# Patient Record
Sex: FEMALE | Race: WHITE | NOT HISPANIC OR LATINO | Employment: OTHER | URBAN - METROPOLITAN AREA
[De-identification: names, ages, dates, MRNs, and addresses within clinical notes are randomized per-mention and may not be internally consistent; named-entity substitution may affect disease eponyms.]

---

## 2018-01-09 PROBLEM — D50.9 IRON DEFICIENCY ANEMIA: Status: ACTIVE | Noted: 2018-01-09

## 2018-01-09 PROBLEM — N18.30 CKD (CHRONIC KIDNEY DISEASE) STAGE 3, GFR 30-59 ML/MIN: Status: ACTIVE | Noted: 2018-01-09

## 2018-01-09 PROBLEM — I10 ESSENTIAL HYPERTENSION: Status: ACTIVE | Noted: 2018-01-09

## 2018-03-16 PROBLEM — E87.20 ACIDOSIS: Status: ACTIVE | Noted: 2018-03-16

## 2018-03-16 PROBLEM — C50.919 BREAST CANCER: Status: ACTIVE | Noted: 2018-03-16

## 2018-03-16 PROBLEM — R80.9 MICROALBUMINURIA: Status: ACTIVE | Noted: 2018-03-16

## 2018-03-16 PROBLEM — R91.8 PULMONARY NODULES: Status: ACTIVE | Noted: 2018-03-16

## 2018-03-16 PROBLEM — E03.9 HYPOTHYROID: Status: ACTIVE | Noted: 2018-03-16

## 2018-03-16 PROBLEM — M85.80 OSTEOPENIA: Status: ACTIVE | Noted: 2018-03-16

## 2018-05-26 PROBLEM — N28.89 RIGHT RENAL MASS: Status: ACTIVE | Noted: 2018-05-26

## 2020-01-29 RX ORDER — LEVOTHYROXINE SODIUM 75 UG/1
75 CAPSULE ORAL DAILY
Qty: 90 CAPSULE | Refills: 0 | Status: SHIPPED | OUTPATIENT
Start: 2020-01-29 | End: 2020-01-30

## 2020-01-29 NOTE — TELEPHONE ENCOUNTER
----- Message from Chiqui Teran sent at 1/29/2020 10:43 AM CST -----  Contact: [place] [phone]  Type:  RX Refill Request    Who Called: Pt Elizabeth    Refill or New Rx: refill    RX Name and Strength: levothyroxine (TIROSINT) 75 mcg Cap    How is the patient currently taking it? (ex. 1XDay): Take 75 mcg by mouth once daily. - Oral    Is this a 30 day or 90 day RX: 90 day supply    Preferred Pharmacy with phone number: [facility]/pharmacy #[id number] - [place], [place] - [address]--[phone]      Local or Mail Order: Local    Ordering Provider: Dr. Masters    Would the patient rather a call back or a response via [facility]? Callback    Best Call Back Number: [phone]    Additional Information:     The pt is requesting a call back when the Rx has been called in

## 2020-01-30 RX ORDER — LEVOTHYROXINE SODIUM 75 UG/1
75 TABLET ORAL
Qty: 90 TABLET | Refills: 0 | Status: SHIPPED | OUTPATIENT
Start: 2020-01-30 | End: 2020-04-20

## 2020-01-30 RX ORDER — LEVOTHYROXINE SODIUM 75 UG/1
75 TABLET ORAL
COMMUNITY
End: 2020-01-30 | Stop reason: SDUPTHER

## 2020-01-30 NOTE — TELEPHONE ENCOUNTER
----- Message from Alex Jones sent at 1/30/2020  9:23 AM CST -----  Contact: [facility]   Pharmacy is calling to clarify an RX.  RX name:  levothyroxine (TIROSINT) 75 mcg Cap  What do they need to clarify:  Change to Tablet patient was taking tablet form Cap is more expensive can it be changed back to tablets please advise  Comments:

## 2020-02-05 NOTE — PROGRESS NOTES
[facility] Note    Chief Complaint      Chief Complaint   Patient presents with    Annual Exam       History of Present Illness      Elizabeth Dickson is a 79 y.o. WF with Breast cancer, Pulmonary nodules, Adjustment disorder, CKD 3,  BLE, HTN, Hypothyroidism, Osteopenia presents to re-establish care with him.  Pt was previously seen at [facility].  Last visit-2/14/2019.    Ovarian cyst-4.3 x 4.3 cm-Fu  by Dr. Chandler. Benign - was d/c from clinic.    Breast CA-invasive ductal carcinoma, right breast s/p lumpectomy.  Followed up by Dr. Solo.  Pt on Arimidex.  Patient followed up by H/O-Dr. Lopes.    Iron deficiency anemia-Pt on iron via prenatal vitamins q.day.    Pulmonary nodules-Fu  by Pulmonary, Dr. Sanchez, annually.    Osteopenia-patient on Prolia and vitamin D3 1000 units/day.    Vitamin-D deficiency-28-patient completed ergocalciferol times 12 weeks and is on vitamin D3 1000 units/day.    CKD III - Fu  by Dr. Goodman. Vit D level 48, at CKD goal. PTH 77, at CKD goal. Has  fu 3/25/2020    Iron Def anemia - Improved.  Pt on iron suppl 65 mg once daily.     Adjustment disorder with mixed anxiety and depression-Pt on melatonin OTC.    Hypothyroidism- Pt on levothyroxine 75 mcg/day.  TSH -     HTN - Pt on Lisinopril 10 mg/day and Toprol-XL 25 mg/day.  Continue low-sodium diet. +Microalbuminuria.     HLD - Pt on Lipitor 10 mg QHS.     Annular erythema - Pt on dapsone 50 mg/d planning to inc to 100 mg/d.  Fu by Dr. Mallory. Not helping per pt.     Low back pain - affects her ability to walk.  Pt on Gabapentin 200 mg QHS per Dr. Mallory.     BLE - Pt on compression stockings    Carotid bruits-no significant stenosis seen on previous carotid ultrasound.    Renal mass -8 mm right kidney.  Followed up by  Dr. Sanchez. Sched for U/S in June.     Pancreatic cyst- Fu by GI, Dr. King.  Patient had EUS-02/21/2019?    Osteopenia - Pt on Vit D3 2000 units/d and Calcium 1200 mg/d.  Pt on Prolia Q 6  mos.    HCM - Flu - 9/12/19;  Tdap - '06?;  PCV 13 - 6/2/17;  PVX 23 - 11/10;  Shingrix - ;  MGM - 11/19;  DEXA - 8/16/19-Osteopenia;  Hep C Screen - ;  C-scope - 1/10/19 - polyp, int hemorrhoids, diverticulosis, repeat 5 yrs;  Gyn/Onc - Dr. Chandler;  Renal - Dr. Goodman;  GI - Dr. King/Darron;  H/O - Dr. Lopes;  Rheum - Dr. Mallory;  Gen Sx - Dr. Solo; Pulm - Dr. Ayala;  ID - Dr. Rollins;  Podiatry - Dr. Ruiz; Ortho - [facility]; Derm - Dr. Angeles    Past Medical History:  Past Medical History:   Diagnosis Date    Anxiety     Bilateral leg edema     Breast cancer     Cellulitis of left leg     CKD (chronic kidney disease), stage III     Hypertension     Hypothyroidism     Iron deficiency anemia     Osteopenia     Ovarian cyst     Pancreatic cyst     Pulmonary nodules     Renal mass     Vitamin D deficiency        Past Surgical History:   has a past surgical history that includes Breast surgery; Removal of Ovaries; and debridement.    Family History:  family history includes COPD in her brother; Cholelithiasis in her brother; Diabetes in her father; Diabetes type I in her other; Heart disease in her brother and brother; Hypertension in her father and mother; Lung cancer in her sister.     Social History:  Social History     Tobacco Use    Smoking status: Never Smoker   Substance Use Topics    Alcohol use: No    Drug use: Never       Review of Systems   Constitutional: Negative for chills, diaphoresis and fever.   HENT: Negative for congestion, hearing loss and tinnitus.    Eyes: Negative for blurred vision and double vision.   Respiratory: Negative for cough and shortness of breath.    Cardiovascular: Negative for chest pain and palpitations.   Gastrointestinal: Negative for abdominal pain, blood in stool, constipation, diarrhea, heartburn, melena, nausea and vomiting.   Genitourinary: Negative for dysuria and frequency.   Musculoskeletal: Positive for neck pain. Negative for  joint pain and myalgias.   Skin: Positive for rash. Negative for itching.        Left posterior thigh - pt tx with Dapsone at present.  Not helping.  Rheum inc dose.  Pt plans to see Derm.   Neurological: Negative for dizziness and headaches.   Endo/Heme/Allergies: Does not bruise/bleed easily.   Psychiatric/Behavioral: Negative for depression. The patient is not nervous/anxious.         Medications:  Outpatient Encounter Medications as of 2/6/2020   Medication Sig Note Dispense Refill    anastrozole (ARIMIDEX) 1 mg Tab Take 1 mg by mouth once daily.       atorvastatin (LIPITOR) 10 MG tablet Take 1 tablet (10 mg total) by mouth once daily.  90 tablet 0    calcium carbonate 1250 MG capsule Take by mouth 2 (two) times daily with meals.       cholecalciferol, vitamin D3, 2,000 unit Tab Take 2,000 Units by mouth once daily.       dapsone 25 MG Tab Take 50 mg by mouth once daily.       gabapentin (NEURONTIN) 100 MG capsule 2 po QHS  180 capsule 0    levothyroxine (SYNTHROID) 75 MCG tablet Take 1 tablet (75 mcg total) by mouth before breakfast.  90 tablet 0    lisinopril 10 MG tablet Take 1 tablet (10 mg total) by mouth once daily.  90 tablet 1    melatonin 10 mg Tab Take 10 mg by mouth.       metoprolol succinate (TOPROL-XL) 25 MG 24 hr tablet Take 1 tablet (25 mg total) by mouth once daily.  90 tablet 1    [DISCONTINUED] atorvastatin (LIPITOR) 10 MG tablet 10 mg once daily.       [DISCONTINUED] gabapentin (NEURONTIN) 100 MG capsule 100 mg every evening.       [DISCONTINUED] lisinopril 10 MG tablet Take 10 mg by mouth once daily.       [DISCONTINUED] metoprolol succinate (TOPROL-XL) 25 MG 24 hr tablet Take 25 mg by mouth once daily.        [DISCONTINUED] multivit-min-iron-vitamin K (ADULTS' DAILY FORMULA) 18 mg iron-25 mcg Tab Take 25 Units by mouth.       [DISCONTINUED] omeprazole (PRILOSEC) 20 MG capsule 20 mg once daily. 2/6/2020: pt denies taking      denosumab (PROLIA) 60 mg/mL Syrg Inject 1 mL (60  mg total) into the skin every 6 (six) months.       [DISCONTINUED] amoxicillin (AMOXIL) 500 MG capsule 2 tabs in AM & 2 tabs in pm       [DISCONTINUED] clarithromycin (BIAXIN) 500 MG tablet 1 tan in am and 1 tab in pm       [DISCONTINUED] ketoconazole (NIZORAL) 2 % cream Apply topically once daily. for 14 days (Patient not taking: Reported on 2/6/2020)  1 Tube 0    [DISCONTINUED] metoprolol tartrate (LOPRESSOR) 25 MG tablet Take 25 mg by mouth once daily. 1/2 tablet by mouth twice daily        No facility-administered encounter medications on file as of 2/6/2020.        Current Outpatient Medications:     anastrozole (ARIMIDEX) 1 mg Tab, Take 1 mg by mouth once daily., Disp: , Rfl:     atorvastatin (LIPITOR) 10 MG tablet, Take 1 tablet (10 mg total) by mouth once daily., Disp: 90 tablet, Rfl: 0    calcium carbonate 1250 MG capsule, Take by mouth 2 (two) times daily with meals., Disp: , Rfl:     cholecalciferol, vitamin D3, 2,000 unit Tab, Take 2,000 Units by mouth once daily., Disp: , Rfl:     dapsone 25 MG Tab, Take 50 mg by mouth once daily., Disp: , Rfl:     gabapentin (NEURONTIN) 100 MG capsule, 2 po QHS, Disp: 180 capsule, Rfl: 0    levothyroxine (SYNTHROID) 75 MCG tablet, Take 1 tablet (75 mcg total) by mouth before breakfast., Disp: 90 tablet, Rfl: 0    lisinopril 10 MG tablet, Take 1 tablet (10 mg total) by mouth once daily., Disp: 90 tablet, Rfl: 1    melatonin 10 mg Tab, Take 10 mg by mouth., Disp: , Rfl:     metoprolol succinate (TOPROL-XL) 25 MG 24 hr tablet, Take 1 tablet (25 mg total) by mouth once daily., Disp: 90 tablet, Rfl: 1    denosumab (PROLIA) 60 mg/mL Syrg, Inject 1 mL (60 mg total) into the skin every 6 (six) months., Disp: , Rfl:     Allergies:  Review of patient's allergies indicates:  No Known Allergies    Health Maintenance:  Immunization History   Administered Date(s) Administered    Pneumococcal Conjugate - 13 Valent 06/02/2017    Pneumococcal Polysaccharide - 23 Valent  "11/01/2010      Health Maintenance   Topic Date Due    Lipid Panel  1940    TETANUS VACCINE  05/29/1959    DEXA SCAN  08/16/2022    Pneumococcal Vaccine (65+ High/Highest Risk)  Completed      Objective:      Vital Signs  Temp: 98 °F (36.7 °C)  Temp src: Oral  Pulse: 70  Resp: 18  SpO2: 95 %  BP: 132/70  BP Location: Left arm  Patient Position: Sitting  Pain Score: 0-No pain  Height and Weight  Height: 5' 2" (157.5 cm)  Weight: 67.9 kg (149 lb 11.1 oz)  BSA (Calculated - sq m): 1.72 sq meters  BMI (Calculated): 27.4  Weight in (lb) to have BMI = 25: 136.4]    Laboratory:  CBC:  Recent Labs   Lab 03/11/19  0000 08/13/19  1115 11/13/19  1014   WBC 5.0 5.4 4.6   RBC 4.03 4.31 3.97 L   Hemoglobin 11.3 L 12.6 11.7 L   Hematocrit 35.9 37.2 34.7 L   Platelets 183 174 171   Mean Corpuscular Volume 89.1 86.2 87.3   Mean Corpuscular Hemoglobin 28.0 29.1 29.5   Mean Corpuscular Hemoglobin Conc 31.5 L 33.8 33.8       CMP:  Recent Labs   Lab 03/11/19  0000  08/13/19  1115  11/13/19  1014   Glucose 91  --  87   < > 98   Calcium 8.4 L   < > 9.7   < > 9.1   Albumin 3.8  --   --   --   --    ALBUMIN  --   --  4.3  --  4.0   Total Protein 5.9 L  --  6.4  --  6.0 L   Sodium 137  --  141   < > 135   Potassium 4.9  --  5.5 H   < > 4.7   CO2 25  --  23   < > 24   Chloride 106  --  107   < > 100   BUN, Bld 22  --   --   --   --    BUN  --   --  44 H   < > 29 H   Creatinine 1.02 H  --  1.5 H   < > 1.2 H   eGFR if  61  --   --   --   --    eGFR if non  53 L  --   --   --   --    Alkaline Phosphatase 56  --  57  --  57   ALT 16  --  16  --  15   AST 19  --  23  --  22   Total Bilirubin 0.3  --  0.3  --  0.3    < > = values in this interval not displayed.       URINALYSIS:  Recent Labs   Lab 05/07/18  1331  03/11/19  0000  11/13/19  1014   Color, UA Yellow   < > YELLOW   < > YELLOW   Specific Gravity, UA 1.005   < > 1.004   < > 1.005   pH, UA 6.0   < > 5.5   < > < OR = 5.0   Protein, UA Negative   < > " NEGATIVE   < > NEGATIVE   Glucose, UA  --    < > NEGATIVE   < > NEGATIVE   Bilirubin (UA) Negative  --   --   --   --    Bacteria, UA None Seen   < > NONE SEEN  --   --     < > = values in this interval not displayed.      Recent Labs   Lab 05/07/18  1331 09/12/18  1036 03/11/19  0000  11/13/19  1014   Nitrite, UA Negative NEGATIVE NEGATIVE   < > NEGATIVE   Leukocytes, UA Negative 1+ A TRACE A   < > NEGATIVE   Hyaline Casts, UA None Seen NONE SEEN NONE SEEN  --   --     < > = values in this interval not displayed.      Other:   Lab Results   Component Value Date    FERRITIN 88.0 01/03/2018    IRON 64 01/03/2018    TIBC 290 01/03/2018     Lab Results   Component Value Date    HEPCAB Negative 02/07/2018       Physical Exam   Constitutional: She is oriented to person, place, and time. She appears well-developed and well-nourished. No distress.   HENT:   Head: Normocephalic and atraumatic.   Right Ear: External ear normal.   Left Ear: External ear normal.   Mouth/Throat: Oropharynx is clear and moist.   tariq hearing aids   Eyes: Pupils are equal, round, and reactive to light. Conjunctivae and EOM are normal.   Neck: Normal range of motion. Neck supple. No thyromegaly present.   Cardiovascular: Normal rate, regular rhythm, normal heart sounds and intact distal pulses.   No murmur heard.  Pulmonary/Chest: Effort normal and breath sounds normal. No respiratory distress.   Abdominal: Soft. Bowel sounds are normal. She exhibits no distension.   Musculoskeletal:   Tariq knee - Valgus deformity;  Atrophy to RT hand   Neurological: She is alert and oriented to person, place, and time.   Skin: Skin is warm and dry. She is not diaphoretic.   Erythematous rash with slight scale to Left forehead above eye and to Left post thigh and Left forearm   Psychiatric: She has a normal mood and affect.   Nursing note and vitals reviewed.          Assessment:       1. Osteopenia, unspecified location    2. Chronic right-sided low back pain  without sciatica    3. Hypertension, unspecified type    4. CKD (chronic kidney disease), stage III    5. Hypothyroidism, unspecified type    6. Renal mass    7. Pancreatic cyst    8. Hyperlipidemia, unspecified hyperlipidemia type        Elizabeth Dickson is a 79 y.o.female with:    1. Osteopenia, unspecified location  - denosumab (PROLIA) 60 mg/mL Syrg; Inject 1 mL (60 mg total) into the skin every 6 (six) months.  -Pt on Prolia, Calcium, and Vit d.     2. Chronic right-sided low back pain without sciatica  - gabapentin (NEURONTIN) 100 MG capsule; 2 po QHS  Dispense: 180 capsule; Refill: 0  -Cont current regimen. Pt sees a chiropractor.  She had been rx an unk pain med per Ortho which she did not tolerate.     3. Hypertension, unspecified type  - lisinopril 10 MG tablet; Take 1 tablet (10 mg total) by mouth once daily.  Dispense: 90 tablet; Refill: 1  - metoprolol succinate (TOPROL-XL) 25 MG 24 hr tablet; Take 1 tablet (25 mg total) by mouth once daily.  Dispense: 90 tablet; Refill: 1  -Controlled.  Cont current regimen.     4. CKD (chronic kidney disease), stage III  -Stable.  Fu by Dr. Goodman.      5. Hypothyroidism, unspecified type  - TSH; Future  - T4, free; Future  -Cont current repeat TFT's.     6. Renal mass  -Fu by Dr. Daniel HERRERA.     7. Pancreatic cyst  -Fu by GI.     8. Hyperlipidemia, unspecified hyperlipidemia type  - atorvastatin (LIPITOR) 10 MG tablet; Take 1 tablet (10 mg total) by mouth once daily.  Dispense: 90 tablet; Refill: 0  -Cont current regimen. Will see whjimmie last FLP was done.        Chronic conditions status updated as per HPI.  Other than changes above, cont current medications and maintain follow up with specialists.  Return to clinic in 4 months.    Porsha Masters MD  [facility]

## 2020-02-06 ENCOUNTER — OFFICE VISIT (OUTPATIENT)
Dept: PRIMARY CARE CLINIC | Facility: CLINIC | Age: 80
End: 2020-02-06
Payer: MEDICARE

## 2020-02-06 ENCOUNTER — LAB VISIT (OUTPATIENT)
Dept: LAB | Facility: HOSPITAL | Age: 80
End: 2020-02-06
Attending: INTERNAL MEDICINE
Payer: MEDICARE

## 2020-02-06 VITALS
HEART RATE: 70 BPM | RESPIRATION RATE: 18 BRPM | WEIGHT: 149.69 LBS | BODY MASS INDEX: 27.55 KG/M2 | HEIGHT: 62 IN | OXYGEN SATURATION: 95 % | TEMPERATURE: 98 F | SYSTOLIC BLOOD PRESSURE: 132 MMHG | DIASTOLIC BLOOD PRESSURE: 70 MMHG

## 2020-02-06 DIAGNOSIS — N28.89 RENAL MASS: ICD-10-CM

## 2020-02-06 DIAGNOSIS — I10 HYPERTENSION, UNSPECIFIED TYPE: ICD-10-CM

## 2020-02-06 DIAGNOSIS — N18.30 CKD (CHRONIC KIDNEY DISEASE), STAGE III: ICD-10-CM

## 2020-02-06 DIAGNOSIS — M85.80 OSTEOPENIA, UNSPECIFIED LOCATION: Primary | ICD-10-CM

## 2020-02-06 DIAGNOSIS — E78.5 HYPERLIPIDEMIA, UNSPECIFIED HYPERLIPIDEMIA TYPE: ICD-10-CM

## 2020-02-06 DIAGNOSIS — G89.29 CHRONIC RIGHT-SIDED LOW BACK PAIN WITHOUT SCIATICA: ICD-10-CM

## 2020-02-06 DIAGNOSIS — E03.9 HYPOTHYROIDISM, UNSPECIFIED TYPE: ICD-10-CM

## 2020-02-06 DIAGNOSIS — M54.50 CHRONIC RIGHT-SIDED LOW BACK PAIN WITHOUT SCIATICA: ICD-10-CM

## 2020-02-06 DIAGNOSIS — K86.2 PANCREATIC CYST: ICD-10-CM

## 2020-02-06 LAB
T4 FREE SERPL-MCNC: 1.2 NG/DL (ref 0.71–1.51)
TSH SERPL DL<=0.005 MIU/L-ACNC: 0.89 UIU/ML (ref 0.4–4)

## 2020-02-06 PROCEDURE — 99999 PR PBB SHADOW E&M-EST. PATIENT-LVL III: ICD-10-PCS | Mod: PBBFAC,,, | Performed by: INTERNAL MEDICINE

## 2020-02-06 PROCEDURE — 3078F DIAST BP <80 MM HG: CPT | Mod: CPTII,S$GLB,, | Performed by: INTERNAL MEDICINE

## 2020-02-06 PROCEDURE — 1159F PR MEDICATION LIST DOCUMENTED IN MEDICAL RECORD: ICD-10-PCS | Mod: S$GLB,,, | Performed by: INTERNAL MEDICINE

## 2020-02-06 PROCEDURE — 99999 PR PBB SHADOW E&M-EST. PATIENT-LVL III: CPT | Mod: PBBFAC,,, | Performed by: INTERNAL MEDICINE

## 2020-02-06 PROCEDURE — 84443 ASSAY THYROID STIM HORMONE: CPT

## 2020-02-06 PROCEDURE — 99214 OFFICE O/P EST MOD 30 MIN: CPT | Mod: S$GLB,,, | Performed by: INTERNAL MEDICINE

## 2020-02-06 PROCEDURE — 3078F PR MOST RECENT DIASTOLIC BLOOD PRESSURE < 80 MM HG: ICD-10-PCS | Mod: CPTII,S$GLB,, | Performed by: INTERNAL MEDICINE

## 2020-02-06 PROCEDURE — 36415 COLL VENOUS BLD VENIPUNCTURE: CPT | Mod: PN

## 2020-02-06 PROCEDURE — 1159F MED LIST DOCD IN RCRD: CPT | Mod: S$GLB,,, | Performed by: INTERNAL MEDICINE

## 2020-02-06 PROCEDURE — 1126F AMNT PAIN NOTED NONE PRSNT: CPT | Mod: S$GLB,,, | Performed by: INTERNAL MEDICINE

## 2020-02-06 PROCEDURE — 99214 PR OFFICE/OUTPT VISIT, EST, LEVL IV, 30-39 MIN: ICD-10-PCS | Mod: S$GLB,,, | Performed by: INTERNAL MEDICINE

## 2020-02-06 PROCEDURE — 1101F PT FALLS ASSESS-DOCD LE1/YR: CPT | Mod: CPTII,S$GLB,, | Performed by: INTERNAL MEDICINE

## 2020-02-06 PROCEDURE — 1101F PR PT FALLS ASSESS DOC 0-1 FALLS W/OUT INJ PAST YR: ICD-10-PCS | Mod: CPTII,S$GLB,, | Performed by: INTERNAL MEDICINE

## 2020-02-06 PROCEDURE — 3075F PR MOST RECENT SYSTOLIC BLOOD PRESS GE 130-139MM HG: ICD-10-PCS | Mod: CPTII,S$GLB,, | Performed by: INTERNAL MEDICINE

## 2020-02-06 PROCEDURE — 84439 ASSAY OF FREE THYROXINE: CPT

## 2020-02-06 PROCEDURE — 1126F PR PAIN SEVERITY QUANTIFIED, NO PAIN PRESENT: ICD-10-PCS | Mod: S$GLB,,, | Performed by: INTERNAL MEDICINE

## 2020-02-06 PROCEDURE — 3075F SYST BP GE 130 - 139MM HG: CPT | Mod: CPTII,S$GLB,, | Performed by: INTERNAL MEDICINE

## 2020-02-06 RX ORDER — ATORVASTATIN CALCIUM 10 MG/1
10 TABLET, FILM COATED ORAL DAILY
Qty: 90 TABLET | Refills: 0 | Status: SHIPPED | OUTPATIENT
Start: 2020-02-06 | End: 2020-05-31

## 2020-02-06 RX ORDER — METOPROLOL SUCCINATE 25 MG/1
25 TABLET, EXTENDED RELEASE ORAL DAILY
Qty: 90 TABLET | Refills: 1 | Status: SHIPPED | OUTPATIENT
Start: 2020-02-06 | End: 2020-08-19

## 2020-02-06 RX ORDER — DAPSONE 25 MG/1
50 TABLET ORAL DAILY
COMMUNITY
Start: 2019-11-25 | End: 2020-04-30

## 2020-02-06 RX ORDER — METOPROLOL SUCCINATE 25 MG/1
25 TABLET, EXTENDED RELEASE ORAL DAILY
COMMUNITY
Start: 2020-01-31 | End: 2020-02-06 | Stop reason: SDUPTHER

## 2020-02-06 RX ORDER — LISINOPRIL 10 MG/1
10 TABLET ORAL DAILY
Qty: 90 TABLET | Refills: 1 | Status: SHIPPED | OUTPATIENT
Start: 2020-02-06 | End: 2020-04-30

## 2020-02-06 RX ORDER — GABAPENTIN 100 MG/1
CAPSULE ORAL
Qty: 180 CAPSULE | Refills: 0
Start: 2020-02-06 | End: 2020-04-30 | Stop reason: SDUPTHER

## 2020-02-28 ENCOUNTER — TELEPHONE (OUTPATIENT)
Dept: PRIMARY CARE CLINIC | Facility: CLINIC | Age: 80
End: 2020-02-28

## 2020-02-28 NOTE — TELEPHONE ENCOUNTER
----- Message from Porsha Masters MD sent at 2/28/2020  4:05 PM CST -----  Please inform pt labs her TFT's were wnl.    Dr. JACOBS

## 2020-03-18 ENCOUNTER — TELEPHONE (OUTPATIENT)
Dept: PRIMARY CARE CLINIC | Facility: CLINIC | Age: 80
End: 2020-03-18

## 2020-03-18 DIAGNOSIS — I10 HYPERTENSION, UNSPECIFIED TYPE: Primary | ICD-10-CM

## 2020-03-18 DIAGNOSIS — N18.30 CKD (CHRONIC KIDNEY DISEASE), STAGE III: ICD-10-CM

## 2020-03-18 NOTE — TELEPHONE ENCOUNTER
----- Message from Elida Wellington sent at 3/18/2020 10:56 AM CDT -----  Contact: Self   Pt is requesting a call from nurse regarding message. Please call and advise.

## 2020-03-18 NOTE — TELEPHONE ENCOUNTER
Pt scheduled a same day appt with  this evening. B/c her morning bp was 188/87 and 182/82 and some dizziness. Pt checks her bp everyday and reports all week she has been around 124-126/52-56. She reports having vertigo in the past but is concerned about her bp. She has no other symptoms. She had a skin bx yesterday by derm and is not in any kind of pain.

## 2020-03-18 NOTE — TELEPHONE ENCOUNTER
Pt bp after taking second Lisinopril was 150/63 pulse 56. She will start taking 2 Lisinopril 10mg daily and keep a log. She is aware to hold second dose if bp<110/60. She will call me Friday evening with more bp readings. Pt would like her bmp done at [facility]. I will make her orders to her.

## 2020-03-18 NOTE — TELEPHONE ENCOUNTER
Will have her inc Lisinopril to 10 mg 2 tabs daily (she can take her 2nd dose now).  She should hold 2nd dose if BP <110/60. She should call with her BP log and let me know if Sx's worsen. Will need a BMP in 2 wks.  Call back in 45 min to reassess BP.    Dr. JACOBS

## 2020-03-20 ENCOUNTER — TELEPHONE (OUTPATIENT)
Dept: PRIMARY CARE CLINIC | Facility: CLINIC | Age: 80
End: 2020-03-20

## 2020-03-20 DIAGNOSIS — I10 HYPERTENSION, UNSPECIFIED TYPE: Primary | ICD-10-CM

## 2020-03-20 RX ORDER — AMLODIPINE BESYLATE 2.5 MG/1
2.5 TABLET ORAL DAILY
Qty: 30 TABLET | Refills: 0 | Status: SHIPPED | OUTPATIENT
Start: 2020-03-20 | End: 2020-04-17

## 2020-03-20 NOTE — TELEPHONE ENCOUNTER
----- Message from Paxton Dumont sent at 3/20/2020  1:02 PM CDT -----  Contact: Self [phone]  Patient will like to inform the Tawnya that her B/P is calming down standing up reading is 129/62-64, please advise.

## 2020-03-20 NOTE — TELEPHONE ENCOUNTER
----- Message from Chrissy Rendon sent at 3/20/2020 12:00 PM CDT -----  Contact: self [phone]  Blood pressure readings:  Today's 162/64 before medicine after medicine 178/77, she thinks it could be the machine, and or stress related, please call her back    thanks

## 2020-03-20 NOTE — TELEPHONE ENCOUNTER
Will add amlodipine 2.5 mg/d. Please inform pt. She should alert me for any issues and that a potential S.E . of this med could be swelling.     Dr. JACOBS

## 2020-03-20 NOTE — TELEPHONE ENCOUNTER
Bp readings:    Weds- 1pm 140/57 P 64,133/55 P 61 5pm, 127/57 P 70 8:15p, 132/56 P 55 10pm    Thurs: 146/57 P 60 7:25a, 140/66 P60 10am, 127/54 P 60 12p, 124/53 P 62 10pm    Today: 162/64 P59 7:30a, 178/77 P61 9:45a, standing up 157/73 P 61 12:15    Pt on lisinopril 10mg 2Qd and Metoprolol succinate 25mg QD. Please advise

## 2020-04-02 LAB
ANION GAP SERPL CALC-SCNC: 16 MEQ/L (ref 9–18)
BUN BLD-MCNC: 41 MG/DL (ref 7–21)
BUN/CREAT SERPL: 29 RATIO (ref 6–22)
CALC OSMOLALITY: 279 MOSM/KG (ref 275–295)
CALCIUM SERPL-MCNC: 9.3 MG/DL (ref 8.5–10.3)
CHLORIDE SERPL-SCNC: 101 MEQ/L (ref 98–107)
CO2 SERPL-SCNC: 23 MEQ/L (ref 21–31)
CREAT SERPL-MCNC: 1.4 MG/DL (ref 0.5–1)
GFR: 34.9 ML/MIN/1.73M2
GLUCOSE SERPL-MCNC: 85 MG/DL (ref 70–100)
POTASSIUM SERPL-SCNC: 5.2 MEQ/L (ref 3.5–5)
SODIUM BLD-SCNC: 135 MEQ/L (ref 135–145)

## 2020-04-03 NOTE — TELEPHONE ENCOUNTER
I reviewed labs with pt - Renal functions -35% - stable.  Potassium 5.2 - cont  Low potassium diet.  I rec she inc her hydration.  She is fu by Renal.     Dr. JACOBS

## 2020-04-17 DIAGNOSIS — I10 HYPERTENSION, UNSPECIFIED TYPE: ICD-10-CM

## 2020-04-17 RX ORDER — AMLODIPINE BESYLATE 2.5 MG/1
TABLET ORAL
Qty: 90 TABLET | Refills: 0 | Status: SHIPPED | OUTPATIENT
Start: 2020-04-17 | End: 2020-07-12

## 2020-04-20 RX ORDER — LEVOTHYROXINE SODIUM 75 UG/1
TABLET ORAL
Qty: 90 TABLET | Refills: 0 | Status: SHIPPED | OUTPATIENT
Start: 2020-04-20 | End: 2020-07-13

## 2020-04-29 ENCOUNTER — TELEPHONE (OUTPATIENT)
Dept: PRIMARY CARE CLINIC | Facility: CLINIC | Age: 80
End: 2020-04-29

## 2020-04-29 NOTE — PROGRESS NOTES
"Established Patient - Audio Only Telehealth Visit     The patient location is: "home"  The chief complaint leading to consultation is: "Fu ER fu"  Visit type: Virtual visit with audio only (telephone)     The reason for the audio only service rather than synchronous audio and video virtual visit was related to technical difficulties or patient preference/necessity.     Each patient to whom I provide medical services by telemedicine is:  (1) informed of the relationship between the physician and patient and the respective role of any other health care provider with respect to management of the patient; and (2) notified that they may decline to receive medical services by telemedicine and may withdraw from such care at any time. Patient verbally consented to receive this service via voice-only telephone call.       HPI: 80 yo WF with Breast cancer, Pulmonary nodules, Adjustment disorder, CKD III,  BLE, HTN, Hypothyroidism, Osteopenia presents to fu hospitalization for syncopal episode ?Seizure, Hyperkalemia, Dehydration, with UTI.  Last visit-2/6/20.    Syncope - Pt reports she had a CT Head and Cardiac davis that was neg. I await records from hospital d/c.    UTI - Pt is on Cefdinir BID.     Hyperkalemia - Lisinopril was d/c in hospital.      Ovarian cyst-4.3 x 4.3 cm-Fu  by Dr. Chandler. Benign - was d/c from clinic.     Breast CA-invasive ductal carcinoma, right breast s/p lumpectomy.  Followed up by Dr. Solo.  Pt on Arimidex.  Pt followed up by H/O-Dr. Grande     Iron deficiency anemia-Pt on iron via prenatal vitamins q day.     Pulmonary nodules-Fu by Pulmonary, Dr. Johnson, annually.     Osteopenia-patient on Prolia and vitamin D3 1000 units/day.     Vitamin-D [id number] completed ergocalciferol times 12 weeks and is on vitamin D3 1000 units/day.     CKD III - Fu  by Dr. Goodman. Vit D level 48, at CKD goal. PTH 77, at CKD goal. Has  fu 3/25/2020     Iron Def anemia - Improved.  Pt on iron suppl 65 mg " once daily.      Adjustment disorder with mixed anxiety and depression-Pt on melatonin OTC.     Hypothyroidism- Pt on levothyroxine 75 mcg/day.  Controlled.       HTN - Pt off Lisinopril 10 mg 2 tabs/day.  Pt is on amlodipine 2.5 mg/d, and Toprol-XL 25 mg QHS.  Continue low-sodium diet. +Microalbuminuria. Her BP is 125/64 today.  She denies any dizziness.       HLD - Pt on Lipitor 10 mg QHS.      Dermatitis - Pt had a repeat Bx. Pt off dapsone.  Pt on a topical Triamcinolone Acetonide cream 0.1% BID.  Fu by Derm, Dr. Sloan.      Low back pain - affects her ability to walk.  Pt on Gabapentin 100 mg QHS per Dr. Mallory.      BLE - Pt on compression stockings     Carotid bruits-no significant stenosis seen on previous carotid ultrasound.     Renal mass -8 mm right kidney.  Followed up by  Dr. Sanchez. Sched for imaging in June.      Pancreatic cyst- Fu by GI, Dr. King.  Pt had EUS-02/21/2019?      Osteopenia - Pt on Vit D3 2000 units/d and Calcium 1200 mg/d.  Pt on Prolia Q 6 mos.     HCM - Flu - 9/12/19;  Tdap - '06?;  PCV 13 - 6/2/17;  PVX 23 - 11/10;  Shingrix - ;  MGM - 11/19;  DEXA - 8/16/19-Osteopenia;  Hep C Screen - ;  C-scope - 1/10/19 - polyp, int hemorrhoids, diverticulosis, repeat 5 yrs;  Gyn/Onc - Dr. Chandler;  Renal - Dr. Goodman;  GI - Dr. King/Darron;  H/O - Dr. Lopes;  Rheum - Dr. Mallory;  Gen Sx - Dr. Solo; Pulm - Dr. Ayala;  ID - Dr. Rollins;  Podiatry - Dr. Ruiz; Ortho - [facility]; Derm - Dr. Angeles; Derm - Dr. Sloan       Assessment and plan:      1. Hypertension, unspecified type - Controlled. Cont current.  Added Lisinopril to allergy list.     2. Acute cystitis without hematuria - Pt on Cefdinir.       3. Hyperkalemia - Stopped Lisinopril.     4. CKD (chronic kidney disease), stage III - Will repeat BMP.  Avoid NSAIDS.     5. Renal mass - Pt sched for imaging in June/July.     6. Hyperlipidemia, unspecified hyperlipidemia type - Cont Atorvastatin.     7. Dermatitis -  Pt dx with Dermatitis per recent Bx.      8. Chronic right-sided low back pain without sciatica - Cont Gabapentin 100 mg QHS.         Porsha Masters M.D.  Section of Internal Medicine/Primary Care                          This service was not originating from a related E/M service provided within the previous 7 days nor will  to an E/M service or procedure within the next 24 hours or my soonest available appointment.  Prevailing standard of care was able to be met in this audio-only visit.

## 2020-04-29 NOTE — TELEPHONE ENCOUNTER
----- Message from Paxton Dumont sent at 4/29/2020 12:20 PM CDT -----  Contact: Self [phone]  Patient is returning a phone call.  Who left a message for the patient: Dr. Masters   Does patient know what this is regarding:  Medications review  Comments:

## 2020-04-30 ENCOUNTER — OFFICE VISIT (OUTPATIENT)
Dept: PRIMARY CARE CLINIC | Facility: CLINIC | Age: 80
End: 2020-04-30
Payer: MEDICARE

## 2020-04-30 DIAGNOSIS — E78.5 HYPERLIPIDEMIA, UNSPECIFIED HYPERLIPIDEMIA TYPE: ICD-10-CM

## 2020-04-30 DIAGNOSIS — L30.9 DERMATITIS: ICD-10-CM

## 2020-04-30 DIAGNOSIS — G89.29 CHRONIC RIGHT-SIDED LOW BACK PAIN WITHOUT SCIATICA: ICD-10-CM

## 2020-04-30 DIAGNOSIS — N28.89 RENAL MASS: ICD-10-CM

## 2020-04-30 DIAGNOSIS — N18.30 CKD (CHRONIC KIDNEY DISEASE), STAGE III: ICD-10-CM

## 2020-04-30 DIAGNOSIS — E87.5 HYPERKALEMIA: ICD-10-CM

## 2020-04-30 DIAGNOSIS — M54.50 CHRONIC RIGHT-SIDED LOW BACK PAIN WITHOUT SCIATICA: ICD-10-CM

## 2020-04-30 DIAGNOSIS — N30.00 ACUTE CYSTITIS WITHOUT HEMATURIA: ICD-10-CM

## 2020-04-30 DIAGNOSIS — I10 HYPERTENSION, UNSPECIFIED TYPE: Primary | ICD-10-CM

## 2020-04-30 PROCEDURE — 99441 PR PHYSICIAN TELEPHONE EVALUATION 5-10 MIN: ICD-10-PCS | Mod: 95,,, | Performed by: INTERNAL MEDICINE

## 2020-04-30 PROCEDURE — 99441 PR PHYSICIAN TELEPHONE EVALUATION 5-10 MIN: CPT | Mod: 95,,, | Performed by: INTERNAL MEDICINE

## 2020-04-30 RX ORDER — TRIAMCINOLONE ACETONIDE 1 MG/G
CREAM TOPICAL 2 TIMES DAILY
Qty: 80 G | Refills: 0
Start: 2020-04-30 | End: 2021-04-01

## 2020-04-30 RX ORDER — GABAPENTIN 100 MG/1
CAPSULE ORAL
Qty: 90 CAPSULE | Refills: 0
Start: 2020-04-30 | End: 2021-02-15 | Stop reason: SDUPTHER

## 2020-05-20 ENCOUNTER — LAB VISIT (OUTPATIENT)
Dept: LAB | Facility: HOSPITAL | Age: 80
End: 2020-05-20
Attending: INTERNAL MEDICINE
Payer: MEDICARE

## 2020-05-20 ENCOUNTER — TELEPHONE (OUTPATIENT)
Dept: PRIMARY CARE CLINIC | Facility: CLINIC | Age: 80
End: 2020-05-20

## 2020-05-20 DIAGNOSIS — I10 HYPERTENSION, UNSPECIFIED TYPE: ICD-10-CM

## 2020-05-20 DIAGNOSIS — E78.5 HYPERLIPIDEMIA, UNSPECIFIED HYPERLIPIDEMIA TYPE: ICD-10-CM

## 2020-05-20 DIAGNOSIS — N18.30 CKD (CHRONIC KIDNEY DISEASE), STAGE III: ICD-10-CM

## 2020-05-20 DIAGNOSIS — E87.5 HYPERKALEMIA: ICD-10-CM

## 2020-05-20 LAB
ANION GAP SERPL CALC-SCNC: 8 MMOL/L (ref 8–16)
BUN SERPL-MCNC: 35 MG/DL (ref 8–23)
CALCIUM SERPL-MCNC: 9.3 MG/DL (ref 8.7–10.5)
CHLORIDE SERPL-SCNC: 110 MMOL/L (ref 95–110)
CHOLEST SERPL-MCNC: 161 MG/DL (ref 120–199)
CHOLEST/HDLC SERPL: 2.4 {RATIO} (ref 2–5)
CO2 SERPL-SCNC: 24 MMOL/L (ref 23–29)
CREAT SERPL-MCNC: 1.3 MG/DL (ref 0.5–1.4)
EST. GFR  (AFRICAN AMERICAN): 45.4 ML/MIN/1.73 M^2
EST. GFR  (NON AFRICAN AMERICAN): 39.4 ML/MIN/1.73 M^2
GLUCOSE SERPL-MCNC: 93 MG/DL (ref 70–110)
HDLC SERPL-MCNC: 66 MG/DL (ref 40–75)
HDLC SERPL: 41 % (ref 20–50)
LDLC SERPL CALC-MCNC: 84.8 MG/DL (ref 63–159)
NONHDLC SERPL-MCNC: 95 MG/DL
POTASSIUM SERPL-SCNC: 4 MMOL/L (ref 3.5–5.1)
SODIUM SERPL-SCNC: 142 MMOL/L (ref 136–145)
TRIGL SERPL-MCNC: 51 MG/DL (ref 30–150)

## 2020-05-20 PROCEDURE — 80061 LIPID PANEL: CPT

## 2020-05-20 PROCEDURE — 80048 BASIC METABOLIC PNL TOTAL CA: CPT

## 2020-05-20 PROCEDURE — 36415 COLL VENOUS BLD VENIPUNCTURE: CPT | Mod: PO

## 2020-05-30 DIAGNOSIS — E78.5 HYPERLIPIDEMIA, UNSPECIFIED HYPERLIPIDEMIA TYPE: ICD-10-CM

## 2020-05-31 RX ORDER — ATORVASTATIN CALCIUM 10 MG/1
TABLET, FILM COATED ORAL
Qty: 90 TABLET | Refills: 3 | Status: SHIPPED | OUTPATIENT
Start: 2020-05-31 | End: 2021-01-14

## 2020-06-09 ENCOUNTER — TELEPHONE (OUTPATIENT)
Dept: PRIMARY CARE CLINIC | Facility: CLINIC | Age: 80
End: 2020-06-09

## 2020-06-09 RX ORDER — TRIAMCINOLONE ACETONIDE 1 MG/G
OINTMENT TOPICAL
COMMUNITY
Start: 2020-05-18 | End: 2021-01-14

## 2020-06-09 RX ORDER — LEVOTHYROXINE SODIUM 75 UG/1
TABLET ORAL
COMMUNITY
Start: 2020-04-20 | End: 2020-10-12

## 2020-06-09 RX ORDER — TRIAMCINOLONE ACETONIDE 1 MG/G
CREAM TOPICAL
COMMUNITY
Start: 2020-03-24 | End: 2021-01-14

## 2020-06-09 RX ORDER — KETOCONAZOLE 20 MG/G
1 CREAM TOPICAL 2 TIMES DAILY
COMMUNITY
Start: 2020-03-02 | End: 2021-01-14

## 2020-06-09 RX ORDER — CEFDINIR 300 MG/1
CAPSULE ORAL
COMMUNITY
Start: 2020-04-29 | End: 2020-06-11

## 2020-06-09 RX ORDER — ANASTROZOLE 1 MG/1
TABLET ORAL
COMMUNITY
Start: 2020-03-13 | End: 2020-06-11

## 2020-06-09 NOTE — TELEPHONE ENCOUNTER
----- Message from Elida Wellington sent at 6/9/2020 10:18 AM CDT -----  Contact: Self   Pt is requesting a call after 12:30pm regarding appt. Please advise

## 2020-06-10 NOTE — PROGRESS NOTES
[facility]    Chief Complaint      Chief Complaint   Patient presents with    Follow-up       History of Present Illness      Elizabeth Dickson is a 80 y.o. WF with Breast cancer, Pulmonary nodules, Adjustment disorder, CKD III,  BLE, HTN, Hypothyroidism, Osteopenia presents to fu hospitalization for syncopal episode ?Seizure, Hyperkalemia, Dehydration, with UTI.  Last visit- 4/30/20..      Syncope - Pt reports she had a CT Head and Cardiac davis that was neg.      Hyperkalemia - Lisinopril was d/c in hospital. 5.2.  Pt on Low potassium diet.      Ovarian cyst-4.3 x 4.3 cm-Fu  by Dr. Chandler. Benign - was d/c from clinic. Sched for fu with OB/GYN, Dr. Benitez next wk     Breast CA-invasive ductal carcinoma, right breast s/p lumpectomy.  Fu by Dr. Solo.  Pt on Arimidex.  Pt fu by H/O - Dr. Grande     Iron deficiency anemia-Pt on iron via prenatal vitamins q day.     Pulmonary nodules-Prev fu by Pulmonary, Dr. Elizabeth, annually. [facility] has appt with new Pulm.      Osteopenia-Pt on Prolia and vitamin D3 1000 units/day.     Vitamin-D deficiency-Resolved 48 up from prev 28-Pt completed ergocalciferol times 12 weeks and is on vitamin D3 1000 units/day.     CKD III - Fu  by Dr. Goodman. BUN/Cr - 28/1.2; GFR - 53. Vit D level 48, at CKD goal. , at CKD goal. Has fu Sept. Her Kidney function was stable and still C/w CKD III.  Cont to rec adeq hydration     Iron Def anemia - Improved.  Pt on iron suppl 65 mg once daily.      Adjustment disorder with mixed anxiety and depression-Pt on melatonin OTC.     Hypothyroidism- Pt on levothyroxine 75 mcg/day.  Controlled.       HTN - Pt off Lisinopril 10 mg 2 tabs/day.  Pt is on amlodipine 2.5 mg/d, and Toprol-XL 25 mg QHS.  Continue low-sodium diet. +Microalbuminuria. Her BP is 125/64 today.  She denies any dizziness.       HLD - Pt on Lipitor 10 mg QHS. Controlled.  TG 51 HDL 66 LDL 84 - cont current dose of Lipitor.     Dermatitis - Pt had a  repeat Bx. Pt off dapsone.  Pt on a topical Triamcinolone Acetonide cream 0.1% BID.  Fu by Derm, Dr. Sloan.      Low back pain - Affects her ability to walk.  Pt on Gabapentin 100 mg QHS per Dr. Mallory. No longer fu by Dr. Mallory.      BLE - Pt on compression socks.     Carotid bruits-no significant stenosis seen on previous carotid ultrasound.     Renal mass -8 mm right kidney.  Followed up by  Dr. Sanchez. Sched for imaging and fu next wk.       Pancreatic cyst- Fu by GI, Dr. King.  Pt had EUS-02/21/2019      Osteopenia - Pt on Vit D3 2000 units/d and Calcium 1200 mg/d.  Pt on Prolia Q 6 mos.     HCM - Flu - 9/12/19;  Tdap - '06?;  PCV 13 - 6/2/17;  PVX 23 - 11/10;  Shingrix - none;  Zostavax - utd;  MGM - 11/19;  DEXA - 8/16/19 - Osteopenia;  C-scope - 1/10/19 - polyp, int hemorrhoids, diverticulosis, repeat 5 yrs;  Gyn/Onc - Dr. Chandler;  Renal - Dr. Goodman;  GI - Dr. King/Darron;  H/O - Dr. Grande;  Prev Rheum - Dr. Mallory;  Gen Sx - Dr. Solo; Pulm - Dr. Ayala;  ID - Dr. Rollins; Podiatry - Dr. Ruiz; Ortho - [facility]; Derm - Dr. Angeles; Derm - Dr. Sloan    Past Medical History:  Past Medical History:   Diagnosis Date    Anxiety     Bilateral leg edema     Breast cancer     Cellulitis of left leg     CKD (chronic kidney disease), stage III     Hypertension     Hypothyroidism     Iron deficiency anemia     Osteopenia     Ovarian cyst     Teratoma and Benign Serous Cystadenoma    Pancreatic cyst     Pulmonary nodules     Renal mass     Vitamin D deficiency        Past Surgical History:   has a past surgical history that includes Breast surgery; Removal of Ovaries; and debridement.    Family History:  family history includes COPD in her brother; Cholelithiasis in her brother; Diabetes in her father; Diabetes type I in her other; Heart disease in her brother and brother; Hypertension in her father and mother; Lung cancer in her sister.     Social History:  Social  History     Tobacco Use    Smoking status: Never Smoker   Substance Use Topics    Alcohol use: No    Drug use: Never       Review of Systems   Constitutional: Negative for chills, diaphoresis and fever.   HENT: Positive for hearing loss. Negative for congestion and sore throat.    Eyes: Negative for blurred vision and double vision.        Has fu with Dr. Finkelstein in aug.    Respiratory: Negative for cough.    Cardiovascular: Negative for chest pain and palpitations.   Gastrointestinal: Negative for abdominal pain, blood in stool, constipation, diarrhea, heartburn, nausea and vomiting.   Genitourinary: Negative for dysuria and frequency.   Musculoskeletal: Positive for back pain. Negative for myalgias.        Low back pain   Skin: Positive for itching and rash.        Improving on current.    Neurological: Negative for dizziness, tingling, weakness and headaches.   Endo/Heme/Allergies: Does not bruise/bleed easily.   Psychiatric/Behavioral: Negative for depression. The patient is not nervous/anxious.         Medications:  Outpatient Encounter Medications as of 6/11/2020   Medication Sig Dispense Refill    amLODIPine (NORVASC) 2.5 MG tablet TAKE 1 TABLET BY MOUTH EVERY DAY 90 tablet 0    anastrozole (ARIMIDEX) 1 mg Tab Take 1 mg by mouth once daily.      atorvastatin (LIPITOR) 10 MG tablet TAKE 1 TABLET BY MOUTH EVERY DAY 90 tablet 3    calcium carbonate 1250 MG capsule Take by mouth 2 (two) times daily with meals.      cholecalciferol, vitamin D3, 2,000 unit Tab Take 2,000 Units by mouth once daily.      denosumab (PROLIA) 60 mg/mL Syrg Inject 1 mL (60 mg total) into the skin every 6 (six) months.      gabapentin (NEURONTIN) 100 MG capsule 1 po QHS 90 capsule 0    ketoconazole (NIZORAL) 2 % cream 1 application 2 (two) times daily. Apply to affected area      levothyroxine (SYNTHROID) 75 MCG tablet TAKE 1 CAPSULE BY MOUTH ONCE DAILY. 90 tablet 0    melatonin 10 mg Tab Take 10 mg by mouth.       metoprolol succinate (TOPROL-XL) 25 MG 24 hr tablet Take 1 tablet (25 mg total) by mouth once daily. 90 tablet 1    triamcinolone acetonide 0.1% (KENALOG) 0.1 % cream Apply topically 2 (two) times daily. 80 g 0    triamcinolone acetonide 0.1% (KENALOG) 0.1 % cream       triamcinolone acetonide 0.1% (KENALOG) 0.1 % ointment       SYNTHROID 75 mcg tablet       [DISCONTINUED] anastrozole (ARIMIDEX) 1 mg Tab       [DISCONTINUED] cefdinir (OMNICEF) 300 MG capsule TAKE 1 CAPSULE BY MOUTH EVERY 12 HOURS FOR 3 DAYS       No facility-administered encounter medications on file as of 6/11/2020.        Current Outpatient Medications:     amLODIPine (NORVASC) 2.5 MG tablet, TAKE 1 TABLET BY MOUTH EVERY DAY, Disp: 90 tablet, Rfl: 0    anastrozole (ARIMIDEX) 1 mg Tab, Take 1 mg by mouth once daily., Disp: , Rfl:     atorvastatin (LIPITOR) 10 MG tablet, TAKE 1 TABLET BY MOUTH EVERY DAY, Disp: 90 tablet, Rfl: 3    calcium carbonate 1250 MG capsule, Take by mouth 2 (two) times daily with meals., Disp: , Rfl:     cholecalciferol, vitamin D3, 2,000 unit Tab, Take 2,000 Units by mouth once daily., Disp: , Rfl:     denosumab (PROLIA) 60 mg/mL Syrg, Inject 1 mL (60 mg total) into the skin every 6 (six) months., Disp: , Rfl:     gabapentin (NEURONTIN) 100 MG capsule, 1 po QHS, Disp: 90 capsule, Rfl: 0    ketoconazole (NIZORAL) 2 % cream, 1 application 2 (two) times daily. Apply to affected area, Disp: , Rfl:     levothyroxine (SYNTHROID) 75 MCG tablet, TAKE 1 CAPSULE BY MOUTH ONCE DAILY., Disp: 90 tablet, Rfl: 0    melatonin 10 mg Tab, Take 10 mg by mouth., Disp: , Rfl:     metoprolol succinate (TOPROL-XL) 25 MG 24 hr tablet, Take 1 tablet (25 mg total) by mouth once daily., Disp: 90 tablet, Rfl: 1    triamcinolone acetonide 0.1% (KENALOG) 0.1 % cream, Apply topically 2 (two) times daily., Disp: 80 g, Rfl: 0    triamcinolone acetonide 0.1% (KENALOG) 0.1 % cream, , Disp: , Rfl:     triamcinolone acetonide 0.1% (KENALOG)  "0.1 % ointment, , Disp: , Rfl:     SYNTHROID 75 mcg tablet, , Disp: , Rfl:     Allergies:  Review of patient's allergies indicates:   Allergen Reactions    Lisinopril      hyperkalemia       Health Maintenance:  Immunization History   Administered Date(s) Administered    Pneumococcal Conjugate - 13 Valent 06/02/2017    Pneumococcal Polysaccharide - 23 Valent 11/01/2010      Health Maintenance   Topic Date Due    TETANUS VACCINE  05/29/1958    DEXA SCAN  08/16/2022    Lipid Panel  05/20/2025    Pneumococcal Vaccine (65+ High/Highest Risk)  Completed      Objective:      Vital Signs  Pulse: 62  Resp: 19  SpO2: 99 %  BP: (!) 154/80  BP Location: Left arm  Patient Position: Sitting  Pain Score: 0-No pain  Height and Weight  Height: 5' 2" (157.5 cm)  Weight: 68.4 kg (150 lb 12.7 oz)  BSA (Calculated - sq m): 1.73 sq meters  BMI (Calculated): 27.6  Weight in (lb) to have BMI = 25: 136.4]    Laboratory:  CBC:  Recent Labs   Lab 11/13/19  1014 03/17/20  1111 05/01/20  0914   WBC 4.6 5.0 5.7   RBC 3.97 L 3.33 L 4.16 L   Hemoglobin 11.7 L 10.6 L 12.2   Hematocrit 34.7 L 30.9 L 36.9 L   Platelets 171 142 L 173   Mean Corpuscular Volume 87.3 92.9 88.7   Mean Corpuscular Hemoglobin 29.5 32.0 29.3   Mean Corpuscular Hemoglobin Conc 33.8 34.4 33.1       CMP:  Recent Labs   Lab 11/13/19  1014 03/17/20  1111  05/01/20  0914 05/20/20  0734   Glucose 98 89   < > 88 93   Calcium 9.1 9.4   < > 8.8 9.3   ALBUMIN 4.0 4.3  --  3.8  --    Total Protein 6.0 L 6.4  --  6.1 L  --    Sodium 135 139   < > 139 142   Potassium 4.7 4.5   < > 5.2 H 4.0   CO2 24 25   < > 22 24   Chloride 100 101   < > 106 110   BUN, Bld  --   --   --   --  35 H   BUN 29 H 35 H   < > 28 H  --    Creatinine 1.2 H 1.4 H   < > 1.2 H 1.3   eGFR if   --   --   --   --  45.4 A   eGFR if non   --   --   --   --  39.4 A   Alkaline Phosphatase 57 50  --  52  --    ALT 15 19  --  20  --    AST 22 20  --  24  --    Total Bilirubin 0.3 0.2  " --  0.2  --     < > = values in this interval not displayed.       URINALYSIS:  Recent Labs   Lab 05/07/18  1331  05/01/20  0924   Color, UA Yellow   < > YELLOW   Specific Gravity, UA 1.005   < > 1.009   pH, UA 6.0   < > < OR = 5.0   Protein, UA Negative   < > NEGATIVE   Glucose, UA  --    < > NEGATIVE   Bilirubin (UA) Negative  --   --    Bacteria, UA None Seen   < > NONE SEEN    < > = values in this interval not displayed.      Recent Labs   Lab 03/11/19  0000  03/17/20  1111 05/01/20  0924   Nitrite, UA NEGATIVE   < >  --  NEGATIVE   Leukocytes, UA TRACE A   < >  --  NEGATIVE   Hyaline Casts, UA NONE SEEN  --  NONE SEEN NONE SEEN    < > = values in this interval not displayed.        LIPIDS:  Recent Labs   Lab 02/06/20  1036 05/20/20  0734   TSH 0.889  --    HDL  --  66   Cholesterol  --  161   Triglycerides  --  51   LDL Cholesterol  --  84.8   Hdl/Cholesterol Ratio  --  41.0   Non-HDL Cholesterol  --  95   Total Cholesterol/HDL Ratio  --  2.4       TSH:  Recent Labs   Lab 02/06/20  1036   TSH 0.889     Other:   Lab Results   Component Value Date    FERRITIN 177.0 (H) 03/17/2020    IRON 58 03/17/2020    TIBC 280 03/17/2020     Lab Results   Component Value Date    HEPCAB Negative 02/07/2018       Physical Exam   Constitutional: She is oriented to person, place, and time. She appears well-developed and well-nourished. No distress.   HENT:   Head: Normocephalic and atraumatic.   Right Ear: External ear normal.   Left Ear: External ear normal.   Mouth/Throat: Oropharynx is clear and moist.   Wearing silvina hearing aids   Eyes: Pupils are equal, round, and reactive to light. Conjunctivae and EOM are normal.   Neck: Normal range of motion. Neck supple. No thyromegaly present.   Cardiovascular: Normal rate, regular rhythm, normal heart sounds and intact distal pulses.   No murmur heard.  Pulmonary/Chest: Effort normal and breath sounds normal. No respiratory distress.   Abdominal: Soft. Bowel sounds are normal. She  exhibits no distension.   Musculoskeletal:   Genu valgus   Neurological: She is alert and oriented to person, place, and time.   Skin: She is not diaphoretic.   Erythematous patch to Rt forearm   Psychiatric: She has a normal mood and affect.   Nursing note and vitals reviewed.          Assessment:       1. Hypertension, unspecified type    2. CKD (chronic kidney disease), stage III    3. Hyperlipidemia, unspecified hyperlipidemia type    4. Hypothyroidism, unspecified type    5. Osteopenia, unspecified location    6. Renal mass    7. Pancreatic cyst        Elizabeth Dickson is a 80 y.o.female with:    1. Hypertension, unspecified type  - Elevated today.  Stable per home numbers.  Cont current regimen.    2. CKD (chronic kidney disease), stage III  - Stable.  Cont current regimen. Fu by Dr. Goodman.     3. Hyperlipidemia, unspecified hyperlipidemia type  - Stable.  Cont current regimen.    4. Hypothyroidism, unspecified type  - Stable.  Cont current regimen.    5. Osteopenia, unspecified location  - Stable.  Cont current regimen.    6. Renal mass  - Pt has upcoming imaging and fu with Dr. Sanchez.    7. Pancreatic cyst   -Fu by GI.  Told to fu prn with Dr. Rob    8. Hyperkalemia   - Will repeat Potassium.    Chronic conditions status updated as per HPI.  Other than changes above, cont current medications and maintain follow up with specialists.  Return to clinic in 4 months or sooner if needed.    Porsha Masters MD  [facility]

## 2020-06-11 ENCOUNTER — OFFICE VISIT (OUTPATIENT)
Dept: PRIMARY CARE CLINIC | Facility: CLINIC | Age: 80
End: 2020-06-11
Payer: MEDICARE

## 2020-06-11 ENCOUNTER — TELEPHONE (OUTPATIENT)
Dept: PRIMARY CARE CLINIC | Facility: CLINIC | Age: 80
End: 2020-06-11

## 2020-06-11 VITALS
OXYGEN SATURATION: 99 % | RESPIRATION RATE: 19 BRPM | HEIGHT: 62 IN | DIASTOLIC BLOOD PRESSURE: 80 MMHG | HEART RATE: 62 BPM | WEIGHT: 150.81 LBS | SYSTOLIC BLOOD PRESSURE: 154 MMHG | BODY MASS INDEX: 27.75 KG/M2

## 2020-06-11 DIAGNOSIS — E87.5 HYPERKALEMIA: Primary | ICD-10-CM

## 2020-06-11 DIAGNOSIS — I10 HYPERTENSION, UNSPECIFIED TYPE: Primary | ICD-10-CM

## 2020-06-11 DIAGNOSIS — M85.80 OSTEOPENIA, UNSPECIFIED LOCATION: ICD-10-CM

## 2020-06-11 DIAGNOSIS — N28.89 RENAL MASS: ICD-10-CM

## 2020-06-11 DIAGNOSIS — E03.9 HYPOTHYROIDISM, UNSPECIFIED TYPE: ICD-10-CM

## 2020-06-11 DIAGNOSIS — E78.5 HYPERLIPIDEMIA, UNSPECIFIED HYPERLIPIDEMIA TYPE: ICD-10-CM

## 2020-06-11 DIAGNOSIS — E87.5 HYPERKALEMIA: ICD-10-CM

## 2020-06-11 DIAGNOSIS — N18.30 CKD (CHRONIC KIDNEY DISEASE), STAGE III: ICD-10-CM

## 2020-06-11 DIAGNOSIS — K86.2 PANCREATIC CYST: ICD-10-CM

## 2020-06-11 PROCEDURE — 3077F SYST BP >= 140 MM HG: CPT | Mod: CPTII,S$GLB,, | Performed by: INTERNAL MEDICINE

## 2020-06-11 PROCEDURE — 99214 PR OFFICE/OUTPT VISIT, EST, LEVL IV, 30-39 MIN: ICD-10-PCS | Mod: S$GLB,,, | Performed by: INTERNAL MEDICINE

## 2020-06-11 PROCEDURE — 1159F MED LIST DOCD IN RCRD: CPT | Mod: S$GLB,,, | Performed by: INTERNAL MEDICINE

## 2020-06-11 PROCEDURE — 1101F PR PT FALLS ASSESS DOC 0-1 FALLS W/OUT INJ PAST YR: ICD-10-PCS | Mod: CPTII,S$GLB,, | Performed by: INTERNAL MEDICINE

## 2020-06-11 PROCEDURE — 3079F DIAST BP 80-89 MM HG: CPT | Mod: CPTII,S$GLB,, | Performed by: INTERNAL MEDICINE

## 2020-06-11 PROCEDURE — 99499 UNLISTED E&M SERVICE: CPT | Mod: S$GLB,,, | Performed by: INTERNAL MEDICINE

## 2020-06-11 PROCEDURE — 3077F PR MOST RECENT SYSTOLIC BLOOD PRESSURE >= 140 MM HG: ICD-10-PCS | Mod: CPTII,S$GLB,, | Performed by: INTERNAL MEDICINE

## 2020-06-11 PROCEDURE — 1101F PT FALLS ASSESS-DOCD LE1/YR: CPT | Mod: CPTII,S$GLB,, | Performed by: INTERNAL MEDICINE

## 2020-06-11 PROCEDURE — 99214 OFFICE O/P EST MOD 30 MIN: CPT | Mod: S$GLB,,, | Performed by: INTERNAL MEDICINE

## 2020-06-11 PROCEDURE — 99999 PR PBB SHADOW E&M-EST. PATIENT-LVL IV: ICD-10-PCS | Mod: PBBFAC,,, | Performed by: INTERNAL MEDICINE

## 2020-06-11 PROCEDURE — 3079F PR MOST RECENT DIASTOLIC BLOOD PRESSURE 80-89 MM HG: ICD-10-PCS | Mod: CPTII,S$GLB,, | Performed by: INTERNAL MEDICINE

## 2020-06-11 PROCEDURE — 1159F PR MEDICATION LIST DOCUMENTED IN MEDICAL RECORD: ICD-10-PCS | Mod: S$GLB,,, | Performed by: INTERNAL MEDICINE

## 2020-06-11 PROCEDURE — 1126F PR PAIN SEVERITY QUANTIFIED, NO PAIN PRESENT: ICD-10-PCS | Mod: S$GLB,,, | Performed by: INTERNAL MEDICINE

## 2020-06-11 PROCEDURE — 99499 RISK ADDL DX/OHS AUDIT: ICD-10-PCS | Mod: S$GLB,,, | Performed by: INTERNAL MEDICINE

## 2020-06-11 PROCEDURE — 1126F AMNT PAIN NOTED NONE PRSNT: CPT | Mod: S$GLB,,, | Performed by: INTERNAL MEDICINE

## 2020-06-11 PROCEDURE — 99999 PR PBB SHADOW E&M-EST. PATIENT-LVL IV: CPT | Mod: PBBFAC,,, | Performed by: INTERNAL MEDICINE

## 2020-06-11 NOTE — TELEPHONE ENCOUNTER
Pt did have a BMP after her last visit with . BMP was done on 5/20/2020 and she seen  on 5/11/2020. Do you still want another one?

## 2020-06-15 LAB
ANION GAP SERPL CALC-SCNC: 17 MEQ/L (ref 9–18)
BUN BLD-MCNC: 23 MG/DL (ref 7–21)
BUN/CREAT SERPL: 19 RATIO (ref 6–22)
CALC OSMOLALITY: 281 MOSM/KG (ref 275–295)
CALCIUM SERPL-MCNC: 9.4 MG/DL (ref 8.5–10.3)
CHLORIDE SERPL-SCNC: 103 MEQ/L (ref 98–107)
CO2 SERPL-SCNC: 24 MEQ/L (ref 21–31)
CREAT SERPL-MCNC: 1.2 MG/DL (ref 0.5–1)
GFR: 43.8 ML/MIN/1.73M2
GLUCOSE SERPL-MCNC: 95 MG/DL (ref 70–100)
POTASSIUM SERPL-SCNC: 4.6 MEQ/L (ref 3.5–5)
SODIUM BLD-SCNC: 139 MEQ/L (ref 135–145)

## 2020-06-16 ENCOUNTER — TELEPHONE (OUTPATIENT)
Dept: PRIMARY CARE CLINIC | Facility: CLINIC | Age: 80
End: 2020-06-16

## 2020-06-16 NOTE — TELEPHONE ENCOUNTER
----- Message from Porsha Masters MD sent at 6/15/2020  8:37 PM CDT -----  Please inform pt labs were good.  Her kidney function was stable. Her Potassium was 4.6 - wnl. Cont current regimen.  No further recs at this time.    Dr. JACOBS

## 2020-06-16 NOTE — TELEPHONE ENCOUNTER
Please inform pt labs were good.  Her kidney function was stable. Her Potassium was 4.6 - wnl. Cont current regimen.  No further recs at this time.    Dr. JACOBS

## 2020-08-18 ENCOUNTER — TELEPHONE (OUTPATIENT)
Dept: PRIMARY CARE CLINIC | Facility: CLINIC | Age: 80
End: 2020-08-18

## 2020-08-18 NOTE — TELEPHONE ENCOUNTER
----- Message from Aurelia Brooks sent at 8/18/2020  9:04 AM CDT -----  Contact: Patient [phone]  Requesting Orders    Orders being requested: shingles and pneumonia    Please call to schedule once orders have been placed.    Thank You

## 2020-08-18 NOTE — TELEPHONE ENCOUNTER
I sw pt. She will go to her pharmacy to get her Shingrix vaccine. I informed her that she could come to our pharmacy but she did not want to drive that far

## 2020-10-12 ENCOUNTER — OFFICE VISIT (OUTPATIENT)
Dept: PRIMARY CARE CLINIC | Facility: CLINIC | Age: 80
End: 2020-10-12
Payer: MEDICARE

## 2020-10-12 VITALS
HEIGHT: 62 IN | RESPIRATION RATE: 18 BRPM | SYSTOLIC BLOOD PRESSURE: 132 MMHG | OXYGEN SATURATION: 97 % | HEART RATE: 82 BPM | DIASTOLIC BLOOD PRESSURE: 65 MMHG | WEIGHT: 152.13 LBS | BODY MASS INDEX: 27.99 KG/M2 | TEMPERATURE: 98 F

## 2020-10-12 DIAGNOSIS — R60.0 BILATERAL LEG EDEMA: ICD-10-CM

## 2020-10-12 DIAGNOSIS — K86.2 PANCREATIC CYST: ICD-10-CM

## 2020-10-12 DIAGNOSIS — I10 HYPERTENSION, UNSPECIFIED TYPE: Primary | ICD-10-CM

## 2020-10-12 DIAGNOSIS — N28.89 RENAL MASS: ICD-10-CM

## 2020-10-12 DIAGNOSIS — E03.9 HYPOTHYROIDISM, UNSPECIFIED TYPE: ICD-10-CM

## 2020-10-12 DIAGNOSIS — N18.31 STAGE 3A CHRONIC KIDNEY DISEASE: ICD-10-CM

## 2020-10-12 DIAGNOSIS — M85.80 OSTEOPENIA, UNSPECIFIED LOCATION: ICD-10-CM

## 2020-10-12 DIAGNOSIS — E78.5 HYPERLIPIDEMIA, UNSPECIFIED HYPERLIPIDEMIA TYPE: ICD-10-CM

## 2020-10-12 DIAGNOSIS — R91.8 PULMONARY NODULES: ICD-10-CM

## 2020-10-12 PROCEDURE — 1126F AMNT PAIN NOTED NONE PRSNT: CPT | Mod: S$GLB,,, | Performed by: INTERNAL MEDICINE

## 2020-10-12 PROCEDURE — 99999 PR PBB SHADOW E&M-EST. PATIENT-LVL V: ICD-10-PCS | Mod: PBBFAC,,, | Performed by: INTERNAL MEDICINE

## 2020-10-12 PROCEDURE — 1101F PR PT FALLS ASSESS DOC 0-1 FALLS W/OUT INJ PAST YR: ICD-10-PCS | Mod: CPTII,S$GLB,, | Performed by: INTERNAL MEDICINE

## 2020-10-12 PROCEDURE — 3078F DIAST BP <80 MM HG: CPT | Mod: CPTII,S$GLB,, | Performed by: INTERNAL MEDICINE

## 2020-10-12 PROCEDURE — 3078F PR MOST RECENT DIASTOLIC BLOOD PRESSURE < 80 MM HG: ICD-10-PCS | Mod: CPTII,S$GLB,, | Performed by: INTERNAL MEDICINE

## 2020-10-12 PROCEDURE — 1126F PR PAIN SEVERITY QUANTIFIED, NO PAIN PRESENT: ICD-10-PCS | Mod: S$GLB,,, | Performed by: INTERNAL MEDICINE

## 2020-10-12 PROCEDURE — 99214 PR OFFICE/OUTPT VISIT, EST, LEVL IV, 30-39 MIN: ICD-10-PCS | Mod: S$GLB,,, | Performed by: INTERNAL MEDICINE

## 2020-10-12 PROCEDURE — 3075F PR MOST RECENT SYSTOLIC BLOOD PRESS GE 130-139MM HG: ICD-10-PCS | Mod: CPTII,S$GLB,, | Performed by: INTERNAL MEDICINE

## 2020-10-12 PROCEDURE — 99999 PR PBB SHADOW E&M-EST. PATIENT-LVL V: CPT | Mod: PBBFAC,,, | Performed by: INTERNAL MEDICINE

## 2020-10-12 PROCEDURE — 3075F SYST BP GE 130 - 139MM HG: CPT | Mod: CPTII,S$GLB,, | Performed by: INTERNAL MEDICINE

## 2020-10-12 PROCEDURE — 99499 UNLISTED E&M SERVICE: CPT | Mod: S$GLB,,, | Performed by: INTERNAL MEDICINE

## 2020-10-12 PROCEDURE — 99499 RISK ADDL DX/OHS AUDIT: ICD-10-PCS | Mod: S$GLB,,, | Performed by: INTERNAL MEDICINE

## 2020-10-12 PROCEDURE — 1101F PT FALLS ASSESS-DOCD LE1/YR: CPT | Mod: CPTII,S$GLB,, | Performed by: INTERNAL MEDICINE

## 2020-10-12 PROCEDURE — 99214 OFFICE O/P EST MOD 30 MIN: CPT | Mod: S$GLB,,, | Performed by: INTERNAL MEDICINE

## 2020-10-12 PROCEDURE — 1159F MED LIST DOCD IN RCRD: CPT | Mod: S$GLB,,, | Performed by: INTERNAL MEDICINE

## 2020-10-12 PROCEDURE — 1159F PR MEDICATION LIST DOCUMENTED IN MEDICAL RECORD: ICD-10-PCS | Mod: S$GLB,,, | Performed by: INTERNAL MEDICINE

## 2020-10-12 RX ORDER — ITRACONAZOLE 100 MG/1
100 CAPSULE ORAL 2 TIMES DAILY
COMMUNITY
Start: 2020-09-17 | End: 2021-01-14

## 2020-10-12 RX ORDER — PREDNISONE 5 MG/1
5 TABLET ORAL DAILY
COMMUNITY
Start: 2020-10-09 | End: 2021-01-14

## 2020-10-12 NOTE — PROGRESS NOTES
[facility] Note    Chief Complaint      Chief Complaint   Patient presents with    Follow-up       History of Present Illness      Elizabeth Dcikson is a 80 y.o. WF with Breast cancer, Pulmonary nodules, Adjustment disorder, CKD III,  BLE, HTN, Hypothyroidism, Osteopenia presents to fu hospitalization for syncopal episode ?Seizure, Hyperkalemia, Dehydration, with UTI.  Last visit- 6/11/20.      Syncope - Pt reports she had a CT Head and Cardiac davis that was neg.      Hyperkalemia - Lisinopril was d/c in hospital. 5.2.  Pt on Low potassium diet. Her Potassium was 4.1- wnl.     Ovarian cyst-4.3 x 4.3 cm-Fu  by Dr. Chandler. Benign - was d/c from clinic. Sched for fu with OB/GYN, Dr. Benitez in 2 yrs.     Breast CA-invasive ductal carcinoma, right breast s/p lumpectomy.  Fu by Dr. Solo in Apr.  Pt on Arimidex.  Pt fu by H/O - Dr. Grande     Iron deficiency anemia-Pt on iron via prenatal vitamins q day.     Pulmonary nodules-Prev fu by Pulmonary, Dr. Salinas, annually. She has appt with new Pulm.      Osteopenia-Pt on Prolia and vitamin D3 1000 units/day.      Vitamin-D deficiency-Resolved 48 up from prev 28-Pt completed ergocalciferol times 12 weeks and is on vitamin D3 1000 units/day.     CKD III - Fu  by Dr. Goodman. Has fu in Mar. BUN/Cr - 23/1.3. PTH - 58 -8/31/20 , at CKD goal. Has fu Mar. Her Kidney function was stable and still C/w CKD III.  Cont to rec adeq hydration     Iron Def anemia - Improved.  Pt on prenatal vitamin once daily.      Adjustment disorder with mixed anxiety and depression-Pt on melatonin OTC.     Hypothyroidism- Pt on levothyroxine 75 mcg/day.  Controlled in Feb.       HTN - Pt off Lisinopril 10 mg 2 tabs/day.  Pt is on amlodipine 2.5 mg/d, and Toprol-XL 25 mg QHS.  Continue low-sodium diet. +Microalbuminuria. Her BP is 125/64 today.  She denies any dizziness.       HLD - Pt on Lipitor 10 mg QHS. Controlled.  TG 51 HDL 66 LDL 84 - cont current dose of  Lipitor.    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      Dermatitis - Pt had a repeat Bx. Pt off dapsone.  Pt on a topical Triamcinolone Acetonide cream 0.1% BID.  Fu by Derm, Dr. Sloan. She saw ID, Dr. Rollins. She wis on Prednisone 5 mg/d.  She has fu in 2 wks.      Low back pain - Affects her ability to walk.  Pt on Gabapentin 100 mg QHS per Dr. Mallory. No longer fu by Dr. Mallory.  She goes to the gym daily.     BLE - Pt on compression socks.     Carotid bruits-no significant stenosis seen on previous carotid ultrasound.     Renal mass -8 mm right kidney.  Followed up by  Dr. Sanchez.  Has fu in June.      Pancreatic cyst- Fu by GI, Dr. King.  Pt had EUS-02/21/2019      Osteopenia - Pt on Vit D3 2000 units/d and Calcium 1200 mg/d.  Pt on Prolia Q 6 mos.     HCM - Flu - 8/18/20;  Tdap - '06?;  PCV 13 - 6/2/17;  PVX 23 - 11/1/10;  Shingrix - none;  Zostavax - utd; Shingrix - 8/18/20; MGM - 11/10/20 -scheduled;  DEXA - 8/16/19 - Osteopenia; 11/10/20 -scheduled;  Hep C - 2/7/18; C-scope - 1/10/19 - polyp, int hemorrhoids, diverticulosis, repeat 5 yrs;  Gyn/Onc - Dr. Chandler;  Renal - Dr. Goodman;  GI - Dr. King/Darron;  H/O - Dr. Grande;  Prev Rheum - Dr. Mallory;  Gen Sx - Dr. Solo; Pulm - Dr. Ayala;  ID - Dr. Rollins; Podiatry - Dr. Ruiz; Ortho - [facility]; Derm - Dr. Angeles; Derm - Dr. Sloan    Radiology Reports        Exam Date Time Procedure Performing Provider Status   6/18/20 11:18 AM US Kidney Jesus Mariam PENA;  Modified   Notes:    (US Kidney) Reason For Exam: N28.89    REPORT  = = = = = = = = = = = = = = = = = = = = = = = = = = = = = = = = = = = = = = = =  Patient Number: [id number] --- Patient Name: LUCY DE LA VEGA ( )  Ordering Physician: Silvana Sifuentes MD  = = = = = = = = = = = = = = = = = = = = = = = = = = = = = = = = = = = = = = = =    Renal ultrasound    Clinical history: N28.89 DIAG: N28.89 - Other specified disorders of kidney and ureter Reason for Exam: N28.89  Reason for Visit: Other specified disorders of kidney and ureter      Comparison: 6/19/2019, 1/5/2018 and previous MRI examinations.    Procedure: Grayscale ultrasound images through the kidneys were obtained in transverse and longitudinal projection.    Findings:   The kidneys are normal in size and echogenicity. The right kidney measures 7.9 and the left 8.7 cm in longitudinal dimension. There is no evidence of hydronephrosis or stone. There is a faint echogenic rounded solid area noted in the inferior right kidney measuring 0.9 x 0.9 x 0.7 cm. This area is seen on some of the previous examinations and not well seen on some other examinations. This is measures roughly 0.9 x 0.9 x 0.7 cm (0.9 x 0.9 x 0.7 cm on examination dated 6/19/2019 and 0.8 x 0.7 x 0.6 cm on examination dated 1/5/2018). Left lower simple peripelvic cyst measures 1.0 x 0.7 x 1.5 cm (previously 0.9 x 0.9 x 1.7 cm). Mid to lower left peripelvic cyst measures 0.9 x 0.7 x 1.1 cm, not definitely seen on previous exam.    The bladder is partially contracted. Bilateral ureteral jets are seen.    Impression:    Stable appearance of subcentimeter solid echogenic area noted in the inferior right kidney. Six-month follow-up recommended for surveillance.    Left renal cysts.    Final     Finalized: Reynaldo MUNGUIA, Silvestre MCCAIN 06/18/2020 15:31          Past Medical History:  Past Medical History:   Diagnosis Date    Anxiety     Bilateral leg edema     Breast cancer     Cellulitis of left leg     CKD (chronic kidney disease), stage III     Hypertension     Hypothyroidism     Iron deficiency anemia     Osteopenia     Ovarian cyst     Teratoma and Benign Serous Cystadenoma    Pancreatic cyst     Pulmonary nodules     Renal mass     Vitamin D deficiency        Past Surgical History:   has a past surgical history that includes Breast surgery; Removal of Ovaries; and debridement.    Family History:  family history includes COPD in her brother;  Cholelithiasis in her brother; Diabetes in her father; Diabetes type I in her other; Heart disease in her brother and brother; Hypertension in her father and mother; Lung cancer in her sister.     Social History:  Social History     Tobacco Use    Smoking status: Never Smoker   Substance Use Topics    Alcohol use: No    Drug use: Never       Review of Systems   Constitutional: Negative for chills, diaphoresis and fever.   HENT: Positive for hearing loss. Negative for tinnitus.    Eyes: Negative for blurred vision and double vision.        +glaucoma.  Pt on Ophtho drops. Has fu in 2 wks.    Respiratory: Negative for cough and shortness of breath.    Cardiovascular: Positive for leg swelling. Negative for chest pain and palpitations.   Gastrointestinal: Negative for abdominal pain, blood in stool, constipation, diarrhea, heartburn, melena, nausea and vomiting.   Genitourinary: Negative for dysuria and frequency.   Musculoskeletal: Negative for joint pain and myalgias.   Skin: Positive for itching and rash.   Neurological: Positive for headaches. Negative for dizziness.        With steroids.    Endo/Heme/Allergies: Does not bruise/bleed easily.   Psychiatric/Behavioral: Negative for depression. The patient is not nervous/anxious.         Medications:  Outpatient Encounter Medications as of 10/12/2020   Medication Sig Dispense Refill    amLODIPine (NORVASC) 2.5 MG tablet TAKE 1 TABLET BY MOUTH EVERY DAY 90 tablet 1    anastrozole (ARIMIDEX) 1 mg Tab Take 1 mg by mouth once daily.      atorvastatin (LIPITOR) 10 MG tablet TAKE 1 TABLET BY MOUTH EVERY DAY 90 tablet 3    calcium carbonate 1250 MG capsule Take by mouth 2 (two) times daily with meals.      cholecalciferol, vitamin D3, 2,000 unit Tab Take 2,000 Units by mouth once daily.      clobetasoL (TEMOVATE) 0.05 % cream APPLY CREAM TOPICALLY TWICE DAILY FOR 14 DAYS      denosumab (PROLIA) 60 mg/mL Syrg Inject 1 mL (60 mg total) into the skin every 6 (six)  months.      gabapentin (NEURONTIN) 100 MG capsule 1 po QHS 90 capsule 0    itraconazole (SPORANOX) 100 mg Cap Take 100 mg by mouth 2 (two) times daily.      ketoconazole (NIZORAL) 2 % cream 1 application 2 (two) times daily. Apply to affected area      levothyroxine (SYNTHROID) 75 MCG tablet TAKE 1 TABLET BY MOUTH EVERY DAY 90 tablet 2    melatonin 10 mg Tab Take 10 mg by mouth.      metoprolol succinate (TOPROL-XL) 25 MG 24 hr tablet TAKE 1 TABLET BY MOUTH EVERY DAY 90 tablet 1    predniSONE (DELTASONE) 5 MG tablet Take 5 mg by mouth once daily.       triamcinolone acetonide 0.1% (KENALOG) 0.1 % cream Apply topically 2 (two) times daily. 80 g 0    triamcinolone acetonide 0.1% (KENALOG) 0.1 % cream       triamcinolone acetonide 0.1% (KENALOG) 0.1 % ointment       [DISCONTINUED] doxycycline (VIBRAMYCIN) 100 MG Cap Take 100 mg by mouth 2 (two) times a day.      [DISCONTINUED] SYNTHROID 75 mcg tablet        No facility-administered encounter medications on file as of 10/12/2020.        Current Outpatient Medications:     amLODIPine (NORVASC) 2.5 MG tablet, TAKE 1 TABLET BY MOUTH EVERY DAY, Disp: 90 tablet, Rfl: 1    anastrozole (ARIMIDEX) 1 mg Tab, Take 1 mg by mouth once daily., Disp: , Rfl:     atorvastatin (LIPITOR) 10 MG tablet, TAKE 1 TABLET BY MOUTH EVERY DAY, Disp: 90 tablet, Rfl: 3    calcium carbonate 1250 MG capsule, Take by mouth 2 (two) times daily with meals., Disp: , Rfl:     cholecalciferol, vitamin D3, 2,000 unit Tab, Take 2,000 Units by mouth once daily., Disp: , Rfl:     clobetasoL (TEMOVATE) 0.05 % cream, APPLY CREAM TOPICALLY TWICE DAILY FOR 14 DAYS, Disp: , Rfl:     denosumab (PROLIA) 60 mg/mL Syrg, Inject 1 mL (60 mg total) into the skin every 6 (six) months., Disp: , Rfl:     gabapentin (NEURONTIN) 100 MG capsule, 1 po QHS, Disp: 90 capsule, Rfl: 0    itraconazole (SPORANOX) 100 mg Cap, Take 100 mg by mouth 2 (two) times daily., Disp: , Rfl:     ketoconazole (NIZORAL) 2 %  "cream, 1 application 2 (two) times daily. Apply to affected area, Disp: , Rfl:     levothyroxine (SYNTHROID) 75 MCG tablet, TAKE 1 TABLET BY MOUTH EVERY DAY, Disp: 90 tablet, Rfl: 2    melatonin 10 mg Tab, Take 10 mg by mouth., Disp: , Rfl:     metoprolol succinate (TOPROL-XL) 25 MG 24 hr tablet, TAKE 1 TABLET BY MOUTH EVERY DAY, Disp: 90 tablet, Rfl: 1    predniSONE (DELTASONE) 5 MG tablet, Take 5 mg by mouth once daily. , Disp: , Rfl:     triamcinolone acetonide 0.1% (KENALOG) 0.1 % cream, Apply topically 2 (two) times daily., Disp: 80 g, Rfl: 0    triamcinolone acetonide 0.1% (KENALOG) 0.1 % cream, , Disp: , Rfl:     triamcinolone acetonide 0.1% (KENALOG) 0.1 % ointment, , Disp: , Rfl:     Allergies:  Review of patient's allergies indicates:   Allergen Reactions    Lisinopril      hyperkalemia       Health Maintenance:  Immunization History   Administered Date(s) Administered    Influenza 10/18/2005, 10/11/2013, 09/18/2015, 09/17/2016, 10/01/2018, 10/25/2018, 09/12/2019    Influenza - High Dose - PF (65 years and older) 08/18/2020    Pneumococcal Conjugate - 13 Valent 06/23/2016, 06/02/2017    Pneumococcal Polysaccharide - 23 Valent 11/01/2010    Zoster Recombinant 08/18/2020      Health Maintenance   Topic Date Due    TETANUS VACCINE  05/29/1958    DEXA SCAN  08/16/2022    Lipid Panel  05/20/2025    Hepatitis C Screening  Completed    Pneumococcal Vaccine (65+ High/Highest Risk)  Completed      Objective:      Vital Signs  Temp: 97.8 °F (36.6 °C)  Temp src: Oral  Pulse: 82  Resp: 18  SpO2: 97 %  BP: 132/65  BP Location: Right arm  Patient Position: Sitting  Pain Score: 0-No pain  Height and Weight  Height: 5' 2" (157.5 cm)  Weight: 69 kg (152 lb 1.9 oz)  BSA (Calculated - sq m): 1.74 sq meters  BMI (Calculated): 27.8  Weight in (lb) to have BMI = 25: 136.4]    Laboratory:  CBC:  Recent Labs   Lab 03/17/20  1111 05/01/20  0914 08/31/20  1150   WBC 5.0 5.7 5.7   RBC 3.33 L 4.16 L 4.56 "   Hemoglobin 10.6 L 12.2 13.6   Hematocrit 30.9 L 36.9 L 40.1   Platelets 142 L 173 190   Mean Corpuscular Volume 92.9 88.7 87.9   Mean Corpuscular Hemoglobin 32.0 29.3 29.9   Mean Corpuscular Hemoglobin Conc 34.4 33.1 34.0       CMP:  Recent Labs   Lab 03/17/20  1111  05/01/20  0914 05/20/20  0734  08/31/20  1150   Glucose 89   < > 88 93   < > 90   Calcium 9.4   < > 8.8 9.3   < > 9.7   ALBUMIN 4.3  --  3.8  --   --  4.2   Total Protein 6.4  --  6.1 L  --   --  5.9 L   Sodium 139   < > 139 142   < > 141   Potassium 4.5   < > 5.2 H 4.0   < > 4.1   CO2 25   < > 22 24   < > 24   Chloride 101   < > 106 110   < > 106   BUN, Bld  --   --   --  35 H  --   --    BUN 35 H   < > 28 H  --    < > 22 H   Creatinine 1.4 H   < > 1.2 H 1.3   < > 1.3 H   eGFR if   --   --   --  45.4 A  --   --    eGFR if non   --   --   --  39.4 A  --   --    Alkaline Phosphatase 50  --  52  --   --  63   ALT 19  --  20  --   --  23   AST 20  --  24  --   --  25   Total Bilirubin 0.2  --  0.2  --   --  0.3    < > = values in this interval not displayed.       URINALYSIS:  Recent Labs   Lab 05/07/18  1331  08/31/20  1153   Color, UA Yellow   < > YELLOW   Specific Gravity, UA 1.005   < > 1.005   pH, UA 6.0   < > < OR = 5.0   Protein, UA Negative   < > NEGATIVE   Glucose, UA  --    < > NEGATIVE   Bilirubin (UA) Negative  --   --    Bacteria, UA None Seen   < > NONE SEEN    < > = values in this interval not displayed.      Recent Labs   Lab 03/17/20  1111 05/01/20  0924 08/31/20  1153   Nitrite, UA  --  NEGATIVE NEGATIVE   Leukocytes, UA  --  NEGATIVE NEGATIVE   Hyaline Casts, UA NONE SEEN NONE SEEN NONE SEEN        LIPIDS:  Recent Labs   Lab 02/06/20  1036 05/20/20  0734   TSH 0.889  --    HDL  --  66   Cholesterol  --  161   Triglycerides  --  51   LDL Cholesterol  --  84.8   Hdl/Cholesterol Ratio  --  41.0   Non-HDL Cholesterol  --  95   Total Cholesterol/HDL Ratio  --  2.4       TSH:  Recent Labs   Lab 02/06/20  1036    TSH 0.889     Other:   Lab Results   Component Value Date    FERRITIN 177.0 (H) 03/17/2020    IRON 58 03/17/2020    TIBC 280 03/17/2020     Lab Results   Component Value Date    HEPCAB Negative 02/07/2018       Physical Exam  Vitals signs reviewed.   Constitutional:       General: She is not in acute distress.     Appearance: Normal appearance. She is not ill-appearing, toxic-appearing or diaphoretic.   HENT:      Head: Normocephalic and atraumatic.      Right Ear: Tympanic membrane normal.      Left Ear: Tympanic membrane normal.   Eyes:      Extraocular Movements: Extraocular movements intact.      Pupils: Pupils are equal, round, and reactive to light.   Neck:      Musculoskeletal: Normal range of motion and neck supple.   Cardiovascular:      Rate and Rhythm: Normal rate and regular rhythm.      Pulses: Normal pulses.      Heart sounds: No murmur.      Comments: 1+ pedal edema  Pulmonary:      Breath sounds: Normal breath sounds.   Abdominal:      General: Bowel sounds are normal. There is no distension.      Palpations: Abdomen is soft.      Tenderness: There is no abdominal tenderness. There is no guarding or rebound.   Musculoskeletal: Normal range of motion.      Comments: Genu valgus; Flat feet   Skin:     General: Skin is warm.      Comments: Erythematous serpiginous patches to BUE and to Left lateral thight with scaling to edge    Neurological:      General: No focal deficit present.      Mental Status: She is alert and oriented to person, place, and time.   Psychiatric:         Mood and Affect: Mood normal.         Behavior: Behavior normal.             Assessment:       1. Hypertension, unspecified type    2. Hypothyroidism, unspecified type    3. Hyperlipidemia, unspecified hyperlipidemia type    4. Stage 3a chronic kidney disease    5. Renal mass    6. Osteopenia, unspecified location    7. Pancreatic cyst    8. Pulmonary nodules        Elizabeth Dickson is a 80 y.o.female with:    1.  Hypertension, unspecified type   - Controlled.  Cont current.     2. Hypothyroidism, unspecified type   - Controlled.  Cont current.     3. Hyperlipidemia, unspecified hyperlipidemia type   - Controlled.  Cont current.     4. Stage 3a chronic kidney disease  - Stable.  Cont current regimen.    5. Renal mass  - Stable per repeat imaging.     6. Osteopenia, unspecified location  - Stable.  Cont current regimen.    7. Pancreatic cyst  - Stable.  Cont current regimen.    8. Pulmonary nodules   -Stable. Has upcoming fu with new Pulm.     9. Bilateral leg edema  - Cont compression socks, low sodium diet, and to elevate BLE prn.     Chronic conditions status updated as per HPI.  Other than changes above, cont current medications and maintain follow up with specialists.  Return to clinic in 6 months or sooner if needed.    Porsha Masters MD  [facility]

## 2020-12-03 ENCOUNTER — TELEPHONE (OUTPATIENT)
Dept: PRIMARY CARE CLINIC | Facility: CLINIC | Age: 80
End: 2020-12-03

## 2020-12-03 DIAGNOSIS — L30.9 DERMATITIS: Primary | ICD-10-CM

## 2020-12-03 NOTE — TELEPHONE ENCOUNTER
----- Message from Maty Rodarte sent at 12/3/2020 11:05 AM CST -----  Contact: [phone] @ Patient  Good morning,  Would like to get medical advice.  Symptoms (please be specific):  Rash on arm , face and hip  How long has patient had these symptoms:  1 yr  Pharmacy name and phone # (copy from chart):    Comments:  need to know what she can do or a Dr she can see

## 2020-12-03 NOTE — TELEPHONE ENCOUNTER
Per last note: Dermatitis - Pt had a repeat Bx. Pt off dapsone.  Pt on a topical Triamcinolone Acetonide cream 0.1% BID.  Fu by Derm, Dr. Sloan. She saw ID, Dr. Rollins. She wis on Prednisone 5 mg/d.  She has fu in 2 wks  Pt states  told her recently she needed to see a dermatologist. She states that  was unable to treat this. She would like a rec. Pt was wondering if you would rec  Dr.Julie Lockett at [facility] or anyone else. Pt is very anxious and concerned b/c she has been dealing with this for over 1 year and her skin is still peeling and still has the rashes(now on face as well)

## 2020-12-08 ENCOUNTER — TELEPHONE (OUTPATIENT)
Dept: DERMATOLOGY | Facility: CLINIC | Age: 80
End: 2020-12-08

## 2020-12-08 NOTE — TELEPHONE ENCOUNTER
----- Message from Eriberto Swift sent at 12/8/2020  2:31 PM CST -----  Contact: Pt. [phone]  Pt is calling to see if  Has all of her records from [facility] and need to know if she needs to bring any more information upon appt.     Pt. [phone]

## 2020-12-15 ENCOUNTER — PATIENT OUTREACH (OUTPATIENT)
Dept: ADMINISTRATIVE | Facility: OTHER | Age: 80
End: 2020-12-15

## 2020-12-16 ENCOUNTER — OFFICE VISIT (OUTPATIENT)
Dept: DERMATOLOGY | Facility: CLINIC | Age: 80
End: 2020-12-16
Payer: MEDICARE

## 2020-12-16 VITALS — BODY MASS INDEX: 27.8 KG/M2 | WEIGHT: 152 LBS

## 2020-12-16 DIAGNOSIS — L30.9 DERMATITIS: ICD-10-CM

## 2020-12-16 PROCEDURE — 88312 SPECIAL STAINS GROUP 1: CPT | Mod: 26,,, | Performed by: PATHOLOGY

## 2020-12-16 PROCEDURE — 1101F PR PT FALLS ASSESS DOC 0-1 FALLS W/OUT INJ PAST YR: ICD-10-PCS | Mod: CPTII,S$GLB,, | Performed by: DERMATOLOGY

## 2020-12-16 PROCEDURE — 1159F MED LIST DOCD IN RCRD: CPT | Mod: S$GLB,,, | Performed by: DERMATOLOGY

## 2020-12-16 PROCEDURE — 99203 PR OFFICE/OUTPT VISIT, NEW, LEVL III, 30-44 MIN: ICD-10-PCS | Mod: 25,S$GLB,, | Performed by: DERMATOLOGY

## 2020-12-16 PROCEDURE — 99999 PR PBB SHADOW E&M-EST. PATIENT-LVL IV: ICD-10-PCS | Mod: PBBFAC,,, | Performed by: DERMATOLOGY

## 2020-12-16 PROCEDURE — 1126F AMNT PAIN NOTED NONE PRSNT: CPT | Mod: S$GLB,,, | Performed by: DERMATOLOGY

## 2020-12-16 PROCEDURE — 99203 OFFICE O/P NEW LOW 30 MIN: CPT | Mod: 25,S$GLB,, | Performed by: DERMATOLOGY

## 2020-12-16 PROCEDURE — 88305 TISSUE EXAM BY PATHOLOGIST: CPT | Performed by: PATHOLOGY

## 2020-12-16 PROCEDURE — 88305 TISSUE EXAM BY PATHOLOGIST: ICD-10-PCS | Mod: 26,,, | Performed by: PATHOLOGY

## 2020-12-16 PROCEDURE — 88312 SPECIAL STAINS GROUP 1: CPT | Performed by: PATHOLOGY

## 2020-12-16 PROCEDURE — 1159F PR MEDICATION LIST DOCUMENTED IN MEDICAL RECORD: ICD-10-PCS | Mod: S$GLB,,, | Performed by: DERMATOLOGY

## 2020-12-16 PROCEDURE — 11104 PUNCH BX SKIN SINGLE LESION: CPT | Mod: S$GLB,,, | Performed by: DERMATOLOGY

## 2020-12-16 PROCEDURE — 1126F PR PAIN SEVERITY QUANTIFIED, NO PAIN PRESENT: ICD-10-PCS | Mod: S$GLB,,, | Performed by: DERMATOLOGY

## 2020-12-16 PROCEDURE — 11104 PR PUNCH BIOPSY, SKIN, SINGLE LESION: ICD-10-PCS | Mod: S$GLB,,, | Performed by: DERMATOLOGY

## 2020-12-16 PROCEDURE — 3288F FALL RISK ASSESSMENT DOCD: CPT | Mod: CPTII,S$GLB,, | Performed by: DERMATOLOGY

## 2020-12-16 PROCEDURE — 99999 PR PBB SHADOW E&M-EST. PATIENT-LVL IV: CPT | Mod: PBBFAC,,, | Performed by: DERMATOLOGY

## 2020-12-16 PROCEDURE — 88305 TISSUE EXAM BY PATHOLOGIST: CPT | Mod: 26,,, | Performed by: PATHOLOGY

## 2020-12-16 PROCEDURE — 1101F PT FALLS ASSESS-DOCD LE1/YR: CPT | Mod: CPTII,S$GLB,, | Performed by: DERMATOLOGY

## 2020-12-16 PROCEDURE — 88312 PR  SPECIAL STAINS,GROUP I: ICD-10-PCS | Mod: 26,,, | Performed by: PATHOLOGY

## 2020-12-16 PROCEDURE — 3288F PR FALLS RISK ASSESSMENT DOCUMENTED: ICD-10-PCS | Mod: CPTII,S$GLB,, | Performed by: DERMATOLOGY

## 2020-12-16 NOTE — LETTER
December 17, 2020      Porsha Masters MD  [address] [facility] [address]           [facility] Derm [address]th [address]  [address]  [place] [address]  Phone: [phone]  Fax: [phone]          Patient: Elizabeth Dickson   MR Number: [id number]   YOB: 1940   Date of Visit: 12/16/2020       Dear Dr. Porsha Masters:    Thank you for referring Elizabeth Dickson to me for evaluation. Attached you will find relevant portions of my assessment and plan of care.    If you have questions, please do not hesitate to call me. I look forward to following Elizabeth Dickson along with you.    Sincerely,    Bernarda [facility] MD    Enclosure  CC:  No Recipients    If you would like to receive this communication electronically, please contact [email or web address] or [phone] to request more information on [email or web address] Link access.    For providers and/or their staff who would like to refer a patient to [facility], please contact us through our one-stop-shop provider referral line, [facility] [place], at [phone].    If you feel you have received this communication in error or would no longer like to receive these types of communications, please e-mail [email or web address]

## 2020-12-16 NOTE — PROGRESS NOTES
"  Subjective:       Patient ID:  Elizabeth Dickson is a 80 y.o. female who presents for   Chief Complaint   Patient presents with    Rash     all over the body, itching, several years.      History of a rash which started on her left leg about 2 years ago and has spread to trunk, arms and face.  Has seen multiple physicians for this.  Has some hand written notes with her, history includes a dermatologist who did a punch bx on her arm and showed "fungus", given doxycycline no better.  She is currently on prednisone which has not helped and is using tac cream.    Nephrology note 9/8:  She is being followed by multiple providers regarding BUE and BLE rash. She reports it was initially thought the rash was a drug reaction and the Arimidex was stopped for a period of time with no improvement. She is now back on the Arimidex. Reports the rash is now thought to be fungal. She states she had recent GYN appointment for vaginal infection. 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 She has follow-up with ID next week. She denies cp, sob, dysuria, hematuria, difficulty urinating, worsening edema. She has been wearing compression stockings for BLE edema. Reports her blood pressure at home has been controlled with SBP ranging 126-127 recently.      (Background: taking dapsone since June 2016 when she had a rash and steroids would not clear it, trying to wean. She recalls having a bone marrow biopsy on 3/2/17, it was negative. She has been on the anastrozole for two years.  Works out at [employer])"    She also took itraconizole according the the historical records here but not clear when this was, she and her daughter are not sure about it.  Apparently did not help since she relates she has never been clear.     Patient Active Problem List:     Iron deficiency anemia     Microalbuminuria     Breast cancer     Pulmonary " nodules     Osteopenia     Hypothyroid     Acidosis     Right renal mass     Hypertension     CKD (chronic kidney disease), stage III     Hypothyroidism     Pancreatic cyst     Chronic right-sided low back pain without sciatica     Hyperlipidemia     Acute cystitis without hematuria     Hyperkalemia     Dermatitis          Review of Systems   Constitutional: Negative for fever, chills, weight loss, weight gain, fatigue, night sweats and malaise.   Skin: Positive for itching and rash. Negative for daily sunscreen use, activity-related sunscreen use and wears hat.   Hematologic/Lymphatic: Bruises/bleeds easily.        Objective:    Physical Exam   Constitutional: She appears well-developed and well-nourished. No distress.   Neurological: She is alert and oriented to person, place, and time. She is not disoriented.   Psychiatric: She has a normal mood and affect.   Skin:   Areas Examined (abnormalities noted in diagram):   Scalp / Hair Palpated and Inspected  Head / Face Inspection Performed  Neck Inspection Performed  Chest / Axilla Inspection Performed  Abdomen Inspection Performed  Genitals / Buttocks / Groin Inspection Performed  Back Inspection Performed  RUE Inspected  LUE Inspection Performed  RLE Inspected  LLE Inspection Performed  Nails and Digits Inspection Performed                   Diagram Legend     Erythematous scaling macule/papule c/w actinic keratosis       Vascular papule c/w angioma      Pigmented verrucoid papule/plaque c/w seborrheic keratosis      Yellow umbilicated papule c/w sebaceous hyperplasia      Irregularly shaped tan macule c/w lentigo     1-2 mm smooth white papules consistent with Milia      Movable subcutaneous cyst with punctum c/w epidermal inclusion cyst      Subcutaneous movable cyst c/w pilar cyst      Firm pink to brown papule c/w dermatofibroma      Pedunculated fleshy papule(s) c/w skin tag(s)      Evenly pigmented macule c/w junctional nevus     Mildly variegated pigmented,  slightly irregular-bordered macule c/w mildly atypical nevus      Flesh colored to evenly pigmented papule c/w intradermal nevus       Pink pearly papule/plaque c/w basal cell carcinoma      Erythematous hyperkeratotic cursted plaque c/w SCC      Surgical scar with no sign of skin cancer recurrence      Open and closed comedones      Inflammatory papules and pustules      Verrucoid papule consistent consistent with wart     Erythematous eczematous patches and plaques     Dystrophic onycholytic nail with subungual debris c/w onychomycosis     Umbilicated papule    Erythematous-base heme-crusted tan verrucoid plaque consistent with inflamed seborrheic keratosis     Erythematous Silvery Scaling Plaque c/w Psoriasis     See annotation                      Assessment / Plan:      Pathology Orders:     Normal Orders This Visit    Specimen to Pathology, Dermatology     Comments:    Please stain for fungus, perhaps partially treated  See photos in record  Number of Specimens:->1  ------------------------->-------------------------  Spec 1 Procedure:->Biopsy  Spec 1 Clinical Impression:->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  Spec 1 Source:->left shoulder    Questions:    Procedure Type: Dermatology and skin neoplasms    Number of Specimens: 1    ------------------------: -------------------------    Spec 1 Procedure: Biopsy    Spec 1 Clinical Impression: erythema 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  Directions for post bx care given.                   Follow up if symptoms worsen or fail to improve.

## 2020-12-22 LAB
FINAL PATHOLOGIC DIAGNOSIS: NORMAL
GROSS: NORMAL
MICROSCOPIC EXAM: NORMAL

## 2020-12-24 ENCOUNTER — TELEPHONE (OUTPATIENT)
Dept: DERMATOLOGY | Facility: CLINIC | Age: 80
End: 2020-12-24

## 2020-12-24 NOTE — TELEPHONE ENCOUNTER
----- Message from Bernarda Lynn MD sent at 12/24/2020  1:17 PM CST -----  Regarding: RE: Ref to results fr om last Wednesday  Contact: Daughter /Abbey [phone]  Yes, she can stay on the prednisone  ----- Message -----  From: Marilin Alfaro MA  Sent: 12/24/2020   1:02 PM CST  To: Bernarda Lynn MD  Subject: FW: Ref to results fr om last Wednesday          Dr. Lynn daughter need's to know if mother should stop Prednisone   ----- Message -----  From: Bernarda Lynn MD  Sent: 12/24/2020  12:53 PM CST  To: Marilin Alfaro MA  Subject: FW: Ref to results fr om last Wednesday          Please schedule [facility] next [facility] for skin scraping.  ----- Message -----  From: Marilin Alfaro MA  Sent: 12/23/2020  12:11 PM CST  To: Bernarda Lynn MD  Subject: FW: Ref to results fr om last Wednesday          Dr. Lynn Mrs. Dickson is asking for results not sure if you seen them yet?     Skin, left shoulder, biopsy:  -SPONGIOTIC DERMATITIS, see commen  ----- Message -----  From: Marla Valdivia  Sent: 12/23/2020  12:07 PM CST  To: Shane Pool  Subject: Ref to results fr om last Wednesday              Pt is anxious to get her results before [place].  Call with update.

## 2020-12-24 NOTE — TELEPHONE ENCOUNTER
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  Bx:  Skin, left shoulder, biopsy:  -SPONGIOTIC DERMATITIS, see comment  COMMENT: The clinical images were reviewed in epic and differential diagnosis noted. 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 Although PAS stain is negative for fungal organisms,  this could be due to partial treatment as clinically suggested. Therefore, correlation with culture is  recommended.  EUGENIO BURNS  Biopsy sections show focal spongiotic vesicles and exocytosis of lymphocytes and neutrophils. There is a  superficial perivascular and interstitial lymphohistiocytic infiltrate with rare scattered neutrophils and mild red  KAMLA NEWBERRY M.D.

## 2020-12-28 ENCOUNTER — TELEPHONE (OUTPATIENT)
Dept: PRIMARY CARE CLINIC | Facility: CLINIC | Age: 80
End: 2020-12-28

## 2020-12-28 ENCOUNTER — OFFICE VISIT (OUTPATIENT)
Dept: DERMATOLOGY | Facility: CLINIC | Age: 80
End: 2020-12-28
Payer: MEDICARE

## 2020-12-28 DIAGNOSIS — L53.1 ERYTHEMA ANNULARE CENTRIFUGUM: Primary | ICD-10-CM

## 2020-12-28 DIAGNOSIS — S50.811A ABRASION OF RIGHT FOREARM, INITIAL ENCOUNTER: ICD-10-CM

## 2020-12-28 PROCEDURE — 99213 PR OFFICE/OUTPT VISIT, EST, LEVL III, 20-29 MIN: ICD-10-PCS | Mod: S$GLB,,, | Performed by: DERMATOLOGY

## 2020-12-28 PROCEDURE — 1159F PR MEDICATION LIST DOCUMENTED IN MEDICAL RECORD: ICD-10-PCS | Mod: S$GLB,,, | Performed by: DERMATOLOGY

## 2020-12-28 PROCEDURE — 99213 OFFICE O/P EST LOW 20 MIN: CPT | Mod: S$GLB,,, | Performed by: DERMATOLOGY

## 2020-12-28 PROCEDURE — 99999 PR PBB SHADOW E&M-EST. PATIENT-LVL III: ICD-10-PCS | Mod: PBBFAC,,,

## 2020-12-28 PROCEDURE — 1159F MED LIST DOCD IN RCRD: CPT | Mod: S$GLB,,, | Performed by: DERMATOLOGY

## 2020-12-28 PROCEDURE — 99999 PR PBB SHADOW E&M-EST. PATIENT-LVL III: CPT | Mod: PBBFAC,,,

## 2020-12-28 PROCEDURE — 87102 FUNGUS ISOLATION CULTURE: CPT

## 2020-12-28 NOTE — TELEPHONE ENCOUNTER
Please inform pt/daughter - Unfortunately, there's really nothing new for me to order in regards to the rash or an autoimmune workup at this time.   She's seen Rheum, ID, and Derm for this.  I await Dr. Lynn's thoughts based on her recent evaluation.  Sometimes the fungal tests and biopsies need to be repeated if the rash persists/worsens so while I know this is frustrating it's often necessary to get to the source of the issue.  Let her know, she had a neg. LEONEL in 2018 with Dr. Goodman and that this is a screening test for certain autoimmune disorders.     Dr. JACOBS

## 2020-12-28 NOTE — TELEPHONE ENCOUNTER
----- Message from Ramila Escobar sent at 12/28/2020  3:55 PM CST -----  Contact: [phone] Abbey (daughter)  Patient is requesting a call in regards to dermatology. Will further discuss upon call. Please call and advise.

## 2020-12-28 NOTE — TELEPHONE ENCOUNTER
Pt has been going back and to derm. Pt/daughter states she is going back and forth for scrapping. They stated originally the culture showed no fungus. She has been to a few diff derm recently and states she is continuously getting scraped and does not understand why they are constantly testing for fungus when she was already told it was not. She was told that maybe she has an autoimmune disease. Pt/daughter would like to see if by chance you can order some labs vs waiting a few weeks for derm. Pt is very frustrated and nervous. She is aware that you are out of office this week and was wondering if you could contact her at some point to further discuss

## 2020-12-28 NOTE — PROGRESS NOTES
Subjective:       Patient ID:  Elizabeth Dickson is a 80 y.o. female who presents for   Chief Complaint   Patient presents with    Rash     HPI  Patient presents for follow-up of a rash x 13 months on her trunk and extremities. She is followed by Dr. 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  Dr. Lynn requested she come in to this clinic for fungal culture scraping to further work up.    Patient has not been applying any topicals to the rash as she reports that they do not help. She has self-tapered off prednisone as she does not believe this was helping after 3 months. She endorses pain, especially in her right arm, and worse at night.     She cut her right forearm this morning and requests a bandage change. She has crusting on her dorsal nose, has recently taken antibiotics for this. Currently not treating this area and it is still very crusted.    Skin, left shoulder, biopsy:   -SPONGIOTIC DERMATITIS, see comment   COMMENT:  The clinical images were reviewed in epic and differential   diagnosis noted.  Given the superficial perivascular lymphohistiocytic   infiltrate, spongiosis, and mild red blood cell extravasation, erythema   annulare centrifugum is a likely diagnosis.  Although PAS stain is negative   for fungal organisms, this could be due to partial treatment as clinically   suggested.  Therefore, correlation with culture is recommended.    Review of Systems   Constitutional: Negative for fever and chills.   Skin: Positive for itching, rash and dry skin.        Objective:    Physical Exam   Constitutional: She appears well-developed and well-nourished.   Neurological: She is alert and oriented to person, place, and time.   Psychiatric: Her mood appears anxious.   Skin:   Areas Examined (abnormalities noted in diagram):   Scalp / Hair Palpated and Inspected  Head / Face Inspection Performed  Neck Inspection Performed  Chest /  Axilla Inspection Performed  Abdomen Inspection Performed  Back Inspection Performed  RUE Inspected  LUE Inspection Performed  RLE Inspected  LLE Inspection Performed  Nails and Digits Inspection Performed                   Diagram Legend     Erythematous scaling macule/papule c/w actinic keratosis       Vascular papule c/w angioma      Pigmented verrucoid papule/plaque c/w seborrheic keratosis      Yellow umbilicated papule c/w sebaceous hyperplasia      Irregularly shaped tan macule c/w lentigo     1-2 mm smooth white papules consistent with Milia      Movable subcutaneous cyst with punctum c/w epidermal inclusion cyst      Subcutaneous movable cyst c/w pilar cyst      Firm pink to brown papule c/w dermatofibroma      Pedunculated fleshy papule(s) c/w skin tag(s)      Evenly pigmented macule c/w junctional nevus     Mildly variegated pigmented, slightly irregular-bordered macule c/w mildly atypical nevus      Flesh colored to evenly pigmented papule c/w intradermal nevus       Pink pearly papule/plaque c/w basal cell carcinoma      Erythematous hyperkeratotic cursted plaque c/w SCC      Surgical scar with no sign of skin cancer recurrence      Open and closed comedones      Inflammatory papules and pustules      Verrucoid papule consistent consistent with wart     Erythematous eczematous patches and plaques     Dystrophic onycholytic nail with subungual debris c/w onychomycosis     Umbilicated papule    Erythematous-base heme-crusted tan verrucoid plaque consistent with inflamed seborrheic keratosis     Erythematous Silvery Scaling Plaque c/w Psoriasis     See annotation      Assessment / Plan:      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    The patient is being seen by Dr. Lynn for this in [place], and fungal scraping culture of skin cells was performed today per Dr. Lynn's request.      Discussed that etiology of this rash can be difficult to sort out and can range from no cause (idiopathic)  to autoimmune to drug reactions to infections to underlying malignancy.  Often times these patients have extensive workups to check for an associated conditions.  Various systemic therapies have been described (po antifungals, po steroids, po antibiotics) to treat it, and it appears the patient has tried these without improvement to date.    We offered for the patient to start coming here to see Dr. Sommers for this condition and discussion of further workup and treatment options, which would involve multiple follow up visits.  She does not want to come back to main campus if it can be avoided.      She is frustrated by the lack of answers given today and was hoping to have a definitive solution at this appointment.  She also reports she is in pain and we encouraged her to discuss this with her PCP.  It is unusual for EAC to cause pain.    -     Fungus culture obtained today from left shoulder and right distal lower leg. Areas scraped with #15 blade onto agar plate. Patient tolerated well.     - Recommend patient continue TAC 0.1% ointment BID to the affected areas    - Recommend patient discuss pain management with her PCP as EAC does not classically cause pain     Abrasion, right forearm  - Area was cleaned and dressed with Vaseline and dressed with sterile gauze and ACE bandage. Patient instructed to clean gently once daily with hydrogen peroxide, keep Vaseline and bandage over area for at least 2 weeks.     Serous crusting, dorsal nose  - advise hydrogen peroxide soaked gauze to AA daily to BID, serum crust will gently fall off     Patient to follow-up with Dr. Lynn in 2-3 weeks.

## 2020-12-29 NOTE — TELEPHONE ENCOUNTER
Pt daughter informed me today that her mother is exteremly  anxious. She states she did not mention anything yesterday b/c her mother was in the car with her. She just wanted you to be aware so possibly you can dw pt at her nx visit

## 2021-01-04 ENCOUNTER — PATIENT MESSAGE (OUTPATIENT)
Dept: DERMATOLOGY | Facility: CLINIC | Age: 81
End: 2021-01-04

## 2021-01-04 NOTE — TELEPHONE ENCOUNTER
I rec she also d/w her DERm since I am unsure what her diagnosis is for the rash but typically the contraindication to the vaccine is a severe (anaphylactic allergy)/h/o anaphylaxis which as far as I am aware she does not have so I would recommend the vaccine.     Dr. JACOBS

## 2021-01-04 NOTE — TELEPHONE ENCOUNTER
Pt scheduled for audio visit next week. She would like to know if you rec you schedule an appt for covid vaccine with all her skin issues/bx

## 2021-01-06 DIAGNOSIS — L53.1 ERYTHEMA ANNULARE CENTRIFUGUM: Primary | ICD-10-CM

## 2021-01-06 RX ORDER — DOXYCYCLINE HYCLATE 100 MG
TABLET ORAL
Qty: 30 TABLET | Refills: 0 | Status: SHIPPED | OUTPATIENT
Start: 2021-01-06 | End: 2021-02-01

## 2021-01-14 ENCOUNTER — OFFICE VISIT (OUTPATIENT)
Dept: PRIMARY CARE CLINIC | Facility: CLINIC | Age: 81
End: 2021-01-14
Payer: MEDICARE

## 2021-01-14 DIAGNOSIS — F32.A DEPRESSION, UNSPECIFIED DEPRESSION TYPE: ICD-10-CM

## 2021-01-14 DIAGNOSIS — E78.5 HYPERLIPIDEMIA, UNSPECIFIED HYPERLIPIDEMIA TYPE: ICD-10-CM

## 2021-01-14 DIAGNOSIS — N18.31 STAGE 3A CHRONIC KIDNEY DISEASE: ICD-10-CM

## 2021-01-14 DIAGNOSIS — F41.9 ANXIETY: Primary | ICD-10-CM

## 2021-01-14 PROCEDURE — 99443 PR PHYSICIAN TELEPHONE EVALUATION 21-30 MIN: ICD-10-PCS | Mod: 95,,, | Performed by: INTERNAL MEDICINE

## 2021-01-14 PROCEDURE — 99443 PR PHYSICIAN TELEPHONE EVALUATION 21-30 MIN: CPT | Mod: 95,,, | Performed by: INTERNAL MEDICINE

## 2021-01-14 RX ORDER — ESCITALOPRAM OXALATE 5 MG/1
5 TABLET ORAL DAILY
Qty: 30 TABLET | Refills: 1 | Status: SHIPPED | OUTPATIENT
Start: 2021-01-14 | End: 2021-02-25 | Stop reason: SDUPTHER

## 2021-01-14 RX ORDER — ESCITALOPRAM OXALATE 5 MG/1
5 TABLET ORAL DAILY
Qty: 30 TABLET | Refills: 1 | Status: SHIPPED | OUTPATIENT
Start: 2021-01-14 | End: 2021-01-14

## 2021-01-14 RX ORDER — ATORVASTATIN CALCIUM 10 MG/1
10 TABLET, FILM COATED ORAL DAILY
Qty: 90 TABLET | Refills: 3
Start: 2021-01-14 | End: 2021-06-01

## 2021-01-26 LAB — FUNGUS SPEC CULT: NORMAL

## 2021-02-15 DIAGNOSIS — M54.50 CHRONIC RIGHT-SIDED LOW BACK PAIN WITHOUT SCIATICA: ICD-10-CM

## 2021-02-15 DIAGNOSIS — G89.29 CHRONIC RIGHT-SIDED LOW BACK PAIN WITHOUT SCIATICA: ICD-10-CM

## 2021-02-15 RX ORDER — GABAPENTIN 100 MG/1
CAPSULE ORAL
Qty: 90 CAPSULE | Refills: 1
Start: 2021-02-15 | End: 2021-02-24 | Stop reason: SDUPTHER

## 2021-02-24 DIAGNOSIS — G89.29 CHRONIC RIGHT-SIDED LOW BACK PAIN WITHOUT SCIATICA: ICD-10-CM

## 2021-02-24 DIAGNOSIS — M54.50 CHRONIC RIGHT-SIDED LOW BACK PAIN WITHOUT SCIATICA: ICD-10-CM

## 2021-02-24 RX ORDER — GABAPENTIN 100 MG/1
CAPSULE ORAL
Qty: 90 CAPSULE | Refills: 1 | Status: SHIPPED | OUTPATIENT
Start: 2021-02-24 | End: 2021-05-28

## 2021-02-25 ENCOUNTER — OFFICE VISIT (OUTPATIENT)
Dept: PRIMARY CARE CLINIC | Facility: CLINIC | Age: 81
End: 2021-02-25
Payer: MEDICARE

## 2021-02-25 ENCOUNTER — TELEPHONE (OUTPATIENT)
Dept: PRIMARY CARE CLINIC | Facility: CLINIC | Age: 81
End: 2021-02-25

## 2021-02-25 VITALS — SYSTOLIC BLOOD PRESSURE: 130 MMHG | HEART RATE: 87 BPM | DIASTOLIC BLOOD PRESSURE: 62 MMHG

## 2021-02-25 DIAGNOSIS — I10 HYPERTENSION, UNSPECIFIED TYPE: ICD-10-CM

## 2021-02-25 DIAGNOSIS — F32.A DEPRESSION, UNSPECIFIED DEPRESSION TYPE: ICD-10-CM

## 2021-02-25 DIAGNOSIS — N18.31 STAGE 3A CHRONIC KIDNEY DISEASE: ICD-10-CM

## 2021-02-25 DIAGNOSIS — E03.9 HYPOTHYROIDISM, UNSPECIFIED TYPE: ICD-10-CM

## 2021-02-25 DIAGNOSIS — F41.9 ANXIETY: Primary | ICD-10-CM

## 2021-02-25 PROCEDURE — 99442 PR PHYSICIAN TELEPHONE EVALUATION 11-20 MIN: ICD-10-PCS | Mod: 95,,, | Performed by: INTERNAL MEDICINE

## 2021-02-25 PROCEDURE — 99442 PR PHYSICIAN TELEPHONE EVALUATION 11-20 MIN: CPT | Mod: 95,,, | Performed by: INTERNAL MEDICINE

## 2021-02-25 RX ORDER — METOPROLOL SUCCINATE 25 MG/1
25 TABLET, EXTENDED RELEASE ORAL DAILY
Qty: 90 TABLET | Refills: 1 | Status: SHIPPED | OUTPATIENT
Start: 2021-02-25 | End: 2021-07-01 | Stop reason: SDUPTHER

## 2021-02-25 RX ORDER — ESCITALOPRAM OXALATE 5 MG/1
5 TABLET ORAL DAILY
Qty: 90 TABLET | Refills: 1 | Status: SHIPPED | OUTPATIENT
Start: 2021-02-25 | End: 2021-07-01 | Stop reason: SDUPTHER

## 2021-02-26 LAB
FREE T4: 1.4 NANOGRAM/DL (ref 0.9–1.7)
TSH SERPL DL<=0.005 MIU/L-ACNC: 1.26 UIL/ML (ref 0.35–4)

## 2021-03-01 ENCOUNTER — TELEPHONE (OUTPATIENT)
Dept: PRIMARY CARE CLINIC | Facility: CLINIC | Age: 81
End: 2021-03-01

## 2021-03-09 ENCOUNTER — TELEPHONE (OUTPATIENT)
Dept: DERMATOLOGY | Facility: CLINIC | Age: 81
End: 2021-03-09

## 2021-03-10 ENCOUNTER — PATIENT MESSAGE (OUTPATIENT)
Dept: DERMATOLOGY | Facility: CLINIC | Age: 81
End: 2021-03-10

## 2021-03-30 ENCOUNTER — PATIENT OUTREACH (OUTPATIENT)
Dept: ADMINISTRATIVE | Facility: OTHER | Age: 81
End: 2021-03-30

## 2021-04-01 ENCOUNTER — OFFICE VISIT (OUTPATIENT)
Dept: ALLERGY | Facility: CLINIC | Age: 81
End: 2021-04-01
Payer: MEDICARE

## 2021-04-01 ENCOUNTER — LAB VISIT (OUTPATIENT)
Dept: LAB | Facility: HOSPITAL | Age: 81
End: 2021-04-01
Attending: ALLERGY & IMMUNOLOGY
Payer: MEDICARE

## 2021-04-01 VITALS — BODY MASS INDEX: 27.83 KG/M2 | WEIGHT: 151.25 LBS | HEIGHT: 62 IN

## 2021-04-01 DIAGNOSIS — L30.9 DERMATITIS: Primary | ICD-10-CM

## 2021-04-01 DIAGNOSIS — L30.9 DERMATITIS: ICD-10-CM

## 2021-04-01 DIAGNOSIS — L08.9 SKIN INFECTION: ICD-10-CM

## 2021-04-01 DIAGNOSIS — L29.9 PRURITUS: ICD-10-CM

## 2021-04-01 DIAGNOSIS — L53.1 ERYTHEMA ANNULARE CENTRIFUGUM: ICD-10-CM

## 2021-04-01 LAB
BASOPHILS # BLD AUTO: 0.02 K/UL (ref 0–0.2)
BASOPHILS NFR BLD: 0.4 % (ref 0–1.9)
DIFFERENTIAL METHOD: ABNORMAL
EOSINOPHIL # BLD AUTO: 0.1 K/UL (ref 0–0.5)
EOSINOPHIL NFR BLD: 1.8 % (ref 0–8)
ERYTHROCYTE [DISTWIDTH] IN BLOOD BY AUTOMATED COUNT: 13.2 % (ref 11.5–14.5)
HCT VFR BLD AUTO: 43 % (ref 37–48.5)
HGB BLD-MCNC: 13.7 G/DL (ref 12–16)
IMM GRANULOCYTES # BLD AUTO: 0.02 K/UL (ref 0–0.04)
IMM GRANULOCYTES NFR BLD AUTO: 0.4 % (ref 0–0.5)
LYMPHOCYTES # BLD AUTO: 1.2 K/UL (ref 1–4.8)
LYMPHOCYTES NFR BLD: 22.4 % (ref 18–48)
MCH RBC QN AUTO: 28.4 PG (ref 27–31)
MCHC RBC AUTO-ENTMCNC: 31.9 G/DL (ref 32–36)
MCV RBC AUTO: 89 FL (ref 82–98)
MONOCYTES # BLD AUTO: 0.5 K/UL (ref 0.3–1)
MONOCYTES NFR BLD: 10.5 % (ref 4–15)
NEUTROPHILS # BLD AUTO: 3.3 K/UL (ref 1.8–7.7)
NEUTROPHILS NFR BLD: 64.5 % (ref 38–73)
NRBC BLD-RTO: 0 /100 WBC
PLATELET # BLD AUTO: 189 K/UL (ref 150–450)
PMV BLD AUTO: 10.8 FL (ref 9.2–12.9)
RBC # BLD AUTO: 4.83 M/UL (ref 4–5.4)
WBC # BLD AUTO: 5.14 K/UL (ref 3.9–12.7)

## 2021-04-01 PROCEDURE — 99499 UNLISTED E&M SERVICE: CPT | Mod: S$GLB,,, | Performed by: ALLERGY & IMMUNOLOGY

## 2021-04-01 PROCEDURE — 99204 PR OFFICE/OUTPT VISIT, NEW, LEVL IV, 45-59 MIN: ICD-10-PCS | Mod: S$GLB,,, | Performed by: ALLERGY & IMMUNOLOGY

## 2021-04-01 PROCEDURE — 86003 ALLG SPEC IGE CRUDE XTRC EA: CPT | Performed by: ALLERGY & IMMUNOLOGY

## 2021-04-01 PROCEDURE — 1126F PR PAIN SEVERITY QUANTIFIED, NO PAIN PRESENT: ICD-10-PCS | Mod: S$GLB,,, | Performed by: ALLERGY & IMMUNOLOGY

## 2021-04-01 PROCEDURE — 1159F MED LIST DOCD IN RCRD: CPT | Mod: S$GLB,,, | Performed by: ALLERGY & IMMUNOLOGY

## 2021-04-01 PROCEDURE — 86003 ALLG SPEC IGE CRUDE XTRC EA: CPT | Mod: 59 | Performed by: ALLERGY & IMMUNOLOGY

## 2021-04-01 PROCEDURE — 86359 T CELLS TOTAL COUNT: CPT | Performed by: ALLERGY & IMMUNOLOGY

## 2021-04-01 PROCEDURE — 82785 ASSAY OF IGE: CPT | Performed by: ALLERGY & IMMUNOLOGY

## 2021-04-01 PROCEDURE — 86317 IMMUNOASSAY INFECTIOUS AGENT: CPT | Performed by: ALLERGY & IMMUNOLOGY

## 2021-04-01 PROCEDURE — 99204 OFFICE O/P NEW MOD 45 MIN: CPT | Mod: S$GLB,,, | Performed by: ALLERGY & IMMUNOLOGY

## 2021-04-01 PROCEDURE — 82784 ASSAY IGA/IGD/IGG/IGM EACH: CPT | Mod: 59 | Performed by: ALLERGY & IMMUNOLOGY

## 2021-04-01 PROCEDURE — 99499 RISK ADDL DX/OHS AUDIT: ICD-10-PCS | Mod: S$GLB,,, | Performed by: ALLERGY & IMMUNOLOGY

## 2021-04-01 PROCEDURE — 82787 IGG 1 2 3 OR 4 EACH: CPT | Mod: 59 | Performed by: ALLERGY & IMMUNOLOGY

## 2021-04-01 PROCEDURE — 86355 B CELLS TOTAL COUNT: CPT | Performed by: ALLERGY & IMMUNOLOGY

## 2021-04-01 PROCEDURE — 1159F PR MEDICATION LIST DOCUMENTED IN MEDICAL RECORD: ICD-10-PCS | Mod: S$GLB,,, | Performed by: ALLERGY & IMMUNOLOGY

## 2021-04-01 PROCEDURE — 82784 ASSAY IGA/IGD/IGG/IGM EACH: CPT | Performed by: ALLERGY & IMMUNOLOGY

## 2021-04-01 PROCEDURE — 86774 TETANUS ANTIBODY: CPT | Performed by: ALLERGY & IMMUNOLOGY

## 2021-04-01 PROCEDURE — 85025 COMPLETE CBC W/AUTO DIFF WBC: CPT | Performed by: ALLERGY & IMMUNOLOGY

## 2021-04-01 PROCEDURE — 99999 PR PBB SHADOW E&M-EST. PATIENT-LVL III: CPT | Mod: PBBFAC,,, | Performed by: ALLERGY & IMMUNOLOGY

## 2021-04-01 PROCEDURE — 86360 T CELL ABSOLUTE COUNT/RATIO: CPT | Performed by: ALLERGY & IMMUNOLOGY

## 2021-04-01 PROCEDURE — 99999 PR PBB SHADOW E&M-EST. PATIENT-LVL III: ICD-10-PCS | Mod: PBBFAC,,, | Performed by: ALLERGY & IMMUNOLOGY

## 2021-04-01 PROCEDURE — 86357 NK CELLS TOTAL COUNT: CPT | Performed by: ALLERGY & IMMUNOLOGY

## 2021-04-01 PROCEDURE — 36415 COLL VENOUS BLD VENIPUNCTURE: CPT | Mod: PO | Performed by: ALLERGY & IMMUNOLOGY

## 2021-04-01 PROCEDURE — 1126F AMNT PAIN NOTED NONE PRSNT: CPT | Mod: S$GLB,,, | Performed by: ALLERGY & IMMUNOLOGY

## 2021-04-01 RX ORDER — VIT C/E/ZN/COPPR/LUTEIN/ZEAXAN 250MG-90MG
1000 CAPSULE ORAL DAILY
COMMUNITY

## 2021-04-01 RX ORDER — FERROUS SULFATE 325(65) MG
325 TABLET ORAL DAILY
COMMUNITY

## 2021-04-02 LAB
CD3+CD4+ CELLS # BLD: 531 CELLS/UL (ref 300–1400)
CD3+CD4+ CELLS NFR BLD: 45.5 % (ref 28–57)
IGA SERPL-MCNC: 91 MG/DL (ref 40–350)
IGE SERPL-ACNC: <35 IU/ML (ref 0–100)
IGG SERPL-MCNC: 853 MG/DL (ref 650–1600)
IGM SERPL-MCNC: 69 MG/DL (ref 50–300)
LYMPHOCYTES NFR CSF MANUAL: 18 % (ref 10–39)
LYMPHOCYTES NFR CSF MANUAL: 2.52 % (ref 0.9–3.6)
LYMPHOCYTES NFR CSF MANUAL: 210 CELLS/UL (ref 200–900)
LYMPHOCYTES NFR CSF MANUAL: 30.4 % (ref 7–31)
LYMPHOCYTES NFR CSF MANUAL: 369 CELLS/UL (ref 90–600)
LYMPHOCYTES NFR CSF MANUAL: 6.9 % (ref 6–19)
LYMPHOCYTES NFR CSF MANUAL: 61.2 % (ref 55–83)
LYMPHOCYTES NFR CSF MANUAL: 714 CELLS/UL (ref 700–2100)
LYMPHOCYTES NFR CSF MANUAL: 83 CELLS/UL (ref 100–500)

## 2021-04-05 LAB
IGG1 SER-MCNC: 540 MG/DL (ref 382–929)
IGG2 SER-MCNC: 194 MG/DL (ref 242–700)
IGG3 SER-MCNC: 66 MG/DL (ref 22–176)
IGG4 SER-MCNC: 16 MG/DL (ref 4–86)

## 2021-04-06 LAB
A ALTERNATA IGE QN: <0.1 KU/L
A FUMIGATUS IGE QN: <0.1 KU/L
BERMUDA GRASS IGE QN: <0.1 KU/L
CAT DANDER IGE QN: <0.1 KU/L
CEDAR IGE QN: <0.1 KU/L
COW MILK IGE QN: <0.1 KU/L
D FARINAE IGE QN: <0.1 KU/L
D PTERONYSS IGE QN: <0.1 KU/L
DEPRECATED A ALTERNATA IGE RAST QL: NORMAL
DEPRECATED A FUMIGATUS IGE RAST QL: NORMAL
DEPRECATED BERMUDA GRASS IGE RAST QL: NORMAL
DEPRECATED CAT DANDER IGE RAST QL: NORMAL
DEPRECATED CEDAR IGE RAST QL: NORMAL
DEPRECATED COW MILK IGE RAST QL: NORMAL
DEPRECATED D FARINAE IGE RAST QL: NORMAL
DEPRECATED D PTERONYSS IGE RAST QL: NORMAL
DEPRECATED DOG DANDER IGE RAST QL: NORMAL
DEPRECATED EGG WHITE IGE RAST QL: NORMAL
DEPRECATED ELDER IGE RAST QL: NORMAL
DEPRECATED ENGL PLANTAIN IGE RAST QL: NORMAL
DEPRECATED PEANUT IGE RAST QL: NORMAL
DEPRECATED PECAN/HICK TREE IGE RAST QL: NORMAL
DEPRECATED PECAN/HICK TREE IGE RAST QL: NORMAL
DEPRECATED ROACH IGE RAST QL: NORMAL
DEPRECATED SHRIMP IGE RAST QL: NORMAL
DEPRECATED SOYBEAN IGE RAST QL: NORMAL
DEPRECATED TIMOTHY IGE RAST QL: NORMAL
DEPRECATED TUNA IGE RAST QL: NORMAL
DEPRECATED WEST RAGWEED IGE RAST QL: NORMAL
DEPRECATED WHEAT IGE RAST QL: NORMAL
DEPRECATED WHITE OAK IGE RAST QL: NORMAL
DOG DANDER IGE QN: <0.1 KU/L
EGG WHITE IGE QN: <0.1 KU/L
ELDER IGE QN: <0.1 KU/L
ENGL PLANTAIN IGE QN: <0.1 KU/L
PEANUT IGE QN: <0.1 KU/L
PECAN/HICK TREE IGE QN: <0.1 KU/L
PECAN/HICK TREE IGE QN: <0.1 KU/L
ROACH IGE QN: <0.1 KU/L
SHRIMP IGE QN: <0.1 KU/L
SOYBEAN IGE QN: <0.1 KU/L
TIMOTHY IGE QN: <0.1 KU/L
TUNA IGE QN: <0.1 KU/L
WEST RAGWEED IGE QN: <0.1 KU/L
WHEAT IGE QN: <0.1 KU/L
WHITE OAK IGE QN: <0.1 KU/L

## 2021-04-07 LAB
C DIPHTHERIAE AB SER IA-ACNC: 0.13 IU/ML
C TETANI TOXOID AB SER-ACNC: 4.57 IU/ML
DEPRECATED S PNEUM23 IGG SER-MCNC: 3.1 UG/ML
DEPRECATED S PNEUM4 IGG SER-MCNC: <0.3 UG/ML
HAEM INFLU B IGG SER IA-MCNC: 2.66 MCG/ML
S PN DA SERO 19F IGG SER-MCNC: 10.7 UG/ML
S PNEUM DA 1 IGG SER-MCNC: 6.3 UG/ML
S PNEUM DA 14 IGG SER-MCNC: 2.2
S PNEUM DA 18C IGG SER-MCNC: 10.2
S PNEUM DA 19A IGG SER-MCNC: 16.9 UG/ML
S PNEUM DA 3 IGG SER-MCNC: <0.3 UG/ML
S PNEUM DA 5 IGG SER-MCNC: <0.3 UG/ML
S PNEUM DA 6A IGG SER-MCNC: 0.9 UG/ML
S PNEUM DA 6B IGG SER-MCNC: 0.7 UG/ML
S PNEUM DA 7F IGG SER-MCNC: 0.5 UG/ML
S PNEUM DA 9V IGG SER-MCNC: 0.4 UG/ML

## 2021-04-12 ENCOUNTER — PATIENT MESSAGE (OUTPATIENT)
Dept: ALLERGY | Facility: CLINIC | Age: 81
End: 2021-04-12

## 2021-07-01 ENCOUNTER — OFFICE VISIT (OUTPATIENT)
Dept: PRIMARY CARE CLINIC | Facility: CLINIC | Age: 81
End: 2021-07-01
Payer: MEDICARE

## 2021-07-01 VITALS
HEART RATE: 67 BPM | SYSTOLIC BLOOD PRESSURE: 132 MMHG | OXYGEN SATURATION: 97 % | HEIGHT: 62 IN | WEIGHT: 145.5 LBS | TEMPERATURE: 98 F | BODY MASS INDEX: 26.78 KG/M2 | DIASTOLIC BLOOD PRESSURE: 74 MMHG | RESPIRATION RATE: 17 BRPM

## 2021-07-01 DIAGNOSIS — M85.80 OSTEOPENIA, UNSPECIFIED LOCATION: ICD-10-CM

## 2021-07-01 DIAGNOSIS — E03.9 HYPOTHYROIDISM, UNSPECIFIED TYPE: ICD-10-CM

## 2021-07-01 DIAGNOSIS — L53.1 ERYTHEMA ANNULARE CENTRIFUGUM: ICD-10-CM

## 2021-07-01 DIAGNOSIS — F32.A DEPRESSION, UNSPECIFIED DEPRESSION TYPE: ICD-10-CM

## 2021-07-01 DIAGNOSIS — N18.31 STAGE 3A CHRONIC KIDNEY DISEASE: ICD-10-CM

## 2021-07-01 DIAGNOSIS — F41.9 ANXIETY: ICD-10-CM

## 2021-07-01 DIAGNOSIS — I10 HYPERTENSION, UNSPECIFIED TYPE: ICD-10-CM

## 2021-07-01 DIAGNOSIS — E78.5 HYPERLIPIDEMIA, UNSPECIFIED HYPERLIPIDEMIA TYPE: Primary | ICD-10-CM

## 2021-07-01 DIAGNOSIS — R60.0 BILATERAL LEG EDEMA: ICD-10-CM

## 2021-07-01 DIAGNOSIS — N28.89 RENAL MASS: ICD-10-CM

## 2021-07-01 PROCEDURE — 3288F PR FALLS RISK ASSESSMENT DOCUMENTED: ICD-10-PCS | Mod: CPTII,S$GLB,, | Performed by: INTERNAL MEDICINE

## 2021-07-01 PROCEDURE — 1101F PR PT FALLS ASSESS DOC 0-1 FALLS W/OUT INJ PAST YR: ICD-10-PCS | Mod: CPTII,S$GLB,, | Performed by: INTERNAL MEDICINE

## 2021-07-01 PROCEDURE — 99214 OFFICE O/P EST MOD 30 MIN: CPT | Mod: S$GLB,,, | Performed by: INTERNAL MEDICINE

## 2021-07-01 PROCEDURE — 3288F FALL RISK ASSESSMENT DOCD: CPT | Mod: CPTII,S$GLB,, | Performed by: INTERNAL MEDICINE

## 2021-07-01 PROCEDURE — 1126F AMNT PAIN NOTED NONE PRSNT: CPT | Mod: S$GLB,,, | Performed by: INTERNAL MEDICINE

## 2021-07-01 PROCEDURE — 99999 PR PBB SHADOW E&M-EST. PATIENT-LVL IV: ICD-10-PCS | Mod: PBBFAC,,, | Performed by: INTERNAL MEDICINE

## 2021-07-01 PROCEDURE — 1126F PR PAIN SEVERITY QUANTIFIED, NO PAIN PRESENT: ICD-10-PCS | Mod: S$GLB,,, | Performed by: INTERNAL MEDICINE

## 2021-07-01 PROCEDURE — 1159F PR MEDICATION LIST DOCUMENTED IN MEDICAL RECORD: ICD-10-PCS | Mod: S$GLB,,, | Performed by: INTERNAL MEDICINE

## 2021-07-01 PROCEDURE — 99214 PR OFFICE/OUTPT VISIT, EST, LEVL IV, 30-39 MIN: ICD-10-PCS | Mod: S$GLB,,, | Performed by: INTERNAL MEDICINE

## 2021-07-01 PROCEDURE — 1159F MED LIST DOCD IN RCRD: CPT | Mod: S$GLB,,, | Performed by: INTERNAL MEDICINE

## 2021-07-01 PROCEDURE — 1101F PT FALLS ASSESS-DOCD LE1/YR: CPT | Mod: CPTII,S$GLB,, | Performed by: INTERNAL MEDICINE

## 2021-07-01 PROCEDURE — 99999 PR PBB SHADOW E&M-EST. PATIENT-LVL IV: CPT | Mod: PBBFAC,,, | Performed by: INTERNAL MEDICINE

## 2021-07-01 PROCEDURE — 99499 RISK ADDL DX/OHS AUDIT: ICD-10-PCS | Mod: S$GLB,,, | Performed by: INTERNAL MEDICINE

## 2021-07-01 PROCEDURE — 99499 UNLISTED E&M SERVICE: CPT | Mod: S$GLB,,, | Performed by: INTERNAL MEDICINE

## 2021-07-01 RX ORDER — ESCITALOPRAM OXALATE 5 MG/1
5 TABLET ORAL DAILY
Qty: 90 TABLET | Refills: 1 | Status: SHIPPED | OUTPATIENT
Start: 2021-07-01 | End: 2021-08-05

## 2021-07-01 RX ORDER — AMLODIPINE BESYLATE 2.5 MG/1
2.5 TABLET ORAL DAILY
Qty: 90 TABLET | Refills: 1 | Status: SHIPPED | OUTPATIENT
Start: 2021-07-01 | End: 2022-02-13

## 2021-07-01 RX ORDER — METOPROLOL SUCCINATE 25 MG/1
25 TABLET, EXTENDED RELEASE ORAL DAILY
Qty: 90 TABLET | Refills: 1 | Status: SHIPPED | OUTPATIENT
Start: 2021-07-01 | End: 2021-08-05

## 2021-07-02 LAB
CHOL/HDLC RATIO: 2
CHOLEST SERPL-MSCNC: 148 MG/DL (ref 100–200)
HDLC SERPL-MCNC: 69 MG/DL (ref 40–75)
LDLC SERPL CALC-MCNC: 73 MG/DL (ref 0–125)
NONHDLC SERPL-MCNC: 79 MG/DL (ref 60–125)
TRIGL SERPL-MCNC: 39 MG/DL (ref 30–150)

## 2021-07-12 ENCOUNTER — PATIENT OUTREACH (OUTPATIENT)
Dept: ADMINISTRATIVE | Facility: HOSPITAL | Age: 81
End: 2021-07-12

## 2021-08-18 ENCOUNTER — TELEPHONE (OUTPATIENT)
Dept: PRIMARY CARE CLINIC | Facility: CLINIC | Age: 81
End: 2021-08-18

## 2021-09-17 LAB — BMD RECOMMENDATION EXT: NORMAL

## 2021-12-14 ENCOUNTER — PATIENT OUTREACH (OUTPATIENT)
Dept: PRIMARY CARE CLINIC | Facility: CLINIC | Age: 81
End: 2021-12-14
Payer: MEDICARE

## 2022-01-28 ENCOUNTER — TELEPHONE (OUTPATIENT)
Dept: PRIMARY CARE CLINIC | Facility: CLINIC | Age: 82
End: 2022-01-28
Payer: MEDICARE

## 2022-01-28 DIAGNOSIS — M25.562 ACUTE PAIN OF LEFT KNEE: Primary | ICD-10-CM

## 2022-01-28 NOTE — TELEPHONE ENCOUNTER
LOV 7/1/21  Pt twisted her knee getting up from the sofa 3 days going. She is having pain to her left knee/hamstring. She is using a walker that  A family friend gave her. She does have an appt to see you on Tues but would like to know if you think she needs to be eval by someone before. She states she will not take any without talking to you first. She is taking otc Tylenol with very minimal help

## 2022-01-28 NOTE — TELEPHONE ENCOUNTER
I put in a referral to Sports medicine to evaluate further.  She may need xrays if worsens. It is feeling better. Rec ice prn 20 min intervals, elevate the knee. She is ambulating without walker now. She has been taking tylenol and it is now helping. Can wrap with ACE bandage or use a knee brace.    Dr. JACOBS

## 2022-01-28 NOTE — TELEPHONE ENCOUNTER
----- Message from Martha Adams sent at 1/28/2022  9:21 AM CST -----  Contact: [phone]  Pt states she will be here for appt she states she will be at her appt on Tuesday but she has a question for Jyothi please advise and give a return call

## 2022-01-30 NOTE — PROGRESS NOTES
"[facility] Note    Chief Complaint      Chief Complaint   Patient presents with    Follow-up       History of Present Illness      Elizabeth Dickson is a 81 y.o. . WF with Breast cancer, Pulmonary nodules, Adjustment disorder, CKD III,  BLE, HTN, Hypothyroidism, Osteopenia presents to fu chronic issues.  Last visit- 7/1/21.      Left Knee pain - Twisted it getting up from sofa 1/25/22.  She has been icing it and using an ACE bandage. I referred her to Sports medicine but she prefers to go to [facility]. Sometimes it is better and sometimes she feels her leg is going to give out.     Syncope - No further episodes. Pt had a CT Head and Cardiac davis that was neg.      Hyperkalemia -Resolved since Lisinopril was d/c in hospital. 5.2.  Pt on Low potassium diet.  Wnl in Sept.      Ovarian cyst-4.3 x 4.3 cm-Fu  by Dr. Chandler. Benign - was d/c from clinic.       Breast CA-invasive ductal carcinoma, right breast s/p lumpectomy.  Fu by Dr. Solo.  Pt on Arimidex. She had a titanium marker placed over non-cancerous spot and is planning for upcoming imaging.  Pt fu by H/O - Dr. Grande.     Iron deficiency anemia - Pt on iron via prenatal vitamins q day.     Pulmonary nodules - Prev fu by Pulmonary, Dr. Salinas, annually. She had a PET scan and was reassured. D/c from his clinic.      Vitamin-D deficiency-Resolved 48 up from prev 28-Pt completed ergocalciferol times 12 weeks and is on vitamin D3 1000 units/day.     CKD III - Fu  by Dr. Goodman. Has fu in in Apr.  BUN/Cr - 23/1.1 GFR - 46 -9/29/21. Stable.   Cont to rec adeq hydration     Adjustment disorder with mixed anxiety and depression-Pt on melatonin OTC.  Pt doing good on Lexapro 5 mg and it has helped and she has been blocking out things that bother her.  "It's good".      TMJ -  "It's better". Can't take NSAIDS. She takes a tylenol prn - helps.  She can also try ice/massaging.      Hypothyroidism- Pt controlled on levothyroxine 75 " "mcg/day.  Repeat TFT's.     HTN - Pt off Lisinopril 10 mg 2 tabs/day.  Pt is on amlodipine 2.5 mg/d, and Toprol-XL 25 mg QHS.  Continue low-sodium diet. +Microalbuminuria. Her BP is 130/62 HR 87 today.  She denies any dizziness.       HLD - Pt on Lipitor 10 mg QHS. Controlled.  Cont current dose of Lipitor. 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      Erythema Annulare Centrifugum - Pt had a repeat Bx. Pt off dapsone, Doxycycline.    Fu by Derm, Dr. Sloan. She saw ID, Dr. Rollins. She was on Prednisone 5 mg/d. Now fu by Dr. Lynn.  She has self-tapered off prednisone as she does not believe this was helping after 3 months. Dr. Lynn Rx Doxycycline 100 mg/d which she started 1/10/20. She was told to try a diet avoiding eggplant, potatoes, peppers, and blue cheese. Allergy tests were neg. Immune tests wnl. She saw A/I Dr. Bar and was told to fu with Derm . Pt off Abx.  Fu by new [facility], Dr. Hinds. She uses Aquaphor.     Low back pain - "It's ok". Affects her ability to walk.  Pt off Gabapentin 100 mg QHS per Dr. Rollins. No longer fu by Dr. Mallory.  She goes to the gym most days.  Stopped when twisted knee last wk.      BLE - Pt uses compression socks.     Carotid bruits-no significant stenosis seen on previous carotid ultrasound.     Renal mass -8 mm right kidney.  Followed up by  Dr. Sanchez - had U/S and was discharged from his clinic.     Pancreatic cyst- Fu by GI, Dr. King.  Pt had EUS-02/21/2019      Osteopenia-Pt on Calcium 1200 mg/d, and vitamin D3 1000 units/day.  She did not get her Prolia in Mar.  ID told her to hold it.  Note - she is on arimidex.    Pt off Prolia.  Recent DEXA with Dr. Almanza was stable per pt.     H/o COVID -19 - 8/2021     HCM - Flu - 12/24/21;  Tdap - '06?;  PCV 13 - 6/2/17;  PVX 23 - 11/1/10;   Zostavax - utd; Shingrix - 8/18/20 nad #2 - 11/19/20; COVID - 19 - Vaccine (Pfizer) #1 - 1/26/21;  #2 - 2/15/21; 3 3 11/17/21;  MGM - '21 -per pt;  DEXA - 8/16/19 - " Osteopenia; 11/10/20 -await report;  Hep C - 2/7/18; C-scope - 1/10/19 - polyp, int hemorrhoids, diverticulosis, repeat 5 yrs;  Gyn/Onc - Dr. Chandler;  Renal - Dr. Goodman;  GI - Dr. King/Darron;  H/O - Dr. Grande;  Prev Rheum - Dr. Mallory;  Gen Sx - Dr. Solo; Pulm - Dr. Salinas;  ID - Dr. Rollins; Podiatry - Dr. Ruiz; Ortho - [facility]; Derm - Dr. Angeles; Derm - Dr. Sloan     Patient Care Team:  Porsha Masters MD as PCP - General (Internal Medicine)  Benoit Salinas MD as Consulting Physician (Family Medicine)  Robbie Mallory Jr., MD as Consulting Physician (Rheumatology)  Robbie Mallory Jr., MD (Rheumatology)  Russell King MD as Consulting Physician (Gastroenterology)  Lynsey Goodman MD as Consulting Physician (Nephrology)  Felecia Solo MD as Consulting Physician (General Surgery)  Duncan Singleton DPM as Consulting Physician (Podiatry)  Tarun Rollins Jr., MD as  (Infectious Diseases)  Naomy Estevez MA as Care Coordinator     Health Maintenance:  Immunization History   Administered Date(s) Administered    COVID-19, MRNA, LN-S, PF (Pfizer) (Purple Cap) 01/26/2021, 02/15/2021    Influenza 10/18/2005, 10/11/2013, 09/18/2015, 09/17/2016, 10/01/2018, 10/25/2018, 09/12/2019    Influenza - High Dose - PF (65 years and older) 10/25/2018, 09/12/2019, 08/18/2020    Influenza Split 10/18/2005, 10/11/2013    Pneumococcal Conjugate - 13 Valent 10/11/2013, 06/23/2016, 06/02/2017, 06/28/2021    Pneumococcal Polysaccharide - 23 Valent 11/01/2010    Zoster Recombinant 08/18/2020, 11/19/2020      Health Maintenance   Topic Date Due    TETANUS VACCINE  Never done    DEXA SCAN  09/17/2024    Lipid Panel  07/02/2026        Past Medical History:  Past Medical History:   Diagnosis Date    Anxiety     Bilateral leg edema     Breast cancer     Cellulitis of left leg     CKD (chronic kidney disease), stage III     Hypertension      "Hypothyroidism     Iron deficiency anemia     Osteopenia     Ovarian cyst     Teratoma and Benign Serous Cystadenoma    Pancreatic cyst     Pulmonary nodules     Renal mass     Vitamin D deficiency        Past Surgical History:   has a past surgical history that includes Breast surgery; Removal of Ovaries; and debridement.    Family History:  family history includes COPD in her brother; Cholelithiasis in her brother; Diabetes in her father; Diabetes type I in her other; Heart disease in her brother and brother; Hypertension in her father and mother; Lung cancer in her sister.     Social History:  Social History     Tobacco Use    Smoking status: Never Smoker    Smokeless tobacco: Never Used   Substance Use Topics    Alcohol use: No    Drug use: Never       Review of Systems   Constitutional: Negative for chills, diaphoresis and fever.   HENT: Positive for hearing loss. Negative for nasal congestion and sore throat.    Respiratory: Negative for cough and shortness of breath.    Cardiovascular: Negative for chest pain, palpitations and leg swelling.   Gastrointestinal: Positive for abdominal pain. Negative for constipation, diarrhea, nausea and vomiting.        Abd pain when I get stressed out.   Endocrine: Negative for polydipsia.   Genitourinary: Negative for dysuria and frequency.   Musculoskeletal: Negative for arthralgias and myalgias.   Neurological: Negative for dizziness and headaches.   Psychiatric/Behavioral: Negative for dysphoric mood. The patient is not nervous/anxious.         "Doing well". Daughter and son and in law living with her and she is happy about this.        Medications:    Current Outpatient Medications:     amLODIPine (NORVASC) 2.5 MG tablet, Take 1 tablet (2.5 mg total) by mouth once daily., Disp: 90 tablet, Rfl: 1    atorvastatin (LIPITOR) 10 MG tablet, TAKE 1 TABLET BY MOUTH EVERY DAY, Disp: 90 tablet, Rfl: 3    calcium carbonate 1250 MG capsule, Take by mouth 2 (two) times " "daily with meals., Disp: , Rfl:     cholecalciferol, vitamin D3, (VITAMIN D3) 25 mcg (1,000 unit) capsule, , Disp: , Rfl:     denosumab (PROLIA) 60 mg/mL Syrg, Inject 1 mL (60 mg total) into the skin every 6 (six) months., Disp: , Rfl:     EScitalopram oxalate (LEXAPRO) 5 MG Tab, TAKE 1 TABLET BY MOUTH EVERY DAY, Disp: 90 tablet, Rfl: 1    ferrous sulfate (FEOSOL) 325 mg (65 mg iron) Tab tablet, , Disp: , Rfl:     latanoprost 0.005 % ophthalmic solution, Place into both eyes., Disp: , Rfl:     levothyroxine (SYNTHROID) 75 MCG tablet, TAKE 1 TABLET BY MOUTH EVERY DAY, Disp: 90 tablet, Rfl: 3    melatonin 10 mg Tab, Take 10 mg by mouth., Disp: , Rfl:     metoprolol succinate (TOPROL-XL) 25 MG 24 hr tablet, TAKE 1 TABLET BY MOUTH EVERY DAY, Disp: 90 tablet, Rfl: 1     Allergies:  Review of patient's allergies indicates:   Allergen Reactions    Lisinopril      hyperkalemia       Physical Exam      Vital Signs  Temp: 97.9 °F (36.6 °C)  Pulse: 65  Resp: 18  SpO2: 99 %  BP: 128/80  BP Location: Left arm  Patient Position: Sitting  Pain Score: 0-No pain  Height and Weight  Height: 5' 2" (157.5 cm)  Weight: 68.9 kg (151 lb 14.4 oz)  BSA (Calculated - sq m): 1.74 sq meters  BMI (Calculated): 27.8  Weight in (lb) to have BMI = 25: 136.4      Patient Position: Sitting      Physical Exam  Vitals reviewed.   Constitutional:       General: She is not in acute distress.     Appearance: Normal appearance. She is not ill-appearing, toxic-appearing or diaphoretic.   HENT:      Head: Normocephalic and atraumatic.      Right Ear: Tympanic membrane normal.      Left Ear: Tympanic membrane normal.   Eyes:      Extraocular Movements: Extraocular movements intact.      Conjunctiva/sclera: Conjunctivae normal.      Pupils: Pupils are equal, round, and reactive to light.   Neck:      Vascular: No carotid bruit.   Cardiovascular:      Rate and Rhythm: Normal rate and regular rhythm.      Pulses: Normal pulses.      Heart sounds: " Normal heart sounds.   Pulmonary:      Effort: Pulmonary effort is normal.      Breath sounds: Normal breath sounds.   Abdominal:      General: Bowel sounds are normal. There is no distension.      Palpations: Abdomen is soft.      Tenderness: There is no abdominal tenderness. There is no guarding or rebound.      Hernia: No hernia is present.      Comments: Umbilicus slight sanguinous drainage.  Cleaned with cotton swab.  Do not see any erythema or open sore but can not see the entire umbilicus.  No hernia noted.  No prominence on Valsalva.  NTTP   Musculoskeletal:      Comments: Valgus deformity of knees; No erythema. No warmth. No effusion.  Pt able to flex and extend knee without c/o pain. NTTP to medial or lateral joint line.  No laxity.    Skin:     General: Skin is warm and dry.      Comments: Erythematous scaly plaques to LE   Neurological:      General: No focal deficit present.      Mental Status: She is alert and oriented to person, place, and time.   Psychiatric:         Mood and Affect: Mood normal.         Behavior: Behavior normal.          Laboratory:  CBC:  Recent Labs   Lab 02/23/21  1048 02/23/21  1048 04/01/21  1138 04/01/21  1138 09/29/21  1035   WBC 5.4   < > 5.14   < > 4.8   RBC 4.91   < > 4.83   < > 4.43   Hemoglobin 14.1   < > 13.7   < > 13.1   Hematocrit 42.9   < > 43.0   < > 38.7   Platelets 183   < > 189   < > 168   MCV 87.3   < > 89   < > 87.4   MCH 28.8   < > 28.4   < > 29.5   MCHC 33.0  --  31.9 L  --  33.8    < > = values in this interval not displayed.       CMP:  Recent Labs   Lab 08/31/20  1150 08/31/20  1150 02/23/21  1048 02/23/21  1048 09/29/21  1035   Glucose 90   < > 95   < > 90   Calcium 9.7   < > 9.8   < > 9.5   ALBUMIN 4.2   < > 4.5   < > 4.1   Total Protein 5.9 L   < > 6.4   < > 6.1 L   Sodium 141   < > 139   < > 141   Potassium 4.1   < > 4.0   < > 4.3   CO2 24   < > 25   < > 26   Chloride 106   < > 102   < > 105   BUN 22 H   < > 22 H   < > 23 H   Creatinine 1.3 H   < >  1.0   < > 1.1 H   Alkaline Phosphatase 63   < > 72   < > 98   ALT 23   < > 50   < > 21   AST 25   < > 39   < > 28   Total Bilirubin 0.3  --  0.4  --  0.4    < > = values in this interval not displayed.       URINALYSIS:  Recent Labs   Lab 08/31/20  1153 08/31/20  1153 02/23/21  1050 02/23/21  1050 09/29/21  1037   Color, UA YELLOW   < > YELLOW   < > YELLOW   Specific Gravity, UA 1.005   < > 1.006   < > 1.007   pH, UA < OR = 5.0   < > < OR = 5.0   < > < OR = 5.0   Protein, UA NEGATIVE   < > NEGATIVE   < > NEGATIVE   Glucose, UA NEGATIVE   < > NEGATIVE   < > NEGATIVE   Bacteria, UA NONE SEEN   < > NONE SEEN   < > NONE SEEN   Nitrite, UA NEGATIVE   < > NEGATIVE   < > NEGATIVE   Leukocytes, UA NEGATIVE   < > NEGATIVE   < > NEGATIVE   Hyaline Casts, UA NONE SEEN  --  NONE SEEN  --  NONE SEEN    < > = values in this interval not displayed.        LIPIDS:  Recent Labs   Lab 02/06/20  1036 05/20/20  0734 02/26/21  1042 07/02/21  0851   TSH 0.889  --  1.26  --    HDL  --  66  --  69   Cholesterol  --  161  --  148   Triglycerides  --  51  --  39   LDL Calculated  --   --   --  73   LDL Cholesterol  --  84.8  --   --    HDL/Cholesterol Ratio  --  41.0  --   --    Non-HDL Cholesterol  --  95  --  79   Total Cholesterol/HDL Ratio  --  2.4  --   --        TSH:  Recent Labs   Lab 02/06/20  1036 02/26/21  1042   TSH 0.889 1.26       Other:   Recent Labs   Lab 03/17/20  1111   Ferritin 177.0 H   Iron 58   TIBC 280           Assessment/Plan     Elizabeth Dickson is a 81 y.o.female with:    Hypertension, unspecified type  - Controlled.  Cont current.     Hyperlipidemia, unspecified hyperlipidemia type.ngc  - Controlled.  Cont current.     Anxiety  - Controlled.  Cont current.     Depression, unspecified depression type  - Controlled.  Cont current.     Stage 3a chronic kidney disease  - Stable.  Cont current regimen.     Hypothyroidism, unspecified type  -     TSH; Future; Expected date: 02/01/2022  -     T4, Free; Future;  Expected date: 02/01/2022  - Controlled.  Cont current.     Bilateral leg edema  - Stable.  Cont current regimen.    Osteopenia, unspecified location  - Will get copy of DEXA from Dr. Grande. Pt no longer on Pharmacotherapy.     Acute pain of left knee  - Pt plans to fu with [facility].    Umbilical bleeding  - Unable to visualize source.  Cleaned with soap and water.  Pt will apply neosporin BID and continue to monitor. If persists she will alert me.     Chronic conditions status updated as per HPI.  Other than changes above, cont current medications and maintain follow up with specialists.  Follow up in about 6 months (around 8/1/2022) for fu chronic issues or sooner if needed.      Porsha Masters MD  [facility]

## 2022-02-01 ENCOUNTER — OFFICE VISIT (OUTPATIENT)
Dept: PRIMARY CARE CLINIC | Facility: CLINIC | Age: 82
End: 2022-02-01
Payer: MEDICARE

## 2022-02-01 VITALS
SYSTOLIC BLOOD PRESSURE: 128 MMHG | TEMPERATURE: 98 F | OXYGEN SATURATION: 99 % | RESPIRATION RATE: 18 BRPM | WEIGHT: 151.88 LBS | HEIGHT: 62 IN | DIASTOLIC BLOOD PRESSURE: 80 MMHG | BODY MASS INDEX: 27.95 KG/M2 | HEART RATE: 65 BPM

## 2022-02-01 DIAGNOSIS — E03.9 HYPOTHYROIDISM, UNSPECIFIED TYPE: ICD-10-CM

## 2022-02-01 DIAGNOSIS — N18.31 STAGE 3A CHRONIC KIDNEY DISEASE: ICD-10-CM

## 2022-02-01 DIAGNOSIS — R60.0 BILATERAL LEG EDEMA: ICD-10-CM

## 2022-02-01 DIAGNOSIS — E78.5 HYPERLIPIDEMIA, UNSPECIFIED HYPERLIPIDEMIA TYPE: ICD-10-CM

## 2022-02-01 DIAGNOSIS — F32.A DEPRESSION, UNSPECIFIED DEPRESSION TYPE: ICD-10-CM

## 2022-02-01 DIAGNOSIS — I10 HYPERTENSION, UNSPECIFIED TYPE: Primary | ICD-10-CM

## 2022-02-01 DIAGNOSIS — F41.9 ANXIETY: ICD-10-CM

## 2022-02-01 DIAGNOSIS — M25.562 ACUTE PAIN OF LEFT KNEE: ICD-10-CM

## 2022-02-01 DIAGNOSIS — R19.8 UMBILICAL BLEEDING: ICD-10-CM

## 2022-02-01 DIAGNOSIS — M85.80 OSTEOPENIA, UNSPECIFIED LOCATION: ICD-10-CM

## 2022-02-01 PROCEDURE — 1101F PR PT FALLS ASSESS DOC 0-1 FALLS W/OUT INJ PAST YR: ICD-10-PCS | Mod: CPTII,S$GLB,, | Performed by: INTERNAL MEDICINE

## 2022-02-01 PROCEDURE — 1126F PR PAIN SEVERITY QUANTIFIED, NO PAIN PRESENT: ICD-10-PCS | Mod: CPTII,S$GLB,, | Performed by: INTERNAL MEDICINE

## 2022-02-01 PROCEDURE — 99214 OFFICE O/P EST MOD 30 MIN: CPT | Mod: S$GLB,,, | Performed by: INTERNAL MEDICINE

## 2022-02-01 PROCEDURE — 3079F PR MOST RECENT DIASTOLIC BLOOD PRESSURE 80-89 MM HG: ICD-10-PCS | Mod: CPTII,S$GLB,, | Performed by: INTERNAL MEDICINE

## 2022-02-01 PROCEDURE — 1159F MED LIST DOCD IN RCRD: CPT | Mod: CPTII,S$GLB,, | Performed by: INTERNAL MEDICINE

## 2022-02-01 PROCEDURE — 3074F SYST BP LT 130 MM HG: CPT | Mod: CPTII,S$GLB,, | Performed by: INTERNAL MEDICINE

## 2022-02-01 PROCEDURE — 99214 PR OFFICE/OUTPT VISIT, EST, LEVL IV, 30-39 MIN: ICD-10-PCS | Mod: S$GLB,,, | Performed by: INTERNAL MEDICINE

## 2022-02-01 PROCEDURE — 99999 PR PBB SHADOW E&M-EST. PATIENT-LVL IV: ICD-10-PCS | Mod: PBBFAC,,, | Performed by: INTERNAL MEDICINE

## 2022-02-01 PROCEDURE — 3288F FALL RISK ASSESSMENT DOCD: CPT | Mod: CPTII,S$GLB,, | Performed by: INTERNAL MEDICINE

## 2022-02-01 PROCEDURE — 3074F PR MOST RECENT SYSTOLIC BLOOD PRESSURE < 130 MM HG: ICD-10-PCS | Mod: CPTII,S$GLB,, | Performed by: INTERNAL MEDICINE

## 2022-02-01 PROCEDURE — 1101F PT FALLS ASSESS-DOCD LE1/YR: CPT | Mod: CPTII,S$GLB,, | Performed by: INTERNAL MEDICINE

## 2022-02-01 PROCEDURE — 99999 PR PBB SHADOW E&M-EST. PATIENT-LVL IV: CPT | Mod: PBBFAC,,, | Performed by: INTERNAL MEDICINE

## 2022-02-01 PROCEDURE — 1159F PR MEDICATION LIST DOCUMENTED IN MEDICAL RECORD: ICD-10-PCS | Mod: CPTII,S$GLB,, | Performed by: INTERNAL MEDICINE

## 2022-02-01 PROCEDURE — 3079F DIAST BP 80-89 MM HG: CPT | Mod: CPTII,S$GLB,, | Performed by: INTERNAL MEDICINE

## 2022-02-01 PROCEDURE — 3288F PR FALLS RISK ASSESSMENT DOCUMENTED: ICD-10-PCS | Mod: CPTII,S$GLB,, | Performed by: INTERNAL MEDICINE

## 2022-02-01 PROCEDURE — 1126F AMNT PAIN NOTED NONE PRSNT: CPT | Mod: CPTII,S$GLB,, | Performed by: INTERNAL MEDICINE

## 2022-02-01 RX ORDER — LATANOPROST 50 UG/ML
SOLUTION/ DROPS OPHTHALMIC
COMMUNITY
Start: 2022-01-18

## 2022-02-03 ENCOUNTER — TELEPHONE (OUTPATIENT)
Dept: PRIMARY CARE CLINIC | Facility: CLINIC | Age: 82
End: 2022-02-03
Payer: MEDICARE

## 2022-02-03 LAB
T4 FREE SERPL-MCNC: 1.3 NG/DL (ref 0.8–1.8)
TSH SERPL-ACNC: 1.08 MIU/L (ref 0.4–4.5)

## 2022-02-03 NOTE — TELEPHONE ENCOUNTER
Ok. Sounds good.  Glad she didn't tear anything.  Exam was pretty benign so that's great news.    Dr. JACOBS

## 2022-02-03 NOTE — TELEPHONE ENCOUNTER
----- Message from Betty Adams sent at 2/3/2022 10:47 AM CST -----  Contact: self [phone]  Patient went to ortho 2/2/2022 and he said it is arthritis in the leg. Patient just wanted to inform doctor.

## 2022-02-08 ENCOUNTER — PATIENT OUTREACH (OUTPATIENT)
Dept: ADMINISTRATIVE | Facility: HOSPITAL | Age: 82
End: 2022-02-08
Payer: MEDICARE

## 2022-02-09 ENCOUNTER — TELEPHONE (OUTPATIENT)
Dept: PRIMARY CARE CLINIC | Facility: CLINIC | Age: 82
End: 2022-02-09
Payer: MEDICARE

## 2022-02-09 NOTE — TELEPHONE ENCOUNTER
I would like for her to fu with Gen Sx she sees Dr. Solo. I rec she fu with her. Remind me and I can message Dr. Solo in AM.    Dr. JACOBS

## 2022-02-09 NOTE — TELEPHONE ENCOUNTER
LOV 2/1/22    From last note: Comments: Umbilicus slight sanguinous drainage.  Cleaned with cotton swab.  Do not see any erythema or open sore but can not see the entire umbilicus.  No hernia noted.  No prominence on Valsalva.  NTTP   You told her to use Neosporin Bid . Pt states this is not helping

## 2022-02-09 NOTE — TELEPHONE ENCOUNTER
----- Message from Jolly Noel sent at 2/9/2022 10:07 AM CST -----  Contact: Elizabeth   Patient would like to get medical advice.  Symptoms (please be specific): naval blood discharge    How long have you had these symptoms: since last week   Would you like a call back, or a response through your [facility] portal?: call back   Pharmacy name and phone # (copy from chart): [facility]  [address]   Comments:Elizabeth was in last week to see Dr Masters & was told to use neosporin but she said it is not working

## 2022-02-14 ENCOUNTER — PES CALL (OUTPATIENT)
Dept: ADMINISTRATIVE | Facility: CLINIC | Age: 82
End: 2022-02-14
Payer: MEDICARE

## 2022-02-22 ENCOUNTER — PATIENT MESSAGE (OUTPATIENT)
Dept: PRIMARY CARE CLINIC | Facility: CLINIC | Age: 82
End: 2022-02-22
Payer: MEDICARE

## 2022-02-23 NOTE — TELEPHONE ENCOUNTER
We can fill out the paperwork but if they are asking about an annual well visit for Medicare we don't actually do those.  Those are performed by a NP. Let's get the form and see.  Thanks for the update.  I ahd spoken with Dr. Solo.    Dr. JACOBS

## 2022-03-08 ENCOUNTER — PATIENT OUTREACH (OUTPATIENT)
Dept: ADMINISTRATIVE | Facility: HOSPITAL | Age: 82
End: 2022-03-08
Payer: MEDICARE

## 2022-03-11 ENCOUNTER — PES CALL (OUTPATIENT)
Dept: ADMINISTRATIVE | Facility: CLINIC | Age: 82
End: 2022-03-11
Payer: MEDICARE

## 2022-03-28 DIAGNOSIS — E78.5 HYPERLIPIDEMIA, UNSPECIFIED HYPERLIPIDEMIA TYPE: ICD-10-CM

## 2022-03-28 NOTE — TELEPHONE ENCOUNTER
No new care gaps identified.  Powered by [email or web address]. Reference number: [id number].   3/28/2022 1:53:23 PM CDT

## 2022-03-29 RX ORDER — ATORVASTATIN CALCIUM 10 MG/1
TABLET, FILM COATED ORAL
Qty: 90 TABLET | Refills: 1 | Status: SHIPPED | OUTPATIENT
Start: 2022-03-29 | End: 2022-11-01 | Stop reason: SDUPTHER

## 2022-03-29 NOTE — TELEPHONE ENCOUNTER
Refill Authorization Note   Elizabeth Dickson  is requesting a refill authorization.  Brief Assessment and Rationale for Refill:  Approve     Medication Therapy Plan:       Medication Reconciliation Completed: No   Comments:   --->Care Gap information included below if applicable.       Requested Prescriptions   Pending Prescriptions Disp Refills    atorvastatin (LIPITOR) 10 MG tablet [Pharmacy Med Name: ATORVASTATIN 10 MG TABLET] 90 tablet 1     Sig: TAKE 1 TABLET BY MOUTH EVERY DAY       Cardiovascular:  Antilipid - Statins Passed - 3/28/2022  1:52 PM        Passed - Patient is at least 18 years old        Passed - Valid encounter within last 15 months     Recent Visits  Date Type Provider Dept   02/01/22 Office Visit Porsha Masters MD [facility]   07/01/21 Office Visit Porsha Masters MD [facility]   02/25/21 Office Visit Porsha Masters MD [facility]   01/14/21 Office Visit Porsha Masters MD [facility]   10/12/20 Office Visit Porsha Masters MD [facility]   06/11/20 Office Visit Porsha Masters MD [facility]   04/30/20 Office Visit Porsha Masters MD [facility]   Showing recent visits within past 720 days and meeting all other requirements  Future Appointments  No visits were found meeting these conditions.  Showing future appointments within next 150 days and meeting all other requirements      Future Appointments              In 1 week Lynsey Goodman MD [facility], [facility]    In 3 months [facility]                Passed - Matches previous order       Previous Authorizing Provider: Porsha Masters MD (atorvastatin (LIPITOR) 10 MG tablet)  Previous [id number] [place], [place] - [address]            Passed - No ED/Hospital visits since last PCP visit     Last PCP Visit: 2/1/2022 Last Admission:  Last ED  Visit:           Passed - ALT is 131 or below and within 360 days     ALT   Date Value Ref Range Status   09/29/2021 21 7 - 56 unit/L Final   02/23/2021 50 7 - 56 unit/L Final   08/31/2020 23 7 - 56 unit/L Final   05/07/2018 16 7 - 56 U/L Final   03/14/2018 16 6 - 29 U/L Final              Passed - AST is 119 or below and within 360 days     AST   Date Value Ref Range Status   09/29/2021 28 7 - 40 unit/L Final   02/23/2021 39 7 - 40 unit/L Final   08/31/2020 25 7 - 40 unit/L Final   05/07/2018 22 7 - 40 U/L Final   03/14/2018 20 10 - 35 U/L Final   02/07/2018 19 10 - 35 U/L Final              Passed - Total Cholesterol within 360 days     Lab Results   Component Value Date    CHOL 148 07/02/2021    CHOL 161 05/20/2020              Passed - LDL within 360 days     LDL Calculated   Date Value Ref Range Status   07/02/2021 73 0 - 125 mg/dL Final            Passed - HDL within 360 days     HDL   Date Value Ref Range Status   07/02/2021 69 40 - 75 mg/dL Final            Passed - Triglycerides within 360 days     Lab Results   Component Value Date    TRIG 39 07/02/2021    TRIG 51 05/20/2020                  Appointments  past 12m or future 3m with PCP    Date Provider   Last Visit   2/1/2022 Porsha Masters MD   Next Visit   7/26/2022 Porsha Masters MD   ED visits in past 90 days: 0     Note composed:2:59 PM 03/29/2022

## 2022-05-05 ENCOUNTER — PES CALL (OUTPATIENT)
Dept: ADMINISTRATIVE | Facility: CLINIC | Age: 82
End: 2022-05-05
Payer: MEDICARE

## 2022-05-06 ENCOUNTER — TELEPHONE (OUTPATIENT)
Dept: PRIMARY CARE CLINIC | Facility: CLINIC | Age: 82
End: 2022-05-06
Payer: MEDICARE

## 2022-05-06 NOTE — TELEPHONE ENCOUNTER
----- Message from Leslye Boykin sent at 5/6/2022  3:51 PM CDT -----  Contact: [phone]  Pt called to get an appointment for clearance (pre-op) for an eye surgery she is scheduled to have 6/13/2022. Pt was offered an appointment on 5/26/22 but cannot do that day due to another appointment. Please Advise

## 2022-05-29 NOTE — PROGRESS NOTES
"[facility] Note    Chief Complaint      Chief Complaint   Patient presents with    Pre-op Exam       History of Present Illness      Elizabeth Dickson is a 82 y.o. WF with LBBB, Breast cancer, Pulmonary nodules, Adjustment disorder, CKD III,  BLE, HTN, Hypothyroidism, Osteopenia presents to fu chronic issues.  Last visit-2/1/22    Preop - Pt planning for Ptosis repair/Ectropion repair 6/13/22 with Dr. Mcpherson. She is able to mop and vaccuum at home without SOB. No CP.  She goes to the gym x 30 min and does seated elliptical.      Left Knee pain - Twisted it getting up from sofa 1/25/22.  She has been icing it and using an ACE bandage. Fu by Dr Crawford. Doing better since inj.      Syncope - No further episodes. Pt had a CT Head and Cardiac davis that was neg.      Hyperkalemia -Resolved since Lisinopril was d/c in hospital. 4.3 in Sept.  Pt on Low potassium diet.  Wnl in Sept.      Ovarian cyst-4.3 x 4.3 cm-Fu  by Dr. Chandler. Benign - was d/c from clinic.       Breast CA-invasive ductal carcinoma, right breast s/p lumpectomy.  Fu by Dr. Solo.  Pt on Arimidex. She had a titanium marker placed over non-cancerous spot and is planning for upcoming imaging.  Pt fu by H/O - Dr. Grande.     Iron deficiency anemia - Pt on iron via prenatal vitamins q day. Repeat CBC.      Pulmonary nodules - Prev fu by Pulmonary, Dr. Salinas, annually. She had a PET scan and was reassured. D/c from his clinic.      Vitamin-D deficiency-Resolved 48 up from prev 28-Pt completed ergocalciferol times 12 weeks and is on vitamin D3 1000 units/day.     CKD III - Fu  by Dr. Goodman.    BUN/Cr - 23/1.1 GFR - 46 -9/29/21. Stable.   Cont to rec adeq hydration     Adjustment disorder with mixed anxiety and depression-Pt on melatonin OTC.  Pt doing good on Lexapro 5 mg and it has helped and she has been blocking out things that bother her.  "I'm fine".      TMJ -  "It's better". Can't take NSAIDS. She takes a tylenol prn - helps. " " She can also try ice/massaging.      Hypothyroidism- Pt controlled on levothyroxine 75 mcg/day.  Repeat TFT's in Feb.      HTN - Pt off Lisinopril 10 mg 2 tabs/day due to hyperkalemia.  Pt is on amlodipine 2.5 mg/d, and Toprol-XL 25 mg QHS.  Continue low-sodium diet. +Microalbuminuria. Her BP is 132/69HR 68 at home today.  She denies any dizziness.       HLD - Pt on Lipitor 10 mg QHS. Controlled.  Cont current dose of Lipitor. Repeat FLP.     Lichen simplex chronicus - Fu by OB.      Erythema Annulare Centrifugum - Pt had a repeat Bx. Pt off dapsone, Doxycycline.    Fu by Derm, Dr. Sloan. She saw ID, Dr. Rollins. She was on Prednisone 5 mg/d. Now fu by Dr. Lynn.  She has self-tapered off prednisone as she does not believe this was helping after 3 months. Dr. Lynn Rx Doxycycline 100 mg/d which she started 1/10/20. She was told to try a diet avoiding eggplant, potatoes, peppers, and blue cheese. Allergy tests were neg. Immune tests wnl. She saw A/I Dr. Bar and was told to fu with Derm . Pt off Abx.  Fu by Dr. Guzman Brandt. She uses Aquaphor.    Infected tooth - She just completed PCN for infection.compelted it and is sched for tooth extraction tomorrow.      Low back pain - "It's ok". Affects her ability to walk.  Pt off Gabapentin 100 mg QHS per Dr. Rollins. No longer fu by Dr. Mallory.  She goes to the gym most days.  Stopped when twisted knee last wk.      BLE - Pt uses compression socks.     Carotid bruits-no significant stenosis seen on previous carotid ultrasound.     Renal mass -8 mm right kidney.  Fu by  Dr. Sanchez - had U/S and was discharged from his clinic.     Pancreatic cyst- Fu by GI, Dr. King.  Pt had EUS-02/21/2019. [facility] has not fu in a while.  Will check with his office as I suspect she is overdue.      Osteopenia-Pt on Calcium 1200 mg/d, and vitamin D3 1000 units/day.  She did not get her Prolia in Mar.  ID told her to hold it.  Note - she is on arimidex.    Pt off Prolia.  Recent DEXA with " Dr. Grande was stable.      H/o COVID -19 - 8/2021     HCM - Flu - 12/24/21;  Tdap - '06?;  PCV 13 - 6/2/17;  PVX 23 - 11/1/10;   Zostavax - utd; Shingrix - 8/18/20;  #2 - 11/19/20; COVID - 19 - Vaccine (Pfizer) #1 - 1/26/21;  #2 - 2/15/21; # 3 11/17/21;  MGM - '21 -per pt- planning to sched;  DEXA -Osteopenia; 11/10/20;  Hep C - 2/7/18; C-scope - 1/10/19 - polyp, int hemorrhoids, diverticulosis, repeat 5 yrs;  Gyn/Onc - Dr. Chandler;  Renal - Dr. Goodman;  GI - Dr. King/Darron;  H/O - Dr. Grande;  Prev Rheum - Dr. Mallory;  Gen Sx - Dr. Solo; Pulm - Dr. Salinas;  ID - Dr. Rollins; Podiatry - Dr. Ruiz; Ortho - [facility]; Derm - Dr. Angeles; Derm - Dr. Sloan       Patient Care Team:  Porsha Masters MD as PCP - General (Internal Medicine)  Benoit Salinas MD as Consulting Physician (Family Medicine)  Robbie Mallory Jr., MD as Consulting Physician (Rheumatology)  Robbie Mallory Jr., MD (Rheumatology)  Russell King MD as Consulting Physician (Gastroenterology)  Lynsey Goodman MD as Consulting Physician (Nephrology)  Felecia Solo MD as Consulting Physician (General Surgery)  Duncan Singleton DPM as Consulting Physician (Podiatry)  Tarun Rollins Jr., MD as  (Infectious Diseases)  Naomy Estevez MA as Care Coordinator     Health Maintenance:  Immunization History   Administered Date(s) Administered    COVID-19, MRNA, LN-S, PF (Pfizer) (Purple Cap) 01/26/2021, 02/15/2021, 11/17/2021    Influenza 10/18/2005, 10/11/2013, 09/18/2015, 09/17/2016, 10/01/2018, 10/25/2018, 09/12/2019    Influenza - High Dose - PF (65 years and older) 09/18/2015, 09/17/2016, 10/25/2018, 09/12/2019, 08/18/2020    Influenza - Quadrivalent - PF *Preferred* (6 months and older) 10/11/2013, 12/24/2021    Influenza Split 10/18/2005, 10/11/2013    Pneumococcal Conjugate - 13 Valent 10/11/2013, 06/23/2016, 06/02/2017, 06/28/2021    Pneumococcal Polysaccharide - 23  Valent 11/01/2010    Zoster Recombinant 08/18/2020, 11/19/2020      Health Maintenance   Topic Date Due    TETANUS VACCINE  Never done    DEXA Scan  09/17/2024    Lipid Panel  05/31/2027        Past Medical History:  Past Medical History:   Diagnosis Date    Anxiety     Bilateral leg edema     Breast cancer     Cellulitis of left leg     CKD (chronic kidney disease), stage III     Hypertension     Hypothyroidism     Iron deficiency anemia     Osteopenia     Ovarian cyst     Teratoma and Benign Serous Cystadenoma    Pancreatic cyst     Pulmonary nodules     Renal mass     Vitamin D deficiency        Past Surgical History:   has a past surgical history that includes Breast surgery; Removal of Ovaries; and debridement.    Family History:  family history includes COPD in her brother; Cholelithiasis in her brother; Diabetes in her father; Diabetes type I in her other; Heart disease in her brother and brother; Hypertension in her father and mother; Lung cancer in her sister.     Social History:  Social History     Tobacco Use    Smoking status: Never Smoker    Smokeless tobacco: Never Used   Substance Use Topics    Alcohol use: No    Drug use: Never       Review of Systems   Constitutional: Negative for chills, diaphoresis and fever.   HENT: Negative for nasal congestion and sore throat.    Eyes: Negative for visual disturbance.   Respiratory: Negative for cough and shortness of breath.    Cardiovascular: Negative for chest pain and palpitations.   Gastrointestinal: Negative for abdominal pain, constipation, diarrhea, nausea and vomiting.   Endocrine: Negative for cold intolerance and heat intolerance.   Genitourinary: Negative for dysuria and frequency.   Musculoskeletal: Negative for arthralgias and myalgias.   Neurological: Negative for dizziness and headaches.   Psychiatric/Behavioral: Negative for dysphoric mood. The patient is not nervous/anxious.         Sleeping well with taking melatonin at  "8 PM        Medications:    Current Outpatient Medications:     atorvastatin (LIPITOR) 10 MG tablet, TAKE 1 TABLET BY MOUTH EVERY DAY, Disp: 90 tablet, Rfl: 1    calcium carbonate 1250 MG capsule, Take by mouth 2 (two) times daily with meals., Disp: , Rfl:     cholecalciferol, vitamin D3, (VITAMIN D3) 25 mcg (1,000 unit) capsule, , Disp: , Rfl:     ferrous sulfate (FEOSOL) 325 mg (65 mg iron) Tab tablet, , Disp: , Rfl:     latanoprost 0.005 % ophthalmic solution, Place into both eyes., Disp: , Rfl:     levothyroxine (SYNTHROID) 75 MCG tablet, TAKE 1 TABLET BY MOUTH EVERY DAY, Disp: 90 tablet, Rfl: 1    melatonin 10 mg Tab, Take 10 mg by mouth., Disp: , Rfl:     amLODIPine (NORVASC) 2.5 MG tablet, Take 1 tablet (2.5 mg total) by mouth once daily., Disp: 90 tablet, Rfl: 1    EScitalopram oxalate (LEXAPRO) 5 MG Tab, Take 1 tablet (5 mg total) by mouth once daily., Disp: 90 tablet, Rfl: 1    metoprolol succinate (TOPROL-XL) 25 MG 24 hr tablet, Take 1 tablet (25 mg total) by mouth once daily., Disp: 90 tablet, Rfl: 1     Allergies:  Review of patient's allergies indicates:   Allergen Reactions    Lisinopril      hyperkalemia       Physical Exam      Vital Signs  Temp: 97.8 °F (36.6 °C)  Pulse: 68  Resp: 18  SpO2: 99 %  BP: (!) 140/84  BP Location: Left arm  Patient Position: Sitting  Pain Score: 0-No pain  Height and Weight  Height: 5' 2" (157.5 cm)  Weight: 66 kg (145 lb 8.1 oz)  BSA (Calculated - sq m): 1.7 sq meters  BMI (Calculated): 26.6  Weight in (lb) to have BMI = 25: 136.4      Patient Position: Sitting      Physical Exam  Vitals reviewed.   Constitutional:       General: She is not in acute distress.     Appearance: Normal appearance. She is not ill-appearing, toxic-appearing or diaphoretic.   HENT:      Head: Normocephalic and atraumatic.      Right Ear: Tympanic membrane normal.      Left Ear: Tympanic membrane normal.      Ears:      Comments: silvina hearing aids  Eyes:      Extraocular Movements: " Extraocular movements intact.      Conjunctiva/sclera: Conjunctivae normal.      Pupils: Pupils are equal, round, and reactive to light.      Comments: silvina ptosis   Neck:      Vascular: Carotid bruit present.      Comments: Left carotid bruit  Cardiovascular:      Rate and Rhythm: Normal rate and regular rhythm.      Pulses: Normal pulses.      Heart sounds: Normal heart sounds. No murmur heard.  Pulmonary:      Effort: No respiratory distress.      Breath sounds: Normal breath sounds. No wheezing.   Abdominal:      General: Bowel sounds are normal. There is no distension.      Palpations: Abdomen is soft.      Tenderness: There is no abdominal tenderness. There is no guarding or rebound.   Musculoskeletal:         General: Normal range of motion.      Comments: Valgus deformity of knees.    Skin:     General: Skin is warm and dry.   Neurological:      General: No focal deficit present.      Mental Status: She is alert and oriented to person, place, and time.   Psychiatric:         Mood and Affect: Mood normal.         Behavior: Behavior normal.          Laboratory:  CBC:  Recent Labs   Lab 04/01/21  1138 09/29/21  1035 05/31/22  1330   WBC 5.14 4.8 5.1   RBC 4.83 4.43 4.79   Hemoglobin 13.7 13.1 14.2   Hematocrit 43.0 38.7 42.4   Platelets 189 168 164   MCV 89 87.4 88.5   MCH 28.4 29.5 29.6   MCHC 31.9 L 33.8 33.4       CMP:  Recent Labs   Lab 02/23/21  1048 09/29/21  1035 05/31/22  1330   Glucose 95 90 94   Calcium 9.8 9.5 9.9   ALBUMIN 4.5 4.1 4.6   Total Protein 6.4 6.1 L 6.3   Sodium 139 141 142   Potassium 4.0 4.3 4.3   CO2 25 26 26   Chloride 102 105 103   BUN 22 H 23 H 23.0 H   Creatinine 1.0 1.1 H 1.06 H   Alkaline Phosphatase 72 98 104   ALT 50 21 24   AST 39 28 28   Total Bilirubin 0.4 0.4 0.6           URINALYSIS:  Recent Labs   Lab 02/23/21  1050 09/29/21  1037 03/29/22  1037   Color, UA YELLOW YELLOW YELLOW   Specific Gravity, UA 1.006 1.007 1.015   pH, UA < OR = 5.0 < OR = 5.0 < OR = 5.0   Protein, UA  NEGATIVE NEGATIVE NEGATIVE   Glucose, UA NEGATIVE NEGATIVE NEGATIVE   Bacteria, UA NONE SEEN NONE SEEN NONE SEEN   Nitrite, UA NEGATIVE NEGATIVE NEGATIVE   Leukocytes, UA NEGATIVE NEGATIVE 1+ A   Hyaline Casts, UA NONE SEEN NONE SEEN NONE SEEN        LIPIDS:  Recent Labs   Lab 02/06/20  1036 05/20/20  0734 02/26/21  1042 07/02/21  0851 02/02/22  1028 05/31/22  1330   TSH 0.889  --  1.26  --  1.08  --    HDL  --  66  --  69  --  75   Cholesterol  --  161  --  148  --  180   Triglycerides  --  51  --  39  --  81   LDL Calculated  --   --   --  73  --  92   LDL Cholesterol  --  84.8  --   --   --   --    HDL/Cholesterol Ratio  --  41.0  --   --   --   --    Non-HDL Cholesterol  --  95  --  79  --   --    Total Cholesterol/HDL Ratio  --  2.4  --   --   --   --        TSH:  Recent Labs   Lab 02/06/20  1036 02/26/21  1042 02/02/22  1028   TSH 0.889 1.26 1.08       Other:   Recent Labs   Lab 03/17/20  1111   Ferritin 177.0 H   Iron 58   TIBC 280           Assessment/Plan     Elizabeth Dickson is a 82 y.o.female with:    Preop examination  -     IN OFFICE EKG 12-LEAD (to Muse)  -     Comprehensive Metabolic Panel; Future; Expected date: 05/31/2022  -     CBC Auto Differential; Future; Expected date: 05/31/2022  - EKG - LBBB - not new seen on prev EKG from 2016. Will check labs and clear if normal and pt is aware she must alert MD if tooth infection does not clear as she would have to delay surgery in this event.  I received message from dentist after extraction that there is not current concern for infection.     Hypertension, unspecified type  -     metoprolol succinate (TOPROL-XL) 25 MG 24 hr tablet; Take 1 tablet (25 mg total) by mouth once daily.  Dispense: 90 tablet; Refill: 1  -     amLODIPine (NORVASC) 2.5 MG tablet; Take 1 tablet (2.5 mg total) by mouth once daily.  Dispense: 90 tablet; Refill: 1  - Home BP controlled.  Cont current regimen and cont to monitor at home and call with any concerns.      Hypothyroidism, unspecified type  - Controlled.  Cont current regimen.    Hyperlipidemia, unspecified hyperlipidemia type  -     Lipid Panel; Future; Expected date: 05/31/2022  - Controlled.  Cont current.  Repeat FLP.     Anxiety  -     EScitalopram oxalate (LEXAPRO) 5 MG Tab; Take 1 tablet (5 mg total) by mouth once daily.  Dispense: 90 tablet; Refill: 1  - Stable.  Cont current regimen.    Depression, unspecified depression type  -     EScitalopram oxalate (LEXAPRO) 5 MG Tab; Take 1 tablet (5 mg total) by mouth once daily.  Dispense: 90 tablet; Refill: 1  - Stable.  Cont current regimen.    Bilateral leg edema  - Stable.  Cont current regimen.    Osteopenia, unspecified location  - Stable.  Cont current regimen.    Pancreatic cyst  - Rec fu with GI office as I suspect she is due for fu.    Stage 3a chronic kidney disease  - Stable.  Cont current regimen.         Chronic conditions status updated as per HPI.  Other than changes above, cont current medications and maintain follow up with specialists.  Follow up in about 6 months (around 11/30/2022) for fu chronic issues or sooner if needed.      Porsha Masters MD  [facility]

## 2022-05-31 ENCOUNTER — OFFICE VISIT (OUTPATIENT)
Dept: PRIMARY CARE CLINIC | Facility: CLINIC | Age: 82
End: 2022-05-31
Payer: MEDICARE

## 2022-05-31 VITALS
DIASTOLIC BLOOD PRESSURE: 84 MMHG | WEIGHT: 145.5 LBS | RESPIRATION RATE: 18 BRPM | HEART RATE: 68 BPM | OXYGEN SATURATION: 99 % | BODY MASS INDEX: 26.78 KG/M2 | TEMPERATURE: 98 F | HEIGHT: 62 IN | SYSTOLIC BLOOD PRESSURE: 140 MMHG

## 2022-05-31 DIAGNOSIS — R60.0 BILATERAL LEG EDEMA: ICD-10-CM

## 2022-05-31 DIAGNOSIS — E78.5 HYPERLIPIDEMIA, UNSPECIFIED HYPERLIPIDEMIA TYPE: ICD-10-CM

## 2022-05-31 DIAGNOSIS — E03.9 HYPOTHYROIDISM, UNSPECIFIED TYPE: ICD-10-CM

## 2022-05-31 DIAGNOSIS — F41.9 ANXIETY: ICD-10-CM

## 2022-05-31 DIAGNOSIS — F32.A DEPRESSION, UNSPECIFIED DEPRESSION TYPE: ICD-10-CM

## 2022-05-31 DIAGNOSIS — K86.2 PANCREATIC CYST: ICD-10-CM

## 2022-05-31 DIAGNOSIS — Z01.818 PREOP EXAMINATION: Primary | ICD-10-CM

## 2022-05-31 DIAGNOSIS — N18.31 STAGE 3A CHRONIC KIDNEY DISEASE: ICD-10-CM

## 2022-05-31 DIAGNOSIS — M85.80 OSTEOPENIA, UNSPECIFIED LOCATION: ICD-10-CM

## 2022-05-31 DIAGNOSIS — I10 HYPERTENSION, UNSPECIFIED TYPE: ICD-10-CM

## 2022-05-31 LAB
ALBUMIN: 4.6 G/DL (ref 3.5–5)
ALP SERPL-CCNC: 104 U/L (ref 38–126)
ALT SERPL W P-5'-P-CCNC: 24 U/L (ref 7–56)
ANION GAP SERPL CALC-SCNC: 17.3 MMOL/L (ref 9–18)
AST SERPL-CCNC: 28 U/L (ref 7–40)
BASOPHILS ABSOLUTE COUNT: 0 THOUSAND/UL (ref 0–0.2)
BASOPHILS NFR BLD: 0.4 % (ref 0–2)
BILIRUB SERPL-MCNC: 0.6 MG/DL (ref 0–1.2)
BUN BLD-MCNC: 23 MG/DL (ref 7–21)
BUN/CREAT SERPL: 22 (ref 6–22)
CALC OSMOLALITY: 287 MOSM/KG (ref 275–295)
CALCIUM SERPL-MCNC: 9.9 MG/DL (ref 8.5–10.3)
CHLORIDE SERPL-SCNC: 103 MMOL/L (ref 98–107)
CHOL/HDLC RATIO: 2.4
CHOLEST SERPL-MSCNC: 180 MG/DL (ref 100–200)
CO2 SERPL-SCNC: 26 MMOL/L (ref 21–31)
CREAT SERPL-MCNC: 1.06 MG/DL (ref 0.5–1)
EGFR: 57 ML/MIN
EOSINOPHIL NFR BLD: 1 % (ref 0–4)
EOSINOPHILS ABSOLUTE COUNT: 0.1 THOUSAND/UL (ref 0–0.7)
ERYTHROCYTE [DISTWIDTH] IN BLOOD BY AUTOMATED COUNT: 14 % (ref 12–15.3)
GFR: 49 ML/MIN
GLUCOSE SERPL-MCNC: 94 MG/DL (ref 70–100)
HCT VFR BLD AUTO: 42.4 % (ref 37–47)
HDLC SERPL-MCNC: 75 MG/DL (ref 40–75)
HGB BLD-MCNC: 14.2 GM/DL (ref 12–16)
LDLC SERPL CALC-MCNC: 92 MG/DL (ref 0–125)
LYMPHOCYTES %: 24.8 % (ref 15–45)
LYMPHOCYTES ABSOLUTE COUNT: 1.3 THOUSAND/UL (ref 1–4.2)
MCH RBC QN AUTO: 29.6 PG (ref 27–33)
MCHC RBC AUTO-ENTMCNC: 33.4 G/DL (ref 32–36)
MCV RBC AUTO: 88.5 FL (ref 81–99)
MONOCYTES %: 7.7 % (ref 3–13)
MONOCYTES ABSOLUTE COUNT: 0.4 THOUSAND/UL (ref 0.1–0.8)
NEUTROPHILS ABSOLUTE COUNT: 3.4 THOUSAND/UL (ref 2.1–7.6)
NEUTROPHILS RELATIVE PERCENT: 66.1 % (ref 32–80)
PLATELET # BLD AUTO: 164 THOUSAND/UL (ref 150–350)
PMV BLD AUTO: 8.5 FL (ref 7–10.2)
POTASSIUM SERPL-SCNC: 4.3 MMOL/L (ref 3.5–5)
RBC # BLD AUTO: 4.79 MILLION/UL (ref 4.2–5.4)
SODIUM BLD-SCNC: 142 MMOL/L (ref 135–145)
TOTAL PROTEIN: 6.3 G/DL (ref 6.3–8.2)
TRIGL SERPL-MCNC: 81 MG/DL (ref 30–150)
WBC # BLD AUTO: 5.1 THOUSAND/UL (ref 4.5–11)

## 2022-05-31 PROCEDURE — 93005 EKG 12-LEAD: ICD-10-PCS | Mod: S$GLB,,, | Performed by: INTERNAL MEDICINE

## 2022-05-31 PROCEDURE — 1160F PR REVIEW ALL MEDS BY PRESCRIBER/CLIN PHARMACIST DOCUMENTED: ICD-10-PCS | Mod: CPTII,S$GLB,, | Performed by: INTERNAL MEDICINE

## 2022-05-31 PROCEDURE — 99214 PR OFFICE/OUTPT VISIT, EST, LEVL IV, 30-39 MIN: ICD-10-PCS | Mod: S$GLB,,, | Performed by: INTERNAL MEDICINE

## 2022-05-31 PROCEDURE — 99999 PR PBB SHADOW E&M-EST. PATIENT-LVL IV: ICD-10-PCS | Mod: PBBFAC,,, | Performed by: INTERNAL MEDICINE

## 2022-05-31 PROCEDURE — 1101F PT FALLS ASSESS-DOCD LE1/YR: CPT | Mod: CPTII,S$GLB,, | Performed by: INTERNAL MEDICINE

## 2022-05-31 PROCEDURE — 1160F RVW MEDS BY RX/DR IN RCRD: CPT | Mod: CPTII,S$GLB,, | Performed by: INTERNAL MEDICINE

## 2022-05-31 PROCEDURE — 3079F DIAST BP 80-89 MM HG: CPT | Mod: CPTII,S$GLB,, | Performed by: INTERNAL MEDICINE

## 2022-05-31 PROCEDURE — 3077F SYST BP >= 140 MM HG: CPT | Mod: CPTII,S$GLB,, | Performed by: INTERNAL MEDICINE

## 2022-05-31 PROCEDURE — 1159F MED LIST DOCD IN RCRD: CPT | Mod: CPTII,S$GLB,, | Performed by: INTERNAL MEDICINE

## 2022-05-31 PROCEDURE — 99999 PR PBB SHADOW E&M-EST. PATIENT-LVL IV: CPT | Mod: PBBFAC,,, | Performed by: INTERNAL MEDICINE

## 2022-05-31 PROCEDURE — 1126F AMNT PAIN NOTED NONE PRSNT: CPT | Mod: CPTII,S$GLB,, | Performed by: INTERNAL MEDICINE

## 2022-05-31 PROCEDURE — 99499 UNLISTED E&M SERVICE: CPT | Mod: S$GLB,,, | Performed by: INTERNAL MEDICINE

## 2022-05-31 PROCEDURE — 1101F PR PT FALLS ASSESS DOC 0-1 FALLS W/OUT INJ PAST YR: ICD-10-PCS | Mod: CPTII,S$GLB,, | Performed by: INTERNAL MEDICINE

## 2022-05-31 PROCEDURE — 3079F PR MOST RECENT DIASTOLIC BLOOD PRESSURE 80-89 MM HG: ICD-10-PCS | Mod: CPTII,S$GLB,, | Performed by: INTERNAL MEDICINE

## 2022-05-31 PROCEDURE — 1126F PR PAIN SEVERITY QUANTIFIED, NO PAIN PRESENT: ICD-10-PCS | Mod: CPTII,S$GLB,, | Performed by: INTERNAL MEDICINE

## 2022-05-31 PROCEDURE — 1159F PR MEDICATION LIST DOCUMENTED IN MEDICAL RECORD: ICD-10-PCS | Mod: CPTII,S$GLB,, | Performed by: INTERNAL MEDICINE

## 2022-05-31 PROCEDURE — 3288F PR FALLS RISK ASSESSMENT DOCUMENTED: ICD-10-PCS | Mod: CPTII,S$GLB,, | Performed by: INTERNAL MEDICINE

## 2022-05-31 PROCEDURE — 93005 ELECTROCARDIOGRAM TRACING: CPT | Mod: S$GLB,,, | Performed by: INTERNAL MEDICINE

## 2022-05-31 PROCEDURE — 99214 OFFICE O/P EST MOD 30 MIN: CPT | Mod: S$GLB,,, | Performed by: INTERNAL MEDICINE

## 2022-05-31 PROCEDURE — 99499 RISK ADDL DX/OHS AUDIT: ICD-10-PCS | Mod: S$GLB,,, | Performed by: INTERNAL MEDICINE

## 2022-05-31 PROCEDURE — 3077F PR MOST RECENT SYSTOLIC BLOOD PRESSURE >= 140 MM HG: ICD-10-PCS | Mod: CPTII,S$GLB,, | Performed by: INTERNAL MEDICINE

## 2022-05-31 PROCEDURE — 3288F FALL RISK ASSESSMENT DOCD: CPT | Mod: CPTII,S$GLB,, | Performed by: INTERNAL MEDICINE

## 2022-05-31 PROCEDURE — 93010 ELECTROCARDIOGRAM REPORT: CPT | Mod: S$GLB,,, | Performed by: INTERNAL MEDICINE

## 2022-05-31 PROCEDURE — 93010 EKG 12-LEAD: ICD-10-PCS | Mod: S$GLB,,, | Performed by: INTERNAL MEDICINE

## 2022-05-31 RX ORDER — METOPROLOL SUCCINATE 25 MG/1
25 TABLET, EXTENDED RELEASE ORAL DAILY
Qty: 90 TABLET | Refills: 1 | Status: SHIPPED | OUTPATIENT
Start: 2022-05-31 | End: 2022-11-01 | Stop reason: SDUPTHER

## 2022-05-31 RX ORDER — ESCITALOPRAM OXALATE 5 MG/1
5 TABLET ORAL DAILY
Qty: 90 TABLET | Refills: 1 | Status: SHIPPED | OUTPATIENT
Start: 2022-05-31 | End: 2022-11-01 | Stop reason: SDUPTHER

## 2022-05-31 RX ORDER — AMLODIPINE BESYLATE 2.5 MG/1
2.5 TABLET ORAL DAILY
Qty: 90 TABLET | Refills: 1 | Status: SHIPPED | OUTPATIENT
Start: 2022-05-31 | End: 2022-11-01 | Stop reason: SDUPTHER

## 2022-06-03 ENCOUNTER — TELEPHONE (OUTPATIENT)
Dept: PRIMARY CARE CLINIC | Facility: CLINIC | Age: 82
End: 2022-06-03
Payer: MEDICARE

## 2022-06-03 NOTE — TELEPHONE ENCOUNTER
----- Message from Porsha Masters MD sent at 6/3/2022  4:01 PM CDT -----  Called pt.  No answer.  Left message to return call.  Please inform pt and I also sent her a my chart message-  I reviewed your labs.  Your Cholesterol looked good.  Your kidney function was stable and liver functions looked good.  You are not anemic. You are clear for your surgery. No further recommendations at this time.    Porsha

## 2022-06-03 NOTE — PROGRESS NOTES
I sent pt a my chart message -  I reviewed your EKG which showed a Left bundle branch block.  This is chronic and was seen on your previous EKG from 2016. You are clear for your surgery. Your dentist also sent me a letter telling me things went well with your tooth extraction.    Porsha

## 2022-06-03 NOTE — PROGRESS NOTES
Called pt.  No answer.  Left message to return call.  Please inform pt and I also sent her a my chart message-  I reviewed your labs.  Your Cholesterol looked good.  Your kidney function was stable and liver functions looked good.  You are not anemic. You are clear for your surgery. No further recommendations at this time.    Porsha

## 2022-06-03 NOTE — TELEPHONE ENCOUNTER
----- Message from Porsha Masters MD sent at 6/3/2022  4:02 PM CDT -----  I sent pt a my chart message -  I reviewed your EKG which showed a Left bundle branch block.  This is chronic and was seen on your previous EKG from 2016. You are clear for your surgery. Your dentist also sent me a letter telling me things went well with your tooth extraction.    Porsha

## 2022-06-09 ENCOUNTER — PATIENT MESSAGE (OUTPATIENT)
Dept: PRIMARY CARE CLINIC | Facility: CLINIC | Age: 82
End: 2022-06-09
Payer: MEDICARE

## 2022-06-09 ENCOUNTER — TELEPHONE (OUTPATIENT)
Dept: PRIMARY CARE CLINIC | Facility: CLINIC | Age: 82
End: 2022-06-09
Payer: MEDICARE

## 2022-06-09 NOTE — TELEPHONE ENCOUNTER
----- Message from Brie ABRAM Milan sent at 6/9/2022  3:25 PM CDT -----  Contact: Ms. Felicianoen @ [facility]# Direct # [phone], fax# [phone]  MsFredy Lyles at [facility] is calling in regards to her saying that she would like for you to fax the patients medical clearance information that was done on 05/31/2022 and the patients EKG information to her, because the patient is going to [facility] on this Monday coming please.        Group Therapy Note    Date: 11/2/2020    Group Start Time: 1000  Group End Time: 0488  Group Topic: Psychoeducation    [facility]    Patient refused to attend Psychoeducation Group at 1000 after encouragement from staff. 1:1 talk time offered.     Signature:  La Ramos

## 2022-06-09 NOTE — TELEPHONE ENCOUNTER
Can you finish form that is on your desk so I can fax please? I think there is just one spot you need to fill something out

## 2022-06-10 VITALS — DIASTOLIC BLOOD PRESSURE: 56 MMHG | SYSTOLIC BLOOD PRESSURE: 110 MMHG

## 2022-06-10 NOTE — TELEPHONE ENCOUNTER
Oh, so sorry to hear that . I didn't realize that had happened and that explains the mild elevation in her BP. If you wouldn't mind shooting me a reply with her BP from today we can put that in her chart. We sent off her clearance for surgery    Porsha

## 2022-06-16 ENCOUNTER — TELEPHONE (OUTPATIENT)
Dept: PRIMARY CARE CLINIC | Facility: CLINIC | Age: 82
End: 2022-06-16
Payer: MEDICARE

## 2022-06-16 NOTE — TELEPHONE ENCOUNTER
----- Message from Tawnya Perea MA sent at 5/31/2022 11:12 AM CDT -----  Check with Dr. King and see if pt overdue for fu with her Pancreatic Cyst - I suspect she is

## 2022-06-16 NOTE — TELEPHONE ENCOUNTER
I ashish Anne at 's office. She states there is no recommendation for follow up noted in patient chart. It not not appear she needs to f/u unless she is having issues

## 2022-07-06 ENCOUNTER — PES CALL (OUTPATIENT)
Dept: ADMINISTRATIVE | Facility: CLINIC | Age: 82
End: 2022-07-06
Payer: MEDICARE

## 2022-07-07 ENCOUNTER — PES CALL (OUTPATIENT)
Dept: ADMINISTRATIVE | Facility: CLINIC | Age: 82
End: 2022-07-07
Payer: MEDICARE

## 2022-09-21 DIAGNOSIS — E03.9 HYPOTHYROIDISM, UNSPECIFIED TYPE: ICD-10-CM

## 2022-09-21 RX ORDER — LEVOTHYROXINE SODIUM 75 UG/1
TABLET ORAL
Qty: 90 TABLET | Refills: 1 | Status: SHIPPED | OUTPATIENT
Start: 2022-09-21 | End: 2023-02-19

## 2022-09-21 NOTE — TELEPHONE ENCOUNTER
No new care gaps identified.  [facility] Embedded Care Gaps. Reference number: [id number]. 9/21/2022   12:06:50 AM CDT

## 2022-09-21 NOTE — TELEPHONE ENCOUNTER
Refill Authorization Note   Elizabeth Morrokaro Yessi  is requesting a refill authorization.  Brief Assessment and Rationale for Refill:  Approve     Medication Therapy Plan:       Medication Reconciliation Completed: No   Comments:     No Care Gaps recommended.     Note composed:7:19 AM 09/21/2022

## 2022-10-13 ENCOUNTER — TELEPHONE (OUTPATIENT)
Dept: PRIMARY CARE CLINIC | Facility: CLINIC | Age: 82
End: 2022-10-13
Payer: MEDICARE

## 2022-10-13 NOTE — TELEPHONE ENCOUNTER
Her Derm, Dr. Hinds, called to inform me he performed a Bx on pt which showed atypical lymphoid infiltrate +CD 30 which could be assoc with possible mycosis Fungoides, T cell lymphoma, lymphomatoid papulosa.  He wanted to alert me so I could potentially do any labs or imaging necessary to further investigate. He is sending pt to [facility]'s T cell lymphoma clinic and plans to have her see Dr. Martinez. She has an appt at end of Nov with me.  Not sure if we have anywhere we can put her sooner like beg of Nov. As I will be out of towwn but if we have anything let's try.     Dr. JACOBS

## 2022-10-18 ENCOUNTER — TELEPHONE (OUTPATIENT)
Dept: PRIMARY CARE CLINIC | Facility: CLINIC | Age: 82
End: 2022-10-18
Payer: MEDICARE

## 2022-10-18 NOTE — TELEPHONE ENCOUNTER
----- Message from Leslye Boykin sent at 10/18/2022  8:49 AM CDT -----  Contact: [phone]  Pt returning a phone call about coming in November 1st. Please Advise

## 2022-10-31 NOTE — PROGRESS NOTES
[facility] Note    Chief Complaint      Chief Complaint   Patient presents with    Annual Exam       History of Present Illness      Elizabeth Dickson is a 82 y.o.   WF with LBBB, Breast cancer, Pulmonary nodules, Adjustment disorder, CKD III,  BLE, HTN, Hypothyroidism, Osteopenia presents to fu chronic issues. Pt s/p  Ptosis repair/Ectropion repair 6/13/22 with Dr. Mcpherson.  Last visit- 5/31/22    LBBB -  chronic and was seen on previous EKG from 2016.      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   Prev Fu by Derm, Dr. Sloan, and Dr. Lynn . She saw ID, Dr. Rollins. She was on Prednisone 5 mg/d. Prednisone not helping after 3 months.   She was told to try a diet avoiding eggplant, potatoes, peppers, and blue cheese. Allergy tests were neg. Immune tests wnl. She saw A/I Dr. Bar and was told to fu with Derm . Pt off Abx.   Fu by a new Derm, Dr. Hinds, who called to inform me he performed a Bx on pt which showed atypical lymphoid infiltrate +CD 30 which could be assoc with possible mycosis Fungoides, T cell lymphoma, lymphomatoid papulosa.  He wanted to alert me so I could potentially do any labs or imaging necessary to further investigate. He is sending pt to [facility] T cell lymphoma clinic at [facility] (She has appt on 12/20/22 with Dr. Jazzmine James at [facility]).       RT elbow Cellulitis - She also has an appt with wound care at [facility] 11/3/22. Dermatologist put her on bactrim BID x 14 d.      Left Knee pain - Twisted it getting up from sofa 1/25/22.  She has been icing it and using an ACE bandage. Fu by Dr Crawford. Doing ok since inj.      Ovarian cyst-4.3 x 4.3 cm-Fu  by Dr. Chandler. Benign - was d/c from clinic.       Breast CA-invasive ductal carcinoma, right breast s/p lumpectomy.  Fu by Dr. Solo.  Pt on Arimidex. She had a titanium marker placed over non-cancerous spot and is planning for upcoming imaging.  Prev fu by H/O - Dr. Grande.     Iron  "deficiency anemia - Resolved. Pt on iron via prenatal vitamins q day. Repeat CBC.      Pulmonary nodules - Prev fu by Pulmonary, Dr. Salinas, annually. She had a PET scan and was reassured. D/c from his clinic.      Vitamin-D deficiency-Resolved 48 up from prev 28-Pt completed ergocalciferol times 12 weeks and is on vitamin D3 1000 units/day.     CKD III - Fu  by Dr. Goodman.  BUN/Cr - 23/1.06 GFR - 57-5/31/22. Stable.   Cont to rec adeq hydration     Adjustment disorder with mixed anxiety and depression-Pt on melatonin OTC.  Pt doing good on Lexapro 5 mg and it has helped and she has been blocking out things that bother her.  "I'm fine".      TMJ -  "It's better". Can't take NSAIDS. She takes a tylenol prn - helps.  She can also try ice/massaging.      Hypothyroidism- Pt controlled on levothyroxine 75 mcg/day.  Repeat TFT's in Feb.      HTN - Pt off Lisinopril 10 mg 2 tabs/day due to hyperkalemia.  Pt is on amlodipine 2.5 mg/d, and Toprol-XL 25 mg QHS.  Continue low-sodium diet. +Microalbuminuria. Her BP is 132/69HR 68 at home today.  She denies any dizziness.       HLD - Pt on Lipitor 10 mg QHS. Controlled.  Cont current dose of Lipitor. Repeat FLP in May.     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      Low back pain - "It's ok". Affects her ability to walk.  Pt off Gabapentin 100 mg QHS per Dr. Rollins. No longer fu by Dr. Mallory.  She goes to the gym most days.  Stopped when twisted knee last wk.      BLE - Pt uses compression socks.     Carotid bruits-no significant stenosis seen on previous carotid ultrasound.     Renal mass -8 mm right kidney.  Fu by  Dr. Sanchez - had U/S and was discharged from his clinic.  Renal U/S- stable Left renal cyst - 9/13/22     Pancreatic cyst- Fu by GI, Dr. King.  Pt had EUS-02/21/2019. [facility] was told to fu prn.     Osteopenia-Pt on Calcium 1200 mg/d, and vitamin D3 1000 units/day.  She did not get her Prolia in Mar.  ID told her to hold it.  Note - she is on arimidex.    Pt off " Prolia.  Recent DEXA with Dr. Grande was stable.     H/o Syncope - No further episodes. Pt had a CT Head and Cardiac davis that was neg.      H/o COVID -19 - 8/2021     HCM - Flu - 9/17/22;  Tdap - 2/2/22;  PCV 13 - 6/2/17;  PVX 23 - 11/1/10;   Zostavax - utd; Shingrix - 8/18/20;  #2 - 11/19/20; COVID - 19 - Vaccine (Pfizer) #1 - 1/26/21;  #2 - 2/15/21; # 3 11/17/21; 3 4 ;  MGM - 6/24/22 - repeat 1 yr;  DEXA - 9/17/21Osteopenia;  Hep C - 2/7/18; C-scope - 1/10/19 - polyp, int hemorrhoids, diverticulosis, repeat 5 yrs;  Gyn/Onc - Dr. Chandler;  Renal - Dr. Goodman;  GI - Dr. King/Darron;  H/O - Dr. Grande;  Prev Rheum - Dr. Mallory;  Gen Sx - Dr. Solo; Pulm - Dr. Salinas;  ID - Dr. Rollins; Podiatry - Dr. Ruiz/Samuel; Ortho - [facility]; Derm - Dr. Angeles; Derm - Dr. Hinds         Patient Care Team:  Porsha Masters MD as PCP - General (Internal Medicine)  Benoit Salinas MD as Consulting Physician (Family Medicine)  Robbie Mallory Jr., MD as Consulting Physician (Rheumatology)  Robbie Mallory Jr., MD (Rheumatology)  Russell King MD as Consulting Physician (Gastroenterology)  Lynsey Goodman MD as Consulting Physician (Nephrology)  Felecia Solo MD as Consulting Physician (General Surgery)  Duncan Singleton DPM as Consulting Physician (Podiatry)  Tarun Rollins Jr., MD as  (Infectious Diseases)  Naomy Estevez MA as Care Coordinator     Health Maintenance:  Immunization History   Administered Date(s) Administered    COVID-19, MRNA, LN-S, PF (Pfizer) (Purple Cap) 01/26/2021, 02/15/2021, 11/17/2021    Influenza 10/18/2005, 10/11/2013, 09/18/2015, 09/17/2016, 10/01/2018, 10/25/2018, 09/12/2019    Influenza - High Dose - PF (65 years and older) 09/18/2015, 09/17/2016, 10/25/2018, 09/12/2019, 08/18/2020    Influenza - Quadrivalent - High Dose - PF (65 years and older) 09/17/2022    Influenza - Quadrivalent - PF *Preferred* (6 months and older)  10/11/2013, 12/24/2021    Influenza Split 10/18/2005, 10/11/2013    Pneumococcal Conjugate - 13 Valent 10/11/2013, 06/23/2016, 06/02/2017, 06/28/2021    Pneumococcal Polysaccharide - 23 Valent 11/01/2010    Tdap 02/02/2022    Zoster Recombinant 08/18/2020, 11/19/2020      Health Maintenance   Topic Date Due    DEXA Scan  09/17/2024    Lipid Panel  05/31/2027    TETANUS VACCINE  02/02/2032        Past Medical History:  Past Medical History:   Diagnosis Date    Anxiety     Bilateral leg edema     Breast cancer     Cellulitis of left leg     CKD (chronic kidney disease), stage III     Hypertension     Hypothyroidism     Iron deficiency anemia     Osteopenia     Ovarian cyst     Teratoma and Benign Serous Cystadenoma    Pancreatic cyst     Pulmonary nodules     Renal mass     Vitamin D deficiency        Past Surgical History:   has a past surgical history that includes Breast surgery; Removal of Ovaries; and debridement.    Family History:  family history includes COPD in her brother; Cholelithiasis in her brother; Diabetes in her father; Diabetes type I in an other family member; Heart disease in her brother and brother; Hypertension in her father and mother; Lung cancer in her sister.     Social History:  Social History     Tobacco Use    Smoking status: Never    Smokeless tobacco: Never   Substance Use Topics    Alcohol use: No    Drug use: Never       Review of Systems   Constitutional:  Negative for chills, diaphoresis, fever and unexpected weight change.   HENT:  Positive for hearing loss. Negative for nasal congestion and sore throat.         Has fu with audiology in Jan.    Respiratory:  Negative for cough and chest tightness.    Cardiovascular:  Negative for chest pain and palpitations.   Gastrointestinal:  Negative for abdominal pain, constipation, diarrhea, nausea and vomiting.   Genitourinary:  Positive for nocturia. Negative for difficulty urinating and frequency.        Rare   Neurological:  Negative for  "dizziness and headaches.   Psychiatric/Behavioral:  Negative for dysphoric mood. The patient is not nervous/anxious.       Medications:    Current Outpatient Medications:     calcium carbonate 1250 MG capsule, Take by mouth once daily., Disp: , Rfl:     cholecalciferol, vitamin D3, (VITAMIN D3) 25 mcg (1,000 unit) capsule, , Disp: , Rfl:     ferrous sulfate (FEOSOL) 325 mg (65 mg iron) Tab tablet, , Disp: , Rfl:     latanoprost 0.005 % ophthalmic solution, Place into both eyes., Disp: , Rfl:     levothyroxine (SYNTHROID) 75 MCG tablet, TAKE 1 TABLET BY MOUTH EVERY DAY, Disp: 90 tablet, Rfl: 1    melatonin 10 mg Tab, Take 10 mg by mouth., Disp: , Rfl:     sulfamethoxazole-trimethoprim 800-160mg (BACTRIM DS) 800-160 mg Tab, SMARTSI Tablet(s) By Mouth Every 12 Hours, Disp: , Rfl:     amLODIPine (NORVASC) 2.5 MG tablet, Take 1 tablet (2.5 mg total) by mouth once daily., Disp: 90 tablet, Rfl: 1    atorvastatin (LIPITOR) 10 MG tablet, Take 1 tablet (10 mg total) by mouth once daily., Disp: 90 tablet, Rfl: 1    EScitalopram oxalate (LEXAPRO) 5 MG Tab, Take 1 tablet (5 mg total) by mouth once daily., Disp: 90 tablet, Rfl: 1    metoprolol succinate (TOPROL-XL) 25 MG 24 hr tablet, Take 1 tablet (25 mg total) by mouth once daily., Disp: 90 tablet, Rfl: 1     Allergies:  Review of patient's allergies indicates:   Allergen Reactions    Lisinopril      hyperkalemia       Physical Exam      Vital Signs  Temp: 97.6 °F (36.4 °C)  Pulse: 72  Resp: 12  SpO2: 97 %  BP: 132/70  BP Location: Left arm  Patient Position: Sitting  Pain Score: 0-No pain  Height and Weight  Height: 5' 2" (157.5 cm)  Weight: 69.2 kg (152 lb 8.9 oz)  BSA (Calculated - sq m): 1.74 sq meters  BMI (Calculated): 27.9  Weight in (lb) to have BMI = 25: 136.4      Patient Position: Sitting      Physical Exam  Vitals reviewed.   Constitutional:       General: She is not in acute distress.     Appearance: Normal appearance. She is not ill-appearing, toxic-appearing " or diaphoretic.   HENT:      Head: Normocephalic and atraumatic.      Right Ear: Tympanic membrane normal.      Left Ear: Tympanic membrane normal.   Eyes:      Extraocular Movements: Extraocular movements intact.      Pupils: Pupils are equal, round, and reactive to light.   Neck:      Vascular: No carotid bruit.   Cardiovascular:      Rate and Rhythm: Normal rate and regular rhythm.      Pulses: Normal pulses.      Heart sounds: Normal heart sounds. No murmur heard.  Pulmonary:      Effort: No respiratory distress.      Breath sounds: Normal breath sounds. No wheezing.   Abdominal:      General: Bowel sounds are normal. There is no distension.      Palpations: Abdomen is soft.      Tenderness: There is no abdominal tenderness. There is no guarding.   Musculoskeletal:         General: Normal range of motion.   Skin:     General: Skin is warm and dry.      Comments: Erythematous rash to prox arms and Left flank/hip; Circular erythematous raised lesion to elbow (see pic)   Neurological:      General: No focal deficit present.      Mental Status: She is alert and oriented to person, place, and time.   Psychiatric:         Mood and Affect: Mood normal.         Behavior: Behavior normal.        Laboratory:  CBC:  Recent Labs   Lab 04/01/21  1138 09/29/21  1035 05/31/22  1330   WBC 5.14 4.8 5.1   RBC 4.83 4.43 4.79   Hemoglobin 13.7 13.1 14.2   Hematocrit 43.0 38.7 42.4   Platelets 189 168 164   MCV 89 87.4 88.5   MCH 28.4 29.5 29.6   MCHC 31.9 L 33.8 33.4       CMP:  Recent Labs   Lab 02/23/21  1048 09/29/21  1035 05/31/22  1330   Glucose 95 90 94   Calcium 9.8 9.5 9.9   ALBUMIN 4.5 4.1 4.6   Total Protein 6.4 6.1 L 6.3   Sodium 139 141 142   Potassium 4.0 4.3 4.3   CO2 25 26 26   Chloride 102 105 103   BUN 22 H 23 H 23.0 H   Creatinine 1.0 1.1 H 1.06 H   Alkaline Phosphatase 72 98 104   ALT 50 21 24   AST 39 28 28   Total Bilirubin 0.4 0.4 0.6           URINALYSIS:  Recent Labs   Lab 09/29/21  1037 03/29/22  1037  09/23/22  1046   Color, UA YELLOW YELLOW YELLOW   Specific Gravity, UA 1.007 1.015 1.022   pH, UA < OR = 5.0 < OR = 5.0 < OR = 5.0   Protein, UA NEGATIVE NEGATIVE NEGATIVE   Glucose, UA NEGATIVE NEGATIVE NEGATIVE   Bacteria, UA NONE SEEN NONE SEEN NONE SEEN   Nitrite, UA NEGATIVE NEGATIVE NEGATIVE   Leukocytes, UA NEGATIVE 1+ A 1+ A   Hyaline Casts, UA NONE SEEN NONE SEEN NONE SEEN        LIPIDS:  Recent Labs   Lab 02/06/20  1036 05/20/20  0734 02/26/21  1042 07/02/21  0851 02/02/22  1028 05/31/22  1330   TSH 0.889  --  1.26  --  1.08  --    HDL  --  66  --  69  --  75   Cholesterol  --  161  --  148  --  180   Triglycerides  --  51  --  39  --  81   LDL Calculated  --   --   --  73  --  92   LDL Cholesterol  --  84.8  --   --   --   --    HDL/Cholesterol Ratio  --  41.0  --   --   --   --    Non-HDL Cholesterol  --  95  --  79  --   --    Total Cholesterol/HDL Ratio  --  2.4  --   --   --   --        TSH:  Recent Labs   Lab 02/06/20  1036 02/26/21  1042 02/02/22  1028   TSH 0.889 1.26 1.08       Other:   Recent Labs   Lab 03/17/20  1111   Ferritin 177.0 H   Iron 58   TIBC 280           Assessment/Plan     Elizabeth Dickson is a 82 y.o.female with:    Cellulitis of right elbow  - Pt on Bactrim and sched to see Wound care at [facility] in 2 days.             Mycosis fungoides, unspecified body region  - Fu by Derm, Dr. Hinds and planning to be seen in Cutaneous T Cell Lymphoma  clinic at [facility] by Dr. Valenzuela in Dec.     Hypertension, unspecified type  -     amLODIPine (NORVASC) 2.5 MG tablet; Take 1 tablet (2.5 mg total) by mouth once daily.  Dispense: 90 tablet; Refill: 1  -     metoprolol succinate (TOPROL-XL) 25 MG 24 hr tablet; Take 1 tablet (25 mg total) by mouth once daily.  Dispense: 90 tablet; Refill: 1  - Controlled.  Cont current.     Hyperlipidemia, unspecified hyperlipidemia type  -     atorvastatin (LIPITOR) 10 MG tablet; Take 1 tablet (10 mg total) by mouth once daily.  Dispense: 90 tablet;  Refill: 1  -     Lipid Panel; Future; Expected date: 05/01/2023  - Controlled.  Cont current.     Anxiety  -     EScitalopram oxalate (LEXAPRO) 5 MG Tab; Take 1 tablet (5 mg total) by mouth once daily.  Dispense: 90 tablet; Refill: 1  - Controlled.  Cont current.     Depression, unspecified depression type  -     EScitalopram oxalate (LEXAPRO) 5 MG Tab; Take 1 tablet (5 mg total) by mouth once daily.  Dispense: 90 tablet; Refill: 1  - Controlled.  Cont current.     Osteopenia, unspecified location  - Stable.  Cont current regimen.    Stage 3a chronic kidney disease  - Stable.  Cont current regimen.    Hypothyroidism, unspecified type  - Controlled.  Cont current.     Other specified spondylopathies, lumbar region  - Stable.  Cont current regimen.    Bilateral leg edema  - Stable.  Cont current regimen.    Malignant neoplasm of female breast, unspecified estrogen receptor status, unspecified laterality, unspecified site of breast   - Stable.  Cont current regimen. Fu by Dr. Solo.       Chronic conditions status updated as per HPI.  Other than changes above, cont current medications and maintain follow up with specialists.   Follow up in about 3 months (around 2/1/2023) for fu chronic issues or sooner if needed.      Porsha Masters MD  [facility]

## 2022-11-01 ENCOUNTER — OFFICE VISIT (OUTPATIENT)
Dept: PRIMARY CARE CLINIC | Facility: CLINIC | Age: 82
End: 2022-11-01
Payer: MEDICARE

## 2022-11-01 VITALS
HEIGHT: 62 IN | OXYGEN SATURATION: 97 % | TEMPERATURE: 98 F | DIASTOLIC BLOOD PRESSURE: 70 MMHG | BODY MASS INDEX: 28.07 KG/M2 | RESPIRATION RATE: 12 BRPM | HEART RATE: 72 BPM | SYSTOLIC BLOOD PRESSURE: 132 MMHG | WEIGHT: 152.56 LBS

## 2022-11-01 DIAGNOSIS — F41.9 ANXIETY: ICD-10-CM

## 2022-11-01 DIAGNOSIS — N18.31 STAGE 3A CHRONIC KIDNEY DISEASE: ICD-10-CM

## 2022-11-01 DIAGNOSIS — M48.8X6 OTHER SPECIFIED SPONDYLOPATHIES, LUMBAR REGION: ICD-10-CM

## 2022-11-01 DIAGNOSIS — E78.5 HYPERLIPIDEMIA, UNSPECIFIED HYPERLIPIDEMIA TYPE: ICD-10-CM

## 2022-11-01 DIAGNOSIS — M85.80 OSTEOPENIA, UNSPECIFIED LOCATION: ICD-10-CM

## 2022-11-01 DIAGNOSIS — F32.A DEPRESSION, UNSPECIFIED DEPRESSION TYPE: ICD-10-CM

## 2022-11-01 DIAGNOSIS — C84.00 MYCOSIS FUNGOIDES, UNSPECIFIED BODY REGION: ICD-10-CM

## 2022-11-01 DIAGNOSIS — I10 HYPERTENSION, UNSPECIFIED TYPE: ICD-10-CM

## 2022-11-01 DIAGNOSIS — R60.0 BILATERAL LEG EDEMA: ICD-10-CM

## 2022-11-01 DIAGNOSIS — E03.9 HYPOTHYROIDISM, UNSPECIFIED TYPE: ICD-10-CM

## 2022-11-01 DIAGNOSIS — C50.919 MALIGNANT NEOPLASM OF FEMALE BREAST, UNSPECIFIED ESTROGEN RECEPTOR STATUS, UNSPECIFIED LATERALITY, UNSPECIFIED SITE OF BREAST: ICD-10-CM

## 2022-11-01 DIAGNOSIS — L03.113 CELLULITIS OF RIGHT ELBOW: Primary | ICD-10-CM

## 2022-11-01 PROCEDURE — 3288F PR FALLS RISK ASSESSMENT DOCUMENTED: ICD-10-PCS | Mod: CPTII,S$GLB,, | Performed by: INTERNAL MEDICINE

## 2022-11-01 PROCEDURE — 99999 PR PBB SHADOW E&M-EST. PATIENT-LVL V: ICD-10-PCS | Mod: PBBFAC,,, | Performed by: INTERNAL MEDICINE

## 2022-11-01 PROCEDURE — 1159F MED LIST DOCD IN RCRD: CPT | Mod: CPTII,S$GLB,, | Performed by: INTERNAL MEDICINE

## 2022-11-01 PROCEDURE — 3075F SYST BP GE 130 - 139MM HG: CPT | Mod: CPTII,S$GLB,, | Performed by: INTERNAL MEDICINE

## 2022-11-01 PROCEDURE — 3288F FALL RISK ASSESSMENT DOCD: CPT | Mod: CPTII,S$GLB,, | Performed by: INTERNAL MEDICINE

## 2022-11-01 PROCEDURE — 1159F PR MEDICATION LIST DOCUMENTED IN MEDICAL RECORD: ICD-10-PCS | Mod: CPTII,S$GLB,, | Performed by: INTERNAL MEDICINE

## 2022-11-01 PROCEDURE — 1126F AMNT PAIN NOTED NONE PRSNT: CPT | Mod: CPTII,S$GLB,, | Performed by: INTERNAL MEDICINE

## 2022-11-01 PROCEDURE — 3075F PR MOST RECENT SYSTOLIC BLOOD PRESS GE 130-139MM HG: ICD-10-PCS | Mod: CPTII,S$GLB,, | Performed by: INTERNAL MEDICINE

## 2022-11-01 PROCEDURE — 1101F PR PT FALLS ASSESS DOC 0-1 FALLS W/OUT INJ PAST YR: ICD-10-PCS | Mod: CPTII,S$GLB,, | Performed by: INTERNAL MEDICINE

## 2022-11-01 PROCEDURE — 99214 OFFICE O/P EST MOD 30 MIN: CPT | Mod: S$GLB,,, | Performed by: INTERNAL MEDICINE

## 2022-11-01 PROCEDURE — 3078F DIAST BP <80 MM HG: CPT | Mod: CPTII,S$GLB,, | Performed by: INTERNAL MEDICINE

## 2022-11-01 PROCEDURE — 3078F PR MOST RECENT DIASTOLIC BLOOD PRESSURE < 80 MM HG: ICD-10-PCS | Mod: CPTII,S$GLB,, | Performed by: INTERNAL MEDICINE

## 2022-11-01 PROCEDURE — 99499 UNLISTED E&M SERVICE: CPT | Mod: S$GLB,,, | Performed by: INTERNAL MEDICINE

## 2022-11-01 PROCEDURE — 99214 PR OFFICE/OUTPT VISIT, EST, LEVL IV, 30-39 MIN: ICD-10-PCS | Mod: S$GLB,,, | Performed by: INTERNAL MEDICINE

## 2022-11-01 PROCEDURE — 1101F PT FALLS ASSESS-DOCD LE1/YR: CPT | Mod: CPTII,S$GLB,, | Performed by: INTERNAL MEDICINE

## 2022-11-01 PROCEDURE — 1126F PR PAIN SEVERITY QUANTIFIED, NO PAIN PRESENT: ICD-10-PCS | Mod: CPTII,S$GLB,, | Performed by: INTERNAL MEDICINE

## 2022-11-01 PROCEDURE — 99499 RISK ADDL DX/OHS AUDIT: ICD-10-PCS | Mod: S$GLB,,, | Performed by: INTERNAL MEDICINE

## 2022-11-01 PROCEDURE — 99999 PR PBB SHADOW E&M-EST. PATIENT-LVL V: CPT | Mod: PBBFAC,,, | Performed by: INTERNAL MEDICINE

## 2022-11-01 RX ORDER — ATORVASTATIN CALCIUM 10 MG/1
10 TABLET, FILM COATED ORAL DAILY
Qty: 90 TABLET | Refills: 1 | Status: SHIPPED | OUTPATIENT
Start: 2022-11-01 | End: 2023-02-19

## 2022-11-01 RX ORDER — METOPROLOL SUCCINATE 25 MG/1
25 TABLET, EXTENDED RELEASE ORAL DAILY
Qty: 90 TABLET | Refills: 1 | Status: SHIPPED | OUTPATIENT
Start: 2022-11-01 | End: 2023-02-19

## 2022-11-01 RX ORDER — AMLODIPINE BESYLATE 2.5 MG/1
2.5 TABLET ORAL DAILY
Qty: 90 TABLET | Refills: 1 | Status: SHIPPED | OUTPATIENT
Start: 2022-11-01 | End: 2023-06-19 | Stop reason: SDUPTHER

## 2022-11-01 RX ORDER — SULFAMETHOXAZOLE AND TRIMETHOPRIM 800; 160 MG/1; MG/1
TABLET ORAL
COMMUNITY
Start: 2022-10-26 | End: 2023-06-19

## 2022-11-01 RX ORDER — ESCITALOPRAM OXALATE 5 MG/1
5 TABLET ORAL DAILY
Qty: 90 TABLET | Refills: 1 | Status: SHIPPED | OUTPATIENT
Start: 2022-11-01 | End: 2023-03-23

## 2022-11-03 ENCOUNTER — TELEPHONE (OUTPATIENT)
Dept: PRIMARY CARE CLINIC | Facility: CLINIC | Age: 82
End: 2022-11-03
Payer: MEDICARE

## 2022-11-03 NOTE — TELEPHONE ENCOUNTER
----- Message from Brian Sutherland sent at 11/3/2022 11:47 AM CDT -----  Contact: pt [phone]  Patient states it is no emergency but she has the report from wound care.  Please call and advise.    Thank you and have a great day.

## 2022-11-03 NOTE — TELEPHONE ENCOUNTER
Pt is doing wound care. Having more test with dermatology and will have them send us all the results once she hears from them next week. I provided our fax number

## 2022-11-10 ENCOUNTER — TELEPHONE (OUTPATIENT)
Dept: PRIMARY CARE CLINIC | Facility: CLINIC | Age: 82
End: 2022-11-10
Payer: MEDICARE

## 2022-11-10 NOTE — TELEPHONE ENCOUNTER
Pt wanted to let Dr. Masters know that she has been following with wound care regarding her cancer. Patient wanted to let Dr. Masters know they found out the cancer is only above skin, and bones clear of cancer. Wound care is treating with steroid cream first, if that does not work they will treat with steroid pills, and the last option with wound care would be lancing. Message per patient.

## 2022-11-10 NOTE — TELEPHONE ENCOUNTER
----- Message from Patrizia Meredith sent at 11/10/2022  2:00 PM CST -----  Contact: [phone]  Patient is returning a phone call.  Who left a message for the patient: Dr Masters's office  Does patient know what this is regarding:  yes  Would you like a call back, or a response through your [facility] portal?:   phone  Comments: per patient - any time at PCP convenient patient have the report to share. Thank you

## 2022-11-11 DIAGNOSIS — I10 HYPERTENSION, UNSPECIFIED TYPE: ICD-10-CM

## 2022-11-11 RX ORDER — AMLODIPINE BESYLATE 2.5 MG/1
TABLET ORAL
Qty: 90 TABLET | Refills: 1 | OUTPATIENT
Start: 2022-11-11

## 2022-11-11 NOTE — TELEPHONE ENCOUNTER
No new care gaps identified.  [facility] Embedded Care Gaps. Reference number: [id number]. 11/11/2022   11:02:52 AM CST

## 2022-11-11 NOTE — TELEPHONE ENCOUNTER
Quick DC. Request already responded to by other means (e.g. phone or fax)   Refill Authorization Note   Elizabeth Dickson  is requesting a refill authorization.  Brief Assessment and Rationale for Refill:  Quick Discontinue  Medication Therapy Plan:  Signed 1 week ago    Medication Reconciliation Completed:  No      Comments:     Note composed:3:50 PM 11/11/2022

## 2022-12-01 DIAGNOSIS — I10 HYPERTENSION, UNSPECIFIED TYPE: ICD-10-CM

## 2022-12-01 RX ORDER — METOPROLOL SUCCINATE 25 MG/1
TABLET, EXTENDED RELEASE ORAL
Qty: 90 TABLET | Refills: 1 | OUTPATIENT
Start: 2022-12-01

## 2022-12-01 RX ORDER — AMLODIPINE BESYLATE 2.5 MG/1
TABLET ORAL
Qty: 90 TABLET | Refills: 1 | OUTPATIENT
Start: 2022-12-01

## 2022-12-01 NOTE — TELEPHONE ENCOUNTER
No new care gaps identified.  [facility] Embedded Care Gaps. Reference number: [id number]. 12/01/2022   10:53:35 AM CST

## 2022-12-02 NOTE — TELEPHONE ENCOUNTER
Kalie DC. Request already responded to by other means (e.g. phone or fax)   Refill Authorization Note   Elizabeth Dickson  is requesting a refill authorization.  Brief Assessment and Rationale for Refill:  Quick Discontinue  Medication Therapy Plan:  Verified pharmacy received both scripts 11/1/22    Medication Reconciliation Completed:  No      Comments:     Note composed:8:28 PM 12/01/2022

## 2023-01-05 ENCOUNTER — PES CALL (OUTPATIENT)
Dept: ADMINISTRATIVE | Facility: OTHER | Age: 83
End: 2023-01-05
Payer: MEDICARE

## 2023-01-26 LAB
CHOL/HDLC RATIO: 3.38
CHOLEST SERPL-MSCNC: 169 MG/DL (ref 100–200)
HDLC SERPL-MCNC: 50 MG/DL (ref 40–75)
LDLC SERPL CALC-MCNC: 102 MG/DL (ref 0–125)
TRIGL SERPL-MCNC: 107 MG/DL (ref 30–150)

## 2023-01-26 NOTE — PROGRESS NOTES
This is Dr. Simons covering for Dr. Masters while she is out of the office.     Your Cholesterol is NORMAL. Continue to focus on low fat, high fiber foods and aerobic exericse (huffing & puffing) for at least 20 minutes most days of the week.

## 2023-02-01 ENCOUNTER — OFFICE VISIT (OUTPATIENT)
Dept: PRIMARY CARE CLINIC | Facility: CLINIC | Age: 83
End: 2023-02-01
Payer: MEDICARE

## 2023-02-01 VITALS
BODY MASS INDEX: 28.2 KG/M2 | RESPIRATION RATE: 18 BRPM | HEIGHT: 62 IN | WEIGHT: 153.25 LBS | DIASTOLIC BLOOD PRESSURE: 84 MMHG | TEMPERATURE: 98 F | OXYGEN SATURATION: 98 % | SYSTOLIC BLOOD PRESSURE: 128 MMHG

## 2023-02-01 DIAGNOSIS — E78.5 HYPERLIPIDEMIA, UNSPECIFIED HYPERLIPIDEMIA TYPE: ICD-10-CM

## 2023-02-01 DIAGNOSIS — M85.80 OSTEOPENIA, UNSPECIFIED LOCATION: ICD-10-CM

## 2023-02-01 DIAGNOSIS — C84.00 MYCOSIS FUNGOIDES, UNSPECIFIED BODY REGION: Primary | ICD-10-CM

## 2023-02-01 DIAGNOSIS — E03.9 HYPOTHYROIDISM, UNSPECIFIED TYPE: ICD-10-CM

## 2023-02-01 DIAGNOSIS — I70.0 ATHEROSCLEROSIS OF AORTA: ICD-10-CM

## 2023-02-01 DIAGNOSIS — N18.31 STAGE 3A CHRONIC KIDNEY DISEASE: ICD-10-CM

## 2023-02-01 DIAGNOSIS — I10 HYPERTENSION, UNSPECIFIED TYPE: ICD-10-CM

## 2023-02-01 DIAGNOSIS — M48.8X6 OTHER SPECIFIED SPONDYLOPATHIES, LUMBAR REGION: ICD-10-CM

## 2023-02-01 DIAGNOSIS — C50.919 MALIGNANT NEOPLASM OF FEMALE BREAST, UNSPECIFIED ESTROGEN RECEPTOR STATUS, UNSPECIFIED LATERALITY, UNSPECIFIED SITE OF BREAST: ICD-10-CM

## 2023-02-01 DIAGNOSIS — R60.0 BILATERAL LEG EDEMA: ICD-10-CM

## 2023-02-01 PROCEDURE — 1159F PR MEDICATION LIST DOCUMENTED IN MEDICAL RECORD: ICD-10-PCS | Mod: CPTII,S$GLB,, | Performed by: INTERNAL MEDICINE

## 2023-02-01 PROCEDURE — 3079F PR MOST RECENT DIASTOLIC BLOOD PRESSURE 80-89 MM HG: ICD-10-PCS | Mod: CPTII,S$GLB,, | Performed by: INTERNAL MEDICINE

## 2023-02-01 PROCEDURE — 3288F PR FALLS RISK ASSESSMENT DOCUMENTED: ICD-10-PCS | Mod: CPTII,S$GLB,, | Performed by: INTERNAL MEDICINE

## 2023-02-01 PROCEDURE — 99214 OFFICE O/P EST MOD 30 MIN: CPT | Mod: S$GLB,,, | Performed by: INTERNAL MEDICINE

## 2023-02-01 PROCEDURE — 1126F PR PAIN SEVERITY QUANTIFIED, NO PAIN PRESENT: ICD-10-PCS | Mod: CPTII,S$GLB,, | Performed by: INTERNAL MEDICINE

## 2023-02-01 PROCEDURE — 99214 PR OFFICE/OUTPT VISIT, EST, LEVL IV, 30-39 MIN: ICD-10-PCS | Mod: S$GLB,,, | Performed by: INTERNAL MEDICINE

## 2023-02-01 PROCEDURE — 3074F SYST BP LT 130 MM HG: CPT | Mod: CPTII,S$GLB,, | Performed by: INTERNAL MEDICINE

## 2023-02-01 PROCEDURE — 1159F MED LIST DOCD IN RCRD: CPT | Mod: CPTII,S$GLB,, | Performed by: INTERNAL MEDICINE

## 2023-02-01 PROCEDURE — 99999 PR PBB SHADOW E&M-EST. PATIENT-LVL IV: CPT | Mod: PBBFAC,,, | Performed by: INTERNAL MEDICINE

## 2023-02-01 PROCEDURE — 1101F PR PT FALLS ASSESS DOC 0-1 FALLS W/OUT INJ PAST YR: ICD-10-PCS | Mod: CPTII,S$GLB,, | Performed by: INTERNAL MEDICINE

## 2023-02-01 PROCEDURE — 3288F FALL RISK ASSESSMENT DOCD: CPT | Mod: CPTII,S$GLB,, | Performed by: INTERNAL MEDICINE

## 2023-02-01 PROCEDURE — 3074F PR MOST RECENT SYSTOLIC BLOOD PRESSURE < 130 MM HG: ICD-10-PCS | Mod: CPTII,S$GLB,, | Performed by: INTERNAL MEDICINE

## 2023-02-01 PROCEDURE — 1160F RVW MEDS BY RX/DR IN RCRD: CPT | Mod: CPTII,S$GLB,, | Performed by: INTERNAL MEDICINE

## 2023-02-01 PROCEDURE — 1101F PT FALLS ASSESS-DOCD LE1/YR: CPT | Mod: CPTII,S$GLB,, | Performed by: INTERNAL MEDICINE

## 2023-02-01 PROCEDURE — 1160F PR REVIEW ALL MEDS BY PRESCRIBER/CLIN PHARMACIST DOCUMENTED: ICD-10-PCS | Mod: CPTII,S$GLB,, | Performed by: INTERNAL MEDICINE

## 2023-02-01 PROCEDURE — 1126F AMNT PAIN NOTED NONE PRSNT: CPT | Mod: CPTII,S$GLB,, | Performed by: INTERNAL MEDICINE

## 2023-02-01 PROCEDURE — 3079F DIAST BP 80-89 MM HG: CPT | Mod: CPTII,S$GLB,, | Performed by: INTERNAL MEDICINE

## 2023-02-01 PROCEDURE — 99999 PR PBB SHADOW E&M-EST. PATIENT-LVL IV: ICD-10-PCS | Mod: PBBFAC,,, | Performed by: INTERNAL MEDICINE

## 2023-02-01 RX ORDER — IBUPROFEN 100 MG/5ML
1000 SUSPENSION, ORAL (FINAL DOSE FORM) ORAL DAILY
COMMUNITY

## 2023-02-01 RX ORDER — BEXAROTENE 75 MG/1
4 CAPSULE ORAL DAILY
COMMUNITY

## 2023-02-01 NOTE — PROGRESS NOTES
[facility] Note    Chief Complaint      Chief Complaint   Patient presents with    Follow-up       History of Present Illness      Elizabeth Dickson is a 82 y.o.  WF with LBBB, Breast cancer, Pulmonary nodules, Adjustment disorder, CKD III,  BLE, HTN, Hypothyroidism, Osteopenia presents to fu chronic issues. Pt s/p  Ptosis repair/Ectropion repair 6/13/22 with Dr. Mcpherson.  Last visit- 11/1/22     LBBB -  chronic and was seen on previous EKG from 2016.       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   Prev Fu by Derm, Dr. Sloan, and Dr. Lynn . She saw ID, Dr. Rollins. She was on Prednisone 5 mg/d. Prednisone not helping after 3 months.   She was told to try a diet avoiding eggplant, potatoes, peppers, and blue cheese. Allergy tests were neg. Immune tests wnl. She saw A/I Dr. Bar and was told to fu with Derm . Pt off Abx.   Fu by a new Derm, Dr. Hinds, who called to inform me he performed a Bx on pt which showed atypical lymphoid infiltrate +CD 30 which could be assoc with possible mycosis Fungoides, T cell lymphoma, lymphomatoid papulosa. He referred her to [facility] T cell lymphoma clinic at [facility] ( Dr. Jazzmine James at [facility]).   She was recently started on Bexarotene 150 mg/day and continued on betamethasone.   Using gentamycin and betamethasone to the area and follows with wound care for wound to her RT elbow which is improving.    1/2023 CT chest- SUBCENTIMETER PULMONARY NODULES WITH SOME NODULES WHICH APPEAR GROSSLY SIMILAR, SOME NODULES WHICH APPEAR DECREASED AND SOME RETICULONODULAR CHANGES OF THE LEFT LOWER LOBE WHICH WERE NOT SEEN ON PREVIOUS EXAMINATION FOR WHICH CONTINUED FOLLOW-UP IS RECOMMENDED.  1/2023 CT AP- SMALL HYPODENSITY NOTED IN THE LIVER WHICH MAY REFLECT SMALL CYST OR HEMANGIOMA. NO EVIDENCE OF LYMPHADENOPATHY. ATHEROSCLEROTIC VASCULAR DISEASE. 11/3/2022 Skin, right elbow, biopsy:  Final result: Cutaneous T-cell lymphoma.  "bexarotene, again discussed side effects and anticipated central thyroid suppression and hypertriglycerdemia; patient already on lipitor and synthroid. She is sched for a repeat CBC, CMP lipids, free T4 after starting bexarotene in one month ([facility]). Has fu in Apr with Dr. Fleming.      Left Knee pain - Twisted it getting up from sofa 1/25/22.  She has been icing it and using an ACE bandage. Fu by Dr Crawford. Doing ok since inj. Doing ok.      Ovarian cyst-4.3 x 4.3 cm-Fu  by Dr. Chandler. Benign - was d/c from clinic.       Breast CA-invasive ductal carcinoma, right breast s/p lumpectomy.  Prev fu by Dr. Solo. Now being seen by a new Sx at [facility].  Pt on Arimidex. She had a titanium marker placed over non-cancerous spot and is planning for upcoming imaging.  Prev fu by H/O - Dr. Grande.     Iron deficiency anemia - H/H - 14.8/44 - 12/20/22 -  Resolved. Pt on iron via prenatal vitamins q day.       Pulmonary nodules - Prev fu by Pulmonary, Dr. Salinas, annually. She had a PET scan and was reassured. D/c from his clinic. Pt now getting serial CT scans to monitor her T cell Lymphoma.      Vitamin-D deficiency-Resolved 48 up from prev 28-Pt completed ergocalciferol times 12 weeks and is on vitamin D3 1000 units/day.     CKD III - Prev fu  by Dr. Goodman.  BUN/Cr - 23/1.06 GFR - 57-5/31/22. Stable.   Cont to rec adeq hydration. Planning to fu with [facility] - has appt to est in end of Feb. .      Adjustment disorder with mixed anxiety and depression-Pt on melatonin OTC.  Pt doing good on Lexapro 5 mg and it has helped and she has been blocking out things that bother her.  "I'm fine".      TMJ -  "It's better". Can't take NSAIDS. She takes a tylenol prn - helps.  She can also try ice/massaging.      Hypothyroidism- Pt controlled on levothyroxine 75 mcg/day.  TSH - 1.26 - 12/20/22      HTN - Pt off Lisinopril 10 mg 2 tabs/day due to hyperkalemia.  Pt is on amlodipine 2.5 mg/d, and " "Toprol-XL 25 mg QHS.  Continue low-sodium diet. +Microalbuminuria.  She denies any dizziness.  Monitor edema.      HLD - Pt on Lipitor 10 mg QHS. Controlled.  Cont current dose of Lipitor.  HDL 75 LDL 71  - 12/20/22     Lichen simplex chronicus - Fu by OB.      Low back pain - "It's ok". Affects her ability to walk.  Pt off Gabapentin 100 mg QHS per Dr. Rollins. No longer fu by Dr. Mallory.  She goes to the gym most days.  Stopped when twisted knee last wk. CT Chest/abd/pelvis -1/13/23 - Right 1 anterolisthesis of L4 on L5. Multilevel spondylosis of the spine.      BLE - Pt uses compression socks.     Carotid bruits-no significant stenosis seen on previous carotid ultrasound.     Renal mass -8 mm right kidney.  Fu by  Dr. Sanchez - had U/S and was discharged from his clinic.  Renal U/S- stable Left renal cyst - 9/13/22.  Not noted on recent CT 1/13/23 at [facility].      Pancreatic cyst- Fu by GI, Dr. King.  Pt had EUS-02/21/2019. She was told to fu prn. Not noted on recent CT 1/13/23 at [facility].      Osteopenia-Pt on Calcium 1200 mg/d, and vitamin D3 1000 units/day.  She did not get her Prolia in Mar.  ID told her to hold it.  Note - she is on arimidex.    Pt off Prolia.  Recent DEXA with Dr. Grande was stable.      H/o Syncope - No further episodes. Pt had a CT Head and Cardiac davis that was neg.      H/o COVID -19 - 8/2021     HCM - Flu - 9/17/22;  Tdap - 2/2/22;  PCV 13 - 6/2/17;  PVX 23 - 11/1/10;   Zostavax - utd; Shingrix - 8/18/20;  #2 - 11/19/20; COVID - 19 - Vaccine (Pfizer) #1 - 1/26/21;  #2 - 2/15/21; # 3 11/17/21; 3 4 ;  MGM - 6/24/22 - repeat 1 yr;  DEXA - 9/17/21Osteopenia;  Hep C - 2/7/18; C-scope - 1/10/19 - polyp, int hemorrhoids, diverticulosis, repeat 5 yrs;  Gyn/Onc - Dr. Chandler;  Renal - Dr. Goodman;  GI - Dr. King/Darron;  H/O - Dr. Grande;  Prev Rheum - Dr. Mallory;  Gen Sx - Dr. Solo; Pulm - Dr. Ayala;  ID - Dr. Rollins; Podiatry - Dr. Ruiz/Samuel; [facility] " [facility]o; Derm - Dr. Angeles; Derm - Dr. Hinds     Patient Care Team:  Porsha Masters MD as PCP - General (Internal Medicine)  Benoit Salinas MD as Consulting Physician (Family Medicine)  Robbie Mallory Jr., MD as Consulting Physician (Rheumatology)  Robbie Mallory Jr., MD (Rheumatology)  Russell King MD as Consulting Physician (Gastroenterology)  Lynsey Goodman MD as Consulting Physician (Nephrology)  Felecia Solo MD as Consulting Physician (General Surgery)  Duncan Singleton DPM as Consulting Physician (Podiatry)  Tarun Rollins Jr., MD as  (Infectious Diseases)  Naomy Estevez MA as Care Coordinator     Silvestre Jacobs MD - 01/13/2023   Formatting of this note might be different from the original.   CT scan of the chest without contrast.     Clinical history:Hematologic malignancy, staging   Cutaneous T-Cell Lymphoma Staging C84.A0   Cutaneous T-cell lymphoma, unspecified body region (CMS/HCC)     Comparison:12/2/2019.     Technique: CT scan of the chest was performed without intravenous contrast as requested using 5mm slice thickness per standard protocol.  The lack of intravenous contrast decreases the sensitivity for evaluation of lymphadenopathy and parenchymal lesions. An individualized dose optimization technique, automated exposure control, was utilized for the performed procedure.     Findings:     CARDIOVASCULAR STRUCTURES: The heart is normal in size. Mitral annulus and coronary artery calcifications. Some tortuosity and atherosclerotic calcification of the aorta. The aorta demonstrates similar caliber when compared to the previous exam.     HILAR/MEDIASTINUM/AXILLARY:  Subcentimeter lymph nodes are identified in the mediastinum and axillary regions. No definite mediastinal or axillary lymphadenopathy can be demonstrated by CT scan size criteria. The hilar regions are suboptimally evaluated due to the lack of intravenous contrast but  demonstrate no gross focal findings.     LUNGS: Stable 4 mm nodule noted in the lingula on image 32. Mild bronchial thickening and reticulonodular changes of the right middle lobe appears somewhat decreased when compared to the previous examination. Mild increased reticulonodular changes of the left lower lobe on image 33/57 may reflect inflammatory changes. Continued follow-up is recommended. Previously noted 2 mm nodule in the left lower lobe is not definitely seen on today's exam. Mild biapical parenchymal scarring, grossly similar. Minor linear scarring or atelectasis is noted in the inferior right middle lobe and lingula, grossly similar.     PLEURA/PERICARDIUM: No pleural nor pericardial effusions are identified.     SOFT TISSUES: Small hiatal hernia appears somewhat increased when compared to the previous exam.     UPPER ABDOMEN: Abdominal findings are reported separately.     BONY STRUCTURES: Grossly similar. Multilevel spondylosis of the spine.     IMPRESSION:     SUBCENTIMETER PULMONARY NODULES WITH SOME NODULES WHICH APPEAR GROSSLY SIMILAR, SOME NODULES WHICH APPEAR DECREASED AND SOME RETICULONODULAR CHANGES OF THE LEFT LOWER LOBE WHICH WERE NOT SEEN ON PREVIOUS EXAMINATION FOR WHICH CONTINUED FOLLOW-UP IS RECOMMENDED.     ADDITIONAL FINDINGS AS DISCUSSED ABOVE.     Electronically Signed By: Silvestre Jacobs MD 1/13/2023 3:09 PM CST  Exam End: 01/13/23 14:16    Specimen Collected: 01/13/23 14:44 Last Resulted: 01/13/23 15:09   Received From: [facility]  Result Received: 02/01/23 10:32        Silvestre Jacobs MD - 01/13/2023   Formatting of this note might be different from the original.   Clinical data:  Hematologic malignancy, staging   Cutaneous T-Cell Lymphoma Staging C84.A0   Cutaneous T-cell lymphoma, unspecified body region (CMS/HCC)     Comparison: PET/CT scan examination dated 11/24/2020.     CT scan of the abdomen and pelvis without contrast     Procedure:  No IV contrast material was  administered as requested. Oral contrast was given.  Multiple noncontrasted axial CT images through the abdomen and pelvis were obtained at 5 mm slice thickness. In addition, coronal and sagittal reformatted images were obtained. The lack of intravenous contrast decreases sensitivity for evaluation and detection of lymphadenopathy and parenchymal lesions. An individualized dose optimization technique, automated exposure control, was utilized for the performed procedure.       Findings:      Chest findings are reported separately.     KIDNEYS: Both kidneys are normal in size. No hydronephrosis nor hydroureter is identified. Definite renal or ureteral calculi noted.     LIVER:A 9 mm hypodensity noted in the central superior aspect of the liver in the left hepatic lobe adjacent to its junction with the right hepatic lobe on image 10. This cannot be further characterized due to the small size of the findings could reflect a small cyst or hemangioma. This is grossly similar when compared to the previous PET CT scan examination.     SPLEEN: No gross focal findings.     GALLBLADDER: No visible gallstones. No pericholecystic fluid.     PANCREAS: No definite gross focal findings can be demonstrated.     ADRENALS GLANDS: Unremarkable.     RETROPERITONEUM: The aorta tapers normally. No definite periaortic lymphadenopathy is identified. Atherosclerotic calcification of the aorta.     GASTROINTESTINAL: The stomach is partially contracted. The bowel is nondilated. There is moderate scattered stool material and incomplete distention of the colon which limits evaluation for neoplastic process. Contracted rectosigmoid colon. No definite inflammatory changes can be demonstrated in the colon. The appendix demonstrates no focal inflammatory changes.     PELVIS: The bladder is contracted. Hysterectomy changes are identified. No definite inguinal lymphadenopathy is identified.     FREE AIR/FLUID: None observed.     BONY STRUCTURES: Right  1 anterolisthesis of L4 on L5. Multilevel spondylosis of the spine.     IMPRESSION:     SMALL HYPODENSITY NOTED IN THE LIVER WHICH MAY REFLECT SMALL CYST OR HEMANGIOMA.     NO EVIDENCE OF LYMPHADENOPATHY.     ATHEROSCLEROTIC VASCULAR DISEASE.     ADDITIONAL FINDINGS AS DISCUSSED ABOVE.     Electronically Signed By: Silvestre Jacobs MD 1/13/2023 3:21 PM CST  Exam End: 01/13/23 14:16    Specimen Collected: 01/13/23 15:12 Last Resulted: 01/13/23 15:21   Received From: [facility]  Result Received: 02/01/23 10:32     Health Maintenance:  Immunization History   Administered Date(s) Administered    COVID-19, MRNA, LN-S, PF (Pfizer) (Purple Cap) 01/26/2021, 02/15/2021, 11/17/2021    Influenza 10/18/2005, 10/11/2013, 09/18/2015, 09/17/2016, 10/01/2018, 10/25/2018, 09/12/2019    Influenza - High Dose - PF (65 years and older) 09/18/2015, 09/17/2016, 10/25/2018, 09/12/2019, 08/18/2020    Influenza - Quadrivalent - High Dose - PF (65 years and older) 09/17/2022    Influenza - Quadrivalent - PF *Preferred* (6 months and older) 10/11/2013, 12/24/2021    Influenza Split 10/18/2005, 10/11/2013    Pneumococcal Conjugate - 13 Valent 10/11/2013, 06/23/2016, 06/02/2017, 06/28/2021    Pneumococcal Polysaccharide - 23 Valent 11/01/2010    Tdap 02/02/2022    Zoster Recombinant 08/18/2020, 11/19/2020      Health Maintenance   Topic Date Due    DEXA Scan  09/17/2024    Lipid Panel  01/26/2028    TETANUS VACCINE  02/02/2032        Past Medical History:  Past Medical History:   Diagnosis Date    Anxiety     Bilateral leg edema     Breast cancer     Cellulitis of left leg     CKD (chronic kidney disease), stage III     Hypertension     Hypothyroidism     Iron deficiency anemia     Osteopenia     Ovarian cyst     Teratoma and Benign Serous Cystadenoma    Pancreatic cyst     Pulmonary nodules     Renal mass     Vitamin D deficiency        Past Surgical History:   has a past surgical history that includes Breast surgery; Removal of Ovaries; and  "debridement.    Family History:  family history includes COPD in her brother; Cholelithiasis in her brother; Diabetes in her father; Diabetes type I in an other family member; Heart disease in her brother and brother; Hypertension in her father and mother; Lung cancer in her sister.     Social History:  Social History     Tobacco Use    Smoking status: Never    Smokeless tobacco: Never   Substance Use Topics    Alcohol use: No    Drug use: Never       Review of Systems   Constitutional:  Negative for chills, diaphoresis and fever.   HENT:  Positive for hearing loss.         Planning to get new hearing aids   Eyes:  Negative for visual disturbance.   Respiratory:  Negative for chest tightness and shortness of breath.    Cardiovascular:  Negative for chest pain and leg swelling.   Gastrointestinal:  Positive for abdominal pain and fecal incontinence. Negative for blood in stool, constipation, diarrhea, nausea and vomiting.        Abd pain in Am sometimes since starting new medication - Bexarotene. She has 3 stools that ar formed and no soft or watery.  This makes her nervous because she has rare fecal incontinence.    Genitourinary:  Negative for bladder incontinence, dysuria and frequency.   Musculoskeletal:  Negative for arthralgias and myalgias.   Neurological:  Negative for dizziness, vertigo and headaches.   Psychiatric/Behavioral:  Negative for dysphoric mood. The patient is not nervous/anxious.         "I try to keep busy"      Medications:    Current Outpatient Medications:     amLODIPine (NORVASC) 2.5 MG tablet, Take 1 tablet (2.5 mg total) by mouth once daily., Disp: 90 tablet, Rfl: 1    ascorbic acid, vitamin C, (VITAMIN C) 1000 MG tablet, Take 1,000 mg by mouth once daily., Disp: , Rfl:     atorvastatin (LIPITOR) 10 MG tablet, Take 1 tablet (10 mg total) by mouth once daily., Disp: 90 tablet, Rfl: 1    bexarotene 75 mg Cap, Take 1 capsule by mouth 2 (two) times a day., Disp: , Rfl:     cholecalciferol, " "vitamin D3, (VITAMIN D3) 25 mcg (1,000 unit) capsule, , Disp: , Rfl:     EScitalopram oxalate (LEXAPRO) 5 MG Tab, Take 1 tablet (5 mg total) by mouth once daily., Disp: 90 tablet, Rfl: 1    ferrous sulfate (FEOSOL) 325 mg (65 mg iron) Tab tablet, , Disp: , Rfl:     latanoprost 0.005 % ophthalmic solution, Place into both eyes., Disp: , Rfl:     levothyroxine (SYNTHROID) 75 MCG tablet, TAKE 1 TABLET BY MOUTH EVERY DAY, Disp: 90 tablet, Rfl: 1    melatonin 10 mg Tab, Take 10 mg by mouth., Disp: , Rfl:     metoprolol succinate (TOPROL-XL) 25 MG 24 hr tablet, Take 1 tablet (25 mg total) by mouth once daily., Disp: 90 tablet, Rfl: 1    calcium carbonate 1250 MG capsule, Take by mouth once daily., Disp: , Rfl:     sulfamethoxazole-trimethoprim 800-160mg (BACTRIM DS) 800-160 mg Tab, SMARTSI Tablet(s) By Mouth Every 12 Hours, Disp: , Rfl:      Allergies:  Review of patient's allergies indicates:   Allergen Reactions    Lisinopril      hyperkalemia       Physical Exam      Vital Signs  Temp: 97.7 °F (36.5 °C)  Temp src: Oral  Resp: 18  SpO2: 98 %  BP: 128/84  BP Location: Left arm  Patient Position: Sitting  Pain Score: 0-No pain  Height and Weight  Height: 5' 2" (157.5 cm)  Weight: 69.5 kg (153 lb 3.5 oz)  BSA (Calculated - sq m): 1.74 sq meters  BMI (Calculated): 28  Weight in (lb) to have BMI = 25: 136.4      Patient Position: Sitting      Physical Exam  Vitals reviewed.   Constitutional:       General: She is not in acute distress.     Appearance: Normal appearance. She is not ill-appearing, toxic-appearing or diaphoretic.   HENT:      Head: Normocephalic and atraumatic.      Right Ear: Tympanic membrane normal.      Left Ear: Tympanic membrane normal.      Mouth/Throat:      Mouth: Mucous membranes are moist.      Pharynx: No posterior oropharyngeal erythema.   Eyes:      Extraocular Movements: Extraocular movements intact.      Conjunctiva/sclera: Conjunctivae normal.      Pupils: Pupils are equal, round, and " reactive to light.   Cardiovascular:      Rate and Rhythm: Normal rate and regular rhythm.      Comments: Trace BLE edema; Compression socks in place  Pulmonary:      Effort: Pulmonary effort is normal. No respiratory distress.      Breath sounds: Normal breath sounds.   Abdominal:      General: Bowel sounds are normal. There is no distension.      Palpations: Abdomen is soft.      Tenderness: There is no abdominal tenderness. There is no guarding or rebound.   Musculoskeletal:      Comments: Valgus deformity of knees.   Skin:     Comments: Dry skin to hands   Neurological:      General: No focal deficit present.      Mental Status: She is alert and oriented to person, place, and time.   Psychiatric:         Mood and Affect: Mood normal.         Behavior: Behavior normal.        Laboratory:  CBC:  Recent Labs   Lab 05/31/22  1330 09/22/22  1053 12/07/22  1036   WBC 5.1 6.7 14.6 H   RBC 4.79 4.74 4.58   Hemoglobin 14.2 13.9 13.4   Hematocrit 42.4 41.8 40.4   Platelets 164 180 210   MCV 88.5 88.0 88.2   MCH 29.6 29.3 29.3   MCHC 33.4 33.3 33.2       CMP:  Recent Labs   Lab 09/29/21  1035 05/31/22  1330 09/22/22  1053 12/07/22  1036   Glucose 90 94 93 88   Calcium 9.5 9.9 9.6 9.7   ALBUMIN 4.1 4.6 4.3  --    Total Protein 6.1 L 6.3 6.3  --    Sodium 141 142 139 138   Potassium 4.3 4.3 5.0 4.1   CO2 26 26 25 23   Chloride 105 103 104 104   BUN 23 H 23.0 H 31.0 H 30.0 H   Creatinine 1.1 H 1.06 H 1.37 H 1.19 H   Alkaline Phosphatase 98 104 87  --    ALT 21 24 20  --    AST 28 28 22  --    Total Bilirubin 0.4 0.6 0.3  --        URINALYSIS:  Recent Labs   Lab 09/29/21  1037 03/29/22  1037 09/23/22  1046   Color, UA YELLOW YELLOW YELLOW   Specific Gravity, UA 1.007 1.015 1.022   pH, UA < OR = 5.0 < OR = 5.0 < OR = 5.0   Protein, UA NEGATIVE NEGATIVE NEGATIVE   Glucose, UA NEGATIVE NEGATIVE NEGATIVE   Bacteria, UA NONE SEEN NONE SEEN NONE SEEN   Nitrite, UA NEGATIVE NEGATIVE NEGATIVE   Leukocytes, UA NEGATIVE 1+ A 1+ A    Hyaline Casts, UA NONE SEEN NONE SEEN NONE SEEN        LIPIDS:  Recent Labs   Lab 02/06/20  1036 05/20/20  0734 05/20/20  0734 02/26/21  1042 07/02/21  0851 02/02/22  1028 05/31/22  1330 12/20/22  1543 01/26/23  0957   TSH 0.889  --   --  1.26  --  1.08  --   --   --    HDL  --  66   < >  --  69  --  75 75 H 50   Cholesterol  --  161   < >  --  148  --  180 170 169   Triglycerides  --  51   < >  --  39  --  81 122 107   LDL Calculated  --   --    < >  --  73  --  92 71 102   LDL Cholesterol  --  84.8  --   --   --   --   --   --   --    HDL/Cholesterol Ratio  --  41.0  --   --   --   --   --   --   --    Hdl/Cholesterol Ratio  --   --   --   --   --   --   --  2.27  --    Non-HDL Cholesterol  --  95  --   --  79  --   --  95  --    Total Cholesterol/HDL Ratio  --  2.4  --   --   --   --   --   --   --     < > = values in this interval not displayed.       TSH:  Recent Labs   Lab 02/06/20  1036 02/26/21  1042 02/02/22  1028   TSH 0.889 1.26 1.08       Other:   Recent Labs   Lab 03/17/20  1111   Ferritin 177.0 H   Iron 58   TIBC 280           Assessment/Plan     Elizabeth Dickson is a 82 y.o.female with:    Mycosis fungoides, unspecified body region  - Fu at [facility]. Cont current regimen. She has order fr upcoming labs.     Hypertension, unspecified type  - Controlled.  Cont current.     Hyperlipidemia, unspecified hyperlipidemia type  - Controlled.  Cont current.     Stage 3a chronic kidney disease  .- Stable.  Cont current regimen. Planning to est with a new Nephrologist, Dr. Kinsey.     Hypothyroidism, unspecified type  - Controlled.  Cont current. Cont to monitor closely while on Bexarotene. Sched for upcoming labs with Derm.     Bilateral leg edema  - Stable.  Cont current regimen. Cont to monitor.     Malignant neoplasm of female breast, unspecified estrogen receptor status, unspecified laterality, unspecified site of breast  - Stable.  Cont current regimen.    Osteopenia, unspecified location  -  Stable.  Cont current regimen.    Atherosclerosis of aorta  - Stable.  Cont current regimen.    Other specified spondylopathies, lumbar region  - Stable.  Cont current regimen.         Chronic conditions status updated as per HPI.  Other than changes above, cont current medications and maintain follow up with specialists.  Follow up in about 3 months (around 5/1/2023) for fu chronic issues or sooner if needed.      Porsha Masters MD  [facility]

## 2023-03-02 ENCOUNTER — TELEPHONE (OUTPATIENT)
Dept: PRIMARY CARE CLINIC | Facility: CLINIC | Age: 83
End: 2023-03-02
Payer: MEDICARE

## 2023-03-02 NOTE — TELEPHONE ENCOUNTER
----- Message from Verna Palomino sent at 3/2/2023  2:53 PM CST -----  Contact: [phone]  Patient says she had to get a divorce from [facility] [place] and she would like Tawnya to give her a call back to discuss her test result and what she should do with her medications.

## 2023-03-03 ENCOUNTER — PATIENT MESSAGE (OUTPATIENT)
Dept: PRIMARY CARE CLINIC | Facility: CLINIC | Age: 83
End: 2023-03-03
Payer: MEDICARE

## 2023-03-03 DIAGNOSIS — E03.9 HYPOTHYROIDISM, UNSPECIFIED TYPE: Primary | ICD-10-CM

## 2023-03-03 NOTE — TELEPHONE ENCOUNTER
Ok, thanks. I reviewed the labs.  TSH - 0.12 - low  Free T4 - 0.87.  BUN/Cr - 28/1.15 GFR 48; ; WBC - 3.8.  Will need to decrease her Levothyroxine from 75 mcg daily to 75 mcg daily M-F and 1/2 tab (37.5 mcg) on Sat and Sun and repeat Thyroid functions in 6 wks.     Dr. JACOBS

## 2023-03-03 NOTE — TELEPHONE ENCOUNTER
"I sw pt. She will no longer be seeing [facility] skin cancer specialist. She will now be going back [facility]. States she has labs for thyroid and cholesterol. States she is on Bexaroten 75mg bid and derm has been checking her levels closely. She filed a complaint with the supervisor. She will get a copy of the labs sent to us incase you need to make any adjustments to her statin or thyroid meds. Pt states her levels changed but not sure what the readings are. Note" patient was all over the place during the call but it appears pt just wants to let you know that she is not going to see [facility] anymore(states you can't get them on the phone and never returns any calls) and will get a copy of the labs to you to review.   "

## 2023-03-21 ENCOUNTER — TELEPHONE (OUTPATIENT)
Dept: PRIMARY CARE CLINIC | Facility: CLINIC | Age: 83
End: 2023-03-21
Payer: MEDICARE

## 2023-03-21 DIAGNOSIS — R91.8 PULMONARY NODULES: Primary | ICD-10-CM

## 2023-03-21 NOTE — TELEPHONE ENCOUNTER
Dr. Judie James and Dr. Sutherland were kind enough to call and update me and inform me of her recent CT scan which showed: SUBCENTIMETER PULMONARY NODULES WITH SOME NODULES WHICH APPEAR GROSSLY SIMILAR, SOME NODULES WHICH APPEAR DECREASED AND SOME RETICULONODULAR CHANGES OF THE LEFT LOWER LOBE WHICH WERE NOT SEEN ON PREVIOUS EXAMINATION FOR WHICH CONTINUED FOLLOW-UP IS RECOMMENDED.   In regards to the Mild increased reticulonodular changes of the left lower lobe that will need follow up I rec repeating a CT chest in near future or we could have her fu again with Dr. Salinas who had prev. been following her lung nodules.  I called pt to discuss her preference. No answer.  Left message to return call.  Please get her on phone for me      Dr. JACOBS

## 2023-03-22 NOTE — TELEPHONE ENCOUNTER
I d/w pt.  She upset with the [facility] system and does not plan to fu there again.  She will fu with Dr. Houston. In regards to her lung findings she will fu again with Dr. Salinas.  Will place a referral again. She is also planning to fu with H/O, Dr. Harjinder JACOBS

## 2023-03-22 NOTE — TELEPHONE ENCOUNTER
----- Message from Brie Milan sent at 3/22/2023  9:27 AM CDT -----  Contact: Pt Home [phone]  Patient is returning a phone call.  Who left a message for the patient:   Does patient know what this is regarding:    Would you like a call back, or a response through your [facility] portal?:   Call  Comments:  Patient said that she received a call on yesterday in regards to test results for a CT scan that she had.

## 2023-04-11 ENCOUNTER — TELEPHONE (OUTPATIENT)
Dept: PRIMARY CARE CLINIC | Facility: CLINIC | Age: 83
End: 2023-04-11
Payer: MEDICARE

## 2023-04-11 NOTE — TELEPHONE ENCOUNTER
Chief Complaint:  Naomi Latif is here today for   Chief Complaint   Patient presents with   • Physical        Medications: medications verified, no change  Refills needed today?  NO  denies Latex allergy or sensitivity  Tobacco history: verified  Health Maintenance Due   Topic Date Due   • Influenza Vaccine (1) 09/01/2020     Patient is due for topics as listed above but will discuss with PCP   Health Maintenance Summary     Influenza Vaccine (1)  Overdue since 9/1/2020    Depression Screening (Yearly)  Next due on 11/17/2021    Breast Cancer Screening (Every 2 Years)  Next due on 1/17/2022    Cervical Cancer Screening HPV CO-Testing (Every 5 Years)  Next due on 10/25/2022    Colonoscopy Risk (Every 5 Years)  Next due on 2/3/2025    DTaP/Tdap/Td Vaccine (3 - Td)  Next due on 10/31/2028    Hepatitis C Screening   Completed    Shingles Vaccine   Completed    Meningococcal Vaccine   Aged Out    HPV Vaccine   Aged Out    Pneumococcal Vaccine 0-64   Aged Out          Patient would like communication of their results via:      Dez   I sw pt and informed her that we are not positive who is still in the [facility]. She will find out who is there and will let us know to see if  rec one of these.

## 2023-04-11 NOTE — TELEPHONE ENCOUNTER
----- Message from Ramila Palomino sent at 4/11/2023  9:17 AM CDT -----  Contact: Self [phone]  Would like to receive medical advice.    Would they like a call back or a response via [facility]:  call back    Additional information:  Calling to speak with the nurse regarding pt provider  from [facility] oncologist is no longer practicing and would like another recommendation for a new oncologist provider. Pt also states  from [facility] did an error on blood work.

## 2023-04-11 NOTE — TELEPHONE ENCOUNTER
----- Message from Rossy Diaz sent at 4/11/2023  4:04 PM CDT -----  Contact: MATTHEW Johnson@[phone]--  Patient is returning a phone call.    Who left a message for the patient: --Tawnya--    Does patient know what this is regarding:  --not sure--    Would you like a call back, or a response through your [facility] portal?:  --Call back--     Comments: Please call to advise.

## 2023-04-12 ENCOUNTER — TELEPHONE (OUTPATIENT)
Dept: PRIMARY CARE CLINIC | Facility: CLINIC | Age: 83
End: 2023-04-12
Payer: MEDICARE

## 2023-04-12 NOTE — TELEPHONE ENCOUNTER
----- Message from Shanita Aaron sent at 4/12/2023  3:33 PM CDT -----  Contact: self [phone]  Pt stated Dr. Isaac going on maternity leave have an appt with  and it is 4/25/2023.    Please call and advise

## 2023-04-14 LAB
FREE T4: 1 NG/DL (ref 0.9–1.7)
TSH SERPL DL<=0.005 MIU/L-ACNC: 0.42 UIU/ML (ref 0.35–4)

## 2023-04-21 DIAGNOSIS — E03.9 HYPOTHYROIDISM, UNSPECIFIED TYPE: ICD-10-CM

## 2023-04-21 DIAGNOSIS — E03.9 HYPOTHYROIDISM, UNSPECIFIED TYPE: Primary | ICD-10-CM

## 2023-04-21 RX ORDER — LEVOTHYROXINE SODIUM 75 UG/1
75 TABLET ORAL DAILY
Qty: 90 TABLET | Refills: 1
Start: 2023-04-21 | End: 2023-06-11

## 2023-04-21 NOTE — PROGRESS NOTES
I sent pt a my chart message -  I reviewed your labs.  Your TSH is low normal.  I would like to see it a little higher. She is currently on  levothyroxine 75 mcg/day. I would like her to take 1 tab daily M-Sat and hold the medication on sundays. We can repeat her TSH in 6 wks.   No further recommendations at this time.    Dr. JACOBS

## 2023-04-21 NOTE — TELEPHONE ENCOUNTER
No new care gaps identified.  [facility] Embedded Care Gaps. Reference number: [id number]. 4/21/2023   3:44:04 PM CDT

## 2023-04-21 NOTE — TELEPHONE ENCOUNTER
----- Message from Porsha Masters MD sent at 4/21/2023  3:40 PM CDT -----  I sent pt a my chart message -  I reviewed your labs.  Your TSH is low normal.  I would like to see it a little higher. She is currently on  levothyroxine 75 mcg/day. I would like her to take 1 tab daily M-Sat and hold the medication on sundays. We can repeat her TSH in 6 wks.   No further recommendations at this time.    Dr. JACOBS

## 2023-04-28 ENCOUNTER — PES CALL (OUTPATIENT)
Dept: ADMINISTRATIVE | Facility: CLINIC | Age: 83
End: 2023-04-28
Payer: MEDICARE

## 2023-05-01 ENCOUNTER — TELEPHONE (OUTPATIENT)
Dept: PRIMARY CARE CLINIC | Facility: CLINIC | Age: 83
End: 2023-05-01
Payer: MEDICARE

## 2023-05-01 NOTE — TELEPHONE ENCOUNTER
Pt seen the new hem/onc at . She was told that the provider does dermatology when scheduling the appointment but at the visit she was told that she does not do any dermatology. Pt wanted to let you know that will be seeing [facility] on 5/15/23. Also seeing pulm on Wed()

## 2023-05-01 NOTE — TELEPHONE ENCOUNTER
----- Message from Charanjit Salazar sent at 5/1/2023 11:20 AM CDT -----  Contact: [phone] @patient  Good Morning patient would like a call back this afternoon cause she won't be home about her blood work.

## 2023-06-09 LAB — TSH SERPL DL<=0.005 MIU/L-ACNC: 0.19 UIU/ML (ref 0.35–4)

## 2023-06-11 DIAGNOSIS — E03.9 HYPOTHYROIDISM, UNSPECIFIED TYPE: Primary | ICD-10-CM

## 2023-06-11 RX ORDER — LEVOTHYROXINE SODIUM 50 UG/1
50 TABLET ORAL
Qty: 90 TABLET | Refills: 0 | Status: SHIPPED | OUTPATIENT
Start: 2023-06-11 | End: 2024-01-31

## 2023-06-12 NOTE — PROGRESS NOTES
Please let pt know I reviewed her labs. Her TSH is low at 0.19. We need to decrease her Levothyroxine From 75 mcg Every Mon-Sat and none on Sunday to Levothyroxine 50 mcg daily and we can repeat her TSH again in 6 wks.  Dr. JACOBS

## 2023-06-13 ENCOUNTER — TELEPHONE (OUTPATIENT)
Dept: PRIMARY CARE CLINIC | Facility: CLINIC | Age: 83
End: 2023-06-13
Payer: MEDICARE

## 2023-06-13 NOTE — TELEPHONE ENCOUNTER
I sw pt. She seen (Dermatology) on 5/15/23 and was informed at that time to take 75mcg everyday. She has been doing this since instructed. States she ordered a bunch of labs and told her everything was good. She will have them send us a copy. She has an appt with you on Monday

## 2023-06-13 NOTE — TELEPHONE ENCOUNTER
----- Message from Porsha Masters MD sent at 6/11/2023  7:12 PM CDT -----  Please let pt know I reviewed her labs. Her TSH is low at 0.19. We need to decrease her Levothyroxine From 75 mcg Every Mon-Sat and none on Sunday to Levothyroxine 50 mcg daily and we can repeat her TSH again in 6 wks.  Dr. JACOBS

## 2023-06-18 NOTE — PROGRESS NOTES
[facility] Note    Chief Complaint      Chief Complaint   Patient presents with    Follow-up       History of Present Illness      Elizabeth Dickson is a 83 y.o. WF with LBBB, Breast cancer, Pulmonary nodules, Adjustment disorder, CKD III,  BLE, HTN, Hypothyroidism, Osteopenia presents to fu chronic issues. Pt s/p  Ptosis repair/Ectropion repair 6/13/22 with Dr. Mcpherson.  Last visit- 2/1/23     LBBB -  chronic and was seen on previous EKG from 2016.       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   Prev Fu by Derm, Dr. Sloan, and Dr. Lynn. She saw ID, Dr. Rollins. She was on Prednisone 5 mg/d. Prednisone no help after 3 months.   She was told to try a diet avoiding eggplant, potatoes, peppers, and blue cheese. Allergy tests were neg. Immune tests wnl. She saw A/I Dr. Bar and was told to fu with Derm . Pt off Abx.   Fu with Derm, Dr. Hinds, who called to inform me he performed a Bx on pt which showed atypical lymphoid infiltrate +CD 30 which could be assoc with possible mycosis Fungoides, T cell lymphoma, lymphomatoid papulosa. He referred her to [facility]'s T cell lymphoma clinic at [facility] ( Dr. Jazzmine James at [facility]).  She is no longer being seen by [facility] or Dr. Williamson and is only fu with [facility].   She was recently started on Bexarotene 150 mg/day and continued on betamethasone.  Using gentamycin and betamethasone to the area and follows with wound care for wound to her RT elbow which is improving.  1/2023 CT chest- SUBCENTIMETER PULMONARY NODULES WITH SOME NODULES WHICH APPEAR GROSSLY SIMILAR, SOME NODULES WHICH APPEAR DECREASED AND SOME RETICULONODULAR CHANGES OF THE LEFT LOWER LOBE WHICH WERE NOT SEEN ON PREVIOUS EXAMINATION FOR WHICH CONTINUED FOLLOW-UP IS RECOMMENDED.  1/2023 CT AP- SMALL HYPODENSITY NOTED IN THE LIVER WHICH MAY REFLECT SMALL CYST OR HEMANGIOMA. NO EVIDENCE OF LYMPHADENOPATHY. ATHEROSCLEROTIC VASCULAR DISEASE. She fu with  "Pulm, Dr. Salinas, and has fu repeat imaging in Aug. 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 bexarotene, again discussed side effects and anticipated central thyroid suppression and hypertriglycerdemia; patient already on lipitor and synthroid.  She is being monitored with freq labs per Derm, Dr. Pinto.      Left Knee pain - Twisted it getting up from sofa 1/25/22.  She has been icing it and using an ACE bandage. Fu by Dr Crawford. Doing ok since inj.        H/o Benign Ovarian cyst-4.3 x 4.3 cm-Fu  by Dr. Chandler. Benign - was d/c from clinic.       Breast CA-invasive ductal carcinoma, right breast s/p lumpectomy.  Prev fu by Dr. Solo and Dr. Grande. Now being seen by a new Sx at [facility].  Pt on Arimidex. She had a titanium marker placed over non-cancerous spot and is planning for upcoming imaging.  Now fu by H/O - Dr. Charlene Dorado. Sched for MGM 6/27/23      Iron deficiency anemia - H/H - 14.8/44 - 12/20/22 -  Resolved. Pt on iron q day.       Pulmonary nodules - Fu by Pulmonary, Dr. Salinas, annually. Has fu in Aug.  She had a PET scan and was reassured. Pt now getting serial CT scans to monitor her T cell Lymphoma.       Vitamin-D deficiency-Resolved 48 up from prev 28-Pt completed ergocalciferol times 12 weeks and is on vitamin D3 1000 units/day.     CKD III - Prev fu  by Dr. Goodman and now fu by Dr. Nieto.  BUN/Cr -27/1.1 -6/15/23. Stable.   Cont to rec adeq hydration.  .      Adjustment disorder with mixed anxiety and depression-Pt on melatonin OTC.  Pt doing good on Lexapro 5 mg and it has helped and she has been blocking out things that bother her.  "I'm fine".      TMJ -  "It's better". Can't take NSAIDS. She takes a tylenol prn - helps.  She can also try ice/massaging.      Hypothyroidism - TSH was 4.56 in Apr. Free T4 - wnl - 1.15 -6/15/23.   She is currently on Levothyroxine 75 mcg/day      HTN - Pt off Lisinopril 10 mg 2 tabs/day due to hyperkalemia.  Pt is on " "amlodipine 2.5 mg/d, and Toprol-XL 25 mg QHS.  Continue low-sodium diet. +Microalbuminuria.  She denies any dizziness.  Monitor edema.      HLD - Pt on Lipitor 10 mg QHS and fenofibrate. Controlled.  Cont current dose of Lipitor.  HDL 88 LDL 73 TG 68 - 6/15/23     Lichen simplex chronicus - Fu by OB.      Low back pain - "It's ok". She take breaks.  Pt off Gabapentin 100 mg QHS per Dr. Rollins. No longer fu by Dr. Mallory.  She goes to the gym most days.  Stopped when twisted knee last wk. CT Chest/abd/pelvis -1/13/23 - Right 1 anterolisthesis of L4 on L5. Multilevel spondylosis of the spine.      BLE - Pt uses compression socks.     Carotid bruits- No significant stenosis seen on previous carotid ultrasound.     Renal mass -8 mm right kidney.  Fu by  Dr. Sanchez - had U/S and was discharged from his clinic.  Renal U/S- stable Left renal cyst - 9/13/22. Not noted on recent CT 1/13/23 at [facility].  Prev fu by Dr. Goodman. Fu by new Nephrologist, Dr. Nieto.      Pancreatic cyst- Fu by GI, Dr. King.  Pt had EUS-02/21/2019. She was told to fu prn. Not noted on recent CT 1/13/23 at [facility].      Osteopenia-Pt on Calcium 1200 mg/d, and vitamin D3 1000 units/day.  She has been off Prolia x 2 yrs.   ID, Dr. Rollins,  told her to hold it.  Note - she is on arimidex.  Recent DEXA with Dr. Grande was stable.      H/o Syncope - No further episodes. Pt had a CT Head and Cardiac davis that was neg.     HCM - Flu - 9/17/22;  Tdap - 2/2/22;  PCV 13 - 6/2/17;  PVX 23 - 11/1/10;   Zostavax - utd; Shingrix - 8/18/20;  #2 - 11/19/20; COVID - 19 - Vaccine (Pfizer) #1 - 1/26/21;  #2 - 2/15/21; # 3 11/17/21; 3 4 ;  MGM - 6/24/22 - repeat 1 yr;  DEXA - 9/17/21Osteopenia;  Hep C - 2/7/18; C-scope - 1/10/19 - polyp, int hemorrhoids, diverticulosis, repeat 5 yrs;  Gyn/Onc - Dr. Chandler;  Renal - Dr. Nieto;  GI - Dr. King/Darron;  H/O - Dr. Grande;  Prev Rheum - Dr. Mallory;  Gen Sx - Dr. Alabaster;  Pulm - Dr. Ayala;  ID - Dr." Ria; Podiatry - Dr. Ruiz/Samuel; Ortho - [facility]; Derm - Dr. Pinto; Prev. H/O - Dr. Grande and then Dr. Williamson      Patient Care Team:  Porsha Masters MD as PCP - General (Internal Medicine)  Benoit Salinas MD as Consulting Physician (Family Medicine)  Robbie Mallory Jr., MD as Consulting Physician (Rheumatology)  Robbie Mallory Jr., MD (Rheumatology)  Russell King MD as Consulting Physician (Gastroenterology)  Lynsey Goodman MD as Consulting Physician (Nephrology)  Felecia Solo MD as Consulting Physician (General Surgery)  Duncan Singleton DPM as Consulting Physician (Podiatry)  Tarun Rollins Jr., MD as  (Infectious Diseases)  Naomy Estevez MA as Care Coordinator     Health Maintenance:  Immunization History   Administered Date(s) Administered    COVID-19, MRNA, LN-S, PF (Pfizer) (Purple Cap) 01/26/2021, 02/15/2021, 11/17/2021    Influenza 10/18/2005, 10/11/2013, 09/18/2015, 09/17/2016, 10/01/2018, 10/25/2018, 09/12/2019    Influenza - High Dose - PF (65 years and older) 09/18/2015, 09/17/2016, 10/25/2018, 09/12/2019, 08/18/2020    Influenza - Quadrivalent - High Dose - PF (65 years and older) 09/17/2022    Influenza - Quadrivalent - PF *Preferred* (6 months and older) 10/11/2013, 12/24/2021    Influenza Split 10/18/2005, 10/11/2013    Pneumococcal Conjugate - 13 Valent 10/11/2013, 06/23/2016, 06/02/2017, 06/28/2021    Pneumococcal Polysaccharide - 23 Valent 11/01/2010    Tdap 02/02/2022    Zoster 08/18/2020, 11/19/2020    Zoster Recombinant 08/18/2020, 11/19/2020      Health Maintenance   Topic Date Due    DEXA Scan  09/17/2024    Lipid Panel  03/02/2028    TETANUS VACCINE  02/02/2032        Past Medical History:  Past Medical History:   Diagnosis Date    Anxiety     Bilateral leg edema     Breast cancer     Cellulitis of left leg     CKD (chronic kidney disease), stage III     Hypertension     Hypothyroidism     Iron deficiency  anemia     Osteopenia     Ovarian cyst     Teratoma and Benign Serous Cystadenoma    Pancreatic cyst     Pulmonary nodules     Renal mass     Vitamin D deficiency        Past Surgical History:   has a past surgical history that includes Breast surgery; Removal of Ovaries; and debridement.    Family History:  family history includes COPD in her brother; Cholelithiasis in her brother; Colon cancer in her brother and brother; Diabetes in her father; Diabetes type I in an other family member; Heart disease in her brother and brother; Hypertension in her father and mother; Lung cancer in her sister.     Social History:  Social History     Tobacco Use    Smoking status: Never    Smokeless tobacco: Never   Substance Use Topics    Alcohol use: No    Drug use: Never       Review of Systems   Constitutional:  Negative for chills and fever.   HENT:  Negative for nasal congestion and sore throat.    Respiratory:  Negative for cough and shortness of breath.    Cardiovascular:  Negative for chest pain and palpitations.   Gastrointestinal:  Negative for abdominal pain, constipation, diarrhea, nausea and vomiting.        No bloating but feels clothes feel tighter around abdomen. Wt unchanged. She will alert me if this worsens or she notes any bloating, new Sx's, or wt change.    Genitourinary:  Negative for bladder incontinence, dysuria, frequency and hematuria.   Musculoskeletal:  Negative for arthralgias and myalgias.   Neurological:  Negative for dizziness and headaches.   Psychiatric/Behavioral:  Negative for dysphoric mood. The patient is not nervous/anxious.       Medications:    Current Outpatient Medications:     ascorbic acid, vitamin C, (VITAMIN C) 1000 MG tablet, Take 1,000 mg by mouth once daily., Disp: , Rfl:     bexarotene 75 mg Cap, Take 1 capsule by mouth 2 (two) times a day., Disp: , Rfl:     cholecalciferol, vitamin D3, (VITAMIN D3) 25 mcg (1,000 unit) capsule, , Disp: , Rfl:     EScitalopram oxalate (LEXAPRO) 5  "MG Tab, TAKE 1 TABLET BY MOUTH EVERY DAY, Disp: 90 tablet, Rfl: 3    fenofibrate 160 MG Tab, Take 1 tablet by mouth Daily., Disp: , Rfl:     ferrous sulfate (FEOSOL) 325 mg (65 mg iron) Tab tablet, Take 325 mg by mouth., Disp: , Rfl:     latanoprost 0.005 % ophthalmic solution, Place into both eyes., Disp: , Rfl:     levothyroxine (SYNTHROID) 50 MCG tablet, Take 1 tablet (50 mcg total) by mouth before breakfast. (Patient taking differently: Take 75 mcg by mouth before breakfast.), Disp: 90 tablet, Rfl: 0    melatonin 10 mg Tab, Take 10 mg by mouth., Disp: , Rfl:     metoprolol succinate (TOPROL-XL) 25 MG 24 hr tablet, TAKE 1 TABLET BY MOUTH EVERY DAY, Disp: 90 tablet, Rfl: 2    amLODIPine (NORVASC) 2.5 MG tablet, Take 1 tablet (2.5 mg total) by mouth once daily., Disp: 90 tablet, Rfl: 1    atorvastatin (LIPITOR) 10 MG tablet, Take 1 tablet (10 mg total) by mouth once daily., Disp: 90 tablet, Rfl: 1    calcium carbonate 1250 MG capsule, Take by mouth once daily., Disp: , Rfl:      Allergies:  Review of patient's allergies indicates:   Allergen Reactions    Lisinopril      hyperkalemia       Physical Exam      Vital Signs  Temp: 97.9 °F (36.6 °C)  Temp Source: Oral  Pulse: 66  Resp: 18  SpO2: 99 %  BP: (P) 136/70  BP Location: (P) Left arm  Patient Position: (P) Sitting  Pain Score: 0-No pain  Height and Weight  Height: 5' 2" (157.5 cm)  Weight: 69.3 kg (152 lb 12.5 oz)  BSA (Calculated - sq m): 1.74 sq meters  BMI (Calculated): 27.9  Weight in (lb) to have BMI = 25: 136.4      Patient Position: (P) Sitting      Physical Exam  Vitals reviewed.   Constitutional:       General: She is not in acute distress.     Appearance: Normal appearance. She is not ill-appearing, toxic-appearing or diaphoretic.   HENT:      Head: Normocephalic and atraumatic.      Right Ear: Tympanic membrane normal.      Left Ear: Tympanic membrane normal.      Ears:      Comments: Tariq hearing aids  Eyes:      Extraocular Movements: Extraocular " movements intact.      Conjunctiva/sclera: Conjunctivae normal.      Pupils: Pupils are equal, round, and reactive to light.   Cardiovascular:      Rate and Rhythm: Normal rate and regular rhythm.      Pulses: Normal pulses.      Heart sounds: Normal heart sounds. No murmur heard.     Comments: Trace BLE edema; Compression socks in place  Pulmonary:      Effort: No respiratory distress.      Breath sounds: Normal breath sounds. No wheezing.   Abdominal:      General: Bowel sounds are normal. There is no distension.      Palpations: Abdomen is soft.      Tenderness: There is no abdominal tenderness. There is no guarding or rebound.   Musculoskeletal:      Comments: Valgus knee deformity   Skin:     Comments: Left wrist - Palpable visible 5 mm calcified lump. Erythematous patches to Left UE with scale.   Neurological:      General: No focal deficit present.      Mental Status: She is alert and oriented to person, place, and time.   Psychiatric:         Mood and Affect: Mood normal.         Behavior: Behavior normal.        Laboratory:  CBC:  Recent Labs   Lab 05/31/22  1330 09/22/22  1053 12/07/22  1036   WBC 5.1 6.7 14.6 H   RBC 4.79 4.74 4.58   Hemoglobin 14.2 13.9 13.4   Hematocrit 42.4 41.8 40.4   Platelets 164 180 210   MCV 88.5 88.0 88.2   MCH 29.6 29.3 29.3   MCHC 33.4 33.3 33.2       CMP:  Recent Labs   Lab 09/29/21  1035 05/31/22  1330 09/22/22  1053 12/07/22  1036   Glucose 90 94 93 88   Calcium 9.5 9.9 9.6 9.7   ALBUMIN 4.1 4.6 4.3  --    Total Protein 6.1 L 6.3 6.3  --    Sodium 141 142 139 138   Potassium 4.3 4.3 5.0 4.1   CO2 26 26 25 23   Chloride 105 103 104 104   BUN 23 H 23.0 H 31.0 H 30.0 H   Creatinine 1.1 H 1.06 H 1.37 H 1.19 H   Alkaline Phosphatase 98 104 87  --    ALT 21 24 20  --    AST 28 28 22  --    Total Bilirubin 0.4 0.6 0.3  --          URINALYSIS:  Recent Labs   Lab 09/29/21  1037 03/29/22  1037 09/23/22  1046   Color, UA YELLOW YELLOW YELLOW   Specific Gravity, UA 1.007 1.015 1.022    pH, UA < OR = 5.0 < OR = 5.0 < OR = 5.0   Protein, UA NEGATIVE NEGATIVE NEGATIVE   Glucose, UA NEGATIVE NEGATIVE NEGATIVE   Bacteria, UA NONE SEEN NONE SEEN NONE SEEN   Nitrite, UA NEGATIVE NEGATIVE NEGATIVE   Leukocytes, UA NEGATIVE 1+ A 1+ A   Hyaline Casts, UA NONE SEEN NONE SEEN NONE SEEN        LIPIDS:  Recent Labs   Lab 07/02/21  0851 02/02/22  1028 05/31/22  1330 12/20/22  1543 01/26/23  0957 04/14/23  1046 06/09/23  0905   TSH  --  1.08  --   --   --  0.42 0.19 L   HDL 69  --  75 75 H 50  --   --    Cholesterol 148  --  180 170 169  --   --    Triglycerides 39  --  81 122 107  --   --    LDL Calculated 73  --  92 71 102  --   --    Hdl/Cholesterol Ratio  --   --   --  2.27  --   --   --    Non-HDL Cholesterol 79  --   --  95  --   --   --        TSH:  Recent Labs   Lab 02/02/22  1028 04/14/23  1046 06/09/23  0905   TSH 1.08 0.42 0.19 L           Assessment/Plan     Elizabeth Dickson is a 83 y.o.female with:    Hyperlipidemia, unspecified hyperlipidemia type  -     atorvastatin (LIPITOR) 10 MG tablet; Take 1 tablet (10 mg total) by mouth once daily.  Dispense: 90 tablet; Refill: 1  - Controlled.  Cont current.     Hypertension, unspecified type  -     amLODIPine (NORVASC) 2.5 MG tablet; Take 1 tablet (2.5 mg total) by mouth once daily.  Dispense: 90 tablet; Refill: 1  - Controlled.  Cont current.     Wrist lump, left  -     Cancel: X-Ray Wrist 2 View Left; Future; Expected date: 06/19/2023  -     X-Ray Wrist Complete 3 views Left; Future; Expected date: 06/19/2023    Mycosis fungoides, unspecified body region  - Stable.  Cont current regimen as per Dr. Blair Akers.     Pulmonary nodules  - Being fu by PulDr. Brad johnson.  Has fu in aug.     Hypothyroidism, unspecified type  - Stable.  Cont current regimen.    Stage 3a   - Stable.  Cont current regimen.    Bilateral leg edema  - Stable.  Cont current regimen.    Malignant neoplasm of female breast, unspecified estrogen receptor status, unspecified  laterality, unspecified site of breast  - Fu by Dr. Dorado. Has upcoming MGM.    Osteopenia, unspecified location  - Stable.  Cont current regimen.    Anxiety  - Stable.  Cont current regimen.       Chronic conditions status updated as per HPI.  Other than changes above, cont current medications and maintain follow up with specialists.  Follow up in about 6 months (around 12/19/2023) for fu chronic issues or sooner if needed.      Porsha Masters MD  [facility]

## 2023-06-19 ENCOUNTER — HOSPITAL ENCOUNTER (OUTPATIENT)
Dept: RADIOLOGY | Facility: HOSPITAL | Age: 83
Discharge: HOME | End: 2023-06-19
Attending: INTERNAL MEDICINE
Payer: MEDICARE

## 2023-06-19 ENCOUNTER — OFFICE VISIT (OUTPATIENT)
Dept: PRIMARY CARE CLINIC | Facility: CLINIC | Age: 83
End: 2023-06-19
Payer: MEDICARE

## 2023-06-19 VITALS
OXYGEN SATURATION: 99 % | DIASTOLIC BLOOD PRESSURE: 80 MMHG | SYSTOLIC BLOOD PRESSURE: 146 MMHG | HEART RATE: 66 BPM | RESPIRATION RATE: 18 BRPM | HEIGHT: 62 IN | BODY MASS INDEX: 28.11 KG/M2 | WEIGHT: 152.75 LBS | TEMPERATURE: 98 F

## 2023-06-19 DIAGNOSIS — E78.5 HYPERLIPIDEMIA, UNSPECIFIED HYPERLIPIDEMIA TYPE: Primary | ICD-10-CM

## 2023-06-19 DIAGNOSIS — C84.00 MYCOSIS FUNGOIDES, UNSPECIFIED BODY REGION: ICD-10-CM

## 2023-06-19 DIAGNOSIS — M85.80 OSTEOPENIA, UNSPECIFIED LOCATION: ICD-10-CM

## 2023-06-19 DIAGNOSIS — F41.9 ANXIETY: ICD-10-CM

## 2023-06-19 DIAGNOSIS — R60.0 BILATERAL LEG EDEMA: ICD-10-CM

## 2023-06-19 DIAGNOSIS — R91.8 PULMONARY NODULES: ICD-10-CM

## 2023-06-19 DIAGNOSIS — R22.32 WRIST LUMP, LEFT: ICD-10-CM

## 2023-06-19 DIAGNOSIS — N18.31 STAGE 3A CHRONIC KIDNEY DISEASE: ICD-10-CM

## 2023-06-19 DIAGNOSIS — C50.919 MALIGNANT NEOPLASM OF FEMALE BREAST, UNSPECIFIED ESTROGEN RECEPTOR STATUS, UNSPECIFIED LATERALITY, UNSPECIFIED SITE OF BREAST: ICD-10-CM

## 2023-06-19 DIAGNOSIS — I10 HYPERTENSION, UNSPECIFIED TYPE: ICD-10-CM

## 2023-06-19 DIAGNOSIS — E03.9 HYPOTHYROIDISM, UNSPECIFIED TYPE: ICD-10-CM

## 2023-06-19 PROCEDURE — 99999 PR PBB SHADOW E&M-EST. PATIENT-LVL V: ICD-10-PCS | Mod: PBBFAC,,, | Performed by: INTERNAL MEDICINE

## 2023-06-19 PROCEDURE — 3077F PR MOST RECENT SYSTOLIC BLOOD PRESSURE >= 140 MM HG: ICD-10-PCS | Mod: CPTII,S$GLB,, | Performed by: INTERNAL MEDICINE

## 2023-06-19 PROCEDURE — 3077F SYST BP >= 140 MM HG: CPT | Mod: CPTII,S$GLB,, | Performed by: INTERNAL MEDICINE

## 2023-06-19 PROCEDURE — 3288F FALL RISK ASSESSMENT DOCD: CPT | Mod: CPTII,S$GLB,, | Performed by: INTERNAL MEDICINE

## 2023-06-19 PROCEDURE — 3079F DIAST BP 80-89 MM HG: CPT | Mod: CPTII,S$GLB,, | Performed by: INTERNAL MEDICINE

## 2023-06-19 PROCEDURE — 1126F AMNT PAIN NOTED NONE PRSNT: CPT | Mod: CPTII,S$GLB,, | Performed by: INTERNAL MEDICINE

## 2023-06-19 PROCEDURE — 73110 XR WRIST COMPLETE 3 VIEWS LEFT: ICD-10-PCS | Mod: 26,LT,, | Performed by: RADIOLOGY

## 2023-06-19 PROCEDURE — 1159F MED LIST DOCD IN RCRD: CPT | Mod: CPTII,S$GLB,, | Performed by: INTERNAL MEDICINE

## 2023-06-19 PROCEDURE — 99999 PR PBB SHADOW E&M-EST. PATIENT-LVL V: CPT | Mod: PBBFAC,,, | Performed by: INTERNAL MEDICINE

## 2023-06-19 PROCEDURE — 3288F PR FALLS RISK ASSESSMENT DOCUMENTED: ICD-10-PCS | Mod: CPTII,S$GLB,, | Performed by: INTERNAL MEDICINE

## 2023-06-19 PROCEDURE — 1101F PR PT FALLS ASSESS DOC 0-1 FALLS W/OUT INJ PAST YR: ICD-10-PCS | Mod: CPTII,S$GLB,, | Performed by: INTERNAL MEDICINE

## 2023-06-19 PROCEDURE — 1126F PR PAIN SEVERITY QUANTIFIED, NO PAIN PRESENT: ICD-10-PCS | Mod: CPTII,S$GLB,, | Performed by: INTERNAL MEDICINE

## 2023-06-19 PROCEDURE — 1160F PR REVIEW ALL MEDS BY PRESCRIBER/CLIN PHARMACIST DOCUMENTED: ICD-10-PCS | Mod: CPTII,S$GLB,, | Performed by: INTERNAL MEDICINE

## 2023-06-19 PROCEDURE — 99214 PR OFFICE/OUTPT VISIT, EST, LEVL IV, 30-39 MIN: ICD-10-PCS | Mod: S$GLB,,, | Performed by: INTERNAL MEDICINE

## 2023-06-19 PROCEDURE — 99214 OFFICE O/P EST MOD 30 MIN: CPT | Mod: S$GLB,,, | Performed by: INTERNAL MEDICINE

## 2023-06-19 PROCEDURE — 73110 X-RAY EXAM OF WRIST: CPT | Mod: 26,LT,, | Performed by: RADIOLOGY

## 2023-06-19 PROCEDURE — 73110 X-RAY EXAM OF WRIST: CPT | Mod: TC,PN,LT

## 2023-06-19 PROCEDURE — 1101F PT FALLS ASSESS-DOCD LE1/YR: CPT | Mod: CPTII,S$GLB,, | Performed by: INTERNAL MEDICINE

## 2023-06-19 PROCEDURE — 1160F RVW MEDS BY RX/DR IN RCRD: CPT | Mod: CPTII,S$GLB,, | Performed by: INTERNAL MEDICINE

## 2023-06-19 PROCEDURE — 3079F PR MOST RECENT DIASTOLIC BLOOD PRESSURE 80-89 MM HG: ICD-10-PCS | Mod: CPTII,S$GLB,, | Performed by: INTERNAL MEDICINE

## 2023-06-19 PROCEDURE — 1159F PR MEDICATION LIST DOCUMENTED IN MEDICAL RECORD: ICD-10-PCS | Mod: CPTII,S$GLB,, | Performed by: INTERNAL MEDICINE

## 2023-06-19 RX ORDER — ATORVASTATIN CALCIUM 10 MG/1
10 TABLET, FILM COATED ORAL DAILY
Qty: 90 TABLET | Refills: 1 | Status: SHIPPED | OUTPATIENT
Start: 2023-06-19 | End: 2024-01-01

## 2023-06-19 RX ORDER — FENOFIBRATE 160 MG/1
1 TABLET ORAL DAILY
COMMUNITY

## 2023-06-19 RX ORDER — AMLODIPINE BESYLATE 2.5 MG/1
2.5 TABLET ORAL DAILY
Qty: 90 TABLET | Refills: 1 | Status: SHIPPED | OUTPATIENT
Start: 2023-06-19 | End: 2024-01-31 | Stop reason: SDUPTHER

## 2023-06-20 ENCOUNTER — TELEPHONE (OUTPATIENT)
Dept: PRIMARY CARE CLINIC | Facility: CLINIC | Age: 83
End: 2023-06-20
Payer: MEDICARE

## 2023-06-20 ENCOUNTER — PATIENT OUTREACH (OUTPATIENT)
Dept: ADMINISTRATIVE | Facility: HOSPITAL | Age: 83
End: 2023-06-20
Payer: MEDICARE

## 2023-06-20 NOTE — TELEPHONE ENCOUNTER
----- Message from Elizabeth Slaughter sent at 6/20/2023  2:43 PM CDT -----  Contact: Pt @[phone]  Patient is returning a phone call.  Who left a message for the patient:  Tawnya     Does patient know what this is regarding:  Yes     Would you like a call back, or a response through your [facility] portal?:   Call back     Comments:  Please call back to advise on missed call

## 2023-06-20 NOTE — PROGRESS NOTES
Please inform pt - I reviewed your Xray of your left wrist.  It showed Minimal degenerative changes (osteoarthritis) seen at the 1st carpal metacarpal joint space.  No soft tissue calcifications can be identified. If it continues to juan santiago I can send you to one of our hand specialists.  Porsha

## 2023-06-20 NOTE — PROGRESS NOTES
Patient due for the following    COVID-19 Vaccine (4 - Pfizer series)      Immunizations: reviewed and updated  Care Everywhere: triggered  Care Teams: up to date  Outreach: none needed

## 2023-06-22 ENCOUNTER — PES CALL (OUTPATIENT)
Dept: ADMINISTRATIVE | Facility: CLINIC | Age: 83
End: 2023-06-22
Payer: MEDICARE

## 2023-06-27 ENCOUNTER — TELEPHONE (OUTPATIENT)
Dept: PRIMARY CARE CLINIC | Facility: CLINIC | Age: 83
End: 2023-06-27
Payer: MEDICARE

## 2023-06-27 NOTE — TELEPHONE ENCOUNTER
----- Message from Addi Castellanos sent at 6/27/2023  4:00 PM CDT -----  Contact: [phone]  [email or web address]     Patient is calling for Medical Advice regarding:levothyroxine (SYNTHROID) 50 MCG tablet    How long has patient had these symptoms:n/a     Pharmacy name and phone#:    Would like response via [employer]:  call back     Comments:    Pt said she needs a call back about levothyroxine (SYNTHROID) 50 MCG tablet or levothyroxine (SYNTHROID) 75 MCG tablet. Please call pt back.  Pt said mammo was fine.

## 2023-06-28 NOTE — TELEPHONE ENCOUNTER
----- Message from Rosenda Hernandes sent at 6/27/2023  4:49 PM CDT -----  Contact: Abbey daughter [phone]  Patient is returning a phone call.  Who left a message for the patient: Meg   Does patient know what this is regarding:    Would you like a call back, or a response through your [facility] portal?:call back  Comments:Pt's daughter was returning the nurses call

## 2023-07-06 ENCOUNTER — PATIENT OUTREACH (OUTPATIENT)
Dept: ADMINISTRATIVE | Facility: HOSPITAL | Age: 83
End: 2023-07-06
Payer: MEDICARE

## 2023-07-14 DIAGNOSIS — F41.9 ANXIETY: ICD-10-CM

## 2023-07-14 DIAGNOSIS — F32.A DEPRESSION, UNSPECIFIED DEPRESSION TYPE: ICD-10-CM

## 2023-07-14 RX ORDER — ESCITALOPRAM OXALATE 5 MG/1
TABLET ORAL
Qty: 90 TABLET | Refills: 3 | Status: SHIPPED | OUTPATIENT
Start: 2023-07-14

## 2023-07-14 NOTE — TELEPHONE ENCOUNTER
Provider Staff:  Action required for this patient     Please see care gap opportunities below in Care Due Message.    Thanks!  [facility]     Appointments      Date Provider   Last Visit   6/19/2023 Porsha Masters MD   Next Visit   12/18/2023 Porsha Masters MD      Refill Decision Note   Elizabeth Dickson  is requesting a refill authorization.  Brief Assessment and Rationale for Refill:  Approve     Medication Therapy Plan:         Comments:     Note composed:1:58 AM 07/14/2023

## 2023-07-14 NOTE — TELEPHONE ENCOUNTER
Care Due:                  Date            Visit Type   Department     Provider  --------------------------------------------------------------------------------                                EP -                              PRIMARY      [facility] PRIMARY  Last Visit: 06-      CARE ([facility])   CARE           Porsha Masters                              EP -                              PRIMARY      [facility] PRIMARY  Next Visit: 12-      CARE ([facility])   CARE           Porsha  Gisatishrone                                                            Last  Test          Frequency    Reason                     Performed    Due Date  --------------------------------------------------------------------------------    CMP.........  12 months..  atorvastatin.............  09- 09-    [facility] Embedded Care Due Messages. Reference number: [id number].   7/14/2023 12:09:46 AM CDT

## 2023-08-21 ENCOUNTER — TELEPHONE (OUTPATIENT)
Dept: HEMATOLOGY/ONCOLOGY | Facility: CLINIC | Age: 83
End: 2023-08-21
Payer: MEDICARE

## 2023-08-22 ENCOUNTER — PATIENT MESSAGE (OUTPATIENT)
Dept: HEMATOLOGY/ONCOLOGY | Facility: CLINIC | Age: 83
End: 2023-08-22
Payer: MEDICARE

## 2023-08-22 DIAGNOSIS — C84.A0 CUTANEOUS T-CELL LYMPHOMA, UNSPECIFIED BODY REGION: Primary | ICD-10-CM

## 2023-08-24 ENCOUNTER — TELEPHONE (OUTPATIENT)
Dept: HEMATOLOGY/ONCOLOGY | Facility: CLINIC | Age: 83
End: 2023-08-24
Payer: MEDICARE

## 2023-08-24 DIAGNOSIS — E03.9 HYPOTHYROIDISM, UNSPECIFIED TYPE: ICD-10-CM

## 2023-08-24 RX ORDER — LEVOTHYROXINE SODIUM 75 UG/1
TABLET ORAL
Qty: 90 TABLET | Refills: 1 | OUTPATIENT
Start: 2023-08-24

## 2023-08-24 NOTE — TELEPHONE ENCOUNTER
Refill Decision Note   Elizabeth Dickson  is requesting a refill authorization.  Brief Assessment and Rationale for Refill:  Quick Discontinue     Medication Therapy Plan:  Levothyroxine decreased to 50 mcg on 6/11/23      Comments:     Note composed:10:52 AM 08/24/2023

## 2023-08-24 NOTE — TELEPHONE ENCOUNTER
No care due was identified.  [facility] Embedded Care Due Messages. Reference number: [id number].   8/24/2023 12:09:55 AM CDT

## 2023-08-29 ENCOUNTER — PATIENT MESSAGE (OUTPATIENT)
Dept: PRIMARY CARE CLINIC | Facility: CLINIC | Age: 83
End: 2023-08-29
Payer: MEDICARE

## 2023-08-30 ENCOUNTER — LAB VISIT (OUTPATIENT)
Dept: LAB | Facility: HOSPITAL | Age: 83
End: 2023-08-30
Attending: INTERNAL MEDICINE
Payer: MEDICARE

## 2023-08-30 DIAGNOSIS — C84.A0 CUTANEOUS T-CELL LYMPHOMA, UNSPECIFIED BODY REGION: ICD-10-CM

## 2023-08-30 PROCEDURE — 88341 IMHCHEM/IMCYTCHM EA ADD ANTB: CPT | Mod: 26,59,, | Performed by: PATHOLOGY

## 2023-08-30 PROCEDURE — 88360 PR  TUMOR IMMUNOHISTOCHEM/MANUAL: ICD-10-PCS | Mod: 26,59,, | Performed by: PATHOLOGY

## 2023-08-30 PROCEDURE — 88342 IMHCHEM/IMCYTCHM 1ST ANTB: CPT | Mod: 59 | Performed by: PATHOLOGY

## 2023-08-30 PROCEDURE — 88342 IMHCHEM/IMCYTCHM 1ST ANTB: CPT | Mod: 26,59,, | Performed by: PATHOLOGY

## 2023-08-30 PROCEDURE — 88341 IMHCHEM/IMCYTCHM EA ADD ANTB: CPT | Performed by: PATHOLOGY

## 2023-08-30 PROCEDURE — 88360 TUMOR IMMUNOHISTOCHEM/MANUAL: CPT | Mod: 26,59,, | Performed by: PATHOLOGY

## 2023-08-30 PROCEDURE — 88341 PR IHC OR ICC EACH ADD'L SINGLE ANTIBODY  STAINPR: ICD-10-PCS | Mod: 26,59,, | Performed by: PATHOLOGY

## 2023-08-30 PROCEDURE — 88323 CONSLTJ&REPRT MATRL PREP SLD: CPT | Mod: 26,,, | Performed by: PATHOLOGY

## 2023-08-30 PROCEDURE — 88323 CONSLTJ&REPRT MATRL PREP SLD: CPT | Performed by: PATHOLOGY

## 2023-08-30 PROCEDURE — 88323 PR  MICROSLIDE CONSULT W SLIDE PREP: ICD-10-PCS | Mod: 26,,, | Performed by: PATHOLOGY

## 2023-08-30 PROCEDURE — 88342 CHG IMMUNOCYTOCHEMISTRY: ICD-10-PCS | Mod: 26,59,, | Performed by: PATHOLOGY

## 2023-08-30 NOTE — TELEPHONE ENCOUNTER
We can certainly sign a  handicap placard for her. (Place it on my desk to sign)Unfortunately, she can't take any Anti-inflammatories like Aleve, advil, Motrin. She can only take tylenol OTC. If the tylenol isn't working she needs ortho evaluation dwight if the injections have not been helping.   Dr. JACOBS

## 2023-09-01 LAB
COMMENT: NORMAL
FINAL PATHOLOGIC DIAGNOSIS: NORMAL
GROSS: NORMAL
Lab: NORMAL
MICROSCOPIC EXAM: NORMAL

## 2023-11-09 ENCOUNTER — OFFICE VISIT (OUTPATIENT)
Dept: OBSTETRICS AND GYNECOLOGY | Facility: CLINIC | Age: 83
End: 2023-11-09
Payer: MEDICARE

## 2023-11-09 VITALS
SYSTOLIC BLOOD PRESSURE: 140 MMHG | DIASTOLIC BLOOD PRESSURE: 82 MMHG | HEIGHT: 62 IN | BODY MASS INDEX: 27.59 KG/M2 | WEIGHT: 149.94 LBS

## 2023-11-09 DIAGNOSIS — B37.31 VULVOVAGINAL CANDIDIASIS: Primary | ICD-10-CM

## 2023-11-09 PROCEDURE — 1159F PR MEDICATION LIST DOCUMENTED IN MEDICAL RECORD: ICD-10-PCS | Mod: CPTII,S$GLB,,

## 2023-11-09 PROCEDURE — 3077F PR MOST RECENT SYSTOLIC BLOOD PRESSURE >= 140 MM HG: ICD-10-PCS | Mod: CPTII,S$GLB,,

## 2023-11-09 PROCEDURE — 1101F PT FALLS ASSESS-DOCD LE1/YR: CPT | Mod: CPTII,S$GLB,,

## 2023-11-09 PROCEDURE — 99999 PR PBB SHADOW E&M-EST. PATIENT-LVL III: ICD-10-PCS | Mod: PBBFAC,,,

## 2023-11-09 PROCEDURE — 3077F SYST BP >= 140 MM HG: CPT | Mod: CPTII,S$GLB,,

## 2023-11-09 PROCEDURE — 3079F DIAST BP 80-89 MM HG: CPT | Mod: CPTII,S$GLB,,

## 2023-11-09 PROCEDURE — 3288F PR FALLS RISK ASSESSMENT DOCUMENTED: ICD-10-PCS | Mod: CPTII,S$GLB,,

## 2023-11-09 PROCEDURE — 99213 OFFICE O/P EST LOW 20 MIN: CPT | Mod: S$GLB,,,

## 2023-11-09 PROCEDURE — 1126F PR PAIN SEVERITY QUANTIFIED, NO PAIN PRESENT: ICD-10-PCS | Mod: CPTII,S$GLB,,

## 2023-11-09 PROCEDURE — 99213 PR OFFICE/OUTPT VISIT, EST, LEVL III, 20-29 MIN: ICD-10-PCS | Mod: S$GLB,,,

## 2023-11-09 PROCEDURE — 3079F PR MOST RECENT DIASTOLIC BLOOD PRESSURE 80-89 MM HG: ICD-10-PCS | Mod: CPTII,S$GLB,,

## 2023-11-09 PROCEDURE — 3288F FALL RISK ASSESSMENT DOCD: CPT | Mod: CPTII,S$GLB,,

## 2023-11-09 PROCEDURE — 1101F PR PT FALLS ASSESS DOC 0-1 FALLS W/OUT INJ PAST YR: ICD-10-PCS | Mod: CPTII,S$GLB,,

## 2023-11-09 PROCEDURE — 1159F MED LIST DOCD IN RCRD: CPT | Mod: CPTII,S$GLB,,

## 2023-11-09 PROCEDURE — 1126F AMNT PAIN NOTED NONE PRSNT: CPT | Mod: CPTII,S$GLB,,

## 2023-11-09 PROCEDURE — 99999 PR PBB SHADOW E&M-EST. PATIENT-LVL III: CPT | Mod: PBBFAC,,,

## 2023-11-09 RX ORDER — FLUCONAZOLE 150 MG/1
150 TABLET ORAL
Qty: 3 TABLET | Refills: 0 | Status: SHIPPED | OUTPATIENT
Start: 2023-11-09 | End: 2023-11-16

## 2023-11-09 RX ORDER — CLOTRIMAZOLE AND BETAMETHASONE DIPROPIONATE 10; .64 MG/G; MG/G
CREAM TOPICAL 2 TIMES DAILY
Qty: 45 G | Refills: 1 | Status: SHIPPED | OUTPATIENT
Start: 2023-11-09 | End: 2023-11-09 | Stop reason: SDUPTHER

## 2023-11-09 RX ORDER — FLUCONAZOLE 150 MG/1
150 TABLET ORAL
Qty: 3 TABLET | Refills: 0 | Status: SHIPPED | OUTPATIENT
Start: 2023-11-09 | End: 2023-11-09 | Stop reason: SDUPTHER

## 2023-11-09 RX ORDER — BETAMETHASONE DIPROPIONATE 0.5 MG/G
1 OINTMENT, AUGMENTED TOPICAL 2 TIMES DAILY
COMMUNITY

## 2023-11-09 RX ORDER — LEVOTHYROXINE SODIUM 100 UG/1
TABLET ORAL
COMMUNITY
Start: 2023-10-29

## 2023-11-09 RX ORDER — ERYTHROMYCIN 5 MG/G
OINTMENT OPHTHALMIC
COMMUNITY

## 2023-11-09 RX ORDER — FENOFIBRATE 200 MG/1
CAPSULE ORAL
COMMUNITY
Start: 2023-10-15

## 2023-11-09 RX ORDER — CLOTRIMAZOLE AND BETAMETHASONE DIPROPIONATE 10; .64 MG/G; MG/G
CREAM TOPICAL 2 TIMES DAILY
Qty: 45 G | Refills: 1 | Status: SHIPPED | OUTPATIENT
Start: 2023-11-09 | End: 2024-03-21

## 2023-11-09 RX ORDER — BETAMETHASONE VALERATE 1.2 MG/G
CREAM TOPICAL
COMMUNITY

## 2023-11-09 NOTE — PROGRESS NOTES
"  Chief Complaint: Vaginal Pruritis     HPI:      New patient    Elizabeth Dickson is a 83 y.o. No obstetric history on file. who presents with complaint of vaginal pruritis for the last few weeks.  Currently under the care of dermatology at [facility], Dr. Pinto, for skin cancer.  Reports she was her recently and was told she had a yeast infection on her bottom and prescribed a cream.  Today reports rash that goes from her vagina to the back of her bottom.  Endorses itching and pain.  Cream helps slightly.  Denies discharge.  She reports she does not wear pads.  Denies incontinence issues.  Hasn't noticed any bumps in the area or lesions.  Just reports the redness and irritation.      ROS:   GENERAL: Denies weight gain or weight loss. Feeling well overall.   SKIN: Denies rash or lesions.   ABDOMEN: Denies abdominal pain, constipation, diarrhea, nausea, vomiting, change in appetite or rectal bleeding.   URINARY: Denies frequency, dysuria, hematuria.  GYN: See HPI.    Physical Exam:      PHYSICAL EXAM:   Vitals:    11/09/23 1307   BP: (!) 140/82   Weight: 68 kg (149 lb 14.6 oz)   Height: 5' 2" (1.575 m)   PainSc: 0-No pain     Body mass index is 27.42 kg/m².    General: No acute distress, alert and engaged  VULVA: red flat rash with sharp, scalloped edges extending from the buttocks to top of mons pubis.  Skin intact, no lesions.  VAGINA: normal appearing vagina with normal color. no discharge, no lesions   CERVIX: normal appearing cervix without discharge or lesions   UTERUS: uterus is normal size, shape, consistency and nontender   ADNEXA: normal adnexa in size, nontender and no masses    Assessment/Plan:     Vulvovaginal candidiasis  -     fluconazole (DIFLUCAN) 150 MG Tab; Take 1 tablet (150 mg total) by mouth Every 3 (three) days. for 3 doses  Dispense: 3 tablet; Refill: 0  -     clotrimazole-betamethasone 1-0.05% (LOTRISONE) cream; Apply topically 2 (two) times daily.  Dispense: 45 g; Refill: 1    Patient was " counseled today on vaginitis prevention including :  a. avoiding feminine products such as deoderant soaps, body wash, bubble bath, douches, scented toilet paper, deoderant tampons or pads, feminine wipes, chronic pad use, etc.  b. avoiding other vulvovaginal irritants such as long hot baths, humidity, tight, synthetic clothing, chlorine and sitting around in wet bathing suits  c. wearing cotton underwear, avoiding thong underwear and no underwear to bed  d. taking showers instead of baths and use a hair dryer on cool setting afterwards to dry  e. wearing cotton to exercise and shower immediately after exercise and change clothes  f. using polyurethane condoms without spermicide if sexually active and symptoms are triggered by intercourse    Use of MyChart and Patient Portal recommended to patient today.    FOLLOW UP: PRN lack of improvement.    Delaney Adame (Maggie), MARISABEL  Obstetrics and Gynecology  [facility]

## 2023-11-13 NOTE — TELEPHONE ENCOUNTER
----- Message from Addi Castellanos sent at 4/11/2023  9:39 AM CDT -----  Contact: [phone]  Pt said she needs a call back about a oncologist provider the ones still in the office is Dr. Santos and Dr. Isaac.      Knee Pain or Injury: Care Instructions  Overview     Injuries are a common cause of knee problems. Sudden (acute) injuries may be caused by a direct blow to the knee. They can also be caused by abnormal twisting, bending, or falling on the knee. Pain, bruising, or swelling may be severe, and may start within minutes of the injury. Overuse is another cause of knee pain. Other causes are climbing stairs, kneeling, and other activities that use the knee. Everyday wear and tear, especially as you get older, also can cause knee pain. Rest, along with home treatment, often relieves pain and allows your knee to heal. If you have a serious knee injury, you may need tests and treatment. Follow-up care is a key part of your treatment and safety. Be sure to make and go to all appointments, and call your doctor if you are having problems. It's also a good idea to know your test results and keep a list of the medicines you take. How can you care for yourself at home? Be safe with medicines. Read and follow all instructions on the label. If the doctor gave you a prescription medicine for pain, take it as prescribed. If you are not taking a prescription pain medicine, ask your doctor if you can take an over-the-counter medicine. Rest and protect your knee. Take a break from any activity that may cause pain. Put ice or a cold pack on your knee for 10 to 20 minutes at a time. Put a thin cloth between the ice and your skin. Prop up a sore knee on a pillow when you ice it or anytime you sit or lie down for the next 3 days. Try to keep it above the level of your heart. This will help reduce swelling. If your knee is not swollen, you can put moist heat, a heating pad, or a warm cloth on your knee. If your doctor recommends an elastic bandage, sleeve, or other type of support for your knee, wear it as directed. Follow your doctor's instructions about how much weight you can put on your leg.  Use a cane, crutches, or a walker

## 2023-12-06 DIAGNOSIS — M17.0 BILATERAL PRIMARY OSTEOARTHRITIS OF KNEE: Primary | ICD-10-CM

## 2023-12-07 ENCOUNTER — CLINICAL SUPPORT (OUTPATIENT)
Dept: REHABILITATION | Facility: HOSPITAL | Age: 83
End: 2023-12-07
Payer: MEDICARE

## 2023-12-07 DIAGNOSIS — R29.898 DECREASED STRENGTH OF LOWER EXTREMITY: ICD-10-CM

## 2023-12-07 DIAGNOSIS — R68.89 DECREASED FUNCTIONAL ACTIVITY TOLERANCE: Primary | ICD-10-CM

## 2023-12-07 PROCEDURE — 97110 THERAPEUTIC EXERCISES: CPT | Mod: PN

## 2023-12-07 PROCEDURE — 97161 PT EVAL LOW COMPLEX 20 MIN: CPT | Mod: PN

## 2023-12-07 NOTE — PLAN OF CARE
[facility]   Physical Therapy Initial Evaluation      Name: Elizabeth Ambrocio [facility] [place]  Clinic Number: [id number]    Therapy Diagnosis:   Encounter Diagnoses   Name Primary?    Decreased functional activity tolerance Yes    Decreased strength of lower extremity         Physician: Juan Diego Sherman PA-C    Physician Orders: PT Eval and Treat   Medical Diagnosis from Referral: M17.0 (ICD-10-CM) - Bilateral primary osteoarthritis of knee  Evaluation Date: 12/7/2023  Authorization Period Expiration: 02/01/2024  Plan of Care Expiration: 1/26/24  Progress Note Due: 1/26/24  Visit # / Visits authorized: 1/ 1   FOTO: 1/5    Precautions: Standard     Time In: 1:00 pm  Time Out: 2:00 pm  Total Appointment Time (timed & untimed codes): 60 (LCE + TE) minutes    Subjective     Date of onset: 3 months ago  History of current condition - Elizabeth reports: ,pain in bilateral knee, R one is the worst. She has trouble with walking for short distances, and does not want to fall. She currently goes to the gym and does 33 minutes on light impact Nu-step. She has done PT in the past but not recently. Her biggest fear is falling and breaking something. She would like to strength back into her knees. The R one has increased soreness after and during activity compared to the L knee.    Falls: no falls    Imaging: : See EMR    Prior Therapy: Yes,   Social History:  lives with their family  Occupation: no working  Prior Level of Function: Able to ambulate without difficulty.  Current Level of Function: has difficulty with ambulation, stairs, and squatting due to weakness in both knees.    Pain:  Current 0/10, worst 4/10, best 0/10   Location: bilateral knees   Description: Aching/weak  Aggravating Factors: Sitting, Standing, Bending, Morning, and getting out of bed/chair  Easing Factors: rest  Patients goals: get back to walking without difficulty.       Medical History:   Past Medical History:   Diagnosis Date     "Anxiety     Bilateral leg edema     Breast cancer     Cellulitis of left leg     CKD (chronic kidney disease), stage III     Hypertension     Hypothyroidism     Iron deficiency anemia     Osteopenia     Ovarian cyst     Teratoma and Benign Serous Cystadenoma    Pancreatic cyst     Pulmonary nodules     Renal mass     Vitamin D deficiency        Surgical History:   Elizabeth Dickson  has a past surgical history that includes Breast surgery; Removal of Ovaries; and debridement.    Medications:   Elizabeth Ambrocio has a current medication list which includes the following prescription(s): amlodipine, ascorbic acid (vitamin c), atorvastatin, augmented betamethasone dipropionate, betamethasone valerate 0.1%, bexarotene, calcium carbonate, cholecalciferol (vitamin d3), clotrimazole-betamethasone 1-0.05%, erythromycin, escitalopram oxalate, fenofibrate, fenofibrate micronized, ferrous sulfate, latanoprost, levothyroxine, levothyroxine, melatonin, and metoprolol succinate.    Allergies:   Review of patient's allergies indicates:   Allergen Reactions    Lisinopril      hyperkalemia        Objective      Posture: rounded shoulders, bilateral valgus  Palpation: tenderness VMO R knee    Active range of motion:  Right knee: 0-120  Left knee: 0-120    Passive range of motion:  Right knee: 0-120  Left knee: 0-120    Lower extremity srength with MicroFET handheld dynamometer Right Left Pain/dysfunction with movement   (approx 4 sec hold w/ max contraction)   Hip flexion 8.2 kg  8.3 kg     Hip abduction 8.1 kg  8.2 kg     Quadriceps 12.7 kg  17 kg     Hamstrings 8 kg  8.9 kg       Hip:  L  IR: 10-5 (from zero) Starting in valgus   ER 60    R   IR: 10-5 (from zero) starting in valgus  ER 60    Timed Up and Go:  22 seconds     30" Chair Stand: 8 with  bilateral upper extremity support      Limitation/Restriction for FOTO  Survey    Therapist reviewed FOTO scores for Elizabeth Dickson on 12/7/2023.   FOTO documents entered into " "EPIC - see Media section.    Limitation Score: 54%         Treatment     Total Treatment time (time-based codes) separate from Evaluation: 10 minutes     Elizabeth received the treatments listed below:      therapeutic exercises to develop  for 10 minutes including:    Quad sets: 5"x10  Short arc quads: x10   Straight leg raise: x5   Sidelying hip abduction: x5   Long arc quads: x10   Seated marching: x10   Seated heel prop: 3'   Seated hamstring stretch in heel prop: 3x30"       Patient Education and Home Exercises     Education provided:   - Findings; prognosis and plan of care  - Home exercise program  - Modality options  - Therapist contact information    Written Home Exercises Provided: yes. Exercises were reviewed and Elizabeth was able to demonstrate them prior to the end of the session.  Elizabeth demonstrated fair  understanding of the education provided. See EMR under Patient Instructions for exercises provided during therapy sessions.    Assessment     Elizabeth Ambrocio is a 83 y.o. female referred to outpatient Physical Therapy with a medical diagnosis of M17.0 (ICD-10-CM) - Bilateral primary osteoarthritis of knee. Patient presents with deficits that consist of decreased strength in BLE, fall risk indicated by unstable gait and high TUG score. Pt has functional deficits that prevent pt from walking on unstable surfaces, navigating stairs, and squatting safely. Pt would benefit from PT services to address the above deficits and allow for return to ambulation without fear of falling.     Patient prognosis is Good.   Patient will benefit from skilled outpatient Physical Therapy to address the deficits stated above and in the chart below, provide patient /family education, and to maximize patientt's level of independence.     Plan of care discussed with patient: Yes  Patient's spiritual, cultural and educational needs considered and patient is agreeable to the plan of care and goals as stated below: "     Anticipated Barriers for therapy:     Medical Necessity is demonstrated by the following  History  Co-morbidities and personal factors that may impact the plan of care [x] LOW: no personal factors / co-morbidities  [] MODERATE: 1-2 personal factors / co-morbidities  [] HIGH: 3+ personal factors / co-morbidities    Moderate / High Support Documentation:   Co-morbidities affecting plan of care:     Personal Factors:        Examination  Body Structures and Functions, activity limitations and participation restrictions that may impact the plan of care [x] LOW: addressing 1-2 elements  [] MODERATE: 3+ elements  [] HIGH: 4+ elements (please support below)    Moderate / High Support Documentation:      Clinical Presentation [x] LOW: stable  [] MODERATE: Evolving  [] HIGH: Unstable     Decision Making/ Complexity Score: low       Short Term Goals (3 Weeks):   1. Patient will be independent with home exercise program to supplement physical therapy treatment in improving functional status.  2. Patient will improve bilateral lower extremity strength to at least 75% as measured via MicroFet handheld dynamometer to improve strength for closed chain tasks.   3. Patient will perform timed up and go with less restrictive assistive device in < 15 seconds to improve gait speed and safety with community ambulation.  4. Patient will perform at least 10 sit to stands in 30 seconds without UE support to demonstrate increased functional strength.      Long Term Goals (6 Weeks):   1. Patient will improve bilateral lower extremity strength to at least 90% as measured via MicroFet handheld dynamometer to improve strength for closed chain tasks.   2. Patient will improve the total FOTO Knee Survey Score to </= 40% limited to demonstrate increased perceived functional mobility.  3. Patient will perform timed up and go with least restrictive assistive device in < 12 seconds to improve gait speed and safety with community ambulation.  4.  "Patient will perform at least 13 sit to stands in 30 seconds without UE support to demonstrate increased functional strength.     Timed Up and Go Cutoff Scores:        30" Chair Stand Cutoff Scores:            Plan     Plan of care Certification: 12/7/2023 to 1/26/24.    Outpatient Physical Therapy 2 times weekly for 6 weeks to include the following interventions: Electrical Stimulation  , Gait Training, Manual Therapy, Moist Heat/ Ice, Neuromuscular Re-ed, Patient Education, Therapeutic Activities, and Therapeutic Exercise.     Wil Willis, PT        "

## 2023-12-11 ENCOUNTER — CLINICAL SUPPORT (OUTPATIENT)
Dept: REHABILITATION | Facility: HOSPITAL | Age: 83
End: 2023-12-11
Payer: MEDICARE

## 2023-12-11 DIAGNOSIS — R29.898 DECREASED STRENGTH OF LOWER EXTREMITY: ICD-10-CM

## 2023-12-11 DIAGNOSIS — R68.89 DECREASED FUNCTIONAL ACTIVITY TOLERANCE: Primary | ICD-10-CM

## 2023-12-11 PROCEDURE — 97110 THERAPEUTIC EXERCISES: CPT | Mod: PN

## 2023-12-11 NOTE — PROGRESS NOTES
"[facility]   Physical Therapy Treatment Note      Name: Elizabeth Ambrocio [facility]  Clinic Number: [id number]    Therapy Diagnosis:   Encounter Diagnoses   Name Primary?    Decreased functional activity tolerance Yes    Decreased strength of lower extremity      Physician: Juan Diego Sherman PA-C    Visit Date: 12/11/2023    Physician: Juan Diego Sherman PA-C     Physician Orders: PT Eval and Treat   Medical Diagnosis from Referral: M17.0 (ICD-10-CM) - Bilateral primary osteoarthritis of knee  Evaluation Date: 12/7/2023  Authorization Period Expiration: 02/01/2024  Plan of Care Expiration: 1/26/24  Progress Note Due: 1/26/24  Visit # / Visits authorized: 1/ 1   FOTO: 1/5     Precautions: Standard      Time In: 12:30 pm  Time Out: 1:30 pm  Total Appointment Time (timed & untimed codes): 30 1:1 (2TE) minutes    Subjective     Patient reports: that she was able to perform the HEP. She still continues to have issues with walking.  She was compliant with home exercise program.  Response to previous treatment: 1st after  Functional change: 1st after    Pain: **/10- stiffness and weakness  Location:  bilateral (right>L) knees     Objective      Objective Measures updated at progress report unless specified.     Treatment     Elizabeth received the treatments listed below:      therapeutic exercises to develop strength, endurance, and flexibility for 60 (30 1:1' 30 supervised) minutes including:     Quad sets: 5"x10  Short arc quads: x10   Straight leg raise: x5   Sidelying hip abduction: x5   Long arc quads: x10   Seated marching: 2x10   Seated heel prop: 3'   Seated hamstring stretch in heel prop: 3x30"   Bridges 3x10  Seated hip add 2x10 5" holds  Seated hip abd 2x10 5" holds RTB  Standing hip abd x10 ea side  Mini-squats x15  Calf raises 2x10    Patient Education and Home Exercises       Education provided:   - Pt educated on importance of performing HEP and using modalities such as heat/ice to manage " exacerbation of pain.     Written Home Exercises Provided: yes. Exercises were reviewed and Elizabeth was able to demonstrate them prior to the end of the session.  Elizabeth demonstrated good  understanding of the education provided. See Electronic Medical Record under Patient Instructions for exercises provided during therapy sessions    Assessment     Elizabeth Ambrocio is a 83 y.o. female referred to outpatient Physical Therapy with a medical diagnosis of M17.0 (ICD-10-CM) - Bilateral primary osteoarthritis of knee. Patient presents with deficits that consist of decreased strength in BLE, fall risk indicated by unstable gait and high TUG score. Pt tolerated there-ex with no complaints except for SAQ, in which she had some discomfort in R lateral knee. Moderate crepitus noted during mini squats with hip abduction, better with return to normal bilateral valgus. PT to progress strengthening and balance POC within tolerance to improve gait and reduce fall risk.     Elizabeth Is progressing well towards her goals.   Patient prognosis is Fair.     Patient will continue to benefit from skilled outpatient physical therapy to address the deficits listed in the problem list box on initial evaluation, provide pt/family education and to maximize pt's level of independence in the home and community environment.     Patient's spiritual, cultural and educational needs considered and pt agreeable to plan of care and goals.     Anticipated barriers to physical therapy:     Short Term Goals (3 Weeks):   1. Patient will be independent with home exercise program to supplement physical therapy treatment in improving functional status.  2. Patient will improve bilateral lower extremity strength to at least 75% as measured via MicroFet handheld dynamometer to improve strength for closed chain tasks.   3. Patient will perform timed up and go with less restrictive assistive device in < 15 seconds to improve gait speed and safety with  community ambulation.  4. Patient will perform at least 10 sit to stands in 30 seconds without UE support to demonstrate increased functional strength.      Long Term Goals (6 Weeks):   1. Patient will improve bilateral lower extremity strength to at least 90% as measured via MicroFet handheld dynamometer to improve strength for closed chain tasks.   2. Patient will improve the total FOTO Knee Survey Score to </= 40% limited to demonstrate increased perceived functional mobility.  3. Patient will perform timed up and go with least restrictive assistive device in < 12 seconds to improve gait speed and safety with community ambulation.  4. Patient will perform at least 13 sit to stands in 30 seconds without UE support to demonstrate increased functional strength.     Plan     Plan of care Certification: 12/7/2023 to 1/26/24.     Outpatient Physical Therapy 2 times weekly for 6 weeks to include the following interventions: Electrical Stimulation , Gait Training, Manual Therapy, Moist Heat/ Ice, Neuromuscular Re-ed, Patient Education, Therapeutic Activities, and Therapeutic Exercise.        Wil Willis, PT

## 2023-12-12 PROBLEM — R68.89 DECREASED FUNCTIONAL ACTIVITY TOLERANCE: Status: ACTIVE | Noted: 2023-12-12

## 2023-12-12 PROBLEM — R29.898 DECREASED STRENGTH OF LOWER EXTREMITY: Status: ACTIVE | Noted: 2023-12-12

## 2023-12-13 ENCOUNTER — CLINICAL SUPPORT (OUTPATIENT)
Dept: REHABILITATION | Facility: HOSPITAL | Age: 83
End: 2023-12-13
Payer: MEDICARE

## 2023-12-13 DIAGNOSIS — R68.89 DECREASED FUNCTIONAL ACTIVITY TOLERANCE: Primary | ICD-10-CM

## 2023-12-13 DIAGNOSIS — R29.898 DECREASED STRENGTH OF LOWER EXTREMITY: ICD-10-CM

## 2023-12-13 PROCEDURE — 97110 THERAPEUTIC EXERCISES: CPT | Mod: PN

## 2023-12-13 NOTE — PROGRESS NOTES
"[facility]   Physical Therapy Treatment Note      Name: Elizabeth Ambrocio [facility] [place]  Clinic Number: [id number]    Therapy Diagnosis:   Encounter Diagnoses   Name Primary?    Decreased functional activity tolerance Yes    Decreased strength of lower extremity      Physician: Juan Diego Sherman PA-C    Visit Date: 12/13/2023    Physician: Juan Diego Sherman PA-C     Physician Orders: PT Eval and Treat   Medical Diagnosis from Referral: M17.0 (ICD-10-CM) - Bilateral primary osteoarthritis of knee  Evaluation Date: 12/7/2023  Authorization Period Expiration: 02/01/2024  Plan of Care Expiration: 1/26/24  Progress Note Due: 1/26/24  Visit # / Visits authorized: 2/ (+1 initial evaluation)  FOTO: 1/5     Precautions: Standard      Time In: 1:00 pm  Time Out: 1:55 pm  Total Appointment Time (timed & untimed codes): 55 1:1 (4 TE) minutes    Subjective     Patient reports:that she has trouble walking, but she knows this is nothing new.  She was compliant with home exercise program.  Response to previous treatment: no change  Functional change: no change    Pain: **/10- pain in R knee- "just pain"  Location:  bilateral (right>L) knees     Objective      Objective Measures updated at progress report unless specified.     Treatment     Elizabeth received the treatments listed below:      therapeutic exercises to develop strength, endurance, and flexibility for 60 (30 1:1' 30 supervised) minutes including:     Quad sets: 2' 5" holds  Short arc quads: -NT  Straight leg raise: 2x10  Sidelying hip abduction: x10  Long arc quads: x10   Bridges 3x10 3" holds  Seated hip add 2x10 5" holds  Seated hip abd 2x10 5" holds RTB  Standing hip abd x10 ea side  Mini-squats x15  Calf raises 2x10  Standing marches on foam 2x15    Next:  Steps 2x10 fwd/lateral  Patient Education and Home Exercises       Education provided:   - Pt educated on importance of performing HEP and using modalities such as heat/ice to manage exacerbation of " pain.     Written Home Exercises Provided: yes. Exercises were reviewed and Elizabeth was able to demonstrate them prior to the end of the session.  Elizabeth demonstrated good  understanding of the education provided. See Electronic Medical Record under Patient Instructions for exercises provided during therapy sessions    Assessment     Elizabeth Ambrocio is a 83 y.o. female referred to outpatient Physical Therapy with a medical diagnosis of M17.0 (ICD-10-CM) - Bilateral primary osteoarthritis of knee. Patient presents with deficits that consist of decreased strength in BLE, fall risk indicated by unstable gait and high TUG score. Pt tolerated Table There-ex without complaints. Required frequent break with CKC standing There-ex due to pain in bilateral knees. PT to progress with CKC strengthening as per pt tolerance will allow.     Elizabeth Is progressing well towards her goals.   Patient prognosis is Fair.     Patient will continue to benefit from skilled outpatient physical therapy to address the deficits listed in the problem list box on initial evaluation, provide pt/family education and to maximize pt's level of independence in the home and community environment.     Patient's spiritual, cultural and educational needs considered and pt agreeable to plan of care and goals.     Anticipated barriers to physical therapy:     Short Term Goals (3 Weeks):   1. Patient will be independent with home exercise program to supplement physical therapy treatment in improving functional status.  2. Patient will improve bilateral lower extremity strength to at least 75% as measured via MicroFet handheld dynamometer to improve strength for closed chain tasks.   3. Patient will perform timed up and go with less restrictive assistive device in < 15 seconds to improve gait speed and safety with community ambulation.  4. Patient will perform at least 10 sit to stands in 30 seconds without UE support to demonstrate increased functional  strength.      Long Term Goals (6 Weeks):   1. Patient will improve bilateral lower extremity strength to at least 90% as measured via MicroFet handheld dynamometer to improve strength for closed chain tasks.   2. Patient will improve the total FOTO Knee Survey Score to </= 40% limited to demonstrate increased perceived functional mobility.  3. Patient will perform timed up and go with least restrictive assistive device in < 12 seconds to improve gait speed and safety with community ambulation.  4. Patient will perform at least 13 sit to stands in 30 seconds without UE support to demonstrate increased functional strength.     Plan     Plan of care Certification: 12/7/2023 to 1/26/24.     Outpatient Physical Therapy 2 times weekly for 6 weeks to include the following interventions: Electrical Stimulation , Gait Training, Manual Therapy, Moist Heat/ Ice, Neuromuscular Re-ed, Patient Education, Therapeutic Activities, and Therapeutic Exercise.        Wil Willis, PT

## 2023-12-18 ENCOUNTER — CLINICAL SUPPORT (OUTPATIENT)
Dept: REHABILITATION | Facility: HOSPITAL | Age: 83
End: 2023-12-18
Payer: MEDICARE

## 2023-12-18 DIAGNOSIS — R68.89 DECREASED FUNCTIONAL ACTIVITY TOLERANCE: Primary | ICD-10-CM

## 2023-12-18 DIAGNOSIS — R29.898 DECREASED STRENGTH OF LOWER EXTREMITY: ICD-10-CM

## 2023-12-18 PROCEDURE — 97110 THERAPEUTIC EXERCISES: CPT | Mod: PN

## 2023-12-18 NOTE — PROGRESS NOTES
"[facility]   Physical Therapy Treatment Note      [place]roc[place] [facility] [place]inic Number: [id number]    Therapy Diagnosis:   Encounter Diagnoses   Name Primary?    Decreased functional activity tolerance Yes    Decreased strength of lower extremity      Physician: Juan Diego Sherman PA-C    Visit Date: 12/18/2023    Physician: Juan Diego Sherman PA-C     Physician Orders: PT Eval and Treat   Medical Diagnosis from Referral: M17.0 (ICD-10-CM) - Bilateral primary osteoarthritis of knee  Evaluation Date: 12/7/2023  Authorization Period Expiration: 02/01/2024  Plan of Care Expiration: 1/26/24  Progress Note Due: 1/26/24  Visit # / Visits authorized: 3/ (+1 initial evaluation)  FOTO: 4/5     Precautions: Standard      Time In: 1:30 pm  Time Out: 2:30 pm  Total Appointment Time (timed & untimed codes): 55 1:1 (4 TE) minutes    Subjective     Patient reports:feels that she is still unsteady. Pt feels that she still has trouble walking.  She was compliant with home exercise program.  Response to previous treatment: no change  Functional change: no change    Pain: **/10- pain in R knee- "just pain"  Location:  bilateral (right>L) knees     Objective      Objective Measures updated at progress report unless specified.     Treatment     Elizabeth received the treatments listed below:      therapeutic exercises to develop strength, endurance, and flexibility for 60 (30 1:1' 30 supervised) minutes including:    Nu-step lvl 3 -5 mins  Quad sets: 2' 5" holds  Short arc quads:  2x10 3#  Straight leg raise: 2x10  Long arc quads: 2x10   Bridges 3x10 3" holds  Seated hip add 2x10 5" holds  Seated hip abd 2x10 5" holds RTB  Standing hip abd x10 ea side  Mini-squats x15 off high low mat (CGA- eccentric control)  Calf raises 2x10  Standing marches 2x15  HSC RTB 2x10     Steps 2x10 fwd/lateral 4 inch      Patient Education and Home Exercises       Education provided:   - Pt educated on importance of performing " HEP and using modalities such as heat/ice to manage exacerbation of pain.     Written Home Exercises Provided: yes. Exercises were reviewed and Elizabeth was able to demonstrate them prior to the end of the session.  Elizabeth demonstrated good  understanding of the education provided. See Electronic Medical Record under Patient Instructions for exercises provided during therapy sessions    Assessment     Elizabeth Ambrocio is a 83 y.o. female referred to outpatient Physical Therapy with a medical diagnosis of M17.0 (ICD-10-CM) - Bilateral primary osteoarthritis of knee. Patient presents with deficits that consist of decreased strength in BLE, fall risk indicated by unstable gait and high TUG score. Slight discomfort noted with SAQ on RLE. Required frequent break with CKC standing There-ex due to pain in bilateral knees. PT to progress with CKC strengthening as per pt tolerance will allow.     Elizabeth Is progressing well towards her goals.   Patient prognosis is Fair.     Patient will continue to benefit from skilled outpatient physical therapy to address the deficits listed in the problem list box on initial evaluation, provide pt/family education and to maximize pt's level of independence in the home and community environment.     Patient's spiritual, cultural and educational needs considered and pt agreeable to plan of care and goals.     Anticipated barriers to physical therapy:     Short Term Goals (3 Weeks):   1. Patient will be independent with home exercise program to supplement physical therapy treatment in improving functional status.  2. Patient will improve bilateral lower extremity strength to at least 75% as measured via MicroFet handheld dynamometer to improve strength for closed chain tasks.   3. Patient will perform timed up and go with less restrictive assistive device in < 15 seconds to improve gait speed and safety with community ambulation.  4. Patient will perform at least 10 sit to stands in 30  seconds without UE support to demonstrate increased functional strength.      Long Term Goals (6 Weeks):   1. Patient will improve bilateral lower extremity strength to at least 90% as measured via MicroFet handheld dynamometer to improve strength for closed chain tasks.   2. Patient will improve the total FOTO Knee Survey Score to </= 40% limited to demonstrate increased perceived functional mobility.  3. Patient will perform timed up and go with least restrictive assistive device in < 12 seconds to improve gait speed and safety with community ambulation.  4. Patient will perform at least 13 sit to stands in 30 seconds without UE support to demonstrate increased functional strength.     Plan     Plan of care Certification: 12/7/2023 to 1/26/24.     Outpatient Physical Therapy 2 times weekly for 6 weeks to include the following interventions: Electrical Stimulation , Gait Training, Manual Therapy, Moist Heat/ Ice, Neuromuscular Re-ed, Patient Education, Therapeutic Activities, and Therapeutic Exercise.        Wil Willis, PT

## 2023-12-20 ENCOUNTER — CLINICAL SUPPORT (OUTPATIENT)
Dept: REHABILITATION | Facility: HOSPITAL | Age: 83
End: 2023-12-20
Payer: MEDICARE

## 2023-12-20 DIAGNOSIS — R29.898 DECREASED STRENGTH OF LOWER EXTREMITY: ICD-10-CM

## 2023-12-20 DIAGNOSIS — R68.89 DECREASED FUNCTIONAL ACTIVITY TOLERANCE: Primary | ICD-10-CM

## 2023-12-20 PROCEDURE — 97110 THERAPEUTIC EXERCISES: CPT | Mod: PN

## 2023-12-20 PROCEDURE — 97116 GAIT TRAINING THERAPY: CPT | Mod: PN

## 2023-12-21 NOTE — PROGRESS NOTES
"[facility]   Physical Therapy Treatment Note      Name: Elizabeth Ambrocio [facility] [place]  Clinic Number: [id number]    Therapy Diagnosis:   Encounter Diagnoses   Name Primary?    Decreased functional activity tolerance Yes    Decreased strength of lower extremity      Physician: Juan Diego Sherman PA-C    Visit Date: 12/20/2023    Physician: Juan Diego Sherman PA-C     Physician Orders: PT Eval and Treat   Medical Diagnosis from Referral: M17.0 (ICD-10-CM) - Bilateral primary osteoarthritis of knee  Evaluation Date: 12/7/2023  Authorization Period Expiration: 02/01/2024  Plan of Care Expiration: 1/26/24  Progress Note Due: 1/26/24  Visit # / Visits authorized: 3/ (+1 initial evaluation)  FOTO: 4/5     Precautions: Standard      Time In: 1:00 pm  Time Out: 2:00 pm  Total Appointment Time (timed & untimed codes): 55 1:1 (3 TE 1 GT) minutes    Subjective     Patient reports:feels that she is still unsteady. Pt feels that she still has trouble walking.  She was compliant with home exercise program.  Response to previous treatment: no change  Functional change: no change    Pain: **/10- pain in R knee- "just pain"  Location:  bilateral (right>L) knees     Objective      Objective Measures updated at progress report unless specified.     Treatment     Elizabeth received the treatments listed below:      therapeutic exercises to develop strength, endurance, and flexibility for 60 (30 1:1' 30 supervised) minutes including:    Nu-step lvl 3 -5 mins  Quad sets: 2' 5" holds  Short arc quads:  2x10 3#  Straight leg raise: 2x10  Long arc quads: 2x10   Bridges 3x10 3" holds  Seated hip add 2x10 5" holds  Seated hip abd 2x10 5" holds RTB  Standing hip abd x10 ea side  Mini-squats x15 off high low mat (CGA- eccentric control)  Calf raises 2x10  Standing marches 2x15  HSC RTB 2x10     Steps 2x10 fwd/lateral 4 inch    Gait training to improve body mechanics and decrease added pressure through knees for 10 minutes  LBQC " 150ft with step-to pattern. (Cane in opposite hand)    Patient Education and Home Exercises       Education provided:   - Pt educated on importance of performing HEP and using modalities such as heat/ice to manage exacerbation of pain.     Written Home Exercises Provided: yes. Exercises were reviewed and Elizabeth was able to demonstrate them prior to the end of the session.  Elizabeth demonstrated good  understanding of the education provided. See Electronic Medical Record under Patient Instructions for exercises provided during therapy sessions    Assessment     Elizabeth Ambrocio is a 83 y.o. female referred to outpatient Physical Therapy with a medical diagnosis of M17.0 (ICD-10-CM) - Bilateral primary osteoarthritis of knee. Patient presents with deficits that consist of decreased strength in BLE, fall risk indicated by unstable gait and high TUG score. Slight discomfort noted with SAQ on RLE. Required frequent break with CKC standing There-ex due to pain in bilateral knees. Pt demonstrated improved gait mechanics when practicing with LBQC during gait training. PT will focus next session on carry over with cane, so pt can consider purchasing one to improve balance while ambulating.   Elizabeth Is progressing well towards her goals.   Patient prognosis is Fair.     Patient will continue to benefit from skilled outpatient physical therapy to address the deficits listed in the problem list box on initial evaluation, provide pt/family education and to maximize pt's level of independence in the home and community environment.     Patient's spiritual, cultural and educational needs considered and pt agreeable to plan of care and goals.     Anticipated barriers to physical therapy:     Short Term Goals (3 Weeks):   1. Patient will be independent with home exercise program to supplement physical therapy treatment in improving functional status.  2. Patient will improve bilateral lower extremity strength to at least 75% as  measured via MicroFet handheld dynamometer to improve strength for closed chain tasks.   3. Patient will perform timed up and go with less restrictive assistive device in < 15 seconds to improve gait speed and safety with community ambulation.  4. Patient will perform at least 10 sit to stands in 30 seconds without UE support to demonstrate increased functional strength.      Long Term Goals (6 Weeks):   1. Patient will improve bilateral lower extremity strength to at least 90% as measured via MicroFet handheld dynamometer to improve strength for closed chain tasks.   2. Patient will improve the total FOTO Knee Survey Score to </= 40% limited to demonstrate increased perceived functional mobility.  3. Patient will perform timed up and go with least restrictive assistive device in < 12 seconds to improve gait speed and safety with community ambulation.  4. Patient will perform at least 13 sit to stands in 30 seconds without UE support to demonstrate increased functional strength.     Plan     Plan of care Certification: 12/7/2023 to 1/26/24.     Outpatient Physical Therapy 2 times weekly for 6 weeks to include the following interventions: Electrical Stimulation , Gait Training, Manual Therapy, Moist Heat/ Ice, Neuromuscular Re-ed, Patient Education, Therapeutic Activities, and Therapeutic Exercise.        Wil Willis, PT

## 2023-12-27 ENCOUNTER — CLINICAL SUPPORT (OUTPATIENT)
Dept: REHABILITATION | Facility: HOSPITAL | Age: 83
End: 2023-12-27
Payer: MEDICARE

## 2023-12-27 DIAGNOSIS — R29.898 DECREASED STRENGTH OF LOWER EXTREMITY: ICD-10-CM

## 2023-12-27 DIAGNOSIS — R68.89 DECREASED FUNCTIONAL ACTIVITY TOLERANCE: Primary | ICD-10-CM

## 2023-12-27 PROCEDURE — 97116 GAIT TRAINING THERAPY: CPT | Mod: PN

## 2023-12-27 PROCEDURE — 97110 THERAPEUTIC EXERCISES: CPT | Mod: PN

## 2023-12-27 NOTE — PROGRESS NOTES
"[facility]   Physical Therapy Treatment Note      Name: Elizabeth Ambrocio [facility] [place]  Clinic Number: [id number]    Therapy Diagnosis:   Encounter Diagnoses   Name Primary?    Decreased functional activity tolerance Yes    Decreased strength of lower extremity      Physician: Juan Diego Sherman PA-C    Visit Date: 12/27/2023    Physician: Juan Diego Sherman PA-C     Physician Orders: PT Eval and Treat   Medical Diagnosis from Referral: M17.0 (ICD-10-CM) - Bilateral primary osteoarthritis of knee  Evaluation Date: 12/7/2023  Authorization Period Expiration: 02/01/2024  Plan of Care Expiration: 1/26/24  Progress Note Due: 1/26/24  Visit # / Visits authorized: 5/ (+1 initial evaluation)  FOTO: 6/5     Precautions: Standard      Time In: 1:00 pm  Time Out: 1:55 pm  Total Appointment Time (timed & untimed codes): 55 1:1 (3 TE 1 GT) minutes    Subjective     Patient reports:pt feels unsteady with walking.   She was compliant with home exercise program.  Response to previous treatment: no change  Functional change: no change    Pain: **/10- pain in R knee- "just pain"  Location:  bilateral (right>L) knees     Objective      Objective Measures updated at progress report unless specified.     Treatment     Elizabeth received the treatments listed below:      therapeutic exercises to develop strength, endurance, and flexibility for 45 minutes including:    Nu-step lvl 3 -5 mins- NT  Short arc quads:  2x10 3#  Straight leg raise: 2x10 3#  Long arc quads: 2x10 3#  Bridges 3x10 3" holds  Seated hip add 2x10 5" holds  Seated hip abd 2x10 5" holds RTB  Standing hip abd x15 ea side  Mini-squats x15 off high low mat (CGA- eccentric control)  Calf raises 2x10  Standing marches 2x15  Standing Hamstring curl 2x10    Steps 2x10 fwd/lateral 4 inch    Gait training to improve body mechanics and decrease added pressure through knees for 10 minutes  LBQC 150ft with step-to pattern. (Cane in opposite hand)  SPC 150ft with " step-to pattern. (Cane in opposite hand)    Patient Education and Home Exercises       Education provided:   - Pt educated on importance of performing HEP and using modalities such as heat/ice to manage exacerbation of pain.     Written Home Exercises Provided: yes. Exercises were reviewed and Elizabeth was able to demonstrate them prior to the end of the session.  Elizabeth demonstrated good  understanding of the education provided. See Electronic Medical Record under Patient Instructions for exercises provided during therapy sessions    Assessment     Elizabeth Ambrocio is a 83 y.o. female referred to outpatient Physical Therapy with a medical diagnosis of M17.0 (ICD-10-CM) - Bilateral primary osteoarthritis of knee. Patient presents with deficits that consist of decreased strength in BLE, fall risk indicated by unstable gait and high TUG score. Required frequent break with CKC standing There-ex due to pain in bilateral knees. Pt demonstrated improved gait mechanics when practicing with cane usage during gait training. After assessing gait with cane, it was discussed in detail that single point cane was best for pt. She stated that she would work on getting one before next session.     Elizabeth Is progressing well towards her goals.   Patient prognosis is Fair.     Patient will continue to benefit from skilled outpatient physical therapy to address the deficits listed in the problem list box on initial evaluation, provide pt/family education and to maximize pt's level of independence in the home and community environment.     Patient's spiritual, cultural and educational needs considered and pt agreeable to plan of care and goals.     Anticipated barriers to physical therapy:     Short Term Goals (3 Weeks):   1. Patient will be independent with home exercise program to supplement physical therapy treatment in improving functional status.  2. Patient will improve bilateral lower extremity strength to at least 75% as  measured via MicroFet handheld dynamometer to improve strength for closed chain tasks.   3. Patient will perform timed up and go with less restrictive assistive device in < 15 seconds to improve gait speed and safety with community ambulation.  4. Patient will perform at least 10 sit to stands in 30 seconds without UE support to demonstrate increased functional strength.      Long Term Goals (6 Weeks):   1. Patient will improve bilateral lower extremity strength to at least 90% as measured via MicroFet handheld dynamometer to improve strength for closed chain tasks.   2. Patient will improve the total FOTO Knee Survey Score to </= 40% limited to demonstrate increased perceived functional mobility.  3. Patient will perform timed up and go with least restrictive assistive device in < 12 seconds to improve gait speed and safety with community ambulation.  4. Patient will perform at least 13 sit to stands in 30 seconds without UE support to demonstrate increased functional strength.     Plan     Plan of care Certification: 12/7/2023 to 1/26/24.     Outpatient Physical Therapy 2 times weekly for 6 weeks to include the following interventions: Electrical Stimulation , Gait Training, Manual Therapy, Moist Heat/ Ice, Neuromuscular Re-ed, Patient Education, Therapeutic Activities, and Therapeutic Exercise.        Wil Willis, PT

## 2023-12-29 ENCOUNTER — CLINICAL SUPPORT (OUTPATIENT)
Dept: REHABILITATION | Facility: HOSPITAL | Age: 83
End: 2023-12-29
Payer: MEDICARE

## 2023-12-29 DIAGNOSIS — R29.898 DECREASED STRENGTH OF LOWER EXTREMITY: ICD-10-CM

## 2023-12-29 DIAGNOSIS — R68.89 DECREASED FUNCTIONAL ACTIVITY TOLERANCE: Primary | ICD-10-CM

## 2023-12-29 PROCEDURE — 97116 GAIT TRAINING THERAPY: CPT | Mod: PN,CQ

## 2023-12-29 PROCEDURE — 97110 THERAPEUTIC EXERCISES: CPT | Mod: PN,CQ

## 2023-12-29 NOTE — PROGRESS NOTES
"[facility]   Physical Therapy Treatment Note      [place] [facility] [place]: [id number]    Therapy Diagnosis:   Encounter Diagnoses   Name Primary?    Decreased functional activity tolerance Yes    Decreased strength of lower extremity          Physician: Juan Diego Sherman PA-C    Visit Date: 12/29/2023    Physician: Juan Diego Sherman PA-C     Physician Orders: PT Eval and Treat   Medical Diagnosis from Referral: M17.0 (ICD-10-CM) - Bilateral primary osteoarthritis of knee  Evaluation Date: 12/7/2023  Authorization Period Expiration: 02/01/2024  Plan of Care Expiration: 1/26/24  Progress Note Due: 1/26/24  Visit # / Visits authorized: 5/ (+1 initial evaluation)  FOTO: 6/5     Precautions: Standard      Time In: 11:10 pm  Time Out: 11:55 pm  Total Appointment Time (timed & untimed codes): 45 1:1 (3 TE 1 GT) minutes    Subjective     Patient reports:pt feels unsteady with walking.   She was compliant with home exercise program.  Response to previous treatment: tolerate well  Functional change: Ongoing    Pain: **/10- pain in R knee- "just pain"  Location:  bilateral (right>L) knees     Objective      Objective Measures updated at progress report unless specified.     Treatment     Elizabeth received the treatments listed below:      therapeutic exercises to develop strength, endurance, and flexibility for 35 minutes including:    Nu-step lvl 3 -5 mins- NT  Short arc quads:  2x10 3#  Straight leg raise: 2x10 3#  Long arc quads: 2x10 3#  Bridges 3x10 3" holds  Seated hip add 2x10 5" holds  Seated hip abd 2x10 5" holds RTB  Standing hip abd x15 ea side  Mini-squats x15 off high low mat (CGA- eccentric control)  Calf raises 2x10  Standing marches 2x15  Standing Hamstring curl 2x10    Steps 2x10 fwd/lateral 4 inch    Gait training to improve body mechanics and decrease added pressure through knees for 10 minutes    SPC 150ft with step-to pattern. (Cane in opposite left " hand)    Patient Education and Home Exercises       Education provided:   - Pt educated on importance of performing HEP and using modalities such as heat/ice to manage exacerbation of pain.     Written Home Exercises Provided: yes. Exercises were reviewed and Elizabeth was able to demonstrate them prior to the end of the session.  Elizabeth demonstrated good  understanding of the education provided. See Electronic Medical Record under Patient Instructions for exercises provided during therapy sessions    Assessment     Elizabeth Ambrocio is a 83 y.o. female referred to outpatient Physical Therapy with a medical diagnosis of M17.0 (ICD-10-CM) - Bilateral primary osteoarthritis of knee. Presents with single point cane. Adjusted cane to height. Instructed in gait sequencing single point cane left hand. Will benefit from additional training due to inconsistent sequencing.  Required frequent breaks less than last visit.  Using single point cane for two days with good safety. Will continue with single point cane at home and in community.     Elizabeth Is progressing well towards her goals.   Patient prognosis is Fair.     Patient will continue to benefit from skilled outpatient physical therapy to address the deficits listed in the problem list box on initial evaluation, provide pt/family education and to maximize pt's level of independence in the home and community environment.     Patient's spiritual, cultural and educational needs considered and pt agreeable to plan of care and goals.     Anticipated barriers to physical therapy:     Short Term Goals (3 Weeks):   1. Patient will be independent with home exercise program to supplement physical therapy treatment in improving functional status.  2. Patient will improve bilateral lower extremity strength to at least 75% as measured via MicroFet handheld dynamometer to improve strength for closed chain tasks.   3. Patient will perform timed up and go with less restrictive  assistive device in < 15 seconds to improve gait speed and safety with community ambulation.  4. Patient will perform at least 10 sit to stands in 30 seconds without UE support to demonstrate increased functional strength.      Long Term Goals (6 Weeks):   1. Patient will improve bilateral lower extremity strength to at least 90% as measured via MicroFet handheld dynamometer to improve strength for closed chain tasks.   2. Patient will improve the total FOTO Knee Survey Score to </= 40% limited to demonstrate increased perceived functional mobility.  3. Patient will perform timed up and go with least restrictive assistive device in < 12 seconds to improve gait speed and safety with community ambulation.  4. Patient will perform at least 13 sit to stands in 30 seconds without UE support to demonstrate increased functional strength.     Plan     Plan of care Certification: 12/7/2023 to 1/26/24.     Outpatient Physical Therapy 2 times weekly for 6 weeks to include the following interventions: Electrical Stimulation , Gait Training, Manual Therapy, Moist Heat/ Ice, Neuromuscular Re-ed, Patient Education, Therapeutic Activities, and Therapeutic Exercise.        Junior Fields, PTA

## 2024-01-02 ENCOUNTER — CLINICAL SUPPORT (OUTPATIENT)
Dept: REHABILITATION | Facility: HOSPITAL | Age: 84
End: 2024-01-02
Payer: MEDICARE

## 2024-01-02 DIAGNOSIS — R68.89 DECREASED FUNCTIONAL ACTIVITY TOLERANCE: Primary | ICD-10-CM

## 2024-01-02 DIAGNOSIS — R29.898 DECREASED STRENGTH OF LOWER EXTREMITY: ICD-10-CM

## 2024-01-02 PROCEDURE — 97110 THERAPEUTIC EXERCISES: CPT | Mod: PN

## 2024-01-02 PROCEDURE — 97116 GAIT TRAINING THERAPY: CPT | Mod: PN

## 2024-01-02 NOTE — PROGRESS NOTES
"[facility]   Physical Therapy Treatment Note      Name: Elizabeth Ambrocio [facility] [place]  Clinic Number: [id number]    Therapy Diagnosis:   Encounter Diagnoses   Name Primary?    Decreased functional activity tolerance Yes    Decreased strength of lower extremity          Physician: Juan Diego Sherman PA-C    Visit Date: 1/2/2024    Physician: Juan Diego Sherman PA-C     Physician Orders: PT Eval and Treat   Medical Diagnosis from Referral: M17.0 (ICD-10-CM) - Bilateral primary osteoarthritis of knee  Evaluation Date: 12/7/2023  Authorization Period Expiration: 02/01/2024  Plan of Care Expiration: 1/26/24  Progress Note Due: 1/26/24  Visit # / Visits authorized: 5/ (+1 initial evaluation)  FOTO: 6/5     Precautions: Standard      Time In: 12:00 pm  Time Out: 12:55 pm  Total Appointment Time (timed & untimed codes): 25 1:1 (1 TE 1 GT) minutes    Subjective     Patient reports:pt got a new cane, and is walking more.   She was compliant with home exercise program.  Response to previous treatment: tolerate well  Functional change: Ongoing    Pain: **/10- pain in R knee- "just pain"  Location:  bilateral (right>L) knees     Objective      Objective Measures updated at progress report unless specified.     Treatment     Elizabeth received the treatments listed below:      therapeutic exercises to develop strength, endurance, and flexibility for 35 minutes including:    Nu-step lvl 3 -5 mins- NT  Short arc quads:  2x10 3#  Straight leg raise: 2x10 3#  Long arc quads: 2x10 3#  Bridges 3x10 3" holds  Seated hip add 2x10 5" holds  Seated hip abd 2x10 5" holds RTB  Standing hip abd x15 ea side  Mini-squats x15 off high low mat (CGA- eccentric control)  Calf raises 2x10  Standing marches 2x15  Standing Hamstring curl 2x10  Steps 2x10 fwd/lateral 4 inch    Gait training to improve body mechanics and decrease added pressure through knees for 10 minutes     ft with step-to pattern. (Cane in opposite left " hand)    Patient Education and Home Exercises       Education provided:   - Pt educated on importance of performing HEP and using modalities such as heat/ice to manage exacerbation of pain.     Written Home Exercises Provided: yes. Exercises were reviewed and Elizabeth was able to demonstrate them prior to the end of the session.  Elizabeth demonstrated good  understanding of the education provided. See Electronic Medical Record under Patient Instructions for exercises provided during therapy sessions    Assessment     Elizabeth Ambrocio is a 83 y.o. female referred to outpatient Physical Therapy with a medical diagnosis of M17.0 (ICD-10-CM) - Bilateral primary osteoarthritis of knee.  Pt demonstrated good carry over with gait training. Tolerated there-ex well. Will continue to progress CK functional strengthening and gait mechanics next treatment session. Will continue with single point cane at home and in community.     Elizabeth Is progressing well towards her goals.   Patient prognosis is Fair.     Patient will continue to benefit from skilled outpatient physical therapy to address the deficits listed in the problem list box on initial evaluation, provide pt/family education and to maximize pt's level of independence in the home and community environment.     Patient's spiritual, cultural and educational needs considered and pt agreeable to plan of care and goals.     Anticipated barriers to physical therapy:     Short Term Goals (3 Weeks):   1. Patient will be independent with home exercise program to supplement physical therapy treatment in improving functional status.  2. Patient will improve bilateral lower extremity strength to at least 75% as measured via MicroFet handheld dynamometer to improve strength for closed chain tasks.   3. Patient will perform timed up and go with less restrictive assistive device in < 15 seconds to improve gait speed and safety with community ambulation.  4. Patient will perform at  least 10 sit to stands in 30 seconds without UE support to demonstrate increased functional strength.      Long Term Goals (6 Weeks):   1. Patient will improve bilateral lower extremity strength to at least 90% as measured via MicroFet handheld dynamometer to improve strength for closed chain tasks.   2. Patient will improve the total FOTO Knee Survey Score to </= 40% limited to demonstrate increased perceived functional mobility.  3. Patient will perform timed up and go with least restrictive assistive device in < 12 seconds to improve gait speed and safety with community ambulation.  4. Patient will perform at least 13 sit to stands in 30 seconds without UE support to demonstrate increased functional strength.     Plan     Plan of care Certification: 12/7/2023 to 1/26/24.     Outpatient Physical Therapy 2 times weekly for 6 weeks to include the following interventions: Electrical Stimulation , Gait Training, Manual Therapy, Moist Heat/ Ice, Neuromuscular Re-ed, Patient Education, Therapeutic Activities, and Therapeutic Exercise.        Wil Willis, PT

## 2024-01-05 ENCOUNTER — CLINICAL SUPPORT (OUTPATIENT)
Dept: REHABILITATION | Facility: HOSPITAL | Age: 84
End: 2024-01-05
Payer: MEDICARE

## 2024-01-05 DIAGNOSIS — R68.89 DECREASED FUNCTIONAL ACTIVITY TOLERANCE: Primary | ICD-10-CM

## 2024-01-05 DIAGNOSIS — R29.898 DECREASED STRENGTH OF LOWER EXTREMITY: ICD-10-CM

## 2024-01-05 PROCEDURE — 97116 GAIT TRAINING THERAPY: CPT | Mod: PN

## 2024-01-05 PROCEDURE — 97110 THERAPEUTIC EXERCISES: CPT | Mod: PN

## 2024-01-05 NOTE — PROGRESS NOTES
"[facility]   Physical Therapy Treatment Note      Name: [place] Ambrocio [facility] [place]  Clinic Number: [id number]    Therapy Diagnosis:   Encounter Diagnoses   Name Primary?    Decreased functional activity tolerance Yes    Decreased strength of lower extremity          Physician: Juan Diego Sherman PA-C    Visit Date: 1/5/2024    Physician: Juan Diego Sherman PA-C     Physician Orders: PT Eval and Treat   Medical Diagnosis from Referral: M17.0 (ICD-10-CM) - Bilateral primary osteoarthritis of knee  Evaluation Date: 12/7/2023  Authorization Period Expiration: 02/01/2024  Plan of Care Expiration: 1/26/24  Progress Note Due: 1/26/24  Visit # / Visits authorized: 5/ (+1 initial evaluation)  FOTO: 6/5     Precautions: Standard      Time In: 12:00 pm  Time Out: 12:55 pm  Total Appointment Time (timed & untimed codes): 25 1:1 (1 TE 1 GT) minutes    Subjective     Patient reports: flare-up of skin cancer that she is seeing  for currently.  She was compliant with home exercise program.  Response to previous treatment: tolerate well  Functional change: Ongoing    Pain: **/10- pain in R knee- "just pain"  Location:  bilateral (right>L) knees     Objective      Objective Measures updated at progress report unless specified.     Treatment     Elizabeth received the treatments listed below:      therapeutic exercises to develop strength, endurance, and flexibility for 45 minutes including:    Nu-step lvl 3 -5 mins- NT  Short arc quads:  2x10 3#  Straight leg raise: 2x10 3#  Long arc quads: 2x10 3#  Bridges 3x10 3" holds  Seated hip add 2x10 5" holds  Seated hip abd 2x10 5" holds RTB  Standing hip abd x15 ea side  Mini-squats x15 off high low mat (CGA- eccentric control)  Calf raises 2x10  Standing marches 2x15  Standing Hamstring curl 2x10  Steps 2x10 fwd/lateral 4 inch    Gait training to improve body mechanics and decrease added pressure through knees for 10 minutes     ft with step-to pattern. (Cane in " opposite left hand)    Patient Education and Home Exercises       Education provided:   - Pt educated on importance of performing HEP and using modalities such as heat/ice to manage exacerbation of pain.     Written Home Exercises Provided: yes. Exercises were reviewed and Elizabeth was able to demonstrate them prior to the end of the session.  Elizabeth demonstrated good  understanding of the education provided. See Electronic Medical Record under Patient Instructions for exercises provided during therapy sessions    Assessment     Elizabeth Ambrocio is a 83 y.o. female referred to outpatient Physical Therapy with a medical diagnosis of M17.0 (ICD-10-CM) - Bilateral primary osteoarthritis of knee.  Pt demonstrated good carry over with gait training. Will continue to progress [facility] functional strengthening and gait mechanics next treatment session. Will continue with single point cane at home and in community. Starting to work towards compliance of updated HEP. Re-assessment of strength next treatment session.    Elizabeth Is progressing well towards her goals.   Patient prognosis is Fair.     Patient will continue to benefit from skilled outpatient physical therapy to address the deficits listed in the problem list box on initial evaluation, provide pt/family education and to maximize pt's level of independence in the home and community environment.     Patient's spiritual, cultural and educational needs considered and pt agreeable to plan of care and goals.     Anticipated barriers to physical therapy:     Short Term Goals (3 Weeks):   1. Patient will be independent with home exercise program to supplement physical therapy treatment in improving functional status.  2. Patient will improve bilateral lower extremity strength to at least 75% as measured via MicroFet handheld dynamometer to improve strength for closed chain tasks.   3. Patient will perform timed up and go with less restrictive assistive device in < 15 seconds  to improve gait speed and safety with community ambulation.  4. Patient will perform at least 10 sit to stands in 30 seconds without UE support to demonstrate increased functional strength.      Long Term Goals (6 Weeks):   1. Patient will improve bilateral lower extremity strength to at least 90% as measured via MicroFet handheld dynamometer to improve strength for closed chain tasks.   2. Patient will improve the total FOTO Knee Survey Score to </= 40% limited to demonstrate increased perceived functional mobility.  3. Patient will perform timed up and go with least restrictive assistive device in < 12 seconds to improve gait speed and safety with community ambulation.  4. Patient will perform at least 13 sit to stands in 30 seconds without UE support to demonstrate increased functional strength.     Plan     Plan of care Certification: 12/7/2023 to 1/26/24.     Outpatient Physical Therapy 2 times weekly for 6 weeks to include the following interventions: Electrical Stimulation , Gait Training, Manual Therapy, Moist Heat/ Ice, Neuromuscular Re-ed, Patient Education, Therapeutic Activities, and Therapeutic Exercise.        Wil Willis, PT

## 2024-01-09 ENCOUNTER — PATIENT MESSAGE (OUTPATIENT)
Dept: OBSTETRICS AND GYNECOLOGY | Facility: CLINIC | Age: 84
End: 2024-01-09
Payer: MEDICARE

## 2024-01-10 ENCOUNTER — CLINICAL SUPPORT (OUTPATIENT)
Dept: REHABILITATION | Facility: HOSPITAL | Age: 84
End: 2024-01-10
Payer: MEDICARE

## 2024-01-10 DIAGNOSIS — R68.89 DECREASED FUNCTIONAL ACTIVITY TOLERANCE: Primary | ICD-10-CM

## 2024-01-10 DIAGNOSIS — R29.898 DECREASED STRENGTH OF LOWER EXTREMITY: ICD-10-CM

## 2024-01-10 PROCEDURE — 97116 GAIT TRAINING THERAPY: CPT | Mod: PN

## 2024-01-10 PROCEDURE — 97110 THERAPEUTIC EXERCISES: CPT | Mod: PN

## 2024-01-10 NOTE — PROGRESS NOTES
"[facility]   Physical Therapy Treatment Note      [place] [facility] [place] [id number]    Therapy Diagnosis:   Encounter Diagnoses   Name Primary?    Decreased functional activity tolerance Yes    Decreased strength of lower extremity        Physician: Juan Diego Sherman PA-C    Visit Date: 1/10/2024    Physician: Juan Diego Sherman PA-C     Physician Orders: PT Eval and Treat   Medical Diagnosis from Referral: M17.0 (ICD-10-CM) - Bilateral primary osteoarthritis of knee  Evaluation Date: 12/7/2023  Authorization Period Expiration: 02/01/2024  Plan of Care Expiration: 1/26/24  Progress Note Due: 1/26/24  Visit # / Visits authorized:  9/ (+1 initial evaluation)   FOTO: 6/5     Precautions: Standard      Time In: 12:00 pm  Time Out: 12:55 pm  Total Appointment Time (timed & untimed codes): 25 1:1 (1 TE 1 GT) minutes    Subjective     Patient reports: flare-up of skin cancer that she is seeing  for currently. She feels like her legs have been feeling "heavy" lately.  She was compliant with home exercise program.  Response to previous treatment: tolerate well  Functional change: Ongoing    Pain: **/10- pain in R knee- "just pain"  Location:  bilateral (right>L) knees     Objective      Objective Measures updated at progress report unless specified.     Initial measurements:  Lower extremity srength with MicroFET handheld dynamometer Right Left Pain/dysfunction with movement   (approx 4 sec hold w/ max contraction)   Hip flexion 8.2 kg  8.3 kg      Hip abduction 8.1 kg  8.2 kg      Quadriceps 12.7 kg  17 kg      Hamstrings 8 kg  8.9 kg         1/10/24  Lower extremity srength with MicroFET handheld dynamometer Right Left Pain/dysfunction with movement   (approx 4 sec hold w/ max contraction)   Hip flexion   7.1k g   7.6 kg     Hip abduction  7.6 kg  8.4 kg      Quadriceps   13.4kg   16.7 kg      Hamstrings  8.1 kg  8.3 kg          Treatment     Elizabeth received the treatments " "listed below:      therapeutic exercises to develop strength, endurance, and flexibility for 45 minutes including:    Nu-step lvl 3 -5 mins- NT  Short arc quads:  2x10 3#  Straight leg raise: 2x10 3#  Long arc quads: 2x10 3#  Bridges 3x10 3" holds  Seated hip add 2x10 5" holds  Seated hip abd 2x10 5" holds RTB  Standing hip abd x15 ea side  Mini-squats x15 off high low mat (CGA- eccentric control)  Calf raises 2x10  Standing marches 2x15  Standing Hamstring curl 2x10  Steps 2x10 fwd/lateral 4 inch    Gait training to improve body mechanics and decrease added pressure through knees for 10 minutes     ft with step-to pattern. (Cane in opposite left hand)    Patient Education and Home Exercises       Education provided:   - Pt educated on importance of performing HEP and using modalities such as heat/ice to manage exacerbation of pain.     Written Home Exercises Provided: yes. Exercises were reviewed and Elizabeth was able to demonstrate them prior to the end of the session.  Elizabeth demonstrated good  understanding of the education provided. See Electronic Medical Record under Patient Instructions for exercises provided during therapy sessions    Assessment     Elizabeth Ambrocio is a 83 y.o. female referred to outpatient Physical Therapy with a medical diagnosis of M17.0 (ICD-10-CM) - Bilateral primary osteoarthritis of knee.  Pt demonstrated good carry over with gait training. Will continue to progress [facility] functional strengthening and gait mechanics next treatment session. Will continue with single point cane at home and in community. Pt demonstrated minimal progression of strength at this time. Improving distance walking with cane before requiring break. Discussed in detail importance of progressing, and potential discharge with updated HEP to keep gains made in PT if continued plateau.     Elizabeth Is progressing well towards her goals.   Patient prognosis is Fair.     Patient will continue to benefit from " skilled outpatient physical therapy to address the deficits listed in the problem list box on initial evaluation, provide pt/family education and to maximize pt's level of independence in the home and community environment.     Patient's spiritual, cultural and educational needs considered and pt agreeable to plan of care and goals.     Anticipated barriers to physical therapy:     Short Term Goals (3 Weeks):   1. Patient will be independent with home exercise program to supplement physical therapy treatment in improving functional status.  2. Patient will improve bilateral lower extremity strength to at least 75% as measured via MicroFet handheld dynamometer to improve strength for closed chain tasks.   3. Patient will perform timed up and go with less restrictive assistive device in < 15 seconds to improve gait speed and safety with community ambulation.  4. Patient will perform at least 10 sit to stands in 30 seconds without UE support to demonstrate increased functional strength.      Long Term Goals (6 Weeks):   1. Patient will improve bilateral lower extremity strength to at least 90% as measured via MicroFet handheld dynamometer to improve strength for closed chain tasks.   2. Patient will improve the total FOTO Knee Survey Score to </= 40% limited to demonstrate increased perceived functional mobility.  3. Patient will perform timed up and go with least restrictive assistive device in < 12 seconds to improve gait speed and safety with community ambulation.  4. Patient will perform at least 13 sit to stands in 30 seconds without UE support to demonstrate increased functional strength.     Plan     Plan of care Certification: 12/7/2023 to 1/26/24.      Outpatient Physical Therapy 2 times weekly for 6 weeks to include the following interventions: Electrical Stimulation , Gait Training, Manual Therapy, Moist Heat/ Ice, Neuromuscular Re-ed, Patient Education, Therapeutic Activities, and Therapeutic Exercise.        Wil Willis, PT

## 2024-01-29 NOTE — PROGRESS NOTES
[facility] Note    Chief Complaint      Chief Complaint   Patient presents with    Follow-up       History of Present Illness      Elizabeth Dickson is a 83 y.o.  WF with LBBB, Breast cancer, Pulmonary nodules, Adjustment disorder, CKD III,  BLE, HTN, Hypothyroidism, Osteopenia presents to fu chronic issues. Pt s/p  Ptosis repair/Ectropion repair 6/13/22 with Dr. Mcpherson.  Last visit- 6/19/23     LBBB -  chronic and was seen on previous EKG from 2016.       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   Prev Fu by Derm, Dr. Sloan, and Dr. Lynn. She saw ID, Dr. Rollins. She was on Prednisone 5 mg/d. Prednisone no help after 3 months.   She was told to try a diet avoiding eggplant, potatoes, peppers, and blue cheese. Allergy tests were neg. Immune tests wnl. She saw A/I Dr. Bar and was told to fu with Derm . Pt off Abx.   Fu with Derm, Dr. Hinds, who called to inform me he performed a Bx on pt which showed atypical lymphoid infiltrate +CD 30 which could be assoc with possible mycosis Fungoides, T cell lymphoma, lymphomatoid papulosa. He referred her to [facility]'s T cell lymphoma clinic at [facility] ( Dr. Jazzmine James at [facility]).  She is no longer being seen by [facility] or Dr. Williamson and is only fu with [facility].   She was recently started on Bexarotene 150 mg/day and continued on betamethasone.  Using gentamycin and betamethasone to the area and follows with wound care for wound to her RT elbow which is improving.  1/2023 CT chest- SUBCENTIMETER PULMONARY NODULES WITH SOME NODULES WHICH APPEAR GROSSLY SIMILAR, SOME NODULES WHICH APPEAR DECREASED AND SOME RETICULONODULAR CHANGES OF THE LEFT LOWER LOBE WHICH WERE NOT SEEN ON PREVIOUS EXAMINATION FOR WHICH CONTINUED FOLLOW-UP IS RECOMMENDED.  1/2023 CT AP- SMALL HYPODENSITY NOTED IN THE LIVER WHICH MAY REFLECT SMALL CYST OR HEMANGIOMA. NO EVIDENCE OF LYMPHADENOPATHY. ATHEROSCLEROTIC VASCULAR DISEASE. She fu with  "[facility]ul[facility]. Brad johnson. 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 bexarotene, again discussed side effects and anticipated central thyroid suppression and hypertriglycerdemia; patient already on lipitor and synthroid.  She is being monitored with freq labs per Derm, Dr. Pinto. Now fu by H/O, Dr. Martha Berger for PET - 2/28/24.  Considering MTX based on final path from recent Bx but would also depend on kidney fx. Pt on prednisone 20 mg/d.      Left Knee pain - Twisted it getting up from sofa 1/25/22.  She has been icing it and using an ACE bandage. Fu by Dr Crawford. Doing ok since inj.  She did Ptx. She uses a cane to ambulate now.      H/o Benign Ovarian cyst-4.3 x 4.3 cm-Fu  by Dr. Chandler. Benign - was d/c from clinic.       Breast CA-invasive ductal carcinoma, right breast s/p lumpectomy.  Prev fu by Dr. Solo and Dr. Grande. Now being seen by a new Sx at [facility].  Pt on Arimidex.  Now fu by H/O - Dr. Charlene Dorado.       Iron deficiency anemia - H/H - 13.5/40 - 4/25/23 -  Resolved. Pt on iron q day.       Pulmonary nodules - Fu by Pulmonary, Dr. Salinas, annually. Has fu in Aug.  She had a PET scan and was reassured. Pt now getting serial CT scans to monitor her T cell Lymphoma.  CT chest -8/23/23 - planning for repeat in 6 mos with Dr. Salinas.     Vitamin-D deficiency-Resolved 48 up from prev 28-Pt completed ergocalciferol times 12 weeks and is on vitamin D3 1000 units/day.     CKD III - Prev fu  by Dr. Goodman and now fu by Dr. Nieto.  BUN/Cr -27/1.1 -6/15/23. Stable.   Cont to rec adeq hydration.  Will stop amodipine to due to edema.  Will see if this improves her edema and if not may need to consider low dose diuretic.       Adjustment disorder with mixed anxiety and depression-Pt on melatonin OTC.  Pt doing good on Lexapro 5 mg and it has helped and she has been blocking out things that bother her.       TMJ -  "It's ok". Can't take NSAIDS. She takes a tylenol prn - helps.  She " "can also try ice/massaging.      Hypothyroidism - TSH was 4.56 in Apr. Free T4 - wnl - 1.15 -6/15/23.   She is currently on Levothyroxine 100 mcg/day per Dr. Pinto.      HTN - Pt off Lisinopril 10 mg 2 tabs/day due to hyperkalemia.  Pt is on amlodipine 2.5 mg/d, and Toprol-XL 25 mg QHS.  Continue low-sodium diet. +Microalbuminuria.  She denies any dizziness.  Will stop amodipine to due to edema.  Will see if this improves her edema and if not may need to cosnider low dose diuretic.      HLD - Pt on Lipitor 10 mg QHS and fenofibrate 200 mg/d. Controlled.  Cont current dose of Lipitor.  HDL 88 LDL 73 TG 68 - 6/15/23     Lichen simplex chronicus - Fu by OB.      Low back pain - "It's ok". She take breaks.  Pt off Gabapentin 100 mg QHS per Dr. Rollins. No longer fu by Dr. Mallory.  She goes to the gym most days.  Stopped when twisted knee last wk. CT Chest/abd/pelvis -1/13/23 - Right 1 anterolisthesis of L4 on L5. Multilevel spondylosis of the spine. Hasn't been able to go to gym due to ulcerated lesion to buttock.  Derm wants her to avoid gym until healed.      BLE - Pt uses compression socks.     Carotid bruits- No significant stenosis seen on previous carotid ultrasound.     Renal mass -8 mm right kidney.  Fu by JAVIER Sanchez - had U/S and was discharged from his clinic.  Renal U/S- stable Left renal cyst - 9/13/22. [place] [facility] [place] fu by Dr. Goodman. Fu by new Nephrologist, Dr. Nieto.      Pancreatic cyst- Fu by GI, Dr. King.  Pt had EUS-02/21/2019. She was told to fu prn. [place] [facility] [place]  Sched for upcoming PET skin for her Mycoses fungoides.      Osteopenia-Pt on Calcium 1200 mg/d, and vitamin D3 1000 units/day.  She has been off Prolia x 2 yrs.   ID, Dr. Rollins,  told her to hold it.  Note - she is on arimidex.  Recent DEXA with Dr. Grande was stable.      H/o Syncope - No further episodes. Pt had a CT Head and Cardiac davis that was neg.      HCM - Flu " - 11/1/23;  RSV - none -defers; Tdap - 2/2/22;  PCV 13 - 6/2/17;  PVX 23 - 6/28/21;   Zostavax - utd; Shingrix - 8/18/20;  #2 - 11/19/20; COVID - 19 - Vaccine (Pfizer) #1 - 1/26/21;  #2 - 2/15/21; # 3 11/17/21; # 4 ;   MGM - 6/27/23 - repeat Screening MGM 1 yr;  DEXA - 9/17/21Osteopenia;  Hep C - 2/7/18; C-scope - 1/10/19 - polyp, int hemorrhoids, diverticulosis, repeat 5 yrs;  Gyn/Onc - Dr. Chandler;  Renal - Dr. Nieto;  GI - Dr. King/Darron;  H/O - Dr. Grande;  Prev Rheum - Dr. Mallory;  Gen Sx - Dr. Alabaster;  Pulm - Dr. Salinas;  ID - Dr. Rollins; Podiatry - Dr. Ruiz/Samuel; Ortho - [facility]; Derm - Dr. Pinto; Prev. H/O - Dr. Grande and then Dr. Williamson       Patient Care Team:  Porsha Masters MD as PCP - General (Internal Medicine)  Benoit Salinas MD as Consulting Physician (Family Medicine)  Robbie Mallory Jr., MD as Consulting Physician (Rheumatology)  Robbie Mallory Jr., MD (Rheumatology)  Russell King MD as Consulting Physician (Gastroenterology)  Lynsey Goodman MD as Consulting Physician (Nephrology)  Felecia Solo MD as Consulting Physician (General Surgery)  Duncan Singleton DPM as Consulting Physician (Podiatry)  Tarun Rollins Jr., MD as  (Infectious Diseases)  Naomy Estevez MA as Care Coordinator     Health Maintenance:  Immunization History   Administered Date(s) Administered    COVID-19, MRNA, LN-S, PF (Pfizer) (Purple Cap) 01/26/2021, 02/15/2021, 11/17/2021    Influenza 10/18/2005, 10/11/2013, 09/18/2015, 09/17/2016, 10/01/2018, 10/25/2018, 09/12/2019    Influenza - High Dose - PF (65 years and older) 09/18/2015, 09/17/2016, 10/25/2018, 09/12/2019, 08/18/2020    Influenza - Quadrivalent - High Dose - PF (65 years and older) 09/17/2022    Influenza - Quadrivalent - PF *Preferred* (6 months and older) 10/11/2013, 12/24/2021    Influenza Split 10/18/2005, 10/11/2013    Pneumococcal Conjugate - 13 Valent  10/11/2013, 06/23/2016, 06/02/2017, 06/28/2021    Pneumococcal Polysaccharide - 23 Valent 11/01/2010    Tdap 02/02/2022    Zoster 08/18/2020, 11/19/2020    Zoster Recombinant 08/18/2020, 11/19/2020      Health Maintenance   Topic Date Due    DEXA Scan  09/17/2024    Lipid Panel  10/30/2028    TETANUS VACCINE  02/02/2032    Shingles Vaccine  Completed        Past Medical History:  Past Medical History:   Diagnosis Date    Anxiety     Bilateral leg edema     Breast cancer     Cellulitis of left leg     CKD (chronic kidney disease), stage III     Hypertension     Hypothyroidism     Iron deficiency anemia     Osteopenia     Ovarian cyst     Teratoma and Benign Serous Cystadenoma    Pancreatic cyst     Pulmonary nodules     Renal mass     Vitamin D deficiency        Past Surgical History:   has a past surgical history that includes Breast surgery; Removal of Ovaries; debridement; and Skin biopsy.    Family History:  family history includes COPD in her brother; Cholelithiasis in her brother; Colon cancer in her brother and brother; Diabetes in her father; Diabetes type I in an other family member; Heart disease in her brother and brother; Hypertension in her father and mother; Lung cancer in her sister.     Social History:  Social History     Tobacco Use    Smoking status: Never    Smokeless tobacco: Never   Substance Use Topics    Alcohol use: No    Drug use: Never       Review of Systems   Constitutional:  Negative for chills, diaphoresis and fever.   HENT:  Positive for hearing loss.         Wears hearing aids.    Eyes:  Negative for visual disturbance.   Respiratory:  Negative for chest tightness and shortness of breath.    Cardiovascular:  Negative for chest pain, palpitations and leg swelling.        Does well with compression socks.    Gastrointestinal:  Negative for abdominal pain, blood in stool, constipation, diarrhea, nausea and vomiting.        Nausea resolved with Omepazole.    Endocrine: Negative for cold  intolerance, heat intolerance and polydipsia.   Genitourinary:  Negative for dysuria, frequency and hematuria.   Musculoskeletal:  Negative for arthralgias and myalgias.   Neurological:  Negative for dizziness and headaches.   Psychiatric/Behavioral:  Negative for dysphoric mood. The patient is not nervous/anxious.         Stable.         Medications:    Current Outpatient Medications:     ascorbic acid, vitamin C, (VITAMIN C) 1000 MG tablet, Take 1,000 mg by mouth once daily., Disp: , Rfl:     atorvastatin (LIPITOR) 10 MG tablet, TAKE 1 TABLET BY MOUTH EVERY DAY, Disp: 90 tablet, Rfl: 1    betamethasone valerate 0.1% (VALISONE) 0.1 % Crea, , Disp: , Rfl:     calcium carbonate 1250 MG capsule, Take by mouth once daily., Disp: , Rfl:     cholecalciferol, vitamin D3, (VITAMIN D3) 25 mcg (1,000 unit) capsule, , Disp: , Rfl:     clotrimazole-betamethasone 1-0.05% (LOTRISONE) cream, Apply topically 2 (two) times daily., Disp: 45 g, Rfl: 1    EScitalopram oxalate (LEXAPRO) 5 MG Tab, TAKE 1 TABLET BY MOUTH EVERY DAY, Disp: 90 tablet, Rfl: 3    fenofibrate micronized (LOFIBRA) 200 MG Cap, TAKE 1 CAPSULE BY MOUTH EVERY DAY WITH A MEAL, Disp: , Rfl:     ferrous sulfate (FEOSOL) 325 mg (65 mg iron) Tab tablet, Take 325 mg by mouth., Disp: , Rfl:     latanoprost 0.005 % ophthalmic solution, Place into both eyes., Disp: , Rfl:     levothyroxine (SYNTHROID) 100 MCG tablet, TAKE 1 TABLET BY MOUTH EVERY DAY IN THE MORNING ON EMPTY STOMACH FOR 30 DAYS, Disp: , Rfl:     melatonin 10 mg Tab, Take 10 mg by mouth., Disp: , Rfl:     omeprazole (PRILOSEC) 20 MG capsule, Take 20 mg by mouth., Disp: , Rfl:     predniSONE (DELTASONE) 10 MG tablet, Take 20 mg by mouth once daily., Disp: , Rfl:     VALTREX 1 gram tablet, Take 1,000 mg by mouth., Disp: , Rfl:     augmented betamethasone dipropionate (DIPROLENE-AF) 0.05 % ointment, 1 application  2 (two) times daily., Disp: , Rfl:     bexarotene 75 mg Cap, Take 1 capsule by mouth 2 (two) times a  "day., Disp: , Rfl:     erythromycin (ROMYCIN) ophthalmic ointment, APPLY TO ALL STITCHES AND OPERATED EYES TWICE DAILY FOR 2 WEEKS, Disp: , Rfl:     fenofibrate 160 MG Tab, Take 1 tablet by mouth Daily., Disp: , Rfl:     metoprolol succinate (TOPROL-XL) 50 MG 24 hr tablet, Take 1 tablet (50 mg total) by mouth once daily., Disp: 90 tablet, Rfl: 0     Allergies:  Review of patient's allergies indicates:   Allergen Reactions    Lisinopril      hyperkalemia       Physical Exam      Vital Signs  Temp: 98 °F (36.7 °C)  Temp Source: Oral  Pulse: 71  SpO2: 98 %  BP: 138/68  BP Location: Left arm  Patient Position: Sitting  Pain Score: 0-No pain  Height and Weight  Height: 5' 2" (157.5 cm)  Weight: 68.8 kg (151 lb 10.8 oz)  BSA (Calculated - sq m): 1.73 sq meters  BMI (Calculated): 27.7  Weight in (lb) to have BMI = 25: 136.4      Patient Position: Sitting      Physical Exam  Vitals reviewed.   Constitutional:       Appearance: Normal appearance. She is not toxic-appearing or diaphoretic.   HENT:      Head: Normocephalic and atraumatic.      Right Ear: Tympanic membrane normal.      Left Ear: Tympanic membrane normal.      Ears:      Comments: Tariq hearing aids  Eyes:      Extraocular Movements: Extraocular movements intact.      Conjunctiva/sclera: Conjunctivae normal.      Pupils: Pupils are equal, round, and reactive to light.   Neck:      Vascular: No carotid bruit.   Cardiovascular:      Rate and Rhythm: Normal rate and regular rhythm.      Pulses: Normal pulses.      Heart sounds: Normal heart sounds. No murmur heard.     Comments: 2+ edema to BLE  Pulmonary:      Effort: No respiratory distress.      Breath sounds: Normal breath sounds. No wheezing.   Abdominal:      General: Bowel sounds are normal. There is no distension.      Palpations: Abdomen is soft.      Tenderness: There is no abdominal tenderness. There is no guarding or rebound.   Musculoskeletal:      Comments: Ambulates with a cane. Valgus deformity of " knees.    Skin:     Comments: Superficial ulceration to RT medial knee.  Scabbed  circular deeper ulcer  to Left 3 rd prox phalanx.  Smalled similar ulceration to RT RT medial 4th PIP.  Scabbed lesion from Skin Bx to Left post prox arm. Area of superficial abrasion/scaling to Left medial knee that is indurated and NTTP. No fluctuance or drainage. Purpuric lesions and erythema to LUE.  See pics   Neurological:      General: No focal deficit present.      Mental Status: She is alert and oriented to person, place, and time.   Psychiatric:         Mood and Affect: Mood normal.         Behavior: Behavior normal.                        Laboratory:  CBC:  Recent Labs   Lab 05/31/22  1330 09/22/22  1053 12/07/22  1036   WBC 5.1 6.7 14.6 H   RBC 4.79 4.74 4.58   Hemoglobin 14.2 13.9 13.4   Hematocrit 42.4 41.8 40.4   Platelets 164 180 210   MCV 88.5 88.0 88.2   MCH 29.6 29.3 29.3   MCHC 33.4 33.3 33.2       CMP:  Recent Labs   Lab 09/29/21  1035 05/31/22  1330 09/22/22  1053 12/07/22  1036   Glucose 90 94 93 88   Calcium 9.5 9.9 9.6 9.7   ALBUMIN 4.1 4.6 4.3  --    Total Protein 6.1 L 6.3 6.3  --    Sodium 141 142 139 138   Potassium 4.3 4.3 5.0 4.1   CO2 26 26 25 23   Chloride 105 103 104 104   BUN 23 H 23.0 H 31.0 H 30.0 H   Creatinine 1.1 H 1.06 H 1.37 H 1.19 H   Alkaline Phosphatase 98 104 87  --    ALT 21 24 20  --    AST 28 28 22  --    Total Bilirubin 0.4 0.6 0.3  --            URINALYSIS:  Recent Labs   Lab 09/29/21  1037 03/29/22  1037 09/23/22  1046   Color, UA YELLOW YELLOW YELLOW   Specific Gravity, UA 1.007 1.015 1.022   pH, UA < OR = 5.0 < OR = 5.0 < OR = 5.0   Protein, UA NEGATIVE NEGATIVE NEGATIVE   Glucose, UA NEGATIVE NEGATIVE NEGATIVE   Bacteria, UA NONE SEEN NONE SEEN NONE SEEN   Nitrite, UA NEGATIVE NEGATIVE NEGATIVE   Leukocytes, UA NEGATIVE 1+ A 1+ A   Hyaline Casts, UA NONE SEEN NONE SEEN NONE SEEN        LIPIDS:  Recent Labs   Lab 07/02/21  0851 02/02/22  1028 05/31/22  1330 12/20/22  1545  01/26/23  0957 04/14/23  1046 06/09/23 0905   TSH  --    < >  --  1.26  --  0.42 0.19 L   HDL 69  --  75 75 H 50  --   --    Cholesterol 148  --  180 170 169  --   --    Triglycerides 39  --  81 122 107  --   --    LDL Calculated 73  --  92 71 102  --   --    Hdl/Cholesterol Ratio  --   --   --  2.27  --   --   --    Non-HDL Cholesterol 79  --   --  95  --   --   --     < > = values in this interval not displayed.       TSH:  Recent Labs   Lab 12/20/22  1543 04/14/23  1046 06/09/23 0905   TSH 1.26 0.42 0.19 L       Assessment/Plan     Elizabeth Dickson is a 83 y.o.female with:    Hypertension, unspecified type  -     Discontinue: amLODIPine (NORVASC) 2.5 MG tablet; Take 1 tablet (2.5 mg total) by mouth once daily.  Dispense: 90 tablet; Refill: 1  -     metoprolol succinate (TOPROL-XL) 50 MG 24 hr tablet; Take 1 tablet (50 mg total) by mouth once daily.  Dispense: 90 tablet; Refill: 0  -Controlled.  Will stop amlodipine due to edema.  Cont to monitor BP at home and call with any concerns. Cont compression socks.  Rec elev BLE, low sodium diet. Inc Metorpol  from 25 to 50 mg/d.     Stage 3a chronic kidney disease  - Stable.  Cont current regimen.    Atherosclerosis of aorta  - Cont statin. Seen on outside CT scans.     Mycosis fungoides, unspecified body region  - Fu by H/O and Derm.  Awaits new Bx  path eval. For plan of care. Sched for upcoming PET.     Hypothyroidism, unspecified type  - Stable.  Cont current regimen.    Pancreatic cyst  - Stable. Fu by GI.     Osteopenia, unspecified location  - Stable.  Cont current regimen.    Bilateral leg edema  - Will stop amlodipine due to edema.  Cont to monitor BP at home and call with any concerns. Cont compression socks.  Rec elev BLE, low sodium diet     Current chronic use of systemic steroids   - Pt on prednisone 20 mg/d per Derm. Monitor for any signs of infection as discussed particularly over skin lesions/ Left knee.        Chronic conditions status updated  as per HPI.  Other than changes above, cont current medications and maintain follow up with specialists.  Follow up in about 6 weeks (around 3/13/2024) for fu chronic issues or sooner if needed.      Porsha Masters MD  [facility]

## 2024-01-31 ENCOUNTER — OFFICE VISIT (OUTPATIENT)
Dept: PRIMARY CARE CLINIC | Facility: CLINIC | Age: 84
End: 2024-01-31
Payer: MEDICARE

## 2024-01-31 VITALS
SYSTOLIC BLOOD PRESSURE: 138 MMHG | OXYGEN SATURATION: 98 % | BODY MASS INDEX: 27.92 KG/M2 | WEIGHT: 151.69 LBS | HEIGHT: 62 IN | DIASTOLIC BLOOD PRESSURE: 68 MMHG | HEART RATE: 71 BPM | TEMPERATURE: 98 F

## 2024-01-31 DIAGNOSIS — M85.80 OSTEOPENIA, UNSPECIFIED LOCATION: ICD-10-CM

## 2024-01-31 DIAGNOSIS — K86.2 PANCREATIC CYST: ICD-10-CM

## 2024-01-31 DIAGNOSIS — I10 HYPERTENSION, UNSPECIFIED TYPE: Primary | ICD-10-CM

## 2024-01-31 DIAGNOSIS — I70.0 ATHEROSCLEROSIS OF AORTA: ICD-10-CM

## 2024-01-31 DIAGNOSIS — N18.31 STAGE 3A CHRONIC KIDNEY DISEASE: ICD-10-CM

## 2024-01-31 DIAGNOSIS — E03.9 HYPOTHYROIDISM, UNSPECIFIED TYPE: ICD-10-CM

## 2024-01-31 DIAGNOSIS — C84.00 MYCOSIS FUNGOIDES, UNSPECIFIED BODY REGION: ICD-10-CM

## 2024-01-31 DIAGNOSIS — Z79.52 CURRENT CHRONIC USE OF SYSTEMIC STEROIDS: ICD-10-CM

## 2024-01-31 DIAGNOSIS — R60.0 BILATERAL LEG EDEMA: ICD-10-CM

## 2024-01-31 PROCEDURE — 99999 PR PBB SHADOW E&M-EST. PATIENT-LVL V: CPT | Mod: PBBFAC,,, | Performed by: INTERNAL MEDICINE

## 2024-01-31 PROCEDURE — 99214 OFFICE O/P EST MOD 30 MIN: CPT | Mod: S$GLB,,, | Performed by: INTERNAL MEDICINE

## 2024-01-31 RX ORDER — VALACYCLOVIR HYDROCHLORIDE 1 G/1
1000 TABLET, FILM COATED ORAL
COMMUNITY
Start: 2024-01-17

## 2024-01-31 RX ORDER — AMLODIPINE BESYLATE 2.5 MG/1
2.5 TABLET ORAL DAILY
Qty: 90 TABLET | Refills: 1 | Status: SHIPPED | OUTPATIENT
Start: 2024-01-31 | End: 2024-01-31

## 2024-01-31 RX ORDER — METOPROLOL SUCCINATE 50 MG/1
50 TABLET, EXTENDED RELEASE ORAL DAILY
Qty: 90 TABLET | Refills: 0 | Status: SHIPPED | OUTPATIENT
Start: 2024-01-31 | End: 2024-03-21 | Stop reason: SDUPTHER

## 2024-01-31 RX ORDER — PREDNISONE 10 MG/1
20 TABLET ORAL DAILY
COMMUNITY
Start: 2024-01-17

## 2024-01-31 RX ORDER — OMEPRAZOLE 20 MG/1
20 CAPSULE, DELAYED RELEASE ORAL DAILY
COMMUNITY

## 2024-03-18 NOTE — PROGRESS NOTES
[facility] Note    Chief Complaint      Chief Complaint   Patient presents with    Follow-up       History of Present Illness      Elizabeth Dickson is a 83 y.o.   WF with LBBB, Breast cancer, Pulmonary nodules, Adjustment disorder, CKD III,  BLE, HTN, Hypothyroidism, Osteopenia presents to fu chronic issues. Pt s/p  Ptosis repair/Ectropion repair 6/13/22 with Dr. Mcpherson.  Last visit- 1/31/24     LBBB -  chronic and was seen on previous EKG from 2016.       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   Prev Fu by Derm, Dr. Sloan, and Dr. Lynn. She saw ID, Dr. Rollins. She was on Prednisone 5 mg/d. Prednisone no help after 3 months.   She was told to try a diet avoiding eggplant, potatoes, peppers, and blue cheese. Allergy tests were neg. Immune tests wnl. She saw A/I Dr. Bar and was told to fu with Derm . Pt off Abx.   Fu with Derm, Dr. Hinds, who called to inform me he performed a Bx on pt which showed atypical lymphoid infiltrate +CD 30 which could be assoc with possible mycosis Fungoides, T cell lymphoma, lymphomatoid papulosa. He referred her to [facility]'s T cell lymphoma clinic at [facility] (Dr. Jazzmine James at [facility]).  She is no longer being seen by [facility] or Dr. Williamson and is only fu with [facility].   She was recently started on Bexarotene 150 mg/day and continued on betamethasone.  Using gentamycin and betamethasone to the area and follows with wound care for wound to her RT elbow which is improving.  1/2023 CT chest- SUBCENTIMETER PULMONARY NODULES WITH SOME NODULES WHICH APPEAR GROSSLY SIMILAR, SOME NODULES WHICH APPEAR DECREASED AND SOME RETICULONODULAR CHANGES OF THE LEFT LOWER LOBE WHICH WERE NOT SEEN ON PREVIOUS EXAMINATION FOR WHICH CONTINUED FOLLOW-UP IS RECOMMENDED.  1/2023 CT AP- SMALL HYPODENSITY NOTED IN THE LIVER WHICH MAY REFLECT SMALL CYST OR HEMANGIOMA. NO EVIDENCE OF LYMPHADENOPATHY. ATHEROSCLEROTIC VASCULAR DISEASE. She fu with  Dr. Brad Domingo. 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 bexarotene, again discussed side effects and anticipated central thyroid suppression and hypertriglycerdemia; patient already on lipitor and synthroid.  She is being monitored with freq labs per Derm, Dr. Pinto. Now fu by H/O, Dr. Thomas. PET - 2/28/24 - No definite malignant metabolic activity throughout the exam. There are 3 foci of punctate low-level uptake involving the skin of the proximal left leg just below the knee.  Maximum uptake is 1.9 SUV which is, at most, inflammatory.  Correlation with physical exam findings necessary, although suspect urinary contamination. Additional arthritic uptake associated with facet joint arthropathy right C2-3, and degenerative changes both knees are present.   Considering MTX based on final path from recent Bx but would also depend on kidney fx. Pt on prednisone 20 mg/d. She had a recent skin bx to Left shin - Pending.      Left Knee pain - Twisted it getting up from sofa 1/25/22.  She has been icing it and using an ACE bandage. Fu by Dr Crawford. Doing ok since inj.  She did Ptx. She uses a cane to ambulate now.      H/o Benign Ovarian cyst-4.3 x 4.3 cm-Fu  by Dr. Chandler. Benign - was d/c from clinic.       Breast CA-invasive ductal carcinoma, right breast s/p lumpectomy.  Prev fu by Dr. Solo and Dr. Grande. Now being seen by a new Sx at [facility].  Pt on Arimidex.  Now fu by H/O - Dr. Charlene Dorado.       Iron deficiency anemia - H/H - 13.5/40 - 4/25/23 -  Resolved. Pt on iron q day.       Pulmonary nodules - Fu by Pulmonary, Dr. Salinas, prn.   She had a PET scan and was reassured. Pt now getting serial CT scans to monitor her T cell Lymphoma.      Vitamin-D deficiency-Resolved 48 up from prev 28-Pt completed ergocalciferol times 12 weeks and is on vitamin D3 1000 units/day.     CKD III - Prev fu  by Dr. Goodman and now fu by Dr. Nieto. Has fu 4/15/24.  BUN/Cr -27/1.1 -6/15/23. Stable.  "  Cont to rec adeq hydration.  Will stop amodipine to due to edema.  Will see if this improves her edema and if not may need to consider low dose diuretic.       Adjustment disorder with mixed anxiety and depression-Pt on melatonin OTC.  Pt doing good on Lexapro 5 mg and it has helped and she has been blocking out things that bother her.       TMJ -  "It's ok". Can't take NSAIDS. She takes a tylenol prn - helps.  She can also try ice/massaging.      Hypothyroidism - TSH was 4.56 in Apr. Free T4 - wnl - 1.15 -6/15/23.   She is currently on Levothyroxine 100 mcg/day per Dr. Pinto. Has order from Dr. Pinto.      HTN - Pt off Lisinopril 10 mg 2 tabs/day due to hyperkalemia.  Pt is off amlodipine 2.5 mg/d due to edema.  She is doing well on  Toprol-XL50 mg QHS.  Continue low-sodium diet. +Microalbuminuria.  She denies any dizziness.  edema improved with stoppinga amlodipine.      HLD - Pt on Lipitor 10 mg QHS and fenofibrate 200 mg/d. Controlled.  Cont current dose of Lipitor.  HDL 88 LDL 73 TG 68 - 6/15/23. Has order from Dr. Pinto.      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      Low back pain - "It's ok". She take breaks.  Pt off Gabapentin 100 mg QHS per Dr. Rollins. No longer fu by Dr. Mallory.  She goes to the gym most days.  Stopped when twisted knee last wk. CT Chest/abd/pelvis -1/13/23 - Right 1 anterolisthesis of L4 on L5. Multilevel spondylosis of the spine. Hasn't been able to go to gym due to ulcerated lesion to buttock.  Derm wants her to avoid gym until healed.      BLE - Pt uses compression socks. Improved with stopping amlodipine.      Carotid bruits- No significant stenosis seen on previous carotid ultrasound.     Renal mass -8 mm right kidney.  Fu by JAVIER Sanchez - had U/S and was discharged from his clinic.  Renal U/S- stable Left renal cyst - 9/13/22. [place] [facility] [place]  [place] fu by Dr. Goodman. Fu by new Nephrologist, Dr. Nieto.      Pancreatic cyst- Fu by GI, Dr. King.  Pt " had EUS-02/21/2019. She was told to fu prn. Not noted on recent CT 1[place]13[place] [facility] [place]  Had recent PET skin for her Mycoses fungoides.      Osteopenia-Pt on Calcium 1200 mg/d, and vitamin D3 1000 units/day.  She has been off Prolia x 2 yrs.   ID, Dr. Rollins,  told her to hold it.  Note - she is off arimidex.  Recent DEXA with Dr. Grande was stable.      H/o Syncope - No further episodes. Pt had a CT Head and Cardiac davis that was neg.      HCM - Flu - 11/1/23;  RSV - none -defers; Tdap - 2/2/22;  PCV 13 - 6/2/17;  PVX 23 - 6/28/21;   Zostavax - utd; Shingrix - 8/18/20;  #2 - 11/19/20; COVID - 19 - Vaccine (Pfizer) #1 - 1/26/21;  #2 - 2/15/21; # 3 11/17/21; # 4 ;   MGM - 6/27/23 - repeat Screening MGM 1 yr;  DEXA - 9/17/21-Osteopenia;  Hep C - 2/7/18; C-scope - 1/10/19 - polyp, int hemorrhoids, diverticulosis, told no need to repeat when she d/w GI;  Gyn/Onc - Dr. Chandler;  Renal - Dr. Nieto;  GI - Dr. King/Darron;  H/O - Dr. Grande;  Prev Rheum - Dr. Mallory;  Gen Sx - Dr. Alabaster;  Pulm - Dr. Salinas;  ID - Dr. Rollins; Podiatry - Dr. Ruiz/Samuel; Ortho - [facility]; Derm - Dr. Pinto; Prev. H/O - Dr. Grande and then Dr. Williamson    Patient Care Team:  Porsha Masters MD as PCP - General (Internal Medicine)  Benoit Salinas MD as Consulting Physician (Family Medicine)  Robbie Mallory Jr., MD as Consulting Physician (Rheumatology)  Robbie Mallory Jr., MD (Rheumatology)  Russell King MD as Consulting Physician (Gastroenterology)  Lynsey Goodman MD as Consulting Physician (Nephrology)  Felecia Solo MD as Consulting Physician (General Surgery)  Duncan Singleton DPM as Consulting Physician (Podiatry)  Tarun Rollins Jr., MD as  (Infectious Diseases)  Naomy Estevez MA as Care Coordinator     Health Maintenance:  Immunization History   Administered Date(s) Administered    COVID-19, MRNA, LN-S, PF (Pfizer) (Purple Cap) 01/26/2021,  02/15/2021, 11/17/2021    Influenza 10/18/2005, 10/11/2013, 09/18/2015, 09/17/2016, 10/01/2018, 10/25/2018, 09/12/2019    Influenza (FLUAD) - Quadrivalent - Adjuvanted - PF *Preferred* (65+) 11/01/2023    Influenza - High Dose - PF (65 years and older) 09/18/2015, 09/17/2016, 10/25/2018, 09/12/2019, 08/18/2020    Influenza - Quadrivalent - High Dose - PF (65 years and older) 09/17/2022    Influenza - Quadrivalent - PF *Preferred* (6 months and older) 10/11/2013, 12/24/2021    Influenza Split 10/18/2005, 10/11/2013    Pneumococcal Conjugate - 13 Valent 10/11/2013, 06/23/2016, 06/02/2017, 06/28/2021    Pneumococcal Polysaccharide - 23 Valent 11/01/2010    Tdap 02/02/2022    Zoster 08/18/2020, 11/19/2020    Zoster Recombinant 08/18/2020, 11/19/2020      Health Maintenance   Topic Date Due    DEXA Scan  09/17/2024    Lipid Panel  01/04/2029    TETANUS VACCINE  02/02/2032    Shingles Vaccine  Completed        Past Medical History:  Past Medical History:   Diagnosis Date    Anxiety     Bilateral leg edema     Breast cancer     Cellulitis of left leg     CKD (chronic kidney disease), stage III     Hypertension     Hypothyroidism     Iron deficiency anemia     Osteopenia     Ovarian cyst     Teratoma and Benign Serous Cystadenoma    Pancreatic cyst     Pulmonary nodules     Renal mass     Vitamin D deficiency        Past Surgical History:   has a past surgical history that includes Breast surgery; Removal of Ovaries; debridement; and Skin biopsy.    Family History:  family history includes COPD in her brother; Cholelithiasis in her brother; Colon cancer in her brother and brother; Diabetes in her father; Diabetes type I in an other family member; Heart disease in her brother and brother; Hypertension in her father and mother; Lung cancer in her sister.     Social History:  Social History     Tobacco Use    Smoking status: Never    Smokeless tobacco: Never   Substance Use Topics    Alcohol use: No    Drug use: Never        Review of Systems   Constitutional:  Negative for chills, diaphoresis and fever.   HENT:  Positive for hearing loss.         Wears hearing aids.    Eyes:  Negative for visual disturbance.   Respiratory:  Negative for chest tightness and shortness of breath.    Cardiovascular:  Negative for chest pain.   Gastrointestinal:  Negative for abdominal pain, blood in stool, constipation, diarrhea, nausea and vomiting.   Endocrine: Negative for cold intolerance, heat intolerance and polydipsia.   Genitourinary:  Negative for bladder incontinence, difficulty urinating and frequency.   Musculoskeletal:  Positive for arthralgias.        Tariq knees RT > Left    Neurological:  Negative for dizziness and headaches.   Psychiatric/Behavioral:  Negative for dysphoric mood. The patient is not nervous/anxious.         Anxiety doing better.         Medications:    Current Outpatient Medications:     ascorbic acid, vitamin C, (VITAMIN C) 1000 MG tablet, Take 1,000 mg by mouth once daily., Disp: , Rfl:     atorvastatin (LIPITOR) 10 MG tablet, TAKE 1 TABLET BY MOUTH EVERY DAY, Disp: 90 tablet, Rfl: 1    bexarotene 75 mg Cap, Take 4 capsules by mouth Daily., Disp: , Rfl:     calcium carbonate 1250 MG capsule, Take by mouth once daily., Disp: , Rfl:     cholecalciferol, vitamin D3, (VITAMIN D3) 25 mcg (1,000 unit) capsule, Take 1,000 Units by mouth once daily., Disp: , Rfl:     doxycycline monohydrate 100 mg Tab, Take 1 tablet by mouth 2 (two) times a day., Disp: , Rfl:     erythromycin (ROMYCIN) ophthalmic ointment, APPLY TO ALL STITCHES AND OPERATED EYES TWICE DAILY FOR 2 WEEKS, Disp: , Rfl:     EScitalopram oxalate (LEXAPRO) 5 MG Tab, TAKE 1 TABLET BY MOUTH EVERY DAY, Disp: 90 tablet, Rfl: 3    fenofibrate micronized (LOFIBRA) 200 MG Cap, TAKE 1 CAPSULE BY MOUTH EVERY DAY WITH A MEAL, Disp: , Rfl:     ferrous sulfate (FEOSOL) 325 mg (65 mg iron) Tab tablet, Take 325 mg by mouth once daily., Disp: , Rfl:     latanoprost 0.005 %  "ophthalmic solution, Place into both eyes., Disp: , Rfl:     levothyroxine (SYNTHROID) 100 MCG tablet, TAKE 1 TABLET BY MOUTH EVERY DAY IN THE MORNING ON EMPTY STOMACH FOR 30 DAYS, Disp: , Rfl:     melatonin 10 mg Tab, Take 10 mg by mouth nightly as needed., Disp: , Rfl:     omeprazole (PRILOSEC) 20 MG capsule, Take 20 mg by mouth once daily., Disp: , Rfl:     predniSONE (DELTASONE) 10 MG tablet, Take 20 mg by mouth once daily., Disp: , Rfl:     augmented betamethasone dipropionate (DIPROLENE-AF) 0.05 % ointment, 1 application  2 (two) times daily., Disp: , Rfl:     betamethasone valerate 0.1% (VALISONE) 0.1 % Crea, , Disp: , Rfl:     fenofibrate 160 MG Tab, Take 1 tablet by mouth Daily., Disp: , Rfl:     metoprolol succinate (TOPROL-XL) 50 MG 24 hr tablet, Take 1 tablet (50 mg total) by mouth once daily., Disp: 90 tablet, Rfl: 1    VALTREX 1 gram tablet, Take 1,000 mg by mouth., Disp: , Rfl:      Allergies:  Review of patient's allergies indicates:   Allergen Reactions    Lisinopril      hyperkalemia       Physical Exam      Vital Signs  Temp: 98 °F (36.7 °C)  Temp Source: Oral  Pulse: 77  Resp: 18  BP: 136/80  BP Location: Left arm  Patient Position: Sitting  Pain Score: 0-No pain  Height and Weight  Height: 5' 2" (157.5 cm)  Weight: 68.7 kg (151 lb 7.3 oz)  BSA (Calculated - sq m): 1.73 sq meters  BMI (Calculated): 27.7  Weight in (lb) to have BMI = 25: 136.4      Patient Position: Sitting      Physical Exam  Vitals reviewed.   Constitutional:       Appearance: Normal appearance.   HENT:      Head: Normocephalic and atraumatic.      Ears:      Comments: Tariq hearing aids  Eyes:      Extraocular Movements: Extraocular movements intact.      Conjunctiva/sclera: Conjunctivae normal.      Pupils: Pupils are equal, round, and reactive to light.   Neck:      Vascular: No carotid bruit.   Cardiovascular:      Rate and Rhythm: Normal rate and regular rhythm.      Pulses: Normal pulses.      Heart sounds: Normal heart " sounds. No murmur heard.  Pulmonary:      Effort: No respiratory distress.      Breath sounds: Normal breath sounds. No wheezing.   Abdominal:      General: Bowel sounds are normal. There is no distension.      Palpations: Abdomen is soft.      Tenderness: There is no abdominal tenderness. There is no guarding or rebound.   Skin:     Comments: Healing biopsy site to Left leg   Neurological:      Mental Status: She is alert.   Psychiatric:         Mood and Affect: Mood normal.         Behavior: Behavior normal.          Laboratory:  CBC:  Recent Labs   Lab 05/31/22  1330 09/22/22  1053 12/07/22  1036   WBC 5.1 6.7 14.6 H   RBC 4.79 4.74 4.58   Hemoglobin 14.2 13.9 13.4   Hematocrit 42.4 41.8 40.4   Platelets 164 180 210   MCV 88.5 88.0 88.2   MCH 29.6 29.3 29.3   MCHC 33.4 33.3 33.2       CMP:  Recent Labs   Lab 09/29/21  1035 05/31/22  1330 09/22/22  1053 12/07/22  1036   Glucose 90 94 93 88   Calcium 9.5 9.9 9.6 9.7   ALBUMIN 4.1 4.6 4.3  --    Total Protein 6.1 L 6.3 6.3  --    Sodium 141 142 139 138   Potassium 4.3 4.3 5.0 4.1   CO2 26 26 25 23   Chloride 105 103 104 104   BUN 23 H 23.0 H 31.0 H 30.0 H   Creatinine 1.1 H 1.06 H 1.37 H 1.19 H   Alkaline Phosphatase 98 104 87  --    ALT 21 24 20  --    AST 28 28 22  --    Total Bilirubin 0.4 0.6 0.3  --        URINALYSIS:  Recent Labs   Lab 09/29/21  1037 03/29/22  1037 09/23/22  1046   Color, UA YELLOW YELLOW YELLOW   Specific Gravity, UA 1.007 1.015 1.022   pH, UA < OR = 5.0 < OR = 5.0 < OR = 5.0   Protein, UA NEGATIVE NEGATIVE NEGATIVE   Glucose, UA NEGATIVE NEGATIVE NEGATIVE   Bacteria, UA NONE SEEN NONE SEEN NONE SEEN   Nitrite, UA NEGATIVE NEGATIVE NEGATIVE   Leukocytes, UA NEGATIVE 1+ A 1+ A   Hyaline Casts, UA NONE SEEN NONE SEEN NONE SEEN        LIPIDS:  Recent Labs   Lab 07/02/21  0851 02/02/22  1028 05/31/22  1330 12/20/22  1543 01/26/23  0957 04/14/23  1046 06/09/23  0905   TSH  --    < >  --  1.26  --  0.42 0.19 L   HDL 69  --  75 75 H 50  --   --     Cholesterol 148  --  180 170 169  --   --    Triglycerides 39  --  81 122 107  --   --    LDL Calculated 73  --  92 71 102  --   --    Hdl/Cholesterol Ratio  --   --   --  2.27  --   --   --    Non-HDL Cholesterol 79  --   --  95  --   --   --     < > = values in this interval not displayed.       TSH:  Recent Labs   Lab 12/20/22  1543 04/14/23  1046 06/09/23  0905   TSH 1.26 0.42 0.19 L          Assessment/Plan     Elizabeth Dickson is a 83 y.o.female with:    Mycosis fungoides, unspecified body region  - Stable. Pt awaits results of recent bx to LLE. F by [facility].     Hypertension, unspecified type  -     metoprolol succinate (TOPROL-XL) 50 MG 24 hr tablet; Take 1 tablet (50 mg total) by mouth once daily.  Dispense: 90 tablet; Refill: 1  - Controlled.  Cont current.     Hyperlipidemia, unspecified hyperlipidemia type  - Stable.  Cont current regimen. Has labs with Derm and will have them sent to me.     Hypothyroidism, unspecified type  - Stable.  Cont current regimen. Has labs with Derm and will have them sent to me.     Osteopenia, unspecified location  - Stable.  Cont current regimen.    Depression, unspecified depression type  - Controlled.  Cont current.     Anxiety  - Controlled.  Cont current.     Stage 3a chronic kidney disease  - Stable.  Cont current regimen.         Chronic conditions status updated as per HPI.  Other than changes above, cont current medications and maintain follow up with specialists.   Follow up in about 6 months (around 9/21/2024) for fu chronic issues or sooner if needed.      Porsha Masters MD  [facility]

## 2024-03-21 ENCOUNTER — OFFICE VISIT (OUTPATIENT)
Dept: PRIMARY CARE CLINIC | Facility: CLINIC | Age: 84
End: 2024-03-21
Payer: MEDICARE

## 2024-03-21 VITALS
HEART RATE: 77 BPM | SYSTOLIC BLOOD PRESSURE: 136 MMHG | TEMPERATURE: 98 F | HEIGHT: 62 IN | RESPIRATION RATE: 18 BRPM | WEIGHT: 151.44 LBS | BODY MASS INDEX: 27.87 KG/M2 | DIASTOLIC BLOOD PRESSURE: 80 MMHG

## 2024-03-21 DIAGNOSIS — C84.00 MYCOSIS FUNGOIDES, UNSPECIFIED BODY REGION: Primary | ICD-10-CM

## 2024-03-21 DIAGNOSIS — N18.31 STAGE 3A CHRONIC KIDNEY DISEASE: ICD-10-CM

## 2024-03-21 DIAGNOSIS — E03.9 HYPOTHYROIDISM, UNSPECIFIED TYPE: ICD-10-CM

## 2024-03-21 DIAGNOSIS — E78.5 HYPERLIPIDEMIA, UNSPECIFIED HYPERLIPIDEMIA TYPE: ICD-10-CM

## 2024-03-21 DIAGNOSIS — F41.9 ANXIETY: ICD-10-CM

## 2024-03-21 DIAGNOSIS — M85.80 OSTEOPENIA, UNSPECIFIED LOCATION: ICD-10-CM

## 2024-03-21 DIAGNOSIS — I10 HYPERTENSION, UNSPECIFIED TYPE: ICD-10-CM

## 2024-03-21 DIAGNOSIS — F32.A DEPRESSION, UNSPECIFIED DEPRESSION TYPE: ICD-10-CM

## 2024-03-21 PROCEDURE — 99999 PR PBB SHADOW E&M-EST. PATIENT-LVL V: CPT | Mod: PBBFAC,,, | Performed by: INTERNAL MEDICINE

## 2024-03-21 PROCEDURE — 99214 OFFICE O/P EST MOD 30 MIN: CPT | Mod: S$GLB,,, | Performed by: INTERNAL MEDICINE

## 2024-03-21 RX ORDER — METOPROLOL SUCCINATE 50 MG/1
50 TABLET, EXTENDED RELEASE ORAL DAILY
Qty: 90 TABLET | Refills: 1 | Status: SHIPPED | OUTPATIENT
Start: 2024-03-21

## 2024-03-21 RX ORDER — DOXYCYCLINE 100 MG/1
1 TABLET ORAL 2 TIMES DAILY
COMMUNITY
Start: 2024-03-06

## 2024-04-18 ENCOUNTER — NURSE TRIAGE (OUTPATIENT)
Dept: ADMINISTRATIVE | Facility: CLINIC | Age: 84
End: 2024-04-18
Payer: MEDICARE

## 2024-04-18 NOTE — TELEPHONE ENCOUNTER
Pt stated she has skin cancer and she go to Dr Pinto at She take Prednisone 20 mg a day. Eye doctor sent an email to Dr Pinto saying her eye pressure is elevated. Pt stated she hasn't heard from Dr Pinto's office. Care advice recommends pt call Md office. Pt instructed to call Dr Pinto's office. Pt verbalized understanding.   Reason for Disposition   [1] Caller requesting NON-URGENT health information AND [2] PCP's office is the best resource    Protocols used: Information Only Call-A-AH

## 2024-05-06 DIAGNOSIS — E78.5 HYPERLIPIDEMIA, UNSPECIFIED HYPERLIPIDEMIA TYPE: ICD-10-CM

## 2024-05-06 DIAGNOSIS — F41.9 ANXIETY: ICD-10-CM

## 2024-05-06 DIAGNOSIS — F32.A DEPRESSION, UNSPECIFIED DEPRESSION TYPE: ICD-10-CM

## 2024-05-06 NOTE — TELEPHONE ENCOUNTER
Care Due:                  Date            Visit Type   Department     Provider  --------------------------------------------------------------------------------                                EP -                              PRIMARY      [facility]  Last Visit: 03-      CARE ([facility])   CARE           Porsha Masters                              EP -                              PRIMARY      [facility]  Next Visit: 09-      CARE ([facility])   CARE           Porsha Masters                                                            Last  Test          Frequency    Reason                     Performed    Due Date  --------------------------------------------------------------------------------    CMP.........  12 months..  atorvastatin.............  09- 09-    Lipid Panel.  12 months..  atorvastatin.............  01- 01-    [facility] Embedded Care Due Messages. Reference number: [id number].   5/06/2024 4:00:58 PM CDT

## 2024-05-07 RX ORDER — ATORVASTATIN CALCIUM 10 MG/1
10 TABLET, FILM COATED ORAL
Qty: 90 TABLET | Refills: 1 | Status: SHIPPED | OUTPATIENT
Start: 2024-05-07

## 2024-05-07 RX ORDER — ESCITALOPRAM OXALATE 5 MG/1
TABLET ORAL
Qty: 90 TABLET | Refills: 3 | Status: SHIPPED | OUTPATIENT
Start: 2024-05-07

## 2024-05-07 NOTE — TELEPHONE ENCOUNTER
Refill Routing Note   Medication(s) are not appropriate for processing by [facility] for the following reason(s):        Required labs outdated    [facility] action(s):  Defer  Approve   Requires labs : Yes             Appointments  past 12m or future 3m with PCP    Date Provider   Last Visit   3/21/2024 Porsha Masters MD   Next Visit   9/26/2024 Porsha Masters MD   ED visits in past 90 days: 0        Note composed:2:26 AM 05/07/2024

## 2024-05-08 ENCOUNTER — PATIENT OUTREACH (OUTPATIENT)
Dept: ADMINISTRATIVE | Facility: HOSPITAL | Age: 84
End: 2024-05-08
Payer: MEDICARE

## 2024-05-08 NOTE — PROGRESS NOTES
Patient due for the following    RSV Vaccine (Age 60+ and Pregnant patients) (1 - 1-dose 60+ series)    COVID-19 Vaccine (4 - 2023-24 season)      Immunizations: reviewed and updated  Care Everywhere: triggered  Care Teams: up to date  Outreach: completed

## 2024-09-26 ENCOUNTER — OFFICE VISIT (OUTPATIENT)
Dept: PRIMARY CARE CLINIC | Facility: CLINIC | Age: 84
End: 2024-09-26
Payer: MEDICARE

## 2024-09-26 VITALS
SYSTOLIC BLOOD PRESSURE: 144 MMHG | TEMPERATURE: 98 F | BODY MASS INDEX: 27.67 KG/M2 | HEART RATE: 64 BPM | WEIGHT: 150.38 LBS | RESPIRATION RATE: 18 BRPM | OXYGEN SATURATION: 98 % | HEIGHT: 62 IN | DIASTOLIC BLOOD PRESSURE: 72 MMHG

## 2024-09-26 DIAGNOSIS — M19.90 OSTEOARTHRITIS, UNSPECIFIED OSTEOARTHRITIS TYPE, UNSPECIFIED SITE: ICD-10-CM

## 2024-09-26 DIAGNOSIS — C50.919 MALIGNANT NEOPLASM OF FEMALE BREAST, UNSPECIFIED ESTROGEN RECEPTOR STATUS, UNSPECIFIED LATERALITY, UNSPECIFIED SITE OF BREAST: ICD-10-CM

## 2024-09-26 DIAGNOSIS — R60.0 BILATERAL LEG EDEMA: ICD-10-CM

## 2024-09-26 DIAGNOSIS — F41.9 ANXIETY: ICD-10-CM

## 2024-09-26 DIAGNOSIS — I10 HYPERTENSION, UNSPECIFIED TYPE: ICD-10-CM

## 2024-09-26 DIAGNOSIS — F32.A DEPRESSION, UNSPECIFIED DEPRESSION TYPE: ICD-10-CM

## 2024-09-26 DIAGNOSIS — C84.00 MYCOSIS FUNGOIDES, UNSPECIFIED BODY REGION: Primary | ICD-10-CM

## 2024-09-26 DIAGNOSIS — E78.5 HYPERLIPIDEMIA, UNSPECIFIED HYPERLIPIDEMIA TYPE: ICD-10-CM

## 2024-09-26 DIAGNOSIS — M85.80 OSTEOPENIA, UNSPECIFIED LOCATION: ICD-10-CM

## 2024-09-26 DIAGNOSIS — E03.9 HYPOTHYROIDISM, UNSPECIFIED TYPE: ICD-10-CM

## 2024-09-26 DIAGNOSIS — N18.31 STAGE 3A CHRONIC KIDNEY DISEASE: ICD-10-CM

## 2024-09-26 PROCEDURE — 3078F DIAST BP <80 MM HG: CPT | Mod: CPTII,S$GLB,, | Performed by: INTERNAL MEDICINE

## 2024-09-26 PROCEDURE — 99999 PR PBB SHADOW E&M-EST. PATIENT-LVL V: CPT | Mod: PBBFAC,,, | Performed by: INTERNAL MEDICINE

## 2024-09-26 PROCEDURE — 1126F AMNT PAIN NOTED NONE PRSNT: CPT | Mod: CPTII,S$GLB,, | Performed by: INTERNAL MEDICINE

## 2024-09-26 PROCEDURE — 3077F SYST BP >= 140 MM HG: CPT | Mod: CPTII,S$GLB,, | Performed by: INTERNAL MEDICINE

## 2024-09-26 PROCEDURE — 99214 OFFICE O/P EST MOD 30 MIN: CPT | Mod: S$GLB,,, | Performed by: INTERNAL MEDICINE

## 2024-09-26 PROCEDURE — 1101F PT FALLS ASSESS-DOCD LE1/YR: CPT | Mod: CPTII,S$GLB,, | Performed by: INTERNAL MEDICINE

## 2024-09-26 PROCEDURE — 1159F MED LIST DOCD IN RCRD: CPT | Mod: CPTII,S$GLB,, | Performed by: INTERNAL MEDICINE

## 2024-09-26 PROCEDURE — G2211 COMPLEX E/M VISIT ADD ON: HCPCS | Mod: S$GLB,,, | Performed by: INTERNAL MEDICINE

## 2024-09-26 PROCEDURE — 3288F FALL RISK ASSESSMENT DOCD: CPT | Mod: CPTII,S$GLB,, | Performed by: INTERNAL MEDICINE

## 2024-09-26 NOTE — PROGRESS NOTES
[facility] Note    Chief Complaint      Chief Complaint   Patient presents with    Follow-up       History of Present Illness      Elizabeth Dickson is a 84 y.o.   WF with LBBB, Breast cancer, Pulmonary nodules, Adjustment disorder, CKD III,  BLE, HTN, Hypothyroidism, Osteopenia presents to fu chronic issues. Pt s/p  Ptosis repair/Ectropion repair 6/13/22 with Dr. Mcpherson.  Last visit-  3/21/24    LBBB -  chronic and was seen on previous EKG from 2016.       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   Prev Fu by Derm, Dr. Sloan, and Dr. Lynn. She saw ID, Dr. Rollins. She was on Prednisone 5 mg/d.   She was told to try a diet avoiding eggplant, potatoes, peppers, and blue cheese. Allergy tests were neg. Immune tests wnl. She saw A/I Dr. Bar and was told to fu with Derm . Pt off Abx.   Fu with Derm, Dr. Hinds, who called to inform me he performed a Bx on pt which showed atypical lymphoid infiltrate +CD 30 which could be assoc with possible mycosis Fungoides, T cell lymphoma, lymphomatoid papulosa. He referred her to [facility]'s T cell lymphoma clinic at [facility] (Dr. Jazzmine James at [facility]).  She is no longer being seen by [facility] or Dr. Williamson and is only fu with [facility].   She was recently started on Bexarotene 150 mg/day and continued on betamethasone.  Using gentamycin and betamethasone to the area. 1/2023 CT chest- SUBCENTIMETER PULMONARY NODULES WITH SOME NODULES WHICH APPEAR GROSSLY SIMILAR, SOME NODULES WHICH APPEAR DECREASED AND SOME RETICULONODULAR CHANGES OF THE LEFT LOWER LOBE WHICH WERE NOT SEEN ON PREVIOUS EXAMINATION FOR WHICH CONTINUED FOLLOW-UP IS RECOMMENDED. 1/2023 CT AP- SMALL HYPODENSITY NOTED IN THE LIVER WHICH MAY REFLECT SMALL CYST OR HEMANGIOMA. NO EVIDENCE OF LYMPHADENOPATHY. ATHEROSCLEROTIC VASCULAR DISEASE. She fu with Pulm, Dr. Salinas. 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 bexarotene, again  "discussed side effects and anticipated central thyroid suppression and hypertriglycerdemia; patient already on lipitor and synthroid.  She is being monitored with freq labs per Derm, Dr. Pinto. Now fu by H/O, Dr. Thomas. PET - 2/28/24 -No definite malignant metabolic activity throughout the exam.  There are 3 foci of punctate low-level uptake involving the skin of the proximal left leg just below the knee.  Maximum uptake is 1.9 SUV which is, at most, inflammatory.  Correlation with physical exam findings necessary, although suspect urinary contamination. Additional arthritic uptake associated with facet joint arthropathy right C2-3, and degenerative changes both knees are present.    Pt on prednisone 5 mg/d. Pt on bexarotene 6 tabs/d - this was just inc per Derm. Pt on plaquenil 100 mg/d. Has derm fu I Oc in Oct.      Left Knee pain - Twisted it getting up from sofa 1/25/22. Fu by Dr Crawford.  She did Ptx. She uses a cane to ambulate now.  Has to stop to rest.  Rec fu with Dr. Crawford again.      Breast CA-invasive ductal carcinoma, right breast s/p lumpectomy.   Now being seen by a new Sx at [facility].  Pt off Arimidex.  Now fu by H/O at [facility]     Iron deficiency anemia - H/H - 11.9/36.8 - 9/23/24.  Pt on iron q day.       Pulmonary nodules - Fu by Pulmonary, Dr. Salinas, prn.   She had a PET scan and was reassured. Pt now getting serial CT scans to monitor her T cell Lymphoma.      Vitamin-D deficiency-Resolved 48 up from prev 28-Pt completed ergocalciferol times 12 weeks and is on vitamin D3 1000 units/day.     CKD III -Fu by Dr. Nieto. Has fu in Oct. BUN/Cr -24/1.34 - 9/23/24.  Cont to rec adeq hydration.  We stopped  amodipine to due to edema which improved.      Adjustment disorder with mixed anxiety and depression-Pt on melatonin OTC.  Pt doing good on Lexapro 5 mg and it has helped and she has been blocking out things that bother her.       TMJ -  "It's ok". Can't take NSAIDS. She takes a tylenol prn - helps.  " "She can also try ice/massaging.      Hypothyroidism -  Free T4 - wnl - 0.95 - 9/23/24.   She is currently on Levothyroxine 100 mcg/day per Dr. Pinto. Has order from Dr. Pinto.      HTN - Pt off Lisinopril 10 mg 2 tabs/day due to hyperkalemia.  Pt is off amlodipine 2.5 mg/d due to edema.  She is doing well on  Toprol-XL50 mg QHS.  Continue low-sodium diet. +Microalbuminuria.  She denies any dizziness.   Cont to monitor edema. Could consider low dose lasix but would need to also monitor renal function.      HLD - Pt on Lipitor 10 mg QHS and fenofibrate 200 mg/d. Controlled.  Cont current dose of Lipitor.  HDL 56 LDL 78  - 9/23/24.       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     OA - Rt knee Xray - 6/17/24 -   Moderate to severe osteoarthrosis of the knee with medial compartment, less so lateral compartment, joint space narrowing.  Mild soft tissue swelling over the knee. Ok to take tylenol prn. She tries to limit steroids given eye pressures which have improved from decreasing her oral steroids.      Low back pain - "It's ok". She take breaks.  Pt off Gabapentin 100 mg QHS per Dr. Rollins. No longer fu by Dr. Mallory. CT Chest/abd/pelvis -1/13/23 - Right 1 anterolisthesis of L4 on L5. Multilevel spondylosis of the spine.       BLE - Pt uses compression socks. Improved with stopping amlodipine.      Carotid bruits- No significant stenosis seen on previous carotid ultrasound.     Renal mass -8 mm right kidney.  Fu by  Dr. Sanchez - was discharged from his clinic.  Renal U/S- stable Left renal cyst - 9/13/22. Not noted on recent CT 1/13/23 at [facility].  Fu by new Nephrologist, Dr. Nieto.      Pancreatic cyst- Fu by GI, Dr. King.  Pt had EUS-02/21/2019. She was told to fu prn. Not noted on recent CT 1/13/23 at [facility].  Had recent PET skin for her Mycoses fungoides.      Osteopenia-Pt on Calcium 1200 mg/d, and vitamin D3 1000 units/day.  She has been off Prolia x 2 yrs.  ID, Dr. Rollins,  told her to hold it.  Note - " she is off arimidex.      Elevated BP - BP at home 138/73.  Will repeat BP     H/o Syncope - No further episodes. Pt had a CT Head and Cardiac davis that was neg.      HCM - Flu - 11/1/23;  RSV - none -defers; Tdap - 2/2/22;  PCV 13 - 6/2/17;  PVX 23 - 6/28/21;   Zostavax - utd; Shingrix - 8/18/20;  #2 - 11/19/20; COVID - 19 - Vaccine (Pfizer) #1 - 1/26/21;  #2 - 2/15/21; # 3 11/17/21; # 4 ;   MGM and Tariq breast U/S- 7/9/24 - repeat 1 yr;   DEXA - 9/17/21-Osteopenia;  Hep C - 2/7/18; C-scope - 1/10/19 - polyp, int hemorrhoids, diverticulosis, told no need to repeat when she d/w GI;  Gyn/Onc - Dr. Chandler;  Renal - Dr. Nieto;  GI - Dr. King/Darron;  H/O - Dr. Grande;  Prev Rheum - Dr. Mallory;  Gen Sx - Dr. Alabaster;  Pulm - Dr. Salinas;  ID - Dr. Rollins; Podiatry - Dr. Ruiz/Samuel; Ortho - [facility]; Derm - Dr. Pinto;   H/O - ? At [facility] - is changing since doc moved     Patient Care Team:  Porsha Masters MD as PCP - General (Internal Medicine)  Benoit Salinas MD as Consulting Physician (Family Medicine)  Robbie Mallory Jr., MD as Consulting Physician (Rheumatology)  Robbie Mallory Jr., MD (Rheumatology)  Russell King MD as Consulting Physician (Gastroenterology)  Lynsey Goodman MD as Consulting Physician (Nephrology)  Felecia Solo MD as Consulting Physician (General Surgery)  Duncan Singleton DPM as Consulting Physician (Podiatry)  Tarun Rollins Jr., MD as  (Infectious Diseases)  Naomy Estevez MA (Inactive) as Care Coordinator     Health Maintenance:  Immunization History   Administered Date(s) Administered    COVID-19, MRNA, LN-S, PF (Pfizer) (Purple Cap) 01/26/2021, 02/15/2021, 11/17/2021    Influenza 10/18/2005, 10/11/2013, 09/18/2015, 09/17/2016, 10/01/2018, 10/25/2018, 09/12/2019    Influenza (FLUAD) - Quadrivalent - Adjuvanted - PF *Preferred* (65+) 11/01/2023    Influenza - Quadrivalent - High Dose - PF (65 years and  older) 09/17/2022    Influenza - Quadrivalent - PF *Preferred* (6 months and older) 10/11/2013, 12/24/2021    Influenza - Trivalent - Fluzone High Dose - PF (65 years and older) 09/18/2015, 09/17/2016, 10/25/2018, 09/12/2019, 08/18/2020    Influenza Split 10/18/2005, 10/11/2013    Pneumococcal Conjugate - 13 Valent 10/11/2013, 06/23/2016, 06/02/2017, 06/28/2021    Pneumococcal Polysaccharide - 23 Valent 11/01/2010    Tdap 02/02/2022    Zoster 08/18/2020, 11/19/2020    Zoster Recombinant 08/18/2020, 11/19/2020      Health Maintenance   Topic Date Due    DEXA Scan  09/17/2024    Lipid Panel  09/23/2029    TETANUS VACCINE  02/02/2032    Shingles Vaccine  Completed        Past Medical History:  Past Medical History:   Diagnosis Date    Anxiety     Bilateral leg edema     Breast cancer     Cellulitis of left leg     CKD (chronic kidney disease), stage III     Hypertension     Hypothyroidism     Iron deficiency anemia     Osteopenia     Ovarian cyst     Teratoma and Benign Serous Cystadenoma    Pancreatic cyst     Pulmonary nodules     Renal mass     Vitamin D deficiency        Past Surgical History:   has a past surgical history that includes Breast surgery; Removal of Ovaries; debridement; and Skin biopsy.    Family History:  family history includes COPD in her brother; Cholelithiasis in her brother; Colon cancer in her brother and brother; Diabetes in her father; Diabetes type I in an other family member; Heart disease in her brother and brother; Hypertension in her father and mother; Lung cancer in her sister.     Social History:  Social History     Tobacco Use    Smoking status: Never    Smokeless tobacco: Never   Substance Use Topics    Alcohol use: No    Drug use: Never       Review of Systems   Constitutional:  Negative for chills, diaphoresis and fever.   HENT:  Positive for ear pain. Negative for nasal congestion and sore throat.         Jaw clicks when she opens it. Wears hearing aids.    Eyes:  Negative for  visual disturbance.   Respiratory:  Negative for cough and shortness of breath.    Cardiovascular:  Negative for chest pain and palpitations.   Gastrointestinal:  Negative for abdominal pain, constipation, diarrhea, nausea and vomiting.   Endocrine: Negative for cold intolerance, heat intolerance and polydipsia.   Genitourinary:  Negative for bladder incontinence, difficulty urinating, dysuria, frequency, hematuria and vaginal bleeding.   Musculoskeletal:  Positive for arthralgias. Negative for myalgias.        Knees - has to stop and rest and take small steps.    Neurological:  Negative for dizziness and headaches.   Psychiatric/Behavioral:  Negative for depressed mood. The patient is not nervous/anxious.         Medications:    Current Outpatient Medications:     ascorbic acid, vitamin C, (VITAMIN C) 1000 MG tablet, Take 1,000 mg by mouth once daily., Disp: , Rfl:     atorvastatin (LIPITOR) 10 MG tablet, TAKE 1 TABLET BY MOUTH EVERY DAY, Disp: 90 tablet, Rfl: 1    augmented betamethasone dipropionate (DIPROLENE-AF) 0.05 % ointment, 1 application  2 (two) times daily., Disp: , Rfl:     betamethasone valerate 0.1% (VALISONE) 0.1 % Crea, , Disp: , Rfl:     bexarotene 75 mg Cap, Take 6 capsules by mouth Daily., Disp: , Rfl:     calcium carbonate 1250 MG capsule, Take by mouth once daily., Disp: , Rfl:     cholecalciferol, vitamin D3, (VITAMIN D3) 25 mcg (1,000 unit) capsule, Take 1,000 Units by mouth once daily., Disp: , Rfl:     doxycycline monohydrate 100 mg Tab, Take 1 tablet by mouth 2 (two) times a day., Disp: , Rfl:     erythromycin (ROMYCIN) ophthalmic ointment, APPLY TO ALL STITCHES AND OPERATED EYES TWICE DAILY FOR 2 WEEKS, Disp: , Rfl:     EScitalopram oxalate (LEXAPRO) 5 MG Tab, TAKE 1 TABLET BY MOUTH EVERY DAY, Disp: 90 tablet, Rfl: 3    fenofibrate micronized (LOFIBRA) 200 MG Cap, TAKE 1 CAPSULE BY MOUTH EVERY DAY WITH A MEAL, Disp: , Rfl:     ferrous sulfate (FEOSOL) 325 mg (65 mg iron) Tab tablet, Take  "325 mg by mouth once daily., Disp: , Rfl:     latanoprost 0.005 % ophthalmic solution, Place into both eyes., Disp: , Rfl:     levothyroxine (SYNTHROID) 100 MCG tablet, TAKE 1 TABLET BY MOUTH EVERY DAY IN THE MORNING ON EMPTY STOMACH FOR 30 DAYS, Disp: , Rfl:     melatonin 10 mg Tab, Take 10 mg by mouth nightly as needed., Disp: , Rfl:     metoprolol succinate (TOPROL-XL) 50 MG 24 hr tablet, Take 1 tablet (50 mg total) by mouth once daily., Disp: 90 tablet, Rfl: 1    omeprazole (PRILOSEC) 20 MG capsule, Take 20 mg by mouth once daily., Disp: , Rfl:     predniSONE (DELTASONE) 10 MG tablet, Take 5 mg by mouth once daily., Disp: , Rfl:     VALTREX 1 gram tablet, Take 1,000 mg by mouth., Disp: , Rfl:      Allergies:  Review of patient's allergies indicates:   Allergen Reactions    Lisinopril      hyperkalemia       Physical Exam      Vital Signs  Temp: 97.6 °F (36.4 °C)  Pulse: 64  Resp: 18  SpO2: 98 %  BP: (!) 144/72  BP Location: Left arm  Patient Position: Sitting  Pain Score: 0-No pain  Height and Weight  Height: 5' 2" (157.5 cm)  Weight: 68.2 kg (150 lb 5.7 oz)  BSA (Calculated - sq m): 1.73 sq meters  BMI (Calculated): 27.5  Weight in (lb) to have BMI = 25: 136.4      Patient Position: Sitting    Vitals:    09/26/24 1125 09/26/24 1137   BP: (!) 170/78 (!) 144/72   BP Location: Left arm    Patient Position: Sitting    BP Method: Medium (Manual)    Pulse: 64    Resp: 18    Temp: 97.6 °F (36.4 °C)    SpO2: 98%    Weight: 68.2 kg (150 lb 5.7 oz)    Height: 5' 2" (1.575 m)           Physical Exam  Vitals reviewed.   Constitutional:       General: She is not in acute distress.     Appearance: Normal appearance. She is not ill-appearing, toxic-appearing or diaphoretic.   HENT:      Head: Normocephalic and atraumatic.      Right Ear: Tympanic membrane normal.      Left Ear: Tympanic membrane normal.      Ears:      Comments: Tariq hearing aids  Eyes:      Extraocular Movements: Extraocular movements intact.      " Conjunctiva/sclera: Conjunctivae normal.      Pupils: Pupils are equal, round, and reactive to light.   Neck:      Vascular: No carotid bruit.   Cardiovascular:      Rate and Rhythm: Normal rate and regular rhythm.      Pulses: Normal pulses.      Heart sounds: Normal heart sounds. No murmur heard.     Comments: Compression socks to BLE. 1+ edema to BLE  Pulmonary:      Effort: No respiratory distress.      Breath sounds: Normal breath sounds. No wheezing.   Abdominal:      General: Bowel sounds are normal. There is no distension.      Palpations: Abdomen is soft.      Tenderness: There is no abdominal tenderness. There is no guarding or rebound.      Comments:     Musculoskeletal:      Comments: Valgus knee deformity    Skin:     Comments: Senile purpura to BUE; erythematous scaly plaques to BLE; Scabbed verrucous lesion to Left cheek (was just bx per Derm)   Neurological:      General: No focal deficit present.      Mental Status: She is alert and oriented to person, place, and time.   Psychiatric:         Mood and Affect: Mood normal.         Behavior: Behavior normal.        Laboratory:  CBC:  Recent Labs   Lab 05/31/22  1330 09/22/22  1053 12/07/22  1036   WBC 5.1 6.7 14.6 H   RBC 4.79 4.74 4.58   Hemoglobin 14.2 13.9 13.4   Hematocrit 42.4 41.8 40.4   Platelets 164 180 210   MCV 88.5 88.0 88.2   MCH 29.6 29.3 29.3   MCHC 33.4 33.3 33.2       CMP:  Recent Labs   Lab 05/31/22  1330 09/22/22  1053 12/07/22  1036 04/03/24  0825 05/22/24  0941   Glucose 94 93 88  --  93   Calcium 9.9 9.6 9.7   < > 9.5   ALBUMIN 4.6 4.3  --   --   --    Albumin Level  --   --   --   --  3.8   Total Protein 6.3 6.3  --   --   --    Sodium 142 139 138  --  143   Potassium 4.3 5.0 4.1  --  4.3   CO2 26 25 23  --   --    Carbon Dioxide  --   --   --   --  28   Chloride 103 104 104  --   --    BUN 23.0 H 31.0 H 30.0 H  --   --    Blood Urea Nitrogen  --   --   --   --  46 H   Creatinine 1.06 H 1.37 H 1.19 H  --  1.39 H   Alkaline  Phosphatase 104 87  --   --  34 L   ALT 24 20  --   --  18   AST 28 22  --   --  22   Total Bilirubin 0.6 0.3  --   --  0.3    < > = values in this interval not displayed.           URINALYSIS:  Recent Labs   Lab 09/29/21  1037 03/29/22  1037 09/23/22  1046   Color, UA YELLOW YELLOW YELLOW   Specific Gravity, UA 1.007 1.015 1.022   pH, UA < OR = 5.0 < OR = 5.0 < OR = 5.0   Protein, UA NEGATIVE NEGATIVE NEGATIVE   Glucose, UA NEGATIVE NEGATIVE NEGATIVE   Bacteria, UA NONE SEEN NONE SEEN NONE SEEN   Nitrite, UA NEGATIVE NEGATIVE NEGATIVE   Leukocytes, UA NEGATIVE 1+ A 1+ A   Hyaline Casts, UA NONE SEEN NONE SEEN NONE SEEN        LIPIDS:  Recent Labs   Lab 12/20/22  1543 01/26/23  0957 04/14/23  1046 06/09/23  0905 09/09/24  1122 09/23/24  1120   TSH 1.26  --  0.42 0.19 L  --   --    HDL 75 H 50  --   --   --  56   Cholesterol 170 169  --   --   --  167   Triglycerides 122 107  --   --  205 H 165 H   LDL Calculated 71 102  --   --   --  78   Hdl/Cholesterol Ratio 2.27  --   --   --   --  2.98   Non-HDL Cholesterol 95  --   --   --   --  111       TSH:  Recent Labs   Lab 12/20/22  1543 04/14/23  1046 06/09/23  0905   TSH 1.26 0.42 0.19 L       Assessment/Plan     Elizabeth Dickson is a 84 y.o.female with:    Mycosis fungoides, unspecified body region  - Stable Fu by Derm and H/O.     Hypothyroidism, unspecified type  - Controlled.  Cont current.     Hypertension, unspecified type  - Elevated today.  Cont to monitor.     Stage 3a chronic kidney disease  - Stable.  Cont current regimen.    Hyperlipidemia, unspecified hyperlipidemia type  - Stable.  Cont current regimen.     Anxiety  - Controlled.  Cont current.     Depression, unspecified depression type  - Controlled.  Cont current.     Bilateral leg edema  - Cont compression stockings.  Cont low sodium diet. Cont to monitor edema. Could consider low dose lasix but would need to also monitor renal function.      Osteopenia, unspecified location  - Stable.  Cont  current regimen.     Malignant neoplasm of female breast, unspecified estrogen receptor status, unspecified laterality, unspecified site of breast  - Stable.  Cont current regimen.    Osteoarthritis, unspecified osteoarthritis type, unspecified site  - Ok  for tylenol prn.  Rec fu with Dr. Crawford.      Chronic conditions status updated as per HPI.  Other than changes above, cont current medications and maintain follow up with specialists.  Follow up in about 6 months (around 3/26/2025).      Porsha Masters MD  [facility]

## 2024-09-27 DIAGNOSIS — E78.5 HYPERLIPIDEMIA, UNSPECIFIED HYPERLIPIDEMIA TYPE: ICD-10-CM

## 2024-09-27 NOTE — TELEPHONE ENCOUNTER
Care Due:                  Date            Visit Type   Department     Provider  --------------------------------------------------------------------------------                                EP -                              PRIMARY      [facility]  Last Visit: 09-      CARE ([facility])   CARE           Porsha Masters                              EP -                              PRIMARY      [facility]  Next Visit: 12-      CARE ([facility])   CARE           Porsha Dewitte                                                            Last  Test          Frequency    Reason                     Performed    Due Date  --------------------------------------------------------------------------------    CMP.........  12 months..  atorvastatin.............  Not Found    Overdue    Lipid Panel.  12 months..  atorvastatin.............  09- 01-    [facility] Embedded Care Due Messages. Reference number: [id number].   9/27/2024 11:07:12 AM CDT

## 2024-09-28 RX ORDER — ATORVASTATIN CALCIUM 10 MG/1
10 TABLET, FILM COATED ORAL
Qty: 90 TABLET | Refills: 2 | Status: SHIPPED | OUTPATIENT
Start: 2024-09-28

## 2024-09-28 NOTE — TELEPHONE ENCOUNTER
Provider Staff:  Action required for this patient    Requires labs      Please see care gap opportunities below in Care Due Message.    Thanks!  [facility]     Appointments      Date Provider   Last Visit   9/26/2024 Porsha Masters MD   Next Visit   12/26/2024 Porsha Masters MD     Refill Decision Note   Elizabeth Dickson  is requesting a refill authorization.  Brief Assessment and Rationale for Refill:  Approve     Medication Therapy Plan:         Comments:     Note composed:3:53 AM 09/28/2024

## 2024-09-29 DIAGNOSIS — I10 HYPERTENSION, UNSPECIFIED TYPE: ICD-10-CM

## 2024-09-29 RX ORDER — METOPROLOL SUCCINATE 50 MG/1
50 TABLET, EXTENDED RELEASE ORAL
Qty: 90 TABLET | Refills: 3 | Status: SHIPPED | OUTPATIENT
Start: 2024-09-29

## 2024-09-29 NOTE — TELEPHONE ENCOUNTER
Care Due:                  Date            Visit Type   Department     Provider  --------------------------------------------------------------------------------                                EP -                              PRIMARY      [facility]  Last Visit: 09-      CARE ([facility])   CARE           Porshasharmin Masters                              EP -                              PRIMARY      [facility] PR[facility]  Next Visit: 12-      CARE ([facility])   CARE           Porsha  Gisatishrone                                                            Last  Test          Frequency    Reason                     Performed    Due Date  --------------------------------------------------------------------------------    Lipid Panel.  12 months..  atorvastatin.............  01- 01-    [employer] Embedded Care Due Messages. Reference number: [id number].   9/29/2024 4:10:28 AM CDT

## 2024-09-29 NOTE — TELEPHONE ENCOUNTER
Refill Routing Note   Medication(s) are not appropriate for processing by [facility] for the following reason(s):        Required vitals abnormal    [facility] action(s):  Defer     Requires labs : Yes      Medication Therapy Plan: 09/26/24 (!) 144/72      Appointments  past 12m or future 3m with PCP    Date Provider   Last Visit   9/26/2024 Porsha Masters MD   Next Visit   12/26/2024 Porsha Masters MD   ED visits in past 90 days: 0        Note composed:9:12 AM 09/29/2024

## 2024-10-14 ENCOUNTER — PATIENT MESSAGE (OUTPATIENT)
Dept: PRIMARY CARE CLINIC | Facility: CLINIC | Age: 84
End: 2024-10-14
Payer: MEDICARE

## 2024-10-14 NOTE — TELEPHONE ENCOUNTER
Patient stated ok, will p/u meds. Labs are scheduled week before return visit in December on 18th. She will then see you on the 26th.

## 2024-10-14 NOTE — TELEPHONE ENCOUNTER
"Please make sure pt got my response as she has trouble with the mychart - "Hey ,   I saw his note.  Go ahead and start these meds and keep an eye on the BP. When did he set you up for repeat labs?"   Porsha  "

## 2024-10-29 ENCOUNTER — PATIENT MESSAGE (OUTPATIENT)
Dept: PRIMARY CARE CLINIC | Facility: CLINIC | Age: 84
End: 2024-10-29
Payer: MEDICARE

## 2024-10-29 DIAGNOSIS — M48.8X6 OTHER SPECIFIED SPONDYLOPATHIES, LUMBAR REGION: Primary | ICD-10-CM

## 2024-11-19 ENCOUNTER — OFFICE VISIT (OUTPATIENT)
Dept: URGENT CARE | Facility: CLINIC | Age: 84
End: 2024-11-19
Payer: MEDICARE

## 2024-11-19 VITALS
SYSTOLIC BLOOD PRESSURE: 163 MMHG | BODY MASS INDEX: 27.6 KG/M2 | TEMPERATURE: 98 F | RESPIRATION RATE: 20 BRPM | DIASTOLIC BLOOD PRESSURE: 70 MMHG | HEIGHT: 62 IN | WEIGHT: 150 LBS | HEART RATE: 76 BPM | OXYGEN SATURATION: 99 %

## 2024-11-19 DIAGNOSIS — S50.812A INFECTED ABRASION OF LEFT FOREARM, INITIAL ENCOUNTER: ICD-10-CM

## 2024-11-19 DIAGNOSIS — L08.9 INFECTED ABRASION OF LEFT FOREARM, INITIAL ENCOUNTER: ICD-10-CM

## 2024-11-19 DIAGNOSIS — L01.00 IMPETIGO: ICD-10-CM

## 2024-11-19 DIAGNOSIS — L03.114 CELLULITIS OF LEFT FOREARM: Primary | ICD-10-CM

## 2024-11-19 PROCEDURE — 99213 OFFICE O/P EST LOW 20 MIN: CPT | Mod: S$GLB,,, | Performed by: PHYSICIAN ASSISTANT

## 2024-11-19 RX ORDER — MUPIROCIN 20 MG/G
OINTMENT TOPICAL 2 TIMES DAILY
Qty: 30 G | Refills: 0 | Status: SHIPPED | OUTPATIENT
Start: 2024-11-19 | End: 2024-11-26

## 2024-11-19 RX ORDER — CEPHALEXIN 500 MG/1
500 CAPSULE ORAL EVERY 6 HOURS
Qty: 28 CAPSULE | Refills: 0 | Status: SHIPPED | OUTPATIENT
Start: 2024-11-19 | End: 2024-11-26

## 2024-11-19 NOTE — LETTER
"  November 19, 2024      [facility] [address]  [place] [address]  Phone: [phone]  Fax: [phone]       Patient: Elizabeth Dickson   YOB: 1940  Date of Visit: 11/19/2024    To Whom It May Concern:    Verenice Dickson  was at [facility] on 11/19/2024. The patient may return to work/school on 11/20/24 with no restrictions. If you have any questions or concerns, or if I can be of further assistance, please do not hesitate to contact me.    Sincerely,        Lauraalexa Manzo PA-C (Jackie)       "

## 2024-11-20 PROBLEM — C84.A0 CUTANEOUS T-CELL LYMPHOMA, UNSPECIFIED, UNSPECIFIED SITE: Status: ACTIVE | Noted: 2023-04-25

## 2024-11-20 NOTE — PROGRESS NOTES
"Subjective:      Patient ID: Elizabeth Dickson is a 84 y.o. female.    Vitals:  height is 5' 2" (1.575 m) and weight is 68 kg (150 lb). Her oral temperature is 98 °F (36.7 °C). Her blood pressure is 163/70 (abnormal) and her pulse is 76. Her respiration is 20 and oxygen saturation is 99%.     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       Provider HPI  Patient has skin tear from 6 days ago (Thursday) from kitchen chair. Pt states she didn't clean it, put a plastic bandage on it. She reports hx of skin tears due to thin skin. She is not on a blood thinner. She is present with her son. Patient is followed by Dr. Cyndee Pinto.    Insect Bite  This is a new problem. The current episode started today. The problem occurs constantly. The problem has been gradually worsening. Associated symptoms include a rash. Pertinent negatives include no abdominal pain, anorexia, arthralgias, change in bowel habit, chest pain, chills, congestion, fatigue, fever, headaches, myalgias, nausea, numbness, sore throat or vomiting. Nothing aggravates the symptoms. She has tried nothing for the symptoms. The treatment provided no relief.       Constitution: Negative for chills, fatigue, fever and generalized weakness.   HENT:  Negative for congestion, postnasal drip, sore throat, trouble swallowing and voice change.    Cardiovascular:  Negative for chest pain, leg swelling, palpitations and sob on exertion.   Gastrointestinal:  Negative for abdominal pain, nausea and vomiting.   Musculoskeletal:  Negative for pain, joint pain and muscle ache.   Skin:  Positive for color change, rash, wound, abrasion, lesion, skin thickening/induration, erythema, bruising and hives. Negative for laceration and avulsion.   Allergic/Immunologic: Positive for hives. Negative for immunocompromised " state and itching.   Neurological:  Negative for headaches and numbness.   Psychiatric/Behavioral:  Negative for nervous/anxious. The patient is not nervous/anxious.       Objective:     Physical Exam   Constitutional: She is oriented to person, place, and time. No distress.      Comments:Patient is awake and alert, sitting up in exam chair, speaking and answering in complete sentences     normal  HENT:   Head: Normocephalic and atraumatic.   Ears:   Right Ear: Tympanic membrane, external ear and ear canal normal.   Left Ear: Tympanic membrane, external ear and ear canal normal.   Nose: Nose normal.   Mouth/Throat: Mucous membranes are moist. Oropharynx is clear.   Eyes: Conjunctivae are normal. Pupils are equal, round, and reactive to light. Extraocular movement intact   Neck: Neck supple.   Cardiovascular: Normal rate, regular rhythm, normal heart sounds and normal pulses.   Pulmonary/Chest: Effort normal and breath sounds normal.   Abdominal: Normal appearance.   Musculoskeletal:         General: No tenderness.   Neurological: She is alert and oriented to person, place, and time.   Skin: Capillary refill takes less than 2 seconds. erythema and lesion         Comments: Skin tear with yellowish drainage noted to left forearm; extensive sharply demarcated edematous and erythematous plaque with induration,tenderness,and warmth without purulent drainage on flexor aspect of left forearm extending to medial elbow; few pustules noted to area     Psychiatric: Her behavior is normal. Mood, judgment and thought content normal.   Nursing note and vitals reviewed.chaperone present                 Assessment:     1. Cellulitis of left forearm    2. Infected abrasion of left forearm, initial encounter    3. Impetigo      Patient presents with clinical exam findings and history consistent with above.      On exam, patient is nontoxic appearing and vitals are stable.        Diagnostic testing results were reviewed and discussed  with patient/guardian.   Tests ordered in clinic: None  Previous progress notes/admissions/labs and medications were reviewed.    Plan:   Discussed with pt and son that cellulitis occurred from the skin tear. Counseled to clean/irrigate all new wounds with water, hydrogen peroxide, or rubbing alcohol.      Tdap from 2/2/22  Wound irrigated with saline flushes, applied mupirocin and two plastic bandages supplied from patient    Cellulitis of left forearm  -     cephALEXin (KEFLEX) 500 MG capsule; Take 1 capsule (500 mg total) by mouth every 6 (six) hours. for 7 days  Dispense: 28 capsule; Refill: 0  -     mupirocin (BACTROBAN) 2 % ointment; Apply topically 2 (two) times daily. for 7 days  Dispense: 30 g; Refill: 0  -     Ambulatory referral/consult to Dermatology  -     Ambulatory referral/consult to Internal Medicine    Infected abrasion of left forearm, initial encounter  -     cephALEXin (KEFLEX) 500 MG capsule; Take 1 capsule (500 mg total) by mouth every 6 (six) hours. for 7 days  Dispense: 28 capsule; Refill: 0  -     mupirocin (BACTROBAN) 2 % ointment; Apply topically 2 (two) times daily. for 7 days  Dispense: 30 g; Refill: 0  -     Ambulatory referral/consult to Dermatology    Impetigo  -     cephALEXin (KEFLEX) 500 MG capsule; Take 1 capsule (500 mg total) by mouth every 6 (six) hours. for 7 days  Dispense: 28 capsule; Refill: 0  -     Ambulatory referral/consult to Dermatology

## 2024-11-20 NOTE — PATIENT INSTRUCTIONS
Please keep your wound covered as it heals. Use a thin layer of antibiotic ointment (mupirocin) to help keep the wound moist. This will also keep the dressing from sticking to the wound.  you can gently wash the wound with soap and water. Pat dry and put on a clean dressing. A telfa pad will not stick to the wound.  Change your dressing once a day or every other day.  Always wash your hands before and after touching the wound.  Each time you change the dressing, look closely at the wound to be sure it is healing the right way. The wound may have a yellowish discharge, and this is normal.  Avoid picking the scab or scratching the site which may cause more irritation.  Do not soak in water or swim with an open wound.  Do not use hydrogen peroxide; it will cause the wound to dry out or delay wound healing/new skin growth.  Please complete full course of oral antibiotics.       If not allergic, take Tylenol (Acetaminophen) 650 mg to  1 g every 6 hours as needed for fever/pain and/or Motrin (Ibuprofen) 600 to 800 mg every 6 hours as needed for pain and/or fever      Please remember that you have received care at an urgent care today. Urgent cares are not emergency rooms and are not equipped to handle life threatening emergencies and cannot rule in or out certain medical conditions and you may be released before all of your medical problems are known or treated. Please arrange follow up with your primary care physician or speciality clinic  within 2-5 days if your signs and symptoms have not resolved or worsen. Patient can call our Referral Hotline at [phone] to make an appointment.    Please return here or go to the Emergency Department for any concerns or worsening of condition.Patient was educated on signs/symptoms that would warrant emergent medical attention. Patient verbalized understanding.  Signs of infection. These include a fever of 100.4°F (38°C) or higher, chills, or wound that will not heal.  The pain  in and around the area gets much worse.  There is a bad smell or pus (thick yellow, green, or gray fluid) coming from your wound.  You notice a crunchy feeling or blisters in the skin around the wound.  The redness around your wound gets bigger or is spreading up your arm or leg.  Fluid that is not pus drains from your wound.  Your swelling doesnt improve or gets worse.

## 2024-12-23 NOTE — PROGRESS NOTES
"[facility] Note    Chief Complaint      Chief Complaint   Patient presents with    Follow-up     3 month        History of Present Illness      Elizabeth Dickson is a 84 y.o.   WF with LBBB, Breast cancer, Pulmonary nodules, Adjustment disorder, CKD III,  BLE, HTN, Hypothyroidism, Osteopenia presents to fu chronic issues. Pt s/p  Ptosis repair/Ectropion repair 6/13/22 with Dr. Mcpherson.  Last visit-  9/26/24    Interim:   [facility] - 12/20/24 - tx with prednisone and robaxin for sciatica and was referred to ortho.   UC - 11/19/24 - cellulitis left forearm tx with keflex.     Low back pain - "It's ok". She take breaks.  Pt off Gabapentin 100 mg QHS per Dr. Rollins. No longer fu by Dr. Mallory. CT Chest/abd/pelvis -1/13/23 - Right 1 anterolisthesis of L4 on L5. Multilevel spondylosis of the spine.       Fall - Tripped over step at home > 1 wk ago.  No L.O. C. She has a healing ecchymosis to Rt cheek.      LBBB -  chronic and was seen on previous EKG from 2016.       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   Prev Fu by Derm, Dr. Sloan, and Dr. Lynn. She saw ID, Dr. Rollins. She was on Prednisone 5 mg/d.   She was told to try a diet avoiding eggplant, potatoes, peppers, and blue cheese. Allergy tests were neg. Immune tests wnl. She saw A/I Dr. Bar and was told to fu with Derm . Pt off Abx.   Fu with Derm, Dr. Hinds, who called to inform me he performed a Bx on pt which showed atypical lymphoid infiltrate +CD 30 which could be assoc with possible mycosis Fungoides, T cell lymphoma, lymphomatoid papulosa. He referred her to [facility]'s T cell lymphoma clinic at [facility] (Dr. Jazzmine James at [facility]).  She is no longer being seen by [facility] or Dr. Williamson and is only fu with [facility].   She was recently started on Bexarotene 150 mg/day and continued on betamethasone.  Using gentamycin and betamethasone to the area. 1/2023 CT chest- SUBCENTIMETER PULMONARY NODULES WITH " SOME NODULES WHICH APPEAR GROSSLY SIMILAR, SOME NODULES WHICH APPEAR DECREASED AND SOME RETICULONODULAR CHANGES OF THE LEFT LOWER LOBE WHICH WERE NOT SEEN ON PREVIOUS EXAMINATION FOR WHICH CONTINUED FOLLOW-UP IS RECOMMENDED. 1/2023 CT AP- SMALL HYPODENSITY NOTED IN THE LIVER WHICH MAY REFLECT SMALL CYST OR HEMANGIOMA. NO EVIDENCE OF LYMPHADENOPATHY. ATHEROSCLEROTIC VASCULAR DISEASE. She fu with Dr. Brad Domingo. 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 bexarotene, again discussed side effects and anticipated central thyroid suppression and hypertriglycerdemia; patient already on lipitor and synthroid.  She is being monitored with freq labs per [place], Dr. Pinto. Now fu by H/O, Dr. Thomas. PET - 2/28/24 -No definite malignant metabolic activity throughout the exam.  There are 3 foci of punctate low-level uptake involving the skin of the proximal left leg just below the knee.  Maximum uptake is 1.9 SUV which is, at most, inflammatory.  Correlation with physical exam findings necessary, although suspect urinary contamination. Additional arthritic uptake associated with facet joint arthropathy right C2-3, and degenerative changes both knees are present.    Pt on prednisone 5 mg/d. Pt on bexarotene 6 tabs/d - this was just inc per Derm. Pt on plaquenil 100 mg/d. Has Derm fu in Jan.     Left Knee Oa - Twisted it getting up from sofa 1/25/22. Fu by Dr Hernandez.  She did Ptx. She uses a cane to ambulate now.  Has to stop to rest. Told she is not a surgical candidate. Cont conservative measures. Has recent injections - not helpful. Wuld like pain mgmnt referral. Ok to take tylenol prn. She tries to limit steroids given eye pressures which have improved from decreasing her oral steroids.      Breast CA-invasive ductal carcinoma, right breast s/p lumpectomy.   Now being seen by a new Sx at [facility].  Pt off Arimidex.  Now fu by H/O at [facility]. Has fu in Jan. With a new provider.     Iron deficiency anemia - H/H -  "11.9/36.8 - 9/23/24.  Pt on iron q day.       Pulmonary nodules - Fu by Pulmonary, Dr. Salinas, prn.   She had a PET scan and was reassured. Pt now getting serial CT scans to monitor her T cell Lymphoma.      Vitamin-D deficiency-Resolved 48 up from prev 28-Pt completed ergocalciferol times 12 weeks and is on vitamin D3 1000 units/day.     CKD III -Fu by Dr. Nieto.  BUN/Cr -84/2.06 12/11/24.  Cont to rec adeq hydration.  We stopped amodipine to due to edema which improved. Her HCTZ was stopped.      Adjustment disorder with mixed anxiety and depression-Pt on melatonin OTC.  Pt doing good on Lexapro 5 mg and it has helped and she has been blocking out things that bother her.       TMJ -  "It's ok". Can't take NSAIDS. She takes a tylenol prn - helps.  She can also try ice/massaging.      Hypothyroidism -  Free T4 - wnl - 0.95 - 11/25/24.   She is currently on Levothyroxine 100 mcg/day per Dr. Pinto. Has order from Dr. Pinto.      HTN - Pt off Lisinopril 10 mg 2 tabs/day due to hyperkalemia.  Pt is off amlodipine 2.5 mg/d due to edema.  She is doing well on Toprol-XL 50 mg QHS.  Continue low-sodium diet. +Microalbuminuria.  She denies any dizziness.   Cont to monitor edema. Could consider low dose lasix but would need to also monitor renal function. Dr. Nieto Started her on low dose losartan  25 mg/d with hctz 12.5 mg/d - Hctz was stopped 12/18/24     HLD - Pt on Lipitor 10 mg QHS and fenofibrate 200 mg/d. Controlled.  Cont current dose of Lipitor.  HDL 49 LDL 90  -11/25/24.       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     BLE - Pt uses compression socks. Improved with stopping amlodipine.      Carotid bruits- No significant stenosis seen on previous carotid ultrasound.     Renal mass -8 mm right kidney.  Fu by  Dr. Sanchez - was discharged from his clinic.  Renal U/S- stable Left renal cyst - 9/13/22. Not noted on recent CT 1/13/23 at [facility].  Fu by new Nephrologist, Dr. Nieto.      Pancreatic " cyst- Fu by GI, Dr. King.  Pt had EUS-02/21/2019. She was told to fu prn. Not noted on recent CT 1/13/23 at [facility].  Had recent PET skin for her Mycoses fungoides.      Osteopenia-Pt on Calcium 1200 mg/d, and vitamin D3 1000 units/day.  She has been off Prolia x 2 yrs.  ID, Dr. Rollins,  told her to hold it.  Note - she is off arimidex.       H/o Syncope - No further episodes. Pt had a CT Head and Cardiac davis that was neg.     Acc by son in law     HCM - Flu - 11/1/23;  RSV - none -defers; Tdap - 2/2/22;  PCV 13 - 6/2/17;  PVX 23 - 6/28/21;   Zostavax - utd; Shingrix - 8/18/20;  #2 - 11/19/20; COVID - 19 - Vaccine (Pfizer) #1 - 1/26/21;  #2 - 2/15/21; # 3 11/17/21; # 4 ;   MGM and Tariq breast U/S- 7/9/24 - repeat 1 yr;   DEXA - 9/17/21-Osteopenia;  Hep C - 2/7/18; C-scope - 1/10/19 - polyp, int hemorrhoids, diverticulosis, told no need to repeat when she d/w GI;  Gyn/Onc - Dr. Chandler;  Renal - Dr. Nieto;  GI - Dr. King/Darron;  H/O - Dr. Grande;  Prev Rheum - Dr. Mallory;  Gen Sx - Dr. Alabaster;  Pulm - Dr. Salinas;  ID - Dr. Rollins; Podiatry - Dr. Ruiz/Samuel; Ortho - [facility]; Derm - Dr. Pinto;   H/O - ? At [facility] - is changing since doc moved      Patient Care Team:  Porsha Masters MD as PCP - General (Internal Medicine)  Benoit Salinas MD as Consulting Physician (Family Medicine)  Robbie Mallory Jr., MD as Consulting Physician (Rheumatology)  Robbie Mallory Jr., MD (Rheumatology)  Russell King MD as Consulting Physician (Gastroenterology)  Lynsey Goodman MD as Consulting Physician (Nephrology)  Felecia Solo MD as Consulting Physician (General Surgery)  Duncan Singleton DPM as Consulting Physician (Podiatry)  Tarun Rollins Jr., MD as  (Infectious Diseases)     Health Maintenance:  Immunization History   Administered Date(s) Administered    COVID-19, MRNA, LN-S, PF (Pfizer) (Purple Cap) 01/26/2021, 02/15/2021, 11/17/2021     Influenza 10/18/2005, 10/11/2013, 09/18/2015, 09/17/2016, 10/01/2018, 10/25/2018, 09/12/2019    Influenza (FLUAD) - Quadrivalent - Adjuvanted - PF *Preferred* (65+) 11/01/2023    Influenza - Quadrivalent - High Dose - PF (65 years and older) 09/17/2022    Influenza - Quadrivalent - PF *Preferred* (6 months and older) 10/11/2013, 12/24/2021    Influenza - Trivalent - Fluzone High Dose - PF (65 years and older) 09/18/2015, 09/17/2016, 10/25/2018, 09/12/2019, 08/18/2020    Influenza Split 10/18/2005, 10/11/2013    Pneumococcal Conjugate - 13 Valent 10/11/2013, 06/23/2016, 06/02/2017, 06/28/2021    Pneumococcal Polysaccharide - 23 Valent 11/01/2010    Tdap 02/02/2022    Zoster 08/18/2020, 11/19/2020    Zoster Recombinant 08/18/2020, 11/19/2020      Health Maintenance   Topic Date Due    COVID-19 Vaccine (4 - 2024-25 season) 09/01/2024    DEXA Scan  09/17/2024    Lipid Panel  11/25/2029    TETANUS VACCINE  02/02/2032    Shingles Vaccine  Completed    Influenza Vaccine  Completed    RSV Vaccine (Age 60+ and Pregnant patients)  Completed    Pneumococcal Vaccines (Age 50+)  Completed        Past Medical History:  Past Medical History:   Diagnosis Date    Anxiety     Bilateral leg edema     Breast cancer     Cellulitis of left leg     CKD (chronic kidney disease), stage III     Hypertension     Hypothyroidism     Iron deficiency anemia     Osteopenia     Ovarian cyst     Teratoma and Benign Serous Cystadenoma    Pancreatic cyst     Pulmonary nodules     Renal mass     Vitamin D deficiency        Past Surgical History:   has a past surgical history that includes Breast surgery; Removal of Ovaries; debridement; and Skin biopsy.    Family History:  family history includes COPD in her brother; Cholelithiasis in her brother; Colon cancer in her brother and brother; Diabetes in her father; Diabetes type I in an other family member; Heart disease in her brother and brother; Hypertension in her father and mother; Lung cancer in her  sister.     Social History:  Social History     Tobacco Use    Smoking status: Never    Smokeless tobacco: Never   Substance Use Topics    Alcohol use: No    Drug use: Never       Review of Systems   Constitutional:  Negative for chills, diaphoresis and fever.   HENT:  Positive for hearing loss.    Eyes:  Negative for visual disturbance.   Respiratory:  Negative for cough and shortness of breath.    Cardiovascular:  Negative for chest pain and palpitations.   Gastrointestinal:  Negative for abdominal pain, constipation, diarrhea, nausea and vomiting.   Genitourinary:  Negative for bladder incontinence, dysuria and hematuria.   Musculoskeletal:  Positive for back pain.   Neurological:  Positive for headaches. Negative for dizziness.   Psychiatric/Behavioral:  Negative for depressed mood. The patient is nervous/anxious.         Regarding risk of falls and things taking longer to do.         Medications:    Current Outpatient Medications:     ascorbic acid, vitamin C, (VITAMIN C) 1000 MG tablet, Take 1,000 mg by mouth once daily., Disp: , Rfl:     atorvastatin (LIPITOR) 10 MG tablet, TAKE 1 TABLET BY MOUTH EVERY DAY, Disp: 90 tablet, Rfl: 2    augmented betamethasone dipropionate (DIPROLENE-AF) 0.05 % ointment, 1 application  2 (two) times daily., Disp: , Rfl:     betamethasone valerate 0.1% (VALISONE) 0.1 % Crea, , Disp: , Rfl:     bexarotene 75 mg Cap, Take 6 capsules by mouth Daily., Disp: , Rfl:     calcium carbonate 1250 MG capsule, Take by mouth once daily., Disp: , Rfl:     cholecalciferol, vitamin D3, (VITAMIN D3) 25 mcg (1,000 unit) capsule, Take 1,000 Units by mouth once daily., Disp: , Rfl:     erythromycin (ROMYCIN) ophthalmic ointment, APPLY TO ALL STITCHES AND OPERATED EYES TWICE DAILY FOR 2 WEEKS, Disp: , Rfl:     EScitalopram oxalate (LEXAPRO) 5 MG Tab, TAKE 1 TABLET BY MOUTH EVERY DAY, Disp: 90 tablet, Rfl: 3    fenofibrate micronized (LOFIBRA) 200 MG Cap, TAKE 1 CAPSULE BY MOUTH EVERY DAY WITH A MEAL,  "Disp: , Rfl:     ferrous sulfate (FEOSOL) 325 mg (65 mg iron) Tab tablet, Take 325 mg by mouth once daily., Disp: , Rfl:     latanoprost 0.005 % ophthalmic solution, Place into both eyes., Disp: , Rfl:     levothyroxine (SYNTHROID) 100 MCG tablet, TAKE 1 TABLET BY MOUTH EVERY DAY IN THE MORNING ON EMPTY STOMACH FOR 30 DAYS, Disp: , Rfl:     melatonin 10 mg Tab, Take 10 mg by mouth nightly as needed., Disp: , Rfl:     metoprolol succinate (TOPROL-XL) 50 MG 24 hr tablet, TAKE 1 TABLET BY MOUTH ONCE  DAILY, Disp: 90 tablet, Rfl: 3    omeprazole (PRILOSEC) 20 MG capsule, Take 20 mg by mouth once daily., Disp: , Rfl:     predniSONE (DELTASONE) 10 MG tablet, Take 5 mg by mouth once daily., Disp: , Rfl:     VALTREX 1 gram tablet, Take 1,000 mg by mouth., Disp: , Rfl:     losartan (COZAAR) 25 MG tablet, Take 1 tablet (25 mg total) by mouth once daily., Disp: 1 tablet, Rfl: 0    methocarbamoL (ROBAXIN) 500 MG Tab, 1 po BID prn back pain, Disp: 60 tablet, Rfl: 0     Allergies:  Review of patient's allergies indicates:   Allergen Reactions    Lisinopril      hyperkalemia       Physical Exam      Vital Signs  Temp: 97.7 °F (36.5 °C)  Temp Source: Temporal  Pulse: 78  Resp: 20  SpO2: (!) 94 %  BP: 120/60  BP Location: Left arm  Patient Position: Sitting  Pain Score: 0-No pain  Height and Weight  Height: 5' 2" (157.5 cm)  Weight: 68.8 kg (151 lb 10.8 oz)  BSA (Calculated - sq m): 1.73 sq meters  BMI (Calculated): 27.7  Weight in (lb) to have BMI = 25: 136.4      Patient Position: Sitting      Physical Exam  Vitals reviewed.   Constitutional:       General: She is not in acute distress.     Appearance: Normal appearance. She is not ill-appearing, toxic-appearing or diaphoretic.   HENT:      Head: Normocephalic and atraumatic.      Right Ear: Tympanic membrane normal.      Left Ear: Tympanic membrane normal.      Ears:      Comments: Tariq hearing aids.   Eyes:      Extraocular Movements: Extraocular movements intact.      " Conjunctiva/sclera: Conjunctivae normal.      Pupils: Pupils are equal, round, and reactive to light.   Neck:      Vascular: No carotid bruit.   Cardiovascular:      Rate and Rhythm: Normal rate and regular rhythm.      Pulses: Normal pulses.      Heart sounds: Normal heart sounds.   Pulmonary:      Effort: No respiratory distress.      Breath sounds: Normal breath sounds. No wheezing.   Abdominal:      General: Bowel sounds are normal. There is no distension.      Palpations: Abdomen is soft.      Tenderness: There is no abdominal tenderness. There is no guarding or rebound.   Musculoskeletal:      Comments: Neg straight leg raise silvina.  Genu valgus silvina. +Kyphoscoliosis,  NTTP over spine or PSIS joints.  + tension to Rt paraspinal muscles.    Skin:     General: Skin is warm.      Comments: Scabbed verrucous lesion to Left cheek    Neurological:      General: No focal deficit present.      Mental Status: She is alert and oriented to person, place, and time.   Psychiatric:         Mood and Affect: Mood normal.         Behavior: Behavior normal.          Laboratory:  CBC:  Recent Labs   Lab 05/31/22  1330 09/22/22  1053 12/07/22  1036   WBC 5.1 6.7 14.6 H   RBC 4.79 4.74 4.58   Hemoglobin 14.2 13.9 13.4   Hematocrit 42.4 41.8 40.4   Platelets 164 180 210   MCV 88.5 88.0 88.2   MCH 29.6 29.3 29.3   MCHC 33.4 33.3 33.2       CMP:  Recent Labs   Lab 05/31/22  1330 09/22/22  1053 12/07/22  1036 04/03/24  0825 05/22/24  0941   Glucose 94 93 88  --  93   Calcium 9.9 9.6 9.7   < > 9.5   ALBUMIN 4.6 4.3  --   --   --    Albumin Level  --   --   --   --  3.8   Total Protein 6.3 6.3  --   --   --    Sodium 142 139 138  --  143   Potassium 4.3 5.0 4.1  --  4.3   CO2 26 25 23  --   --    Carbon Dioxide  --   --   --   --  28   Chloride 103 104 104  --   --    BUN 23.0 H 31.0 H 30.0 H  --   --    Blood Urea Nitrogen  --   --   --   --  46 H   Creatinine 1.06 H 1.37 H 1.19 H  --  1.39 H   Alkaline Phosphatase 104 87  --   --  34 L    ALT 24 20  --   --  18   AST 28 22  --   --  22   Total Bilirubin 0.6 0.3  --   --  0.3    < > = values in this interval not displayed.           URINALYSIS:  Recent Labs   Lab 03/29/22  1037 09/23/22  1046   Color, UA YELLOW YELLOW   Specific Gravity, UA 1.015 1.022   pH, UA < OR = 5.0 < OR = 5.0   Protein, UA NEGATIVE NEGATIVE   Glucose, UA NEGATIVE NEGATIVE   Bacteria, UA NONE SEEN NONE SEEN   Nitrite, UA NEGATIVE NEGATIVE   Leukocytes, UA 1+ A 1+ A   Hyaline Casts, UA NONE SEEN NONE SEEN        LIPIDS:  Recent Labs   Lab 12/20/22  1543 01/26/23  0957 04/14/23  1046 06/09/23  0905 09/09/24  1122 09/23/24  1120 11/25/24  0950   TSH 1.26  --  0.42 0.19 L  --   --   --    HDL 75 H 50  --   --   --  56 49   Cholesterol 170 169  --   --   --  167 176   Triglycerides 122 107  --   --  205 H 165 H 184 H   LDL Calculated 71 102  --   --   --  78 90   Hdl/Cholesterol Ratio 2.27  --   --   --   --  2.98 3.59   Non-HDL Cholesterol 95  --   --   --   --  111 127       TSH:  Recent Labs   Lab 12/20/22  1543 04/14/23  1046 06/09/23  0905   TSH 1.26 0.42 0.19 L          Assessment/Plan     Elizabeth Dickson is a 84 y.o.female with:    Hypertension, unspecified type  -     Basic Metabolic Panel; Future; Expected date: 12/26/2024  - Controlled.  Cont current.     Acute renal failure superimposed on stage 3b chronic kidney disease, unspecified acute renal failure type  -     Basic Metabolic Panel; Future; Expected date: 12/26/2024  -Decreased. Fu by renal. HCTZ recently stopped. Repeat BMP.     Debility  -     Ambulatory referral/consult to Physical/Occupational Therapy; Future; Expected date: 01/02/2025    Fall, sequela  -     Ambulatory referral/consult to Physical/Occupational Therapy; Future; Expected date: 01/02/2025  -     X-Ray Lumbar Spine Ap Lateral w/Flex Ext; Future; Expected date: 12/26/2024  -     X-Ray Thoracic Spine AP Lateral; Future; Expected date: 12/26/2024    Acute midline thoracic back pain  -      methocarbamoL (ROBAXIN) 500 MG Tab; 1 po BID prn back pain  Dispense: 60 tablet; Refill: 0    Chronic pain of both knees  -     Ambulatory referral/consult to Pain Clinic; Future; Expected date: 01/02/2025    Hypothyroidism, unspecified type  - Stable.  Cont current regimen.    Mycosis fungoides, unspecified body region  - Stable.  Cont current regimen.    Osteopenia, unspecified location  - Stable.  Cont current regimen.    Anxiety  - Stable.  Cont current regimen.    Hyperlipidemia, unspecified hyperlipidemia type    Other orders  -     losartan (COZAAR) 25 MG tablet; Take 1 tablet (25 mg total) by mouth once daily.  Dispense: 1 tablet; Refill: 0         Chronic conditions status updated as per HPI.  Other than changes above, cont current medications and maintain follow up with specialists.  Follow up in about 6 months (around 6/26/2025).      Porsha Masters MD  [facility]

## 2024-12-26 ENCOUNTER — OFFICE VISIT (OUTPATIENT)
Dept: PRIMARY CARE CLINIC | Facility: CLINIC | Age: 84
End: 2024-12-26
Payer: MEDICARE

## 2024-12-26 ENCOUNTER — HOSPITAL ENCOUNTER (OUTPATIENT)
Dept: RADIOLOGY | Facility: HOSPITAL | Age: 84
Discharge: HOME | End: 2024-12-26
Attending: INTERNAL MEDICINE
Payer: MEDICARE

## 2024-12-26 VITALS
BODY MASS INDEX: 27.92 KG/M2 | WEIGHT: 151.69 LBS | HEIGHT: 62 IN | HEART RATE: 78 BPM | DIASTOLIC BLOOD PRESSURE: 60 MMHG | TEMPERATURE: 98 F | SYSTOLIC BLOOD PRESSURE: 120 MMHG | OXYGEN SATURATION: 94 % | RESPIRATION RATE: 20 BRPM

## 2024-12-26 DIAGNOSIS — N18.32 ACUTE RENAL FAILURE SUPERIMPOSED ON STAGE 3B CHRONIC KIDNEY DISEASE, UNSPECIFIED ACUTE RENAL FAILURE TYPE: ICD-10-CM
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
## 2024-12-26 DIAGNOSIS — E03.9 HYPOTHYROIDISM, UNSPECIFIED TYPE: ICD-10-CM

## 2024-12-26 DIAGNOSIS — F41.9 ANXIETY: ICD-10-CM

## 2024-12-26 DIAGNOSIS — M85.80 OSTEOPENIA, UNSPECIFIED LOCATION: ICD-10-CM

## 2024-12-26 DIAGNOSIS — M54.6 ACUTE MIDLINE THORACIC BACK PAIN: ICD-10-CM

## 2024-12-26 DIAGNOSIS — N17.9 ACUTE RENAL FAILURE SUPERIMPOSED ON STAGE 3B CHRONIC KIDNEY DISEASE, UNSPECIFIED ACUTE RENAL FAILURE TYPE: ICD-10-CM

## 2024-12-26 DIAGNOSIS — E78.5 HYPERLIPIDEMIA, UNSPECIFIED HYPERLIPIDEMIA TYPE: ICD-10-CM

## 2024-12-26 DIAGNOSIS — C84.00 MYCOSIS FUNGOIDES, UNSPECIFIED BODY REGION: ICD-10-CM

## 2024-12-26 DIAGNOSIS — G89.29 CHRONIC PAIN OF BOTH KNEES: ICD-10-CM

## 2024-12-26 DIAGNOSIS — I10 HYPERTENSION, UNSPECIFIED TYPE: Primary | ICD-10-CM

## 2024-12-26 DIAGNOSIS — R53.81 DEBILITY: ICD-10-CM

## 2024-12-26 DIAGNOSIS — M25.561 CHRONIC PAIN OF BOTH KNEES: ICD-10-CM

## 2024-12-26 DIAGNOSIS — M25.562 CHRONIC PAIN OF BOTH KNEES: ICD-10-CM

## 2024-12-26 PROCEDURE — 3074F SYST BP LT 130 MM HG: CPT | Mod: CPTII,S$GLB,, | Performed by: INTERNAL MEDICINE

## 2024-12-26 PROCEDURE — 99214 OFFICE O/P EST MOD 30 MIN: CPT | Mod: S$GLB,,, | Performed by: INTERNAL MEDICINE

## 2024-12-26 PROCEDURE — 99999 PR PBB SHADOW E&M-EST. PATIENT-LVL V: CPT | Mod: PBBFAC,,, | Performed by: INTERNAL MEDICINE

## 2024-12-26 PROCEDURE — 72070 X-RAY EXAM THORAC SPINE 2VWS: CPT | Mod: 26,,, | Performed by: RADIOLOGY

## 2024-12-26 PROCEDURE — 1100F PTFALLS ASSESS-DOCD GE2>/YR: CPT | Mod: CPTII,S$GLB,, | Performed by: INTERNAL MEDICINE

## 2024-12-26 PROCEDURE — 72110 X-RAY EXAM L-2 SPINE 4/>VWS: CPT | Mod: TC,PN

## 2024-12-26 PROCEDURE — 1159F MED LIST DOCD IN RCRD: CPT | Mod: CPTII,S$GLB,, | Performed by: INTERNAL MEDICINE

## 2024-12-26 PROCEDURE — 1160F RVW MEDS BY RX/DR IN RCRD: CPT | Mod: CPTII,S$GLB,, | Performed by: INTERNAL MEDICINE

## 2024-12-26 PROCEDURE — 3078F DIAST BP <80 MM HG: CPT | Mod: CPTII,S$GLB,, | Performed by: INTERNAL MEDICINE

## 2024-12-26 PROCEDURE — 3288F FALL RISK ASSESSMENT DOCD: CPT | Mod: CPTII,S$GLB,, | Performed by: INTERNAL MEDICINE

## 2024-12-26 PROCEDURE — 72070 X-RAY EXAM THORAC SPINE 2VWS: CPT | Mod: TC,PN

## 2024-12-26 PROCEDURE — 1126F AMNT PAIN NOTED NONE PRSNT: CPT | Mod: CPTII,S$GLB,, | Performed by: INTERNAL MEDICINE

## 2024-12-26 RX ORDER — LOSARTAN POTASSIUM 25 MG/1
25 TABLET ORAL DAILY
Qty: 1 TABLET | Refills: 0
Start: 2024-12-26 | End: 2025-12-26

## 2024-12-26 RX ORDER — METHOCARBAMOL 500 MG/1
TABLET, FILM COATED ORAL
Qty: 60 TABLET | Refills: 0 | Status: SHIPPED | OUTPATIENT
Start: 2024-12-26

## 2024-12-30 DIAGNOSIS — N18.9 ACUTE RENAL FAILURE SUPERIMPOSED ON CHRONIC KIDNEY DISEASE, UNSPECIFIED ACUTE RENAL FAILURE TYPE, UNSPECIFIED CKD STAGE: Primary | ICD-10-CM

## 2024-12-30 DIAGNOSIS — N17.9 ACUTE RENAL FAILURE SUPERIMPOSED ON CHRONIC KIDNEY DISEASE, UNSPECIFIED ACUTE RENAL FAILURE TYPE, UNSPECIFIED CKD STAGE: Primary | ICD-10-CM

## 2025-01-09 ENCOUNTER — TELEPHONE (OUTPATIENT)
Dept: PRIMARY CARE CLINIC | Facility: CLINIC | Age: 85
End: 2025-01-09
Payer: MEDICARE

## 2025-01-09 DIAGNOSIS — N17.9 ACUTE RENAL FAILURE SUPERIMPOSED ON STAGE 3B CHRONIC KIDNEY DISEASE, UNSPECIFIED ACUTE RENAL FAILURE TYPE: Primary | ICD-10-CM

## 2025-01-09 DIAGNOSIS — N18.32 ACUTE RENAL FAILURE SUPERIMPOSED ON STAGE 3B CHRONIC KIDNEY DISEASE, UNSPECIFIED ACUTE RENAL FAILURE TYPE: Primary | ICD-10-CM

## 2025-01-09 NOTE — TELEPHONE ENCOUNTER
Telephoned patient, left message to return ----- Message from Charanjit sent at 1/9/2025 10:25 AM CST -----  Contact: [phone]@[email or web address]  Good morning patient would like to request that her lab order be fax to [facility] at  [phone]. Patient is waiting at [facility] to do her lab work. Please call patient to advise [phone]

## 2025-01-09 NOTE — TELEPHONE ENCOUNTER
Order sent to provider for confirmation----- Message from [place] sent at 1/9/2025  3:03 PM CST -----  Contact: [phone]  [email or web address]     Patient is calling for Medical Advice regarding: pt is calling because [facility] work red[facility] from her 12/26 appt. Please call and advise.      How long has patient had these symptoms:    Pharmacy name and phone#:    Patient wants a call back or thru [facility]:    Comments:    Please advise patient replies from provider may take up to 48 hours.

## 2025-01-10 ENCOUNTER — TELEPHONE (OUTPATIENT)
Dept: PAIN MEDICINE | Facility: CLINIC | Age: 85
End: 2025-01-10

## 2025-01-10 ENCOUNTER — OFFICE VISIT (OUTPATIENT)
Dept: PAIN MEDICINE | Facility: CLINIC | Age: 85
End: 2025-01-10
Payer: MEDICARE

## 2025-01-10 VITALS
BODY MASS INDEX: 29.01 KG/M2 | SYSTOLIC BLOOD PRESSURE: 138 MMHG | HEART RATE: 79 BPM | DIASTOLIC BLOOD PRESSURE: 73 MMHG | WEIGHT: 157.63 LBS | HEIGHT: 62 IN

## 2025-01-10 DIAGNOSIS — M25.562 CHRONIC PAIN OF LEFT KNEE: ICD-10-CM

## 2025-01-10 DIAGNOSIS — M17.0 PRIMARY OSTEOARTHRITIS OF BOTH KNEES: Primary | ICD-10-CM

## 2025-01-10 DIAGNOSIS — M25.562 CHRONIC PAIN OF BOTH KNEES: ICD-10-CM

## 2025-01-10 DIAGNOSIS — M25.561 CHRONIC PAIN OF RIGHT KNEE: ICD-10-CM

## 2025-01-10 DIAGNOSIS — G89.29 CHRONIC PAIN OF LEFT KNEE: ICD-10-CM

## 2025-01-10 DIAGNOSIS — G89.29 CHRONIC PAIN OF RIGHT KNEE: ICD-10-CM

## 2025-01-10 DIAGNOSIS — G89.29 CHRONIC PAIN OF BOTH KNEES: ICD-10-CM

## 2025-01-10 DIAGNOSIS — M25.561 CHRONIC PAIN OF BOTH KNEES: ICD-10-CM

## 2025-01-10 PROCEDURE — 99999 PR PBB SHADOW E&M-EST. PATIENT-LVL III: CPT | Mod: PBBFAC,,, | Performed by: EMERGENCY MEDICINE

## 2025-01-10 NOTE — TELEPHONE ENCOUNTER
----- Message from Rasta Tellez MD sent at 1/10/2025 10:03 AM CST -----  Regarding: Order for LUCY DE LA VEGA    Patient Name: LUCY DE LA VEGA([id number])  Sex: Female  : 1940      PCP: MARZENA SAMUELS    Center: [facility]     Types of orders made on 01/10/2025: Outpatient Referral, Procedure Request    Order Date:1/10/2025  Ordering User:RASTA TELLEZ [[id number]  Encounter Provider:Rasta Tellez MD [[id number]]  Authorizing Provider: Rasta Tellez MD [[id number]]  Department:[facility] PAIN MANAGEMENT[[id number]]    Common Order Information  Procedure -> Other (Specify location and laterality) Cmt: BL genicular nerve    Order Specific Information  Order: Procedure Order to Pain Management [Custom: [id number]]  Order #:          [id number]: 1 FUTURE    Priority: Routine  Class: Clinic Performed    Future Order Information      Expires on:01/10/2026            Expected by:01/10/2025                   Associated Diagnoses      M17.0 Primary osteoarthritis of both knees      M25.561, G89.29 Chronic pain of right knee      M25.562, G89.29 Chronic pain of left knee      Physician -> rowdy         Facility Name: -> [facility]           Priority: Routine  Class: Clinic Performed    Future Order Information      Expires on:01/10/2026            Expected by:01/10/2025                   Associated Diagnoses      M17.0 Primary osteoarthritis of both knees      M25.561, G89.29 Chronic pain of right knee      M25.562, G89.29 Chronic pain of left knee      Procedure -> Other (Specify location and laterality) Cmt: BL genicular nerve        Physician -> rowdy         Facility Name: -> [facility]

## 2025-01-10 NOTE — PROGRESS NOTES
..Interventional Pain Management - New Patient Visit  Chief Complaint   Patient presents with    Knee Pain     Bilateral        Original HPI 01/10/2025: Elizabeth Dickson  presents to the clinic for the evaluation of the above pain. The pain started several years ago.     Original Pain Description:  The pain is located in the bilateral knee and is axial in nature. The pain is described as aching. Exacerbating factors: Standing, Bending, Walking, Morning, and Getting out of bed/chair. Mitigating factors sitting. Symptoms interfere with does not interfere with daily activities. The patient feels like symptoms have been worsening. Patient denies night fever/night sweats, urinary incontinence, bowel incontinence, significant weight loss, significant motor weakness, and loss of sensations. The patient has had both CSi and viscosupplmentation for her knees with limited benefit.     Patient also complaining of bilateral lower lumbar pain that is axial in nature.  Worse with prolonged periods of standing or walking.  She leans forward to relief pain.  No radicular symptoms.    PAIN SCORES:  Best: Pain is 5  Current: Pain is 5  Worst: Pain is 10    6 weeks of Conservative therapy:  PT: Completed  Chiro:  HEP: Participating sit down elliptical     Treatments / Medications:   Robaxin 500 mg      Antiplatelets/Anticoagulants:  Plaquenil 200 mg    Interventional Pain Procedures:   NA    IMAGING:    XR THORACIC SPINE AP LATERAL 12/26/2024  Moderate S shaped curvature to the thoracolumbar spine.  Osteopenia noted.  No fracture identified.  There is multilevel degenerative disc disease.        XR LUMBAR SPINE AP AND LAT WITH FLEX/EXT  12/26/2024  Multilevel degenerative disc disease, noting disc height loss with endplate changes. G1 AL L4-5.  Multilevel facet joint arthropathy noted. Mod S shaped curvature to thoracolumbar spine. No subluxation extension and flexion views.  No fractures.  Prominent scattered calcific  "atherosclerosis noted.        XR KNEE 3 VIEW RIGHT 6/17/2024  Moderate to severe osteoarthrosis of the knee with medial compartment, less so lateral compartment, joint space narrowing. Mild soft tissue swelling over the knee.     X-ray bilateral knees 11/20/2024 from outside physician review  Past Surgical History:   Procedure Laterality Date    BREAST SURGERY      debridement      Left leg     Removal of Ovaries      SKIN BIOPSY      1/5/24       Social History     Socioeconomic History    Marital status:    Tobacco Use    Smoking status: Never    Smokeless tobacco: Never   Substance and Sexual Activity    Alcohol use: No    Drug use: Never    Sexual activity: Not Currently       Medications/Allergies: See med card    ROS:  GENERAL: No fever. No chills. No fatigue. Denies weight loss. Denies weight gain.  Back / musculoskeletal / neuro : See HPI    VITALS:   Vitals:    01/10/25 0937   BP: 138/73   Pulse: 79   Weight: 71.5 kg (157 lb 10.1 oz)   Height: 5' 2" (1.575 m)   PainSc:   5   PainLoc: Knee     Body mass index is 28.83 kg/m².       No data to display                PHYSICAL EXAM:   GENERAL: Well appearing, in no acute distress, alert and oriented x3.  PSYCH:  Mood and affect appropriate.  SKIN: Skin color, texture, turgor normal, no rashes or lesions.  HEENT:  Normocephalic, atraumatic. Cranial nerves grossly intact.  NECK: No pain to palpation over the cervical paraspinous muscles. No pain to palpation over facets. No pain with neck flexion, extension, or lateral flexion.   PULM: No evidence of respiratory difficulty, symmetric chest rise.  GI:  Non-distended  BACK:  Limited range of motion. No pain to palpation over the spinous processes.  Pain with axial loading bilaterally There is no pain with palpation over the sacroiliac joints bilaterally.   EXTREMITIES: No deformities, edema, or skin discoloration.   MUSCULOSKELETAL:  Bilateral knees:  Valgus deformity, tenderness palpation medial joint line, " "tenderness to palpation medial surface of proximal tibia, positive crepitus  NEURO: Sensation is equal and appropriate bilaterally. Bilateral upper and lower extremity strength is normal and symmetric. Bilateral upper and lower extremity coordination and muscle stretch reflexes are physiologic and symmetric. Plantar response are downgoing. Straight leg raising in the supine position is negative to radicular pain.   GAIT: normal.      LABS:    No results found for: "LABA1C", "HGBA1C"    Lab Results   Component Value Date    CREATININE 1.8 (H) 12/26/2024         ASSESSMENT: 84 y.o. year old female with pain, consistent with:    Encounter Diagnoses   Name Primary?    Chronic pain of both knees     Primary osteoarthritis of both knees Yes    Chronic pain of right knee     Chronic pain of left knee        DISCUSSION: Elizabeth Dickson is a patient with a History of CKD III who is under the care of [facility] comes to us with chronic bilateral knee pain secondary to significant degenerative joint disease as well as chronic lower back pain secondary to lumbar spondylosis.  Patient has been encouraged to pursue conservative measures by 2 different orthopedists.  She has failed viscosupplementation as well as corticosteroids.  If genicular nerve block is successful, will pursue RFA bilateral genicular nerves.  Following completion of this we will turn our attention to her lumbar spondylosis    PLAN:  - I have stressed the importance of physical activity and a home exercise plan to help with pain and improve health.  - Patient can continue with medications for now since they are providing benefits, using them appropriately, and without side effects.  - Counseled patient regarding the importance of activity modification and constant sleeping habits.  - Interventions:  Bilateral genicular nerve block . Explained the risks and benefits of the procedure in detail with the patient today in clinic along with " alternative treatment options, and the patient elected to pursue the intervention at this time.    - Anticoagulation use: No no anticoagulation  - Imaging: Reviewed available imaging with patient and answered any questions they had regarding study.  - The patient's pathophysiology was explained in detail with reference to x-rays, models, other visual aids as appropriate.   - Follow up visit: return to clinic 3-4 weeks after procedure      I would like to thank Porsha Masters MD for the opportunity to assist in the care of this patient. We had a very nice visit and I look forward to continuing their care. Please let me know if I can be of further assistance.     Rasta Lambert MD  01/10/2025

## 2025-01-10 NOTE — H&P (VIEW-ONLY)
..Interventional Pain Management - New Patient Visit  Chief Complaint   Patient presents with    Knee Pain     Bilateral        Original HPI 01/10/2025: Elizabeth Dickson  presents to the clinic for the evaluation of the above pain. The pain started several years ago.     Original Pain Description:  The pain is located in the bilateral knee and is axial in nature. The pain is described as aching. Exacerbating factors: Standing, Bending, Walking, Morning, and Getting out of bed/chair. Mitigating factors sitting. Symptoms interfere with does not interfere with daily activities. The patient feels like symptoms have been worsening. Patient denies night fever/night sweats, urinary incontinence, bowel incontinence, significant weight loss, significant motor weakness, and loss of sensations. The patient has had both CSi and viscosupplmentation for her knees with limited benefit.     Patient also complaining of bilateral lower lumbar pain that is axial in nature.  Worse with prolonged periods of standing or walking.  She leans forward to relief pain.  No radicular symptoms.    PAIN SCORES:  Best: Pain is 5  Current: Pain is 5  Worst: Pain is 10    6 weeks of Conservative therapy:  PT: Completed  Chiro:  HEP: Participating sit down elliptical     Treatments / Medications:   Robaxin 500 mg      Antiplatelets/Anticoagulants:  Plaquenil 200 mg    Interventional Pain Procedures:   NA    IMAGING:    XR THORACIC SPINE AP LATERAL 12/26/2024  Moderate S shaped curvature to the thoracolumbar spine.  Osteopenia noted.  No fracture identified.  There is multilevel degenerative disc disease.        XR LUMBAR SPINE AP AND LAT WITH FLEX/EXT  12/26/2024  Multilevel degenerative disc disease, noting disc height loss with endplate changes. G1 AL L4-5.  Multilevel facet joint arthropathy noted. Mod S shaped curvature to thoracolumbar spine. No subluxation extension and flexion views.  No fractures.  Prominent scattered calcific  "atherosclerosis noted.        XR KNEE 3 VIEW RIGHT 6/17/2024  Moderate to severe osteoarthrosis of the knee with medial compartment, less so lateral compartment, joint space narrowing. Mild soft tissue swelling over the knee.     X-ray bilateral knees 11/20/2024 from outside physician review  Past Surgical History:   Procedure Laterality Date    BREAST SURGERY      debridement      Left leg     Removal of Ovaries      SKIN BIOPSY      1/5/24       Social History     Socioeconomic History    Marital status:    Tobacco Use    Smoking status: Never    Smokeless tobacco: Never   Substance and Sexual Activity    Alcohol use: No    Drug use: Never    Sexual activity: Not Currently       Medications/Allergies: See med card    ROS:  GENERAL: No fever. No chills. No fatigue. Denies weight loss. Denies weight gain.  Back / musculoskeletal / neuro : See HPI    VITALS:   Vitals:    01/10/25 0937   BP: 138/73   Pulse: 79   Weight: 71.5 kg (157 lb 10.1 oz)   Height: 5' 2" (1.575 m)   PainSc:   5   PainLoc: Knee     Body mass index is 28.83 kg/m².       No data to display                PHYSICAL EXAM:   GENERAL: Well appearing, in no acute distress, alert and oriented x3.  PSYCH:  Mood and affect appropriate.  SKIN: Skin color, texture, turgor normal, no rashes or lesions.  HEENT:  Normocephalic, atraumatic. Cranial nerves grossly intact.  NECK: No pain to palpation over the cervical paraspinous muscles. No pain to palpation over facets. No pain with neck flexion, extension, or lateral flexion.   PULM: No evidence of respiratory difficulty, symmetric chest rise.  GI:  Non-distended  BACK:  Limited range of motion. No pain to palpation over the spinous processes.  Pain with axial loading bilaterally There is no pain with palpation over the sacroiliac joints bilaterally.   EXTREMITIES: No deformities, edema, or skin discoloration.   MUSCULOSKELETAL:  Bilateral knees:  Valgus deformity, tenderness palpation medial joint line, " "tenderness to palpation medial surface of proximal tibia, positive crepitus  NEURO: Sensation is equal and appropriate bilaterally. Bilateral upper and lower extremity strength is normal and symmetric. Bilateral upper and lower extremity coordination and muscle stretch reflexes are physiologic and symmetric. Plantar response are downgoing. Straight leg raising in the supine position is negative to radicular pain.   GAIT: normal.      LABS:    No results found for: "LABA1C", "HGBA1C"    Lab Results   Component Value Date    CREATININE 1.8 (H) 12/26/2024         ASSESSMENT: 84 y.o. year old female with pain, consistent with:    Encounter Diagnoses   Name Primary?    Chronic pain of both knees     Primary osteoarthritis of both knees Yes    Chronic pain of right knee     Chronic pain of left knee        DISCUSSION: Elizabeth Dickson is a patient with a History of CKD III who is under the care of [facility]ist comes to us with chronic bilateral knee pain secondary to significant degenerative joint disease as well as chronic lower back pain secondary to lumbar spondylosis.  Patient has been encouraged to pursue conservative measures by 2 different orthopedists.  She has failed viscosupplementation as well as corticosteroids.  If genicular nerve block is successful, will pursue RFA bilateral genicular nerves.  Following completion of this we will turn our attention to her lumbar spondylosis    PLAN:  - I have stressed the importance of physical activity and a home exercise plan to help with pain and improve health.  - Patient can continue with medications for now since they are providing benefits, using them appropriately, and without side effects.  - Counseled patient regarding the importance of activity modification and constant sleeping habits.  - Interventions:  Bilateral genicular nerve block . Explained the risks and benefits of the procedure in detail with the patient today in clinic along with " alternative treatment options, and the patient elected to pursue the intervention at this time.    - Anticoagulation use: No no anticoagulation  - Imaging: Reviewed available imaging with patient and answered any questions they had regarding study.  - The patient's pathophysiology was explained in detail with reference to x-rays, models, other visual aids as appropriate.   - Follow up visit: return to clinic 3-4 weeks after procedure      I would like to thank Porsha Masters MD for the opportunity to assist in the care of this patient. We had a very nice visit and I look forward to continuing their care. Please let me know if I can be of further assistance.     Rasta Lambert MD  01/10/2025

## 2025-01-12 LAB
BUN SERPL-MCNC: 49 MG/DL (ref 7–25)
BUN/CREAT SERPL: 29 (CALC) (ref 6–22)
CALCIUM SERPL-MCNC: 9.8 MG/DL (ref 8.6–10.4)
CHLORIDE SERPL-SCNC: 111 MMOL/L (ref 98–110)
CO2 SERPL-SCNC: 24 MMOL/L (ref 20–32)
CREAT SERPL-MCNC: 1.7 MG/DL (ref 0.6–0.95)
EGFR: 30 ML/MIN/1.73M2
GLUCOSE SERPL-MCNC: 85 MG/DL (ref 65–99)
POTASSIUM SERPL-SCNC: 4.6 MMOL/L (ref 3.5–5.3)
SODIUM SERPL-SCNC: 143 MMOL/L (ref 135–146)

## 2025-01-13 NOTE — PROGRESS NOTES
I sent pt a my chart message -  I reviewed your labs.  Your kidney function was a tiny bit better but still lower than your baseline.  Continue to work on your hydration. It looks like you are going to have repeat labs and a fu with Dr. Nieto in [place].  Let me know if you have any issues or concerns.  We can always check this sooner if so.    Porsha

## 2025-01-14 NOTE — DISCHARGE INSTRUCTIONS
[facility]  Dr. Rasta Lambert  Messaging service # [phone]    GENICULAR NERVE BLOCK POST-PROCEDURE INSTRUCTIONS:    What you need to do:  Keep a record of your response to the injection you had today.  It is very important that you accurately record how you responded to today's injection.  Please try to separate any soreness from the needle from your usual pain.  We want to know how this affects your usual pain.  Also REMEMBER that today's injection is a DIAGNOSTIC block, the pain relief will only last for a few hours to a few days.  Insurance requires 2 of these blocks before progressing to the ablation.  The sole purpose of this injection is to give us information, and not to treat your pain for an extended period.    Think about the amount of pain that you would have doing the most painful activities (I.e. bending, twisting, walking, standing straight, cleaning, etc...).  Now do those same activities as soon as you get home from the hospital.  Think about how much better you feel doing those activities now that the injection is done.  Does it feel 50%, 80%, 100% better than it would have otherwise?  This is the number you need to send me.    Send me a message through [facility] or leave a message at the phone number above telling me what % of improvement you got from the injection.    If you have difficulty putting a percentage on your pain relief fill this out:  (Rate each question below from 0-10 with 0 being no pain and 10 being the worst imaginable pain)    How bad would your pain get during activities like walking/going up stairs BEFORE the injection? _______    For the first 8 hours AFTER the injection, when you did those same painful activities, what was you best pain score? ____________    Send me those two numbers if you aren't able to give a  percentage.  ---------------------------------------------------------------------------------------------------------------------------------------------------------------------------------------------  What to watch out for:    If you experience any of the following symptoms after your procedure, please notify the messaging service immediately (see above for contact information):   fever (increased oral temperature)   bleeding or swelling at the injection site,    drainage, rash or redness at the injection site    possible signs of infection    increased pain at the injection site   worsening of your usual pain   severe headache   new or worsening numbness    new arm and/or leg weakness, or    changes in bowel and/or bladder function: urinating or defecating on yourself and not knowing that you did it.    PLEASE FOLLOW ALL INSTRUCTIONS CAREFULLY     Do not engage in strenuous activity (e.g., lifting or pushing heavy objects or repeated bending) for 24 hours.     Do not take a bath, swim or use Jacuzzi for 24 hours after procedure. (A shower is fine).   Remove any Band-Aids when you get home.    Use cold/ice, as needed for comfort.  We recommend the use of cold therapy alternating on for 20 minutes, off for 20 minutes.    Do not apply direct heat (heating pad or heat packs) to the injection site for 24 hours.     Resume your usual medications, unless instructed otherwise by your Pain Physician.     If you are on warfarin (Coumadin) or other blood thinner, resume this medication as instructed by your prescribing Physician.    IF AT ANY POINT YOU ARE VERY CONCERNED ABOUT YOUR SYMPTOMS, Urgent or emergent issues after between 5pm and 7am or on weekends, please contact the Doctor on call at [phone].    If you develop worsening pain, weakness, numbness, lose bowel or bladder control (i.e., having an accident where you did not even know you had to go to the bathroom and suddenly noticed you soiled yourself), saddle  anesthesia (a loss of sensation restricted to the area of the buttocks, anus and between the legs -- i.e., those parts of your body that would touch a saddle if you were sitting on one) you need to go immediately to the emergency department for evaluation and treatment.    ----------------------------------------------------------------------------------------------------------------------------------------------------------------  If you received Sedation please read the following instructions:  POST SEDATION INSTRUCTIONS    Today you received intravenous medication (also known as sedation) that was used to help you relax and/or decrease discomfort during your procedure. This medication will be acting in your body for the next 24 hours, so you might feel a little tired or sleepy. This feeling will slowly wear off.   Common side effects associated with these medications include: drowsiness, dizziness, sleepiness, confusion, feeling excited, difficulty remembering things, lack of steadiness with walking or balance, loss of fine muscle control, slowed reflexes, difficulty focusing, and blurred vision.  Some over-the-counter and prescription medications (e.g., muscle relaxants, opioids, mood-altering medications, sedatives/hypnotics, antihistamines) can interact with the intravenous medication you received and cause an increased risk of the side effects listed above in addition to other potentially life threatening side effects. Use extreme caution if you are taking such medications, and consult with your Pain Physician or prescribing physician if you have any questions.  For the next 12-24 hours:    DO NOT--Drive a car, operate machinery or power tools   DO NOT--Drink any alcoholic beverages (not even beer), they may dangerously increase the risk of side effects.    DO NOT--Make any important legal or business decisions or sign important documents.  We advise you to have someone to assist you at home. Move slowly and  carefully. Do not make sudden changes in position. Be aware of dizziness or light-headedness and move accordingly.   If you seek medical treatment within 24 hours, let the nurse or doctor caring for you know that you have received the above medications. If you have any questions or concerns related to your sedation or treatment today please contact us.

## 2025-01-14 NOTE — PRE-PROCEDURE INSTRUCTIONS
Unable to reach pt via phone.  Left voicemail with arrival time also informing pt of need for responsible  accompaniment and instructing pt to follow pre-procedure instructions provided via [facility] portal.  The following message was sent to pt's portal.        Dear Elizabeth,     Please read over the following pre-procedure instructions in it's entirety as there is helpful information here to get you well prepared for your upcoming procedure.     You are scheduled for a procedure with Dr. Lambert on 1/16/2024.     [facility] at the corner of [facility] and [facility]. It is in the [place] next to [employer]. The address is: [address] [place] [address]. Take the elevator to the 2nd floor.       Registration check in time: 1:10 pm  Procedure scheduled for time: 2:10 pm     If you are receiving sedation, you CANNOT drive yourself and must have a responsible friend or family member (no rideshare) to drive you home.        You should take any medications that you routinely take for blood pressure, heart medications, thyroid, cholesterol, etc.      The fasting restrictions are dependent on whether or not you are receiving sedation. Sedation is not available for all procedures.      Your fasting instructions/Sedation type are as follow:  No sedation.  You do not need to fast before this procedure.  You can eat and drink like normal.  You can drive yourself (with stipulations).  There are some rare cases where you may need to call an uber if you are unable to drive after the procedure due to weakness/dizziness.         If you are on blood thinners, you need to follow the anticoagulation instructions that had been discussed previously. You should only stop the blood thinners if it was approved by your primary care physician or your cardiologist. In the event that you are not able to stop your blood thinners, a blood thinner was not listed on your medication list, or we were not able to  get clearance from your cardiologist, then the procedure may have to be postponed/canceled.      IF you were told to stop your blood thinners, this is how long you should generally hold some of the more common ones. Remember that stopping blood thinners is only necessary for certain procedures. If you are unsure of your instructions, please call us.   Aspirin - 5 days  Plavix/Clopidogrel - 7 days  Warfarin / Coumadin - 5 days  Eliquis - 3 days  Pradaxa/Dabigatran - 4 days  Xarelto/Rivaroxaban - 3 days     *If you take Ozempic, Trulicity, Mounjaro, Rybelsus, Zepbound Or other weight loss, non-insulin injections you must hold this for one week (7 days) prior if you are having IV sedation.      If you are a diabetic, do not take your medication if you will be fasting, but bring it with you. Please plan on being here for roughly 2-3 hours.      Please call us if any of the following have occurred:  *running fever or having any flu-like symptoms  *have been taking antibiotics in the past 2 weeks  *have had any or plan on having any immunizations in the 2 weeks before or after your injection(Flu/Shingles/Covid Booster/Pneumonia, etc.)  *had any out patient procedures other than with us in the past 2 weeks (Colonoscopy, Endoscopy, Biopsy, OBGYN, Dental, etc.) Or received a steroid injection from another provider within the last 2 weeks.  *have any wounds or rashes  *awaiting ANY test results that could result in you having to take antibiotics (Urine Culture, Flu/Covid/Strept Swab, etc)     If you have been COVID positive, you will need to hold off on your procedure until you are symptom free for 10 days. If you did not have any symptoms, you can have your procedure 10 days from your positive test result.      On the morning of your procedure:  *HOLD ALL VITAMINS, MINERALS, HERBS (INCLUDING HERBAL TEAS) AND SUPPLEMENTS  *SHOWER WITH ANTIBACTERIAL SOAP (example: DIAL over the counter) NIGHT BEFORE AND MORNING OF  PROCEDURE  *DO NOT APPLY ANY LOTIONS, OILS, POWDERS, PERFUME/COLOGNE, OINTMENTS, GELS, CREAMS, MAKEUP OR DEODORANT TO YOUR SKIN MORNING OF PROCEDURE  *LEAVE JEWELRY AND ANY VALUABLES AT HOME  *WEAR LOOSE COMFORTABLE CLOTHING      If you have any questions please call [phone].    Please reply to this portal message as receipt of delivery.     Thank you,  [facility]  [place], LPN [facility]  Pre-Admit

## 2025-01-15 NOTE — TELEPHONE ENCOUNTER
"Regarding her Compression stockings she should wear these when on her feet/out and about. When she gets home and can raise her legs she should remove them.  Her swelling was ok at our recent visit and had improved with stopping amlodipine so it's hard for me to say what to do about the swelling without seeing it. Her kidney function was looking a little better but that was after Her Nephrologist, Dr. Nieto" stopped her fluid pill (the hydrochlorothiazide). She appeared very dehydrated on the labs and this improved a little bit with her increasing her hydration and her holding the fluid pill.  I would be hesitant to add more fluid pill unless absolutely necessary and would defer to Dr. Nieto.  Perhaps they could send a pic of the legs to show the amount of swelling she has been having.   Dr. JACOBS"

## 2025-01-16 ENCOUNTER — HOSPITAL ENCOUNTER (OUTPATIENT)
Facility: HOSPITAL | Age: 85
Discharge: HOME | End: 2025-01-16
Attending: EMERGENCY MEDICINE | Admitting: EMERGENCY MEDICINE
Payer: MEDICARE

## 2025-01-16 VITALS
HEIGHT: 59 IN | SYSTOLIC BLOOD PRESSURE: 140 MMHG | RESPIRATION RATE: 18 BRPM | TEMPERATURE: 98 F | HEART RATE: 60 BPM | BODY MASS INDEX: 31.25 KG/M2 | WEIGHT: 155 LBS | OXYGEN SATURATION: 100 % | DIASTOLIC BLOOD PRESSURE: 65 MMHG

## 2025-01-16 DIAGNOSIS — G89.29 CHRONIC PAIN: ICD-10-CM

## 2025-01-16 PROCEDURE — 64454 NJX AA&/STRD GNCLR NRV BRNCH: CPT | Mod: 50,,, | Performed by: EMERGENCY MEDICINE

## 2025-01-16 PROCEDURE — 63600175 PHARM REV CODE 636 W HCPCS: Performed by: EMERGENCY MEDICINE

## 2025-01-16 PROCEDURE — 64454 NJX AA&/STRD GNCLR NRV BRNCH: CPT | Mod: 50 | Performed by: EMERGENCY MEDICINE

## 2025-01-16 PROCEDURE — 25000003 PHARM REV CODE 250: Performed by: EMERGENCY MEDICINE

## 2025-01-16 RX ORDER — ALPRAZOLAM 0.5 MG/1
0.5 TABLET, ORALLY DISINTEGRATING ORAL ONCE AS NEEDED
Status: COMPLETED | OUTPATIENT
Start: 2025-01-16 | End: 2025-01-16

## 2025-01-16 RX ORDER — LIDOCAINE HYDROCHLORIDE 20 MG/ML
INJECTION, SOLUTION EPIDURAL; INFILTRATION; INTRACAUDAL; PERINEURAL
Status: DISCONTINUED | OUTPATIENT
Start: 2025-01-16 | End: 2025-01-16 | Stop reason: HOSPADM

## 2025-01-16 RX ORDER — BUPIVACAINE HYDROCHLORIDE 2.5 MG/ML
INJECTION, SOLUTION EPIDURAL; INFILTRATION; INTRACAUDAL
Status: DISCONTINUED | OUTPATIENT
Start: 2025-01-16 | End: 2025-01-16 | Stop reason: HOSPADM

## 2025-01-16 RX ADMIN — ALPRAZOLAM 0.5 MG: 0.5 TABLET, ORALLY DISINTEGRATING ORAL at 09:01

## 2025-01-16 NOTE — PLAN OF CARE
Discharge instructions given to patient, verbalized understanding of all. VSS, denies n/v and tolerating PO, rates pain level tolerable. Family notified for Patient discharge home.

## 2025-01-16 NOTE — DISCHARGE SUMMARY
Discharge Note  Short Stay      SUMMARY     Admit Date: 1/16/2025    Attending Physician: Rasta Lambert      Discharge Physician: Rasta Lambert      Discharge Date: 1/16/2025 9:50 AM    Procedure(s) (LRB):  B/L GNB (Bilateral)    Final Diagnosis: Primary osteoarthritis of both knees [M17.0]  Chronic pain of right knee [M25.561, G89.29]  Chronic pain of left knee [M25.562, G89.29]    Disposition: Home or self care    Patient Instructions:   Current Discharge Medication List        CONTINUE these medications which have NOT CHANGED    Details   atorvastatin (LIPITOR) 10 MG tablet TAKE 1 TABLET BY MOUTH EVERY DAY  Qty: 90 tablet, Refills: 2    Associated Diagnoses: Hyperlipidemia, unspecified hyperlipidemia type      bexarotene 75 mg Cap Take 6 capsules by mouth Daily.      calcium carbonate 1250 MG capsule Take by mouth once daily.      cholecalciferol, vitamin D3, (VITAMIN D3) 25 mcg (1,000 unit) capsule Take 1,000 Units by mouth once daily.      EScitalopram oxalate (LEXAPRO) 5 MG Tab TAKE 1 TABLET BY MOUTH EVERY DAY  Qty: 90 tablet, Refills: 3    Associated Diagnoses: Anxiety; Depression, unspecified depression type      fenofibrate micronized (LOFIBRA) 200 MG Cap TAKE 1 CAPSULE BY MOUTH EVERY DAY WITH A MEAL      ferrous sulfate (FEOSOL) 325 mg (65 mg iron) Tab tablet Take 325 mg by mouth once daily.      levothyroxine (SYNTHROID) 100 MCG tablet TAKE 1 TABLET BY MOUTH EVERY DAY IN THE MORNING ON EMPTY STOMACH FOR 30 DAYS      losartan (COZAAR) 25 MG tablet Take 1 tablet (25 mg total) by mouth once daily.  Qty: 1 tablet, Refills: 0    Comments: .      metoprolol succinate (TOPROL-XL) 50 MG 24 hr tablet TAKE 1 TABLET BY MOUTH ONCE  DAILY  Qty: 90 tablet, Refills: 3    Comments: Please send a replace/new response with 100-Day Supply if appropriate to maximize member benefit. Requesting 1 year supply. - .  Associated Diagnoses: Hypertension, unspecified type      omeprazole (PRILOSEC) 20 MG capsule Take 20 mg by  mouth once daily.      predniSONE (DELTASONE) 10 MG tablet Take 5 mg by mouth once daily.      ascorbic acid, vitamin C, (VITAMIN C) 1000 MG tablet Take 1,000 mg by mouth once daily.      augmented betamethasone dipropionate (DIPROLENE-AF) 0.05 % ointment 1 application  2 (two) times daily.      betamethasone valerate 0.1% (VALISONE) 0.1 % Crea       erythromycin (ROMYCIN) ophthalmic ointment APPLY TO ALL STITCHES AND OPERATED EYES TWICE DAILY FOR 2 WEEKS      latanoprost 0.005 % ophthalmic solution Place into both eyes.      melatonin 10 mg Tab Take 10 mg by mouth nightly as needed.      methocarbamoL (ROBAXIN) 500 MG Tab 1 po BID prn back pain  Qty: 60 tablet, Refills: 0    Associated Diagnoses: Acute midline thoracic back pain      VALTREX 1 gram tablet Take 1,000 mg by mouth.                 Discharge Diagnosis: Primary osteoarthritis of both knees [M17.0]  Chronic pain of right knee [M25.561, G89.29]  Chronic pain of left knee [M25.562, G89.29]  Condition on Discharge: Stable with no complications to procedure   Diet on Discharge: Same as before.  Activity: as per instruction sheet.  Discharge to: Home with a responsible adult.  Follow up: 2-4 weeks       Please call my office or pager at [phone] if experienced any weakness or loss of sensation, fever > 101.5, pain uncontrolled with oral medications, persistent nausea/vomiting/or diarrhea, redness or drainage from the incisions, or any other worrisome concerns. If physician on call was not reached or could not communicate with our office for any reason please go to the nearest emergency department

## 2025-01-16 NOTE — OP NOTE
Diagnostic Genicular Nerve Block under Fluoroscopic Guidance    The procedure, risks, benefits, and options were discussed with the patient. There are no contraindications to the procedure. The patent expressed understanding and agreed to the procedure. Informed written consent was obtained prior to the start of the procedure and can be found in the patient's chart.    PATIENT NAME: Elizabeth Dickson   MRN: [id number]     DATE OF PROCEDURE: 01/16/2025    PROCEDURE: Diagnostic Bilateral Genicular Nerve Block under Fluoroscopic Guidance    PRE-OP DIAGNOSIS: Primary osteoarthritis of both knees [M17.0]  Chronic pain of right knee [M25.561, G89.29]  Chronic pain of left knee [M25.562, G89.29]    POST-OP DIAGNOSIS: Same    PHYSICIAN: Rasta Lambert MD    MEDICATIONS INJECTED: Bupivacine 0.25%     LOCAL ANESTHETIC INJECTED: Xylocaine 2%     SEDATION: None    ESTIMATED BLOOD LOSS: None    COMPLICATIONS: None    INTERVAL HISTORY: Patient has clinical findings of chronic knee pain that is not relieved by other therapies.     TECHNIQUE: Time-out was performed to identify the patient and procedure to be performed. With the patient laying in a supine position, the surgical area was prepped and draped in the usual sterile fashion using ChloraPrep and a fenestrated drape. Three target sites including the superior lateral genicular nerve where the lateral femoral shaft meets the epicondyle, the superior medial genicular nerve where the medial femoral shaft meets the epicondyle, and the inferior medial genicular nerve where the medial tibial shaft meets the epicondyle, were determined under fluoroscopy guidance. Skin anesthesia was achieved by injecting Lidocaine 2% over the injection sites. A 25 gauge, 3.5 inch spinal quinke needle was then advanced under fluoroscopy in the AP and lateral views into the positions of the geniculate nerves at each site. Once the needle tip position was confirmed on fluoroscopy, negative pressure  was applied to confirm no intravascular placement. Contrast dye  Omnipaque (300mg/mL) was injected to confirm placement and there was no vascular runoff. 1 mL of the anesthetic listed above was then slowly injected at each site. The needles were removed and bleeding was nil. A sterile dressing was applied. No specimens collected. The patient tolerated the procedure well.     PRE-PROCEDURE PAIN SCORE: 10/10    POST-PROCEDURE PAIN SCORE: 1/10    The patient was monitored after the procedure in the recovery area. They were given post-procedure and discharge instructions to follow at home. The patient was discharged in a stable condition.    Rasta Lambert MD

## 2025-01-27 ENCOUNTER — TELEPHONE (OUTPATIENT)
Dept: PAIN MEDICINE | Facility: HOSPITAL | Age: 85
End: 2025-01-27
Payer: MEDICARE

## 2025-01-27 ENCOUNTER — TELEPHONE (OUTPATIENT)
Dept: PAIN MEDICINE | Facility: CLINIC | Age: 85
End: 2025-01-27
Payer: MEDICARE

## 2025-01-27 DIAGNOSIS — G89.29 CHRONIC PAIN OF RIGHT KNEE: ICD-10-CM

## 2025-01-27 DIAGNOSIS — M17.0 PRIMARY OSTEOARTHRITIS OF BOTH KNEES: Primary | ICD-10-CM

## 2025-01-27 DIAGNOSIS — M25.562 CHRONIC PAIN OF LEFT KNEE: ICD-10-CM

## 2025-01-27 DIAGNOSIS — M25.561 CHRONIC PAIN OF RIGHT KNEE: ICD-10-CM

## 2025-01-27 DIAGNOSIS — G89.29 CHRONIC PAIN OF LEFT KNEE: ICD-10-CM

## 2025-01-27 NOTE — TELEPHONE ENCOUNTER
Called to discuss the pain relief from her bilateral GNB. She achieved greater than 80% pain relief and more than 50% improvement in function. Will proceed with RFA.

## 2025-01-27 NOTE — TELEPHONE ENCOUNTER
----- Message from Rasta Tellez MD sent at 2025  3:13 PM CST -----  Regarding: Order for LUCY DE LA VEGA    Patient Name: LUCY DE LA VEGA([id number])  Sex: Female  : 1940      PCP: MARZENA SAMUELS    Center: [facility], [facility]     Types of orders made on 2025: Procedure Request    Order Date:2025  Ordering User:RASTA TELLEZ [[id number]]  Encounter Provider:Rasta Tellez MD [[id number]]  Authorizing Provider: Rasta Tellez MD [[id number]]  Department:[facility] PAIN MANAGEMENT[[id number]]    Common Order Information  Procedure -> Radiofrequency Ablation (Specify level and laterality) Cmt:             Genicular Nerve bilateral    Order Specific Information  Order: Procedure Order to Pain Management [Custom: [id number]]  Order #:          [id number]: 1 FUTURE    Priority: Routine  Class: Clinic Performed    Future Order Information      Expires on:2026            Expected by:2025                   Associated Diagnoses      M17.0 Primary osteoarthritis of both knees      M25.561, G89.29 Chronic pain of right knee      M25.562, G89.29 Chronic pain of left knee      Physician -> rowdy         Facility Name: -> [facility]           Priority: Routine  Class: Clinic Performed    Future Order Information      Expires on:2026            Expected by:2025                   Associated Diagnoses      M17.0 Primary osteoarthritis of both knees      M25.561, G89.29 Chronic pain of right knee      M25.562, G89.29 Chronic pain of left knee      Procedure -> Radiofrequency Ablation (Specify level and laterality) Cmt:                 Genicular Nerve bilateral        Physician -> rowdy         Facility Name: -> [facility]

## 2025-01-29 DIAGNOSIS — M54.6 ACUTE MIDLINE THORACIC BACK PAIN: ICD-10-CM

## 2025-01-29 NOTE — TELEPHONE ENCOUNTER
Care Due:                  Date            Visit Type   Department     Provider  --------------------------------------------------------------------------------                                EP -                              PRIMARY      [facility] PR[facility]  Last Visit: 12-      CARE ([facility])   CARE           Porsha Masters                              EP -                              PRIMARY      [facility] PRIMARY  Next Visit: 06-      CARE ([facility])   CARE           Porsha  Gisatishrone                                                            Last  Test          Frequency    Reason                     Performed    Due Date  --------------------------------------------------------------------------------    CMP.........  12 months..  atorvastatin.............  Not Found    Overdue    Lipid Panel.  12 months..  atorvastatin.............  01- 01-    [facility] Embedded Care Due Messages. Reference number: [id number].   1/29/2025 5:54:31 PM CST

## 2025-01-30 RX ORDER — METHOCARBAMOL 500 MG/1
TABLET, FILM COATED ORAL
Qty: 60 TABLET | Refills: 0 | Status: SHIPPED | OUTPATIENT
Start: 2025-01-30 | End: 2025-02-03

## 2025-02-01 ENCOUNTER — PATIENT MESSAGE (OUTPATIENT)
Dept: PRIMARY CARE CLINIC | Facility: CLINIC | Age: 85
End: 2025-02-01
Payer: MEDICARE

## 2025-02-03 ENCOUNTER — HOSPITAL ENCOUNTER (INPATIENT)
Facility: HOSPITAL | Age: 85
LOS: 4 days | Discharge: HOME HEALTH | DRG: 603 | End: 2025-02-08
Attending: EMERGENCY MEDICINE | Admitting: INTERNAL MEDICINE
Payer: MEDICARE

## 2025-02-03 DIAGNOSIS — E78.5 HYPERLIPIDEMIA, UNSPECIFIED HYPERLIPIDEMIA TYPE: ICD-10-CM

## 2025-02-03 DIAGNOSIS — S81.801A WOUND OF RIGHT LEG: ICD-10-CM

## 2025-02-03 DIAGNOSIS — I10 HYPERTENSION, UNSPECIFIED TYPE: ICD-10-CM

## 2025-02-03 DIAGNOSIS — L03.90 CELLULITIS, UNSPECIFIED CELLULITIS SITE: Primary | ICD-10-CM

## 2025-02-03 DIAGNOSIS — L03.115 CELLULITIS OF RIGHT LOWER EXTREMITY: ICD-10-CM

## 2025-02-03 DIAGNOSIS — D50.9 IRON DEFICIENCY ANEMIA, UNSPECIFIED IRON DEFICIENCY ANEMIA TYPE: ICD-10-CM

## 2025-02-03 LAB
ALBUMIN SERPL BCP-MCNC: 3.3 G/DL (ref 3.5–5.2)
ALP SERPL-CCNC: 39 U/L (ref 40–150)
ALT SERPL W/O P-5'-P-CCNC: 28 U/L (ref 10–44)
ANION GAP SERPL CALC-SCNC: 12 MMOL/L (ref 8–16)
AST SERPL-CCNC: 44 U/L (ref 10–40)
BASOPHILS # BLD AUTO: 0.03 K/UL (ref 0–0.2)
BASOPHILS NFR BLD: 0.7 % (ref 0–1.9)
BILIRUB SERPL-MCNC: 0.3 MG/DL (ref 0.1–1)
BUN SERPL-MCNC: 34 MG/DL (ref 8–23)
CALCIUM SERPL-MCNC: 9.8 MG/DL (ref 8.7–10.5)
CHLORIDE SERPL-SCNC: 107 MMOL/L (ref 95–110)
CO2 SERPL-SCNC: 25 MMOL/L (ref 23–29)
CREAT SERPL-MCNC: 1.7 MG/DL (ref 0.5–1.4)
CRP SERPL-MCNC: 64.4 MG/L (ref 0–8.2)
DIFFERENTIAL METHOD BLD: ABNORMAL
EOSINOPHIL # BLD AUTO: 0.1 K/UL (ref 0–0.5)
EOSINOPHIL NFR BLD: 1.3 % (ref 0–8)
ERYTHROCYTE [DISTWIDTH] IN BLOOD BY AUTOMATED COUNT: 13.1 % (ref 11.5–14.5)
ERYTHROCYTE [SEDIMENTATION RATE] IN BLOOD BY PHOTOMETRIC METHOD: 16 MM/HR (ref 0–36)
EST. GFR  (NO RACE VARIABLE): 30 ML/MIN/1.73 M^2
GLUCOSE SERPL-MCNC: 97 MG/DL (ref 70–110)
HCT VFR BLD AUTO: 31.9 % (ref 37–48.5)
HGB BLD-MCNC: 9.5 G/DL (ref 12–16)
IMM GRANULOCYTES # BLD AUTO: 0.02 K/UL (ref 0–0.04)
IMM GRANULOCYTES NFR BLD AUTO: 0.4 % (ref 0–0.5)
LYMPHOCYTES # BLD AUTO: 0.8 K/UL (ref 1–4.8)
LYMPHOCYTES NFR BLD: 18.2 % (ref 18–48)
MCH RBC QN AUTO: 29.9 PG (ref 27–31)
MCHC RBC AUTO-ENTMCNC: 29.8 G/DL (ref 32–36)
MCV RBC AUTO: 100 FL (ref 82–98)
MONOCYTES # BLD AUTO: 0.4 K/UL (ref 0.3–1)
MONOCYTES NFR BLD: 9.1 % (ref 4–15)
NEUTROPHILS # BLD AUTO: 3.2 K/UL (ref 1.8–7.7)
NEUTROPHILS NFR BLD: 70.3 % (ref 38–73)
NRBC BLD-RTO: 0 /100 WBC
PLATELET # BLD AUTO: 268 K/UL (ref 150–450)
PMV BLD AUTO: 10.8 FL (ref 9.2–12.9)
POTASSIUM SERPL-SCNC: 4.2 MMOL/L (ref 3.5–5.1)
PROT SERPL-MCNC: 6.7 G/DL (ref 6–8.4)
RBC # BLD AUTO: 3.18 M/UL (ref 4–5.4)
SODIUM SERPL-SCNC: 144 MMOL/L (ref 136–145)
WBC # BLD AUTO: 4.61 K/UL (ref 3.9–12.7)

## 2025-02-03 PROCEDURE — G0378 HOSPITAL OBSERVATION PER HR: HCPCS

## 2025-02-03 PROCEDURE — 87040 BLOOD CULTURE FOR BACTERIA: CPT | Performed by: NURSE PRACTITIONER

## 2025-02-03 PROCEDURE — 25000003 PHARM REV CODE 250

## 2025-02-03 PROCEDURE — 80053 COMPREHEN METABOLIC PANEL: CPT | Performed by: NURSE PRACTITIONER

## 2025-02-03 PROCEDURE — 36415 COLL VENOUS BLD VENIPUNCTURE: CPT

## 2025-02-03 PROCEDURE — 99285 EMERGENCY DEPT VISIT HI MDM: CPT | Mod: 25

## 2025-02-03 PROCEDURE — 96365 THER/PROPH/DIAG IV INF INIT: CPT

## 2025-02-03 PROCEDURE — 25000003 PHARM REV CODE 250: Performed by: NURSE PRACTITIONER

## 2025-02-03 PROCEDURE — 63600175 PHARM REV CODE 636 W HCPCS: Performed by: NURSE PRACTITIONER

## 2025-02-03 PROCEDURE — 85025 COMPLETE CBC W/AUTO DIFF WBC: CPT | Performed by: NURSE PRACTITIONER

## 2025-02-03 PROCEDURE — 86140 C-REACTIVE PROTEIN: CPT | Performed by: NURSE PRACTITIONER

## 2025-02-03 PROCEDURE — 96372 THER/PROPH/DIAG INJ SC/IM: CPT

## 2025-02-03 PROCEDURE — 85652 RBC SED RATE AUTOMATED: CPT

## 2025-02-03 PROCEDURE — 63600175 PHARM REV CODE 636 W HCPCS

## 2025-02-03 RX ORDER — METOPROLOL SUCCINATE 50 MG/1
50 TABLET, EXTENDED RELEASE ORAL DAILY
Status: DISCONTINUED | OUTPATIENT
Start: 2025-02-04 | End: 2025-02-08 | Stop reason: HOSPADM

## 2025-02-03 RX ORDER — SODIUM CHLORIDE 0.9 % (FLUSH) 0.9 %
10 SYRINGE (ML) INJECTION EVERY 12 HOURS PRN
Status: DISCONTINUED | OUTPATIENT
Start: 2025-02-03 | End: 2025-02-08 | Stop reason: HOSPADM

## 2025-02-03 RX ORDER — ESCITALOPRAM OXALATE 5 MG/1
5 TABLET ORAL NIGHTLY
Status: DISCONTINUED | OUTPATIENT
Start: 2025-02-04 | End: 2025-02-08 | Stop reason: HOSPADM

## 2025-02-03 RX ORDER — TALC
9 POWDER (GRAM) TOPICAL NIGHTLY PRN
Status: DISCONTINUED | OUTPATIENT
Start: 2025-02-03 | End: 2025-02-07

## 2025-02-03 RX ORDER — IBUPROFEN 200 MG
24 TABLET ORAL
Status: DISCONTINUED | OUTPATIENT
Start: 2025-02-03 | End: 2025-02-08 | Stop reason: HOSPADM

## 2025-02-03 RX ORDER — NALOXONE HCL 0.4 MG/ML
0.02 VIAL (ML) INJECTION
Status: DISCONTINUED | OUTPATIENT
Start: 2025-02-03 | End: 2025-02-08 | Stop reason: HOSPADM

## 2025-02-03 RX ORDER — ENOXAPARIN SODIUM 100 MG/ML
30 INJECTION SUBCUTANEOUS EVERY 24 HOURS
Status: DISCONTINUED | OUTPATIENT
Start: 2025-02-03 | End: 2025-02-08

## 2025-02-03 RX ORDER — HYDROCODONE BITARTRATE AND ACETAMINOPHEN 5; 325 MG/1; MG/1
1 TABLET ORAL EVERY 8 HOURS PRN
Status: DISPENSED | OUTPATIENT
Start: 2025-02-03 | End: 2025-02-06

## 2025-02-03 RX ORDER — LEVOTHYROXINE SODIUM 100 UG/1
100 TABLET ORAL
Status: DISCONTINUED | OUTPATIENT
Start: 2025-02-04 | End: 2025-02-08 | Stop reason: HOSPADM

## 2025-02-03 RX ORDER — GABAPENTIN 300 MG/1
300 CAPSULE ORAL 3 TIMES DAILY
Status: DISCONTINUED | OUTPATIENT
Start: 2025-02-03 | End: 2025-02-08 | Stop reason: HOSPADM

## 2025-02-03 RX ORDER — ATORVASTATIN CALCIUM 10 MG/1
10 TABLET, FILM COATED ORAL DAILY
Status: DISCONTINUED | OUTPATIENT
Start: 2025-02-04 | End: 2025-02-08 | Stop reason: HOSPADM

## 2025-02-03 RX ORDER — IBUPROFEN 200 MG
16 TABLET ORAL
Status: DISCONTINUED | OUTPATIENT
Start: 2025-02-03 | End: 2025-02-08 | Stop reason: HOSPADM

## 2025-02-03 RX ORDER — BETAMETHASONE DIPROPIONATE 0.5 MG/G
CREAM TOPICAL DAILY
Status: ON HOLD | COMMUNITY
Start: 2025-01-29 | End: 2025-02-08 | Stop reason: HOSPADM

## 2025-02-03 RX ORDER — GLUCAGON 1 MG
1 KIT INJECTION
Status: DISCONTINUED | OUTPATIENT
Start: 2025-02-03 | End: 2025-02-08 | Stop reason: HOSPADM

## 2025-02-03 RX ORDER — HYDROCODONE BITARTRATE AND ACETAMINOPHEN 5; 325 MG/1; MG/1
1 TABLET ORAL
Status: COMPLETED | OUTPATIENT
Start: 2025-02-03 | End: 2025-02-03

## 2025-02-03 RX ADMIN — PIPERACILLIN SODIUM AND TAZOBACTAM SODIUM 4.5 G: 4; .5 INJECTION, POWDER, LYOPHILIZED, FOR SOLUTION INTRAVENOUS at 08:02

## 2025-02-03 RX ADMIN — HYDROCODONE BITARTRATE AND ACETAMINOPHEN 1 TABLET: 5; 325 TABLET ORAL at 07:02

## 2025-02-03 RX ADMIN — ENOXAPARIN SODIUM 30 MG: 30 INJECTION SUBCUTANEOUS at 10:02

## 2025-02-03 RX ADMIN — Medication 9 MG: at 10:02

## 2025-02-03 RX ADMIN — GABAPENTIN 300 MG: 300 CAPSULE ORAL at 10:02

## 2025-02-03 RX ADMIN — VANCOMYCIN HYDROCHLORIDE 1250 MG: 1.25 INJECTION, POWDER, LYOPHILIZED, FOR SOLUTION INTRAVENOUS at 11:02

## 2025-02-03 NOTE — TELEPHONE ENCOUNTER
SW daughter of pt. Informed her she needs to take mom to ER for wound care and swollen legs. Per Dr Masters

## 2025-02-03 NOTE — ED TRIAGE NOTES
Pt has cellulitis to the right lower leg.  Pt is being treated with abt.  Pt also has skin cancer to affected area.

## 2025-02-03 NOTE — ED PROVIDER NOTES
Encounter Date: 2/3/2025       History     Chief Complaint   Patient presents with    Rash     Pt has cellulitis to the right lower leg.  Pt is being treated with abt.  Pt also has skin cancer to affected area.     84-year-old female presents emergency room with reports of cellulitis to the right lower leg.  Patient states she has been on Keflex in addition to clindamycin for the past few weeks with minimal improvement in her condition.  The initial cellulitis resulted from a biopsy (identified squamous cell carcinoma) that was taken to the posterior aspect of the patient's right lower leg by dermatology.  She reports she was seen at [facility] in which they added the clindamycin to the initial Keflex prescription.  Patient states she is having significant pain to the right lower leg in addition to no improvement in the area of cellulitis.  She denies fever.  She has past medical history of anxiety, breast cancer, CKD, hypertension, hypothyroidism, osteopenia, skin cancer, ovarian cyst, pancreatic cyst, pulmonary nodules, renal mass, vitamin-D deficiency.    The history is provided by the patient and a relative. No  was used.     Review of patient's allergies indicates:   Allergen Reactions    Lisinopril      hyperkalemia     Past Medical History:   Diagnosis Date    Anxiety     Bilateral leg edema     Breast cancer     Cellulitis of left leg     CKD (chronic kidney disease), stage III     Hypertension     Hypothyroidism     Iron deficiency anemia     Osteopenia     Ovarian cyst     Teratoma and Benign Serous Cystadenoma    Pancreatic cyst     Pulmonary nodules     Renal mass     Vitamin D deficiency      Past Surgical History:   Procedure Laterality Date    BLOCK, NERVE, GENICULAR Bilateral 1/16/2025    Procedure: B/L GNB;  Surgeon: Rasta Lambert MD;  Location: [facility];  Service: Pain Management;  Laterality: Bilateral;  no sed-no ac    BREAST SURGERY      debridement       Left leg     Removal of Ovaries      SKIN BIOPSY      1/5/24     Family History   Problem Relation Name Age of Onset    Hypertension Mother Dianne     Diabetes Father Max     Hypertension Father George     Lung cancer Sister Ana     Heart disease Brother Nick     Cholelithiasis Brother Nick     Colon cancer Brother Nick     Heart disease Brother Kelby     COPD Brother Kelby     Colon cancer Brother Kelby     Diabetes type I Other grandson      Social History     Tobacco Use    Smoking status: Never    Smokeless tobacco: Never   Substance Use Topics    Alcohol use: No    Drug use: Never     Review of Systems   Skin:  Positive for wound.   All other systems reviewed and are negative.      Physical Exam     Initial Vitals [02/03/25 1456]   BP Pulse Resp Temp SpO2   (!) 152/67 79 16 97.9 °F (36.6 °C) 96 %      MAP       --         Physical Exam    Constitutional: She appears well-developed and well-nourished.   HENT:   Head: Normocephalic.   Right Ear: Hearing normal.   Left Ear: Hearing normal.   Nose: Nose normal. Mouth/Throat: Oropharynx is clear and moist.   Eyes: Lids are normal.   Neck:   Normal range of motion.  Cardiovascular:  Normal rate.           Pulmonary/Chest: Breath sounds normal. No respiratory distress. She has no wheezes. She has no rhonchi.   Abdominal: Abdomen is soft. There is no abdominal tenderness.   Musculoskeletal:         General: Normal range of motion.      Cervical back: Normal range of motion. No rigidity.     Neurological: She is alert and oriented to person, place, and time.   Skin: Skin is warm and dry. No rash noted.        Erythema and skin breakdown noted to the right lower leg with weeping wounds noted. Serous colored fluid. See images   Psychiatric: She has a normal mood and affect. Her behavior is normal. Judgment and thought content normal.                       ED Course   Procedures  Labs Reviewed   CBC W/ AUTO DIFFERENTIAL - Abnormal       Result Value    WBC 4.61       RBC 3.18 (*)     Hemoglobin 9.5 (*)     Hematocrit 31.9 (*)      (*)     MCH 29.9      MCHC 29.8 (*)     RDW 13.1      Platelets 268      MPV 10.8      Immature Granulocytes 0.4      Gran # (ANC) 3.2      Immature Grans (Abs) 0.02      Lymph # 0.8 (*)     Mono # 0.4      Eos # 0.1      Baso # 0.03      nRBC 0      Gran % 70.3      Lymph % 18.2      Mono % 9.1      Eosinophil % 1.3      Basophil % 0.7      Differential Method Automated     COMPREHENSIVE METABOLIC PANEL - Abnormal    Sodium 144      Potassium 4.2      Chloride 107      CO2 25      Glucose 97      BUN 34 (*)     Creatinine 1.7 (*)     Calcium 9.8      Total Protein 6.7      Albumin 3.3 (*)     Total Bilirubin 0.3      Alkaline Phosphatase 39 (*)     AST 44 (*)     ALT 28      eGFR 30 (*)     Anion Gap 12     C-REACTIVE PROTEIN - Abnormal    CRP 64.4 (*)    CULTURE, BLOOD   CULTURE, BLOOD          Imaging Results              X-Ray Tibia Fibula 2 View Right (Final result)  Result time 02/03/25 19:51:58      Final result by Shane Harry MD (02/03/25 19:51:58)                   Impression:      Subcutaneous edema of the calf on the right suggesting cellulitis.    No underlying bony abnormality as imaged.      Electronically signed by: Shane Harry  Date:    02/03/2025  Time:    19:51               Narrative:    EXAMINATION:  XR TIBIA FIBULA 2 VIEW RIGHT    CLINICAL HISTORY:  Unspecified open wound, right lower leg, initial encounter    TECHNIQUE:  AP and lateral views of the right tibia and fibula were performed.    COMPARISON:  None.    FINDINGS:  Bones joint spaces and soft tissues unremarkable without fracture or dislocation.  Vascular calcifications are noted.  Mild subcutaneous edema.  No subcu air.                                       Medications   piperacillin-tazobactam (ZOSYN) 4.5 g in D5W 100 mL IVPB (MB+) (4.5 g Intravenous New Bag 2/3/25 2002)   vancomycin 1,250 mg in 0.9% NaCl 250 mL IVPB (admixture device) (has no  administration in time range)   HYDROcodone-acetaminophen 5-325 mg per tablet 1 tablet (1 tablet Oral Given 2/3/25 1945)     Medical Decision Making  Differential Diagnosis includes, but is not limited to:  Cellulitis, abscess, necrotizing fasciitis, osteomyelitis, septic joint, MRSA, DVT, drug eruption, allergic/contact dermatitis, EM/SJS, viral exanthem, local trauma/contusion     Amount and/or Complexity of Data Reviewed  Labs: ordered. Decision-making details documented in ED Course.  Radiology: ordered.    Risk  Prescription drug management.  Decision regarding hospitalization.               ED Course as of 02/03/25 2007 Mon Feb 03, 2025   1753 CBC auto differential(!) [DT]   1830 C-reactive protein(!) [DT]   1831 Comprehensive metabolic panel(!) [DT]   1936 Component 3 wk ago  Final Diagnosis   1. Skin, left cheek, biopsy:               -            Squamous cell carcinoma, early invasive.     Note: The tumor involves the deep and peripheral margins.     2.  Skin, right posterior calf, biopsy:               -            Squamous cell carcinoma, early invasive.     Note: The tumor involves the peripheral margin.     [DT]   2003 Spoke with [facility] concerning the admission. Accepts admission. Reviewed labs and xrays with the pt and family. Vanc and Zosyn ordered.  [DT]   2006 FINDINGS:  Bones joint spaces and soft tissues unremarkable without fracture or dislocation.  Vascular calcifications are noted.  Mild subcutaneous edema.  No subcu air.     Impression:     Subcutaneous edema of the calf on the right suggesting cellulitis.     No underlying bony abnormality as imaged.      [DT]   2006 Independent interpretation- no acute fractures noted. Edema noted.  [DT]      ED Course User Index  [DT] Stefani Rubalcava, NP                             Clinical Impression:  Final diagnoses:  [S81.801A] Wound of right leg  [L03.90] Cellulitis, unspecified cellulitis site (Primary)          ED Disposition Condition     Observation Stable                Stefani Rubalcava, [facility]  02/03/25 2007

## 2025-02-04 LAB
ALBUMIN SERPL BCP-MCNC: 2.8 G/DL (ref 3.5–5.2)
ALP SERPL-CCNC: 35 U/L (ref 40–150)
ALT SERPL W/O P-5'-P-CCNC: 24 U/L (ref 10–44)
ANION GAP SERPL CALC-SCNC: 12 MMOL/L (ref 8–16)
AST SERPL-CCNC: 38 U/L (ref 10–40)
BASOPHILS # BLD AUTO: 0.03 K/UL (ref 0–0.2)
BASOPHILS NFR BLD: 0.7 % (ref 0–1.9)
BILIRUB SERPL-MCNC: 0.3 MG/DL (ref 0.1–1)
BUN SERPL-MCNC: 37 MG/DL (ref 8–23)
CALCIUM SERPL-MCNC: 9.3 MG/DL (ref 8.7–10.5)
CHLORIDE SERPL-SCNC: 111 MMOL/L (ref 95–110)
CHOLEST SERPL-MCNC: 122 MG/DL (ref 120–199)
CHOLEST/HDLC SERPL: 3.2 {RATIO} (ref 2–5)
CO2 SERPL-SCNC: 20 MMOL/L (ref 23–29)
CREAT SERPL-MCNC: 1.7 MG/DL (ref 0.5–1.4)
CREAT UR-MCNC: 113.6 MG/DL (ref 15–325)
DIFFERENTIAL METHOD BLD: ABNORMAL
EOSINOPHIL # BLD AUTO: 0 K/UL (ref 0–0.5)
EOSINOPHIL NFR BLD: 0.2 % (ref 0–8)
ERYTHROCYTE [DISTWIDTH] IN BLOOD BY AUTOMATED COUNT: 13 % (ref 11.5–14.5)
EST. GFR  (NO RACE VARIABLE): 30 ML/MIN/1.73 M^2
ESTIMATED AVG GLUCOSE: 88 MG/DL (ref 68–131)
FERRITIN SERPL-MCNC: 796 NG/ML (ref 20–300)
FOLATE SERPL-MCNC: 10.1 NG/ML (ref 4–24)
GLUCOSE SERPL-MCNC: 140 MG/DL (ref 70–110)
HBA1C MFR BLD: 4.7 % (ref 4–5.6)
HCT VFR BLD AUTO: 29.1 % (ref 37–48.5)
HDLC SERPL-MCNC: 38 MG/DL (ref 40–75)
HDLC SERPL: 31.1 % (ref 20–50)
HGB BLD-MCNC: 8.8 G/DL (ref 12–16)
IMM GRANULOCYTES # BLD AUTO: 0.02 K/UL (ref 0–0.04)
IMM GRANULOCYTES NFR BLD AUTO: 0.4 % (ref 0–0.5)
IRON SERPL-MCNC: 33 UG/DL (ref 30–160)
LDLC SERPL CALC-MCNC: 67.2 MG/DL (ref 63–159)
LYMPHOCYTES # BLD AUTO: 0.8 K/UL (ref 1–4.8)
LYMPHOCYTES NFR BLD: 17.7 % (ref 18–48)
MAGNESIUM SERPL-MCNC: 2.1 MG/DL (ref 1.6–2.6)
MCH RBC QN AUTO: 30.4 PG (ref 27–31)
MCHC RBC AUTO-ENTMCNC: 30.2 G/DL (ref 32–36)
MCV RBC AUTO: 101 FL (ref 82–98)
MONOCYTES # BLD AUTO: 0.3 K/UL (ref 0.3–1)
MONOCYTES NFR BLD: 6.2 % (ref 4–15)
NEUTROPHILS # BLD AUTO: 3.4 K/UL (ref 1.8–7.7)
NEUTROPHILS NFR BLD: 74.8 % (ref 38–73)
NONHDLC SERPL-MCNC: 84 MG/DL
NRBC BLD-RTO: 0 /100 WBC
PHOSPHATE SERPL-MCNC: 3.1 MG/DL (ref 2.7–4.5)
PLATELET # BLD AUTO: 247 K/UL (ref 150–450)
PMV BLD AUTO: 11.1 FL (ref 9.2–12.9)
POTASSIUM SERPL-SCNC: 4.6 MMOL/L (ref 3.5–5.1)
PROT SERPL-MCNC: 5.7 G/DL (ref 6–8.4)
RBC # BLD AUTO: 2.89 M/UL (ref 4–5.4)
SATURATED IRON: 9 % (ref 20–50)
SODIUM SERPL-SCNC: 143 MMOL/L (ref 136–145)
SODIUM UR-SCNC: 85 MMOL/L (ref 20–250)
TOTAL IRON BINDING CAPACITY: 388 UG/DL (ref 250–450)
TRANSFERRIN SERPL-MCNC: 262 MG/DL (ref 200–375)
TRIGL SERPL-MCNC: 84 MG/DL (ref 30–150)
VANCOMYCIN SERPL-MCNC: 11.7 UG/ML
VIT B12 SERPL-MCNC: 345 PG/ML (ref 210–950)
WBC # BLD AUTO: 4.51 K/UL (ref 3.9–12.7)

## 2025-02-04 PROCEDURE — 82570 ASSAY OF URINE CREATININE: CPT

## 2025-02-04 PROCEDURE — 82607 VITAMIN B-12: CPT

## 2025-02-04 PROCEDURE — 63600175 PHARM REV CODE 636 W HCPCS

## 2025-02-04 PROCEDURE — 36415 COLL VENOUS BLD VENIPUNCTURE: CPT

## 2025-02-04 PROCEDURE — 84100 ASSAY OF PHOSPHORUS: CPT

## 2025-02-04 PROCEDURE — 84466 ASSAY OF TRANSFERRIN: CPT

## 2025-02-04 PROCEDURE — 83036 HEMOGLOBIN GLYCOSYLATED A1C: CPT

## 2025-02-04 PROCEDURE — 85025 COMPLETE CBC W/AUTO DIFF WBC: CPT

## 2025-02-04 PROCEDURE — 25000003 PHARM REV CODE 250

## 2025-02-04 PROCEDURE — 80061 LIPID PANEL: CPT

## 2025-02-04 PROCEDURE — 83735 ASSAY OF MAGNESIUM: CPT

## 2025-02-04 PROCEDURE — 82746 ASSAY OF FOLIC ACID SERUM: CPT

## 2025-02-04 PROCEDURE — 80053 COMPREHEN METABOLIC PANEL: CPT

## 2025-02-04 PROCEDURE — 11000001 HC ACUTE MED/SURG PRIVATE ROOM

## 2025-02-04 PROCEDURE — 36415 COLL VENOUS BLD VENIPUNCTURE: CPT | Performed by: INTERNAL MEDICINE

## 2025-02-04 PROCEDURE — 80202 ASSAY OF VANCOMYCIN: CPT | Performed by: INTERNAL MEDICINE

## 2025-02-04 PROCEDURE — 84300 ASSAY OF URINE SODIUM: CPT

## 2025-02-04 PROCEDURE — 82728 ASSAY OF FERRITIN: CPT

## 2025-02-04 RX ADMIN — ESCITALOPRAM OXALATE 5 MG: 5 TABLET, FILM COATED ORAL at 09:02

## 2025-02-04 RX ADMIN — LEVOTHYROXINE SODIUM 100 MCG: 100 TABLET ORAL at 06:02

## 2025-02-04 RX ADMIN — METOPROLOL SUCCINATE 50 MG: 50 TABLET, EXTENDED RELEASE ORAL at 08:02

## 2025-02-04 RX ADMIN — HYDROCODONE BITARTRATE AND ACETAMINOPHEN 1 TABLET: 5; 325 TABLET ORAL at 06:02

## 2025-02-04 RX ADMIN — HYDROCODONE BITARTRATE AND ACETAMINOPHEN 1 TABLET: 5; 325 TABLET ORAL at 02:02

## 2025-02-04 RX ADMIN — GABAPENTIN 300 MG: 300 CAPSULE ORAL at 02:02

## 2025-02-04 RX ADMIN — GABAPENTIN 300 MG: 300 CAPSULE ORAL at 08:02

## 2025-02-04 RX ADMIN — Medication 9 MG: at 09:02

## 2025-02-04 RX ADMIN — GABAPENTIN 300 MG: 300 CAPSULE ORAL at 09:02

## 2025-02-04 RX ADMIN — SODIUM CHLORIDE, POTASSIUM CHLORIDE, SODIUM LACTATE AND CALCIUM CHLORIDE 1000 ML: 600; 310; 30; 20 INJECTION, SOLUTION INTRAVENOUS at 09:02

## 2025-02-04 RX ADMIN — PIPERACILLIN SODIUM AND TAZOBACTAM SODIUM 4.5 G: 4; .5 INJECTION, POWDER, LYOPHILIZED, FOR SOLUTION INTRAVENOUS at 09:02

## 2025-02-04 RX ADMIN — ATORVASTATIN CALCIUM 10 MG: 10 TABLET, FILM COATED ORAL at 08:02

## 2025-02-04 RX ADMIN — ENOXAPARIN SODIUM 30 MG: 30 INJECTION SUBCUTANEOUS at 05:02

## 2025-02-04 RX ADMIN — HYDROCODONE BITARTRATE AND ACETAMINOPHEN 1 TABLET: 5; 325 TABLET ORAL at 09:02

## 2025-02-04 NOTE — NURSING
VIRTUAL NURSE: Pt arrived to unit. Permission received to turn camera to view patient. VIP model explained; Patient informed this VN will be working with bedside nurse and the rest of the care team.      Admission questions completed.  Plan of care reviewed with patient.  Educated patient on VTE and fall risk. Safety precautions in place. Call light within reach, side rails up x2.     Patient instucted to ask staff for assistance. Patient verbalized complete understanding.  Will continue to be available and intervene as needed.    Labs, notes, orders, and careplan reviewed.    02/03/25 2145   Admission   Initial VN Admission Questions Complete   Shift   Virtual Nurse - Rounding Complete   Virtual Nurse - Patient Verbalized Approval Of Camera Use;VN Rounding   Type of Frequent Check   Type Patient Rounds   Safety/Activity   Patient Rounds bed in low position;bed wheels locked;call light in patient/parent reach;ID band on;visualized patient   Safety Promotion/Fall Prevention assistive device/personal item within reach   Safety Precautions emergency equipment at bedside   Activity Assistance Provided assistance, 1 person   Positioning   Body Position position changed independently   Head of Bed (HOB) Positioning HOB elevated

## 2025-02-04 NOTE — HPI
Ms. Dickson is a pleasant 85 yo female with PMH of HTN, HLD, SCC of face and legs, breast CA s/p surgery/chemo and radx, hypothyroidism, CKD3, and ZEKE sent from her dermatology clinic for worsening RLE cellulitis after PO Abx. General Surgery, wound care, and ID consulted for recommendations. ID recommending Vancomycin with transition to PO regimen once her exam improves. She underwent arterial and venous US today, both of which were largely unremarkable.

## 2025-02-04 NOTE — HPI
83 yo female with PMH of HTN, HLD, SCC of face and legs, breast CA s/p surgery/chemo and radx, hypothyroidism, CKD3, and ZEKE sent from clinic for worsening RLE cellulitis after PO Abx. The patient was in their usual state of health until she she got SCC bx on her BLE. Denies any chills, fever, nausea/vomiting, urinary or bowel changes, CP, SOB. Endorses worsening swelling of BLE and stopping her diuretics because of incr BUN. On 1/25 had wound culture of right anterior thigh showed pansens S. Aureus and patient was placed on Keflex by dermatology. Was also briefly on cipro but could not tolerate it due to diarrhea. Currently on zosyn and vanco.

## 2025-02-04 NOTE — PLAN OF CARE
Problem: Adult Inpatient Plan of Care  Goal: Plan of Care Review  Outcome: Progressing  Goal: Patient-Specific Goal (Individualized)  Outcome: Progressing  Goal: Absence of Hospital-Acquired Illness or Injury  Outcome: Progressing  Goal: Optimal Comfort and Wellbeing  Outcome: Progressing  Goal: Readiness for Transition of Care  Outcome: Progressing     Problem: Skin or Soft Tissue Infection  Goal: Absence of Infection Signs and Symptoms  Outcome: Progressing     Problem: Fall Injury Risk  Goal: Absence of Fall and Fall-Related Injury  Outcome: Progressing     Problem: Wound  Goal: Optimal Coping  Outcome: Progressing  Goal: Optimal Functional Ability  Outcome: Progressing  Goal: Absence of Infection Signs and Symptoms  Outcome: Progressing  Goal: Improved Oral Intake  Outcome: Progressing  Goal: Optimal Pain Control and Function  Outcome: Progressing  Goal: Skin Health and Integrity  Outcome: Progressing  Goal: Optimal Wound Healing  Outcome: Progressing     Problem: Comorbidity Management  Goal: Blood Pressure in Desired Range  Outcome: Progressing     Problem: Chronic Kidney Disease  Goal: Optimal Coping with Chronic Illness  Outcome: Progressing  Goal: Electrolyte Balance  Outcome: Progressing  Goal: Fluid Balance  Outcome: Progressing  Goal: Optimal Functional Ability  Outcome: Progressing  Goal: Absence of Anemia Signs and Symptoms  Outcome: Progressing  Goal: Optimal Oral Intake  Outcome: Progressing  Goal: Acceptable Pain Control  Outcome: Progressing  Goal: Minimize Renal Failure Effects  Outcome: Progressing     Problem: Skin Injury Risk Increased  Goal: Skin Health and Integrity  Outcome: Progressing

## 2025-02-04 NOTE — ASSESSMENT & PLAN NOTE
84 yoF with cellulitis of the bilateral legs. No concern for deeper infection or drainable collections based on exam and imaging. No surgical intervention indicated.    - Recommend antibiotic regimen per ID  - Wound Care team consult for topical wound cares  - Follow up with dermatology at  for ongoing treatment of her SCC  - Please call with any questions or change in status

## 2025-02-04 NOTE — CONSULTS
[facility] Surg  General Surgery  Consult Note    Patient Name: Elizabeth Dickson  MRN: [id number]  Code Status: Full Code  Admission Date: 2/3/2025  Hospital Length of Stay: 0 days  Attending Physician: Rocky Perez MD  Primary Care Provider: Porsha Masters MD    Patient information was obtained from patient, relative(s), past medical records, and ER records.     Inpatient consult to General Surgery  Consult performed by: Joshua Chavira MD  Consult ordered by: Laura Cristobal MD        Subjective:     Principal Problem: Cellulitis    History of Present Illness: Ms. Dickson is a pleasant 85 yo female with PMH of HTN, HLD, SCC of face and legs, breast CA s/p surgery/chemo and radx, hypothyroidism, CKD3, and ZEKE sent from her dermatology clinic for worsening RLE cellulitis after PO Abx. General Surgery, wound care, and ID consulted for recommendations. ID recommending Vancomycin with transition to PO regimen once her exam improves. She underwent arterial and venous US today, both of which were largely unremarkable.    No current facility-administered medications on file prior to encounter.     Current Outpatient Medications on File Prior to Encounter   Medication Sig    ascorbic acid, vitamin C, (VITAMIN C) 1000 MG tablet Take 1,000 mg by mouth once daily.    atorvastatin (LIPITOR) 10 MG tablet TAKE 1 TABLET BY MOUTH EVERY DAY (Patient taking differently: Take 10 mg by mouth once daily.)    betamethasone dipropionate 0.05 % cream Apply topically once daily.    bexarotene 75 mg Cap Take 6 capsules by mouth Daily.    calcium carbonate 1250 MG capsule Take 1,250 mg by mouth once daily.    cholecalciferol, vitamin D3, (VITAMIN D3) 25 mcg (1,000 unit) capsule Take 1,000 Units by mouth once daily.    EScitalopram oxalate (LEXAPRO) 5 MG Tab TAKE 1 TABLET BY MOUTH EVERY DAY (Patient taking differently: Take 5 mg by mouth nightly.)    fenofibrate micronized (LOFIBRA) 200 MG Cap Take 200 mg by mouth daily  with breakfast.    ferrous sulfate (FEOSOL) 325 mg (65 mg iron) Tab tablet Take 325 mg by mouth once daily.    latanoprost 0.005 % ophthalmic solution Place 1 drop into both eyes every evening.    levothyroxine (SYNTHROID) 100 MCG tablet Take 100 mcg by mouth before breakfast.    melatonin 10 mg Tab Take 10 mg by mouth nightly as needed.    metoprolol succinate (TOPROL-XL) 50 MG 24 hr tablet TAKE 1 TABLET BY MOUTH ONCE  DAILY (Patient taking differently: Take 50 mg by mouth once daily.)    omeprazole (PRILOSEC) 20 MG capsule Take 20 mg by mouth once daily.       Review of patient's allergies indicates:   Allergen Reactions    Lisinopril      hyperkalemia       Past Medical History:   Diagnosis Date    Anxiety     Bilateral leg edema     Breast cancer     Cellulitis of left leg     CKD (chronic kidney disease), stage III     Hypertension     Hypothyroidism     Iron deficiency anemia     Osteopenia     Ovarian cyst     Teratoma and Benign Serous Cystadenoma    Pancreatic cyst     Pulmonary nodules     Renal mass     Vitamin D deficiency      Past Surgical History:   Procedure Laterality Date    BLOCK, NERVE, GENICULAR Bilateral 1/16/2025    Procedure: B/L GNB;  Surgeon: Rasta Lambert MD;  Location: [facility];  Service: Pain Management;  Laterality: Bilateral;  no sed-no ac    BREAST SURGERY      debridement      Left leg     Removal of Ovaries      SKIN BIOPSY      1/5/24     Family History       Problem Relation (Age of Onset)    COPD Brother    Cholelithiasis Brother    Colon cancer Brother, Brother    Diabetes Father    Diabetes type I Other    Heart disease Brother, Brother    Hypertension Mother, Father    Lung cancer Sister          Tobacco Use    Smoking status: Never    Smokeless tobacco: Never   Substance and Sexual Activity    Alcohol use: No    Drug use: Never    Sexual activity: Not Currently     Review of Systems   Constitutional:  Negative for chills and fever.   Respiratory:  Negative for  cough and shortness of breath.    Cardiovascular:  Negative for chest pain and palpitations.   Gastrointestinal:  Negative for abdominal pain, nausea and vomiting.   Genitourinary:  Negative for dysuria.   Skin:  Positive for color change and wound.   Neurological:  Negative for dizziness and light-headedness.     Objective:     Vital Signs (Most Recent):  Temp: 97.9 °F (36.6 °C) (02/04/25 1203)  Pulse: 73 (02/04/25 1203)  Resp: 18 (02/04/25 1415)  BP: (!) 131/56 (02/04/25 1203)  SpO2: 97 % (02/04/25 1203) Vital Signs (24h Range):  Temp:  [97.9 °F (36.6 °C)-98 °F (36.7 °C)] 97.9 °F (36.6 °C)  Pulse:  [67-90] 73  Resp:  [16-20] 18  SpO2:  [96 %-100 %] 97 %  BP: (122-189)/(56-75) 131/56     Weight: 68 kg (149 lb 14.6 oz)  Body mass index is 29.28 kg/m².     Physical Exam  Vitals reviewed.   Constitutional:       Appearance: Normal appearance.   Cardiovascular:      Rate and Rhythm: Normal rate.      Pulses: Normal pulses.   Pulmonary:      Effort: Pulmonary effort is normal.   Abdominal:      Palpations: Abdomen is soft.   Skin:     Findings: Erythema present.      Comments: Erythema of the bilateral legs associated with known superficial squamous cell cancer. No underlying fluctuance, bullae, or drain able lesions noted.   Neurological:      General: No focal deficit present.      Mental Status: She is alert and oriented to person, place, and time.            I have reviewed all pertinent lab results within the past 24 hours.  CBC:   Recent Labs   Lab 02/04/25  0246   WBC 4.51   RBC 2.89*   HGB 8.8*   HCT 29.1*      *   MCH 30.4   MCHC 30.2*     CMP:   Recent Labs   Lab 02/04/25  0245   *   CALCIUM 9.3   ALBUMIN 2.8*   PROT 5.7*      K 4.6   CO2 20*   *   BUN 37*   CREATININE 1.7*   ALKPHOS 35*   ALT 24   AST 38   BILITOT 0.3       Significant Diagnostics:  I have reviewed all pertinent imaging results/findings within the past 24 hours.    Assessment/Plan:     * Cellulitis  84 yoF  with cellulitis of the bilateral legs. No concern for deeper infection or drainable collections based on exam and imaging. No surgical intervention indicated.    - Recommend antibiotic regimen per ID  - Wound Care team consult for topical wound cares  - Follow up with dermatology at  for ongoing treatment of her SCC  - Please call with any questions or change in status      VTE Risk Mitigation (From admission, onward)           Ordered     enoxaparin injection 30 mg  Daily         02/03/25 2015     IP VTE HIGH RISK PATIENT  Once         02/03/25 2015     Place sequential compression device  Until discontinued         02/03/25 2015                    Thank you for your consult. I will sign off. Please contact us if you have any additional questions.    Joshua Chavira MD  General Surgery  [facility] Surg

## 2025-02-04 NOTE — H&P
[facility] Medicine H&P Note     Admitting Team: [facility] Hospitalist Team A  Attending Physician: Rocky Perez MD  Resident: Levar  Intern: Mark    Date of Admit: 2/3/2025    Chief Complaint     Cellulitis RLE     Subjective:      History of Present Illness:  85yo female with PMH of HTN, HLD, SCC of face and legs, breast CA s/p surgery/chemo and radx, hypothyroidism, CKD3, ZEKE sent from clinic for worsening RLE cellulitis failing PO Abx.     The patient was in their usual state of health until she started to get SCC bx on her BLE. Denies any chills, fever, nausea/vomiting, urinary or bowel changes, CP, SOB. Endorses worsening swelling of BLE and stopping her diuretics because of incr BUN.     1/8 face and R posterior calf shave bx.    1/25 wound culture of right anterior thigh showed pansens S. Aureus and patient was placed on Keflex by dermatology.   1/29 patient advised by derm over phone to present to ED for worsening RLE wound after associated trauma.   1/29 presented to [facility] clindamycin added to Keflex after ED discussion with dermatology, but patient only completed 3-4 days 2/2 diarrhea, which has since resolved. Advised by dermatologist to only continue keflex, course completed.   2/3 advised to go to ER.     Past Medical History:  Past Medical History:   Diagnosis Date    Anxiety     Bilateral leg edema     Breast cancer     Cellulitis of left leg     CKD (chronic kidney disease), stage III     Hypertension     Hypothyroidism     Iron deficiency anemia     Osteopenia     Ovarian cyst     Teratoma and Benign Serous Cystadenoma    Pancreatic cyst     Pulmonary nodules     Renal mass     Vitamin D deficiency        Past Surgical History:  Past Surgical History:   Procedure Laterality Date    BLOCK, NERVE, GENICULAR Bilateral 1/16/2025    Procedure: B/L GNB;  Surgeon: Rasta Lambert MD;  [facility];  Service: Pain Management;  Laterality: Bilateral;  no sed-no ac    BREAST SURGERY       debridement      Left leg     Removal of Ovaries      SKIN BIOPSY      1/5/24       Allergies:  Review of patient's allergies indicates:   Allergen Reactions    Lisinopril      hyperkalemia       Home Medications:  Prior to Admission medications    Medication Sig Start Date End Date Taking? Authorizing Provider   ascorbic acid, vitamin C, (VITAMIN C) 1000 MG tablet Take 1,000 mg by mouth once daily.   Yes Provider, Historical   atorvastatin (LIPITOR) 10 MG tablet TAKE 1 TABLET BY MOUTH EVERY DAY  Patient taking differently: Take 10 mg by mouth once daily. 9/28/24  Yes Porsha Masters MD   betamethasone dipropionate 0.05 % cream Apply topically once daily. 1/29/25  Yes Provider, Historical   bexarotene 75 mg Cap Take 6 capsules by mouth Daily.   Yes Provider, Historical   calcium carbonate 1250 MG capsule Take 1,250 mg by mouth once daily.   Yes Provider, Historical   cholecalciferol, vitamin D3, (VITAMIN D3) 25 mcg (1,000 unit) capsule Take 1,000 Units by mouth once daily.   Yes Provider, Historical   EScitalopram oxalate (LEXAPRO) 5 MG Tab TAKE 1 TABLET BY MOUTH EVERY DAY  Patient taking differently: Take 5 mg by mouth nightly. 5/7/24  Yes Porsha Masters MD   fenofibrate micronized (LOFIBRA) 200 MG Cap Take 200 mg by mouth daily with breakfast. 10/15/23  Yes Provider, Historical   ferrous sulfate (FEOSOL) 325 mg (65 mg iron) Tab tablet Take 325 mg by mouth once daily.   Yes Provider, Historical   latanoprost 0.005 % ophthalmic solution Place 1 drop into both eyes every evening. 1/18/22  Yes Provider, Historical   levothyroxine (SYNTHROID) 100 MCG tablet Take 100 mcg by mouth before breakfast. 10/29/23  Yes Provider, Historical   melatonin 10 mg Tab Take 10 mg by mouth nightly as needed.   Yes Provider, Historical   metoprolol succinate (TOPROL-XL) 50 MG 24 hr tablet TAKE 1 TABLET BY MOUTH ONCE  DAILY  Patient taking differently: Take 50 mg by mouth once daily. 9/29/24  Yes Porsha Masters MD    omeprazole (PRILOSEC) 20 MG capsule Take 20 mg by mouth once daily.   Yes Provider, Historical   augmented betamethasone dipropionate (DIPROLENE-AF) 0.05 % ointment 1 application  2 (two) times daily.  2/3/25  Provider, Historical   betamethasone valerate 0.1% (VALISONE) 0.1 % Crea   2/3/25  Provider, Historical   erythromycin (ROMYCIN) ophthalmic ointment APPLY TO ALL STITCHES AND OPERATED EYES TWICE DAILY FOR 2 WEEKS  2/3/25  Provider, Historical   losartan (COZAAR) 25 MG tablet Take 1 tablet (25 mg total) by mouth once daily. 12/26/24 2/3/25  Porsha Masters MD   methocarbamoL (ROBAXIN) 500 MG Tab TAKE 1 TABLET BY MOUTH TWICE A DAY AS NEEDED FOR BACK PAIN 1/30/25 2/3/25  Porsha Masters MD   predniSONE (DELTASONE) 10 MG tablet Take 5 mg by mouth once daily. 1/17/24 2/3/25  Provider, Historical   VALTREX 1 gram tablet Take 1,000 mg by mouth. 1/17/24 2/3/25  Provider, Historical       Family History:  Family History   Problem Relation Name Age of Onset    Hypertension Mother Dianne     Diabetes Father Max     Hypertension Father Max     Lung cancer Sister Ana     Heart disease Brother Nick     Cholelithiasis Brother Nick     Colon cancer Brother Nick     Heart disease Brother Lama     COPD Brother Lama     Colon cancer Brother Lama     Diabetes type I Other grandson        Social History:  Social History     Tobacco Use    Smoking status: Never    Smokeless tobacco: Never   Substance Use Topics    Alcohol use: No    Drug use: Never       Review of Systems:  Pertinent items are noted in HPI. All other systems are reviewed and are negative.    Health Maintaince :   Primary Care Physician: Giambrone    Immunizations:   TDap     Flu UTD per pt   Pna UTD    Cancer Screening:  PAP: NA  MMG: NA  Colonoscopy: NA     Objective:   Last 24 Hour Vital Signs:  BP  Min: 152/67  Max: 189/74  Temp  Av °F (36.7 °C)  Min: 97.9 °F (36.6 °C)  Max: 98 °F (36.7 °C)  Pulse  Av.3  Min: 79  Max:  "85  Resp  Av.5  Min: 16  Max: 18  SpO2  Av.3 %  Min: 96 %  Max: 99 %  Height  Av' 0.5" (153.7 cm)  Min: 5' (152.4 cm)  Max: 5' 1" (154.9 cm)  Weight  Av.4 kg (153 lb)  Min: 69.4 kg (153 lb)  Max: 69.4 kg (153 lb)  Body mass index is 29.88 kg/m².  No intake/output data recorded.    Physical Examination:   Physical Exam  HENT:      Head: Normocephalic and atraumatic.      Nose: Nose normal.      Mouth/Throat:      Pharynx: Oropharynx is clear.   Eyes:      Extraocular Movements: Extraocular movements intact.   Cardiovascular:      Rate and Rhythm: Normal rate and regular rhythm.      Heart sounds: Normal heart sounds.   Pulmonary:      Effort: Pulmonary effort is normal.      Breath sounds: Normal breath sounds.   Abdominal:      General: There is no distension.      Palpations: Abdomen is soft.      Tenderness: There is no abdominal tenderness.   Musculoskeletal:      Cervical back: Normal range of motion and neck supple.      Right lower leg: Edema present.      Left lower leg: Edema present.      Comments: Dopplerable 2+ DP BLE; BLE pitting edema to knees  RLE shin with circumferential erythema, warm and tender to touch with drainage and strong odor.   LLE left lateral shin area of discoloration and LLE knee area of discoloration, per pt both chronic and 2/2 SCC.    Gross ROM intact, sensation intact.    Skin:     General: Skin is warm.      Capillary Refill: Capillary refill takes 2 to 3 seconds.   Neurological:      Mental Status: She is alert.   Psychiatric:         Mood and Affect: Mood normal.         Behavior: Behavior normal.                                   Laboratory:  Most Recent Data:  CBC:   Lab Results   Component Value Date    WBC 4.61 2025    HGB 9.5 (L) 2025    HCT 31.9 (L) 2025     2025     (H) 2025    RDW 13.1 2025       BMP:   Lab Results   Component Value Date     2025    K 4.2 2025     2025    " "CO2 25 02/03/2025    BUN 34 (H) 02/03/2025    CREATININE 1.7 (H) 02/03/2025    GLU 97 02/03/2025    CALCIUM 9.8 02/03/2025    MG 2.0 02/15/2023    PHOS 3.1 02/15/2023     LFTs:   Lab Results   Component Value Date    PROT 6.7 02/03/2025    ALBUMIN 3.3 (L) 02/03/2025    BILITOT 0.3 02/03/2025    AST 44 (H) 02/03/2025    ALKPHOS 39 (L) 02/03/2025    ALT 28 02/03/2025     Coags: No results found for: "INR", "PROTIME", "PTT"  FLP:   Lab Results   Component Value Date    CHOL 176 11/25/2024    HDL 49 11/25/2024    LDLCALC 90 11/25/2024    TRIG 184 (H) 11/25/2024    CHOLHDL 3.59 11/25/2024     DM:   Lab Results   Component Value Date    LDLCALC 90 11/25/2024    CREATININE 1.7 (H) 02/03/2025     Thyroid:   Lab Results   Component Value Date    TSH 0.19 (L) 06/09/2023    FREET4 1.20 02/06/2020     Anemia:   Lab Results   Component Value Date    IRON 58 03/17/2020    TIBC 280 03/17/2020    FERRITIN 177.0 (H) 03/17/2020     Cardiac: No results found for: "TROPONINI", "CKTOTAL", "CKMB", "BNP"  Urinalysis:   Lab Results   Component Value Date    LABURIN SEE NOTE 03/11/2019    COLORU YELLOW 09/23/2022    SPECGRAV 1.022 09/23/2022    NITRITE NEGATIVE 09/23/2022    GLUCOSEU NEGATIVE 09/23/2022    KETONESU NEGATIVE 09/23/2022    BILIRUBINUR NEGATIVE 09/23/2022    WBCUA None Seen 05/07/2018       Trended Lab Data:  Recent Labs   Lab 02/03/25  1637   WBC 4.61   HGB 9.5*   HCT 31.9*      *   RDW 13.1      K 4.2      CO2 25   BUN 34*   CREATININE 1.7*   GLU 97   PROT 6.7   ALBUMIN 3.3*   BILITOT 0.3   AST 44*   ALKPHOS 39*   ALT 28       Trended Cardiac Data:  No results for input(s): "TROPONINI", "CKTOTAL", "CKMB", "BNP" in the last 168 hours.    Microbiology Data:  2/3 BCX pend     Other Results:  EKG (my interpretation): .    Radiology:  Imaging Results              X-Ray Tibia Fibula 2 View Right (Final result)  Result time 02/03/25 19:51:58      Final result by Shane Harry MD (02/03/25 19:51:58) "                   Impression:      Subcutaneous edema of the calf on the right suggesting cellulitis.    No underlying bony abnormality as imaged.      Electronically signed by: Shane Harry  Date:    02/03/2025  Time:    19:51               Narrative:    EXAMINATION:  XR TIBIA FIBULA 2 VIEW RIGHT    CLINICAL HISTORY:  Unspecified open wound, right lower leg, initial encounter    TECHNIQUE:  AP and lateral views of the right tibia and fibula were performed.    COMPARISON:  None.    FINDINGS:  Bones joint spaces and soft tissues unremarkable without fracture or dislocation.  Vascular calcifications are noted.  Mild subcutaneous edema.  No subcu air.                                       Assessment:     Elizabeth Dickson is a 84 y.o. 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    Atherosclerosis of aorta 02/01/2023    Other specified spondylopathies, 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/01/2022    Acute pain of left knee 02/01/2022    Umbilical bleeding 02/01/2022    Bilateral leg edema 07/01/2021    Anxiety 01/14/2021    Depression 01/14/2021    Acute cystitis without hematuria 04/30/2020    Hyperkalemia 04/30/2020    Dermatitis 04/30/2020    Chronic right-sided low back pain without sciatica 02/06/2020    Hyperlipidemia 02/06/2020    Hypertension     Stage 3a chronic kidney disease     Hypothyroidism     Pancreatic cyst     Right renal mass 05/26/2018    Microalbuminuria 03/16/2018    Breast cancer 03/16/2018    Pulmonary nodules 03/16/2018    Osteopenia 03/16/2018    Hypothyroid 03/16/2018    Acidosis 03/16/2018    Iron deficiency anemia 01/09/2018        Plan:     RLE cellulitis 2/2 SCC Bx,  failed PO abx   Hx of SCC of legs and face  BLE swelling  - afebrile, RLE shin cellulitis after lesion bx with dermatology at [facility] dermatology, failed keflex, transitioned to cipro but did not tolerate 2/2 diarrhea, finished keflex course  - XR in ED showing swelling subq edema but no free air   - no leukocytosis, CRP 64, ESR pend   - Bcx ordered  - home creams not available   Plan:  - DVT US BLE, arterial RLE US   - continue vanc/zosyn  - consult podiatry in AM  - continue broad spectrum abx   - fidencio TID, jonah prn for pain    Macrocytic anemia   - H/H on adm 9.5/32, MCV 10, baseline old from 1 years ago 13  - on daily iron PO  - B12, folate, iron studies ordered    CKD  - Cr on adm 1.7, BUN 34; baseline 1.1-1.8 but more likely 1.8, recent bump per pt 2/2 diuretics per pt   - continue to monitor, consider IVF if needed but no TTE available     Hyperlipidemia  HTN  - lipid panel, A1c ordered  - continue home atorvastatin, fenofibrate   - continue home metoprolol, unclear if for BP    Depression?  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

## 2025-02-04 NOTE — CONSULTS
[facility] Surg  Wound Care    Patient Name:  Elizabeth Dickson   MRN:  [id number]  Date: 2/4/2025  Diagnosis: Cellulitis    History:     Past Medical History:   Diagnosis Date    Anxiety     Bilateral leg edema     Breast cancer     Cellulitis of left leg     CKD (chronic kidney disease), stage III     Hypertension     Hypothyroidism     Iron deficiency anemia     Osteopenia     Ovarian cyst     Teratoma and Benign Serous Cystadenoma    Pancreatic cyst     Pulmonary nodules     Renal mass     Vitamin D deficiency        Social History     Socioeconomic History    Marital status:    Tobacco Use    Smoking status: Never    Smokeless tobacco: Never   Substance and Sexual Activity    Alcohol use: No    Drug use: Never    Sexual activity: Not Currently     Social Drivers of Health     Financial Resource Strain: Low Risk  (2/3/2025)    Overall Financial Resource Strain (CARDIA)     Difficulty of Paying Living Expenses: Not hard at all   Food Insecurity: No Food Insecurity (2/3/2025)    Hunger Vital Sign     Worried About Running Out of Food in the Last Year: Never true     Ran Out of Food in the Last Year: Never true   Transportation Needs: No Transportation Needs (2/3/2025)    TRANSPORTATION NEEDS     Transportation : No   Physical Activity: Inactive (2/3/2025)    Exercise Vital Sign     Days of Exercise per Week: 0 days     Minutes of Exercise per Session: 0 min   Stress: No Stress Concern Present (2/3/2025)    [employer] - Occupational Stress Questionnaire     Feeling of Stress : Not at all   Recent Concern: Stress - Stress Concern Present (2/3/2025)    [employer] - Occupational Stress Questionnaire     Feeling of Stress : Rather much   Housing Stability: Low Risk  (2/3/2025)    Housing Stability Vital Sign     Unable to Pay for Housing in the Last Year: No     Homeless in the Last Year: No       Precautions:     Allergies as of 02/03/2025 - Reviewed  02/03/2025   Allergen Reaction Noted    Lisinopril  04/30/2020       [facility] Assessment Details/Treatment     Pt with history of SCC- biopsied and cared for by dermatology     L calf- no open wound      L lateral lower leg- dry flaky skin- no drainage- less edema/erythema        R calf- dried scattered ulcers and dry skin- no drainage- less edema/erythema      R lower leg- scattered dry flaky skin and darkened discolored areas      Recommendations discussed with pt, nurse and Dr Perez team- routine skin care and moisturizer- explained that there is no drainage to culture as the healing skin is dried- ok to hold wound culture if/when drainage occurs.    02/04/2025

## 2025-02-04 NOTE — ASSESSMENT & PLAN NOTE
85 yo female with PMH of HTN, HLD, SCC of face and legs, breast CA s/p surgery/chemo and radx, hypothyroidism, CKD3, and ZEKE sent from clinic for worsening RLE cellulitis after PO Abx    -would stop zosyn  -would continue vanco with goal trough of 10-15  -monitor for improvement in exam  -on discharge will switch to po options

## 2025-02-04 NOTE — PLAN OF CARE
Patient is awake and alert. Patient given medications as ordered per MAR. IV antibiotics given as scheduled. PRN Norco given for pain. Ambulated to toilet using cane with 1 person assist. Safety maintained. Instructed to use call light for assistance.        Problem: Skin or Soft Tissue Infection  Goal: Absence of Infection Signs and Symptoms  Outcome: Progressing     Problem: Fall Injury Risk  Goal: Absence of Fall and Fall-Related Injury  Outcome: Progressing     Problem: Wound  Goal: Optimal Pain Control and Function  Outcome: Progressing

## 2025-02-04 NOTE — PHARMACY MED REC
"  [facility]           Pharmacy  Admission Medication History     The home medication history was taken by Katy Fischer.      Medication history obtained from Medications listed below were obtained from: Patient/family.    Based on information gathered for medication list, you may go to "Admission" then "Reconcile Home Medications" tabs to review and/or act upon those items.     The home medication list has been updated by the Pharmacy department.   Please read ALL comments highlighted in yellow.   Please address this information as you see fit.    Feel free to contact us if you have any questions or require assistance.    The medications listed below were removed from the home medication list.  Please reorder if appropriate:    Patient reports NOT TAKING the following medication(s):  Erythromycin opht oint  Losartan 25 mg tab  Robaxin 500 mg tab  Prednisone 10 mg tab  Valtrex 1 gram tab    No current facility-administered medications on file prior to encounter.     Current Outpatient Medications on File Prior to Encounter   Medication Sig Dispense Refill    ascorbic acid, vitamin C, (VITAMIN C) 1000 MG tablet Take 1,000 mg by mouth once daily.      atorvastatin (LIPITOR) 10 MG tablet TAKE 1 TABLET BY MOUTH EVERY DAY (Patient taking differently: Take 10 mg by mouth once daily.) 90 tablet 2    betamethasone dipropionate 0.05 % cream Apply topically once daily.      bexarotene 75 mg Cap Take 6 capsules by mouth Daily.      calcium carbonate 1250 MG capsule Take 1,250 mg by mouth once daily.      cholecalciferol, vitamin D3, (VITAMIN D3) 25 mcg (1,000 unit) capsule Take 1,000 Units by mouth once daily.      EScitalopram oxalate (LEXAPRO) 5 MG Tab TAKE 1 TABLET BY MOUTH EVERY DAY (Patient taking differently: Take 5 mg by mouth nightly.) 90 tablet 3    fenofibrate micronized (LOFIBRA) 200 MG Cap Take 200 mg by mouth daily with breakfast.      ferrous sulfate (FEOSOL) 325 mg (65 mg iron) Tab tablet " Take 325 mg by mouth once daily.      latanoprost 0.005 % ophthalmic solution Place 1 drop into both eyes every evening.      levothyroxine (SYNTHROID) 100 MCG tablet Take 100 mcg by mouth before breakfast.      melatonin 10 mg Tab Take 10 mg by mouth nightly as needed.      metoprolol succinate (TOPROL-XL) 50 MG 24 hr tablet TAKE 1 TABLET BY MOUTH ONCE  DAILY (Patient taking differently: Take 50 mg by mouth once daily.) 90 tablet 3    omeprazole (PRILOSEC) 20 MG capsule Take 20 mg by mouth once daily.      [DISCONTINUED] augmented betamethasone dipropionate (DIPROLENE-AF) 0.05 % ointment 1 application  2 (two) times daily.      [DISCONTINUED] betamethasone valerate 0.1% (VALISONE) 0.1 % Crea          Please address this information as you see fit.  Feel free to contact us if you have any questions or require assistance.    Katy Fischer  [phone]                .

## 2025-02-04 NOTE — SUBJECTIVE & OBJECTIVE
Past Medical History:   Diagnosis Date    Anxiety     Bilateral leg edema     Breast cancer     Cellulitis of left leg     CKD (chronic kidney disease), stage III     Hypertension     Hypothyroidism     Iron deficiency anemia     Osteopenia     Ovarian cyst     Teratoma and Benign Serous Cystadenoma    Pancreatic cyst     Pulmonary nodules     Renal mass     Vitamin D deficiency        Past Surgical History:   Procedure Laterality Date    BLOCK, NERVE, GENICULAR Bilateral 1/16/2025    Procedure: B/L GNB;  Surgeon: Rasta Lambert MD;  Location: [facility];  Service: Pain Management;  Laterality: Bilateral;  no sed-no ac    BREAST SURGERY      debridement      Left leg     Removal of Ovaries      SKIN BIOPSY      1/5/24       Review of patient's allergies indicates:   Allergen Reactions    Lisinopril      hyperkalemia       Medications:  Medications Prior to Admission   Medication Sig    ascorbic acid, vitamin C, (VITAMIN C) 1000 MG tablet Take 1,000 mg by mouth once daily.    atorvastatin (LIPITOR) 10 MG tablet TAKE 1 TABLET BY MOUTH EVERY DAY (Patient taking differently: Take 10 mg by mouth once daily.)    betamethasone dipropionate 0.05 % cream Apply topically once daily.    bexarotene 75 mg Cap Take 6 capsules by mouth Daily.    calcium carbonate 1250 MG capsule Take 1,250 mg by mouth once daily.    cholecalciferol, vitamin D3, (VITAMIN D3) 25 mcg (1,000 unit) capsule Take 1,000 Units by mouth once daily.    EScitalopram oxalate (LEXAPRO) 5 MG Tab TAKE 1 TABLET BY MOUTH EVERY DAY (Patient taking differently: Take 5 mg by mouth nightly.)    fenofibrate micronized (LOFIBRA) 200 MG Cap Take 200 mg by mouth daily with breakfast.    ferrous sulfate (FEOSOL) 325 mg (65 mg iron) Tab tablet Take 325 mg by mouth once daily.    latanoprost 0.005 % ophthalmic solution Place 1 drop into both eyes every evening.    levothyroxine (SYNTHROID) 100 MCG tablet Take 100 mcg by mouth before breakfast.    melatonin 10 mg  "Tab Take 10 mg by mouth nightly as needed.    metoprolol succinate (TOPROL-XL) 50 MG 24 hr tablet TAKE 1 TABLET BY MOUTH ONCE  DAILY (Patient taking differently: Take 50 mg by mouth once daily.)    omeprazole (PRILOSEC) 20 MG capsule Take 20 mg by mouth once daily.     Antibiotics (From admission, onward)      Start     Stop Route Frequency Ordered    02/04/25 0800  piperacillin-tazobactam (ZOSYN) 4.5 g in D5W 100 mL IVPB (MB+)         -- IV Every 12 hours (non-standard times) 02/03/25 2219 02/04/25 0600  vancomycin - pharmacy to dose  (vancomycin IVPB (PEDS and ADULTS))        Placed in "And" Linked Group    -- IV pharmacy to manage frequency 02/03/25 2151          Antifungals (From admission, onward)      None          Antivirals (From admission, onward)      None             Immunization History   Administered Date(s) Administered    COVID-19, MRNA, LN-S, PF (Pfizer) (Purple Cap) 01/26/2021, 02/15/2021, 11/17/2021    Influenza 10/18/2005, 10/11/2013, 09/18/2015, 09/17/2016, 10/01/2018, 10/25/2018, 09/12/2019    Influenza (FLUAD) - Quadrivalent - Adjuvanted - PF *Preferred* (65+) 11/01/2023    Influenza - Quadrivalent - High Dose - PF (65 years and older) 09/17/2022    Influenza - Quadrivalent - PF *Preferred* (6 months and older) 10/11/2013, 12/24/2021    Influenza - Trivalent - Fluzone High Dose - PF (65 years and older) 09/18/2015, 09/17/2016, 10/25/2018, 09/12/2019, 08/18/2020    Influenza Split 10/18/2005, 10/11/2013    Pneumococcal Conjugate - 13 Valent 10/11/2013, 06/23/2016, 06/02/2017, 06/28/2021    Pneumococcal Polysaccharide - 23 Valent 11/01/2010    Tdap 02/02/2022    Zoster 08/18/2020, 11/19/2020    Zoster Recombinant 08/18/2020, 11/19/2020       Family History       Problem Relation (Age of Onset)    COPD Brother    Cholelithiasis Brother    Colon cancer Brother, Brother    Diabetes Father    Diabetes type I Other    Heart disease Brother, Brother    Hypertension Mother, Father    Lung cancer " Sister          Social History     Socioeconomic History    Marital status:    Tobacco Use    Smoking status: Never    Smokeless tobacco: Never   Substance and Sexual Activity    Alcohol use: No    Drug use: Never    Sexual activity: Not Currently     Social Drivers of Health     Financial Resource Strain: Low Risk  (2/3/2025)    Overall Financial Resource Strain (CARDIA)     Difficulty of Paying Living Expenses: Not hard at all   Food Insecurity: No Food Insecurity (2/3/2025)    Hunger Vital Sign     Worried About Running Out of Food in the Last Year: Never true     Ran Out of Food in the Last Year: Never true   Transportation Needs: No Transportation Needs (2/3/2025)    TRANSPORTATION NEEDS     Transportation : No   Physical Activity: Inactive (2/3/2025)    Exercise Vital Sign     Days of Exercise per Week: 0 days     Minutes of Exercise per Session: 0 min   Stress: No Stress Concern Present (2/3/2025)    [employer] [place] [employer] - Occupational Stress Questionnaire     Feeling of Stress : Not at all   Recent Concern: Stress - Stress Concern Present (2/3/2025)    [employer] [place] [employer] - Occupational Stress Questionnaire     Feeling of Stress : Rather much   Housing Stability: Low Risk  (2/3/2025)    Housing Stability Vital Sign     Unable to Pay for Housing in the Last Year: No     Homeless in the Last Year: No     Review of Systems  Pertinent items are noted in HPI. All other systems are reviewed and are negative.   Objective:     Vital Signs (Most Recent):  Temp: 97.9 °F (36.6 °C) (02/04/25 1203)  Pulse: 73 (02/04/25 1203)  Resp: 18 (02/04/25 1203)  BP: (!) 131/56 (02/04/25 1203)  SpO2: 97 % (02/04/25 1203) Vital Signs (24h Range):  Temp:  [97.9 °F (36.6 °C)-98 °F (36.7 °C)] 97.9 °F (36.6 °C)  Pulse:  [67-90] 73  Resp:  [16-20] 18  SpO2:  [96 %-100 %] 97 %  BP: (122-189)/(56-75) 131/56     Weight: 68 kg (149 lb 14.6 oz)  Body mass index is 29.28 kg/m².    Estimated  Creatinine Clearance: 21.6 mL/min (A) (based on SCr of 1.7 mg/dL (H)).     Physical Exam  HENT:      Head: Normocephalic and atraumatic.      Nose: Nose normal.      Mouth/Throat:      Pharynx: Oropharynx is clear.   Eyes:      Extraocular Movements: Extraocular movements intact.   Cardiovascular:      Rate and Rhythm: Normal rate and regular rhythm.      Heart sounds: Normal heart sounds.   Pulmonary:      Effort: Pulmonary effort is normal.      Breath sounds: Normal breath sounds.   Abdominal:      General: There is no distension.      Palpations: Abdomen is soft.      Tenderness: There is no abdominal tenderness.   Musculoskeletal:      Cervical back: Normal range of motion and neck supple.      Right lower leg: Edema present.      Left lower leg: Edema present.      Comments: Dopplerable 2+ DP BLE; BLE pitting edema to knees  RLE shin with circumferential erythema, warm and tender   LLE left lateral shin area of discoloration and LLE knee area of discoloration, per pt both chronic and 2/2 SCC.    Gross ROM intact, sensation intact.    Skin:     General: Skin is warm.      Capillary Refill: Capillary refill takes 2 to 3 seconds.   Neurological:      Mental Status: She is alert.   Psychiatric:         Mood and Affect: Mood normal.         Behavior: Behavior normal.     Significant Labs: All pertinent labs within the past 24 hours have been reviewed.    Significant Imaging: I have reviewed all pertinent imaging results/findings within the past 24 hours.

## 2025-02-04 NOTE — PROGRESS NOTES
"Pharmacokinetic Initial Assessment: IV Vancomycin    Assessment/Plan:    Initiate intravenous vancomycin with loading dose of 1250 mg once with subsequent doses when random concentrations are less than 20 mcg/mL  Desired empiric serum trough concentration is 15 to 20 mcg/mL  Draw vancomycin random level on 2/4 at 2230.  Pharmacy will continue to follow and monitor vancomycin.      Please contact pharmacy at extension [phone] with any questions regarding this assessment.     Thank you for the consult,   Ginna Lehman       Patient brief summary:  Elizabeth Dickson is a 84 y.o. female initiated on antimicrobial therapy with IV Vancomycin for treatment of suspected skin & soft tissue infection    Drug Allergies:   Review of patient's allergies indicates:   Allergen Reactions    Lisinopril      hyperkalemia       Actual Body Weight:   69.4kg    Renal Function:   Estimated Creatinine Clearance: 21.8 mL/min (A) (based on SCr of 1.7 mg/dL (H)).,     Dialysis Method (if applicable):  N/A    CBC (last 72 hours):  Recent Labs   Lab Result Units 02/03/25  1637   WBC K/uL 4.61   Hemoglobin g/dL 9.5*   Hematocrit % 31.9*   Platelets K/uL 268   Gran % % 70.3   Lymph % % 18.2   Mono % % 9.1   Eosinophil % % 1.3   Basophil % % 0.7   Differential Method  Automated       Metabolic Panel (last 72 hours):  Recent Labs   Lab Result Units 02/03/25  1637   Sodium mmol/L 144   Potassium mmol/L 4.2   Chloride mmol/L 107   CO2 mmol/L 25   Glucose mg/dL 97   BUN mg/dL 34*   Creatinine mg/dL 1.7*   Albumin g/dL 3.3*   Total Bilirubin mg/dL 0.3   Alkaline Phosphatase U/L 39*   AST U/L 44*   ALT U/L 28       Drug levels (last 3 results):  No results for input(s): "VANCOMYCINRA", "VANCORANDOM", "VANCOMYCINPE", "VANCOPEAK", "VANCOMYCINTR", "VANCOTROUGH" in the last 72 hours.    Microbiologic Results:  Microbiology Results (last 7 days)       Procedure Component Value Units Date/Time    Blood culture #2 **CANNOT BE ORDERED STAT** [[id number]] " Collected: 02/03/25 1639    Order Status: Sent Specimen: Blood from Peripheral, Hand, Left Updated: 02/03/25 2131    Blood culture #1 **CANNOT BE ORDERED STAT** [[id number]] Collected: 02/03/25 1638    Order Status: Sent Specimen: Blood from Peripheral, Antecubital, Left Updated: 02/03/25 2131

## 2025-02-04 NOTE — PLAN OF CARE
SW met with pt at bedside this AM to complete DCA. Per pt she lives at home with her drt Abbey [phone] at home and will have her help transport the pt home at time of dc. Pt stated that while at home she will use a cane to complete her ADLS. Pt does not receive any HH or HD services at this time. Pt did not have any additional questions or concerns for sw at this time. White board updated with CM name and contact information.  Discharge brochure provided.  Pt encouraged to call with any questions or concerns.  Cm will continue to follow pt through transitions of care and assist with any discharge needs.    Robe Lynn, MSTANA  [phone]    Future Appointments   Date Time Provider Department Center   6/26/2025 10:00 AM Porsha Masters MD [facility]        02/04/25 1053   Discharge Planning   Assessment Type Discharge Planning Brief Assessment   Resource/Environmental Concerns none   Support Systems Children   Equipment Currently Used at Home cane, straight   Current Living Arrangements home   Care Facility Name home   Patient/Family Anticipates Transition to home with family   Patient/Family Anticipated Services at Transition none   DME Needed Upon Discharge  none   Discharge Plan A Home Health   Health Literacy   How often do you need to have someone help you when you read instructions, pamphlets, or other written material from your doctor or pharmacy? Often

## 2025-02-04 NOTE — CONSULTS
[facility]  Infectious Disease  Consult Note    Patient Name: Elizabeth Dickson  MRN: [id number]  Admission Date: 2/3/2025  Hospital Length of Stay: 0 days  Attending Physician: Rocky Perez MD  Primary Care Provider: Porsha Masters MD     Isolation Status: No active isolations    Patient information was obtained from patient and past medical records.      Inpatient consult to Infectious Diseases  Consult performed by: Yonatan Barillas MD  Consult ordered by: Laura Cristobal MD  Reason for consult: cellulitis        Assessment/Plan:     ID  * Cellulitis  85 yo female with PMH of HTN, HLD, SCC of face and legs, breast CA s/p surgery/chemo and radx, hypothyroidism, CKD3, and ZEKE sent from clinic for worsening RLE cellulitis after PO Abx    -would stop zosyn  -would continue vanco with goal trough of 10-15  -monitor for improvement in exam  -on discharge will switch to po options        Thank you for your consult. I will follow-up with patient. Please contact us if you have any additional questions.    Yonatan Barillas MD  Infectious Disease  [facility]    Subjective:     Principal Problem: Cellulitis    HPI: 85 yo female with PMH of HTN, HLD, SCC of face and legs, breast CA s/p surgery/chemo and radx, hypothyroidism, CKD3, and ZEKE sent from clinic for worsening RLE cellulitis after PO Abx. The patient was in their usual state of health until she she got SCC bx on her BLE. Denies any chills, fever, nausea/vomiting, urinary or bowel changes, CP, SOB. Endorses worsening swelling of BLE and stopping her diuretics because of incr BUN. On 1/25 had wound culture of right anterior thigh showed pansens S. Aureus and patient was placed on Keflex by dermatology. Was also briefly on cipro but could not tolerate it due to diarrhea. Currently on zosyn and vanco.     Past Medical History:   Diagnosis Date    Anxiety     Bilateral leg edema     Breast cancer     Cellulitis of left leg     CKD (chronic kidney  disease), stage III     Hypertension     Hypothyroidism     Iron deficiency anemia     Osteopenia     Ovarian cyst     Teratoma and Benign Serous Cystadenoma    Pancreatic cyst     Pulmonary nodules     Renal mass     Vitamin D deficiency        Past Surgical History:   Procedure Laterality Date    BLOCK, NERVE, GENICULAR Bilateral 1/16/2025    Procedure: B/L GNB;  Surgeon: Rasta Lambert MD;  Location: [facility];  Service: Pain Management;  Laterality: Bilateral;  no sed-no ac    BREAST SURGERY      debridement      Left leg     Removal of Ovaries      SKIN BIOPSY      1/5/24       Review of patient's allergies indicates:   Allergen Reactions    Lisinopril      hyperkalemia       Medications:  Medications Prior to Admission   Medication Sig    ascorbic acid, vitamin C, (VITAMIN C) 1000 MG tablet Take 1,000 mg by mouth once daily.    atorvastatin (LIPITOR) 10 MG tablet TAKE 1 TABLET BY MOUTH EVERY DAY (Patient taking differently: Take 10 mg by mouth once daily.)    betamethasone dipropionate 0.05 % cream Apply topically once daily.    bexarotene 75 mg Cap Take 6 capsules by mouth Daily.    calcium carbonate 1250 MG capsule Take 1,250 mg by mouth once daily.    cholecalciferol, vitamin D3, (VITAMIN D3) 25 mcg (1,000 unit) capsule Take 1,000 Units by mouth once daily.    EScitalopram oxalate (LEXAPRO) 5 MG Tab TAKE 1 TABLET BY MOUTH EVERY DAY (Patient taking differently: Take 5 mg by mouth nightly.)    fenofibrate micronized (LOFIBRA) 200 MG Cap Take 200 mg by mouth daily with breakfast.    ferrous sulfate (FEOSOL) 325 mg (65 mg iron) Tab tablet Take 325 mg by mouth once daily.    latanoprost 0.005 % ophthalmic solution Place 1 drop into both eyes every evening.    levothyroxine (SYNTHROID) 100 MCG tablet Take 100 mcg by mouth before breakfast.    melatonin 10 mg Tab Take 10 mg by mouth nightly as needed.    metoprolol succinate (TOPROL-XL) 50 MG 24 hr tablet TAKE 1 TABLET BY MOUTH ONCE  DAILY (Patient  "taking differently: Take 50 mg by mouth once daily.)    omeprazole (PRILOSEC) 20 MG capsule Take 20 mg by mouth once daily.     Antibiotics (From admission, onward)      Start     Stop Route Frequency Ordered    02/04/25 0800  piperacillin-tazobactam (ZOSYN) 4.5 g in D5W 100 mL IVPB (MB+)         -- IV Every 12 hours (non-standard times) 02/03/25 2219    02/04/25 0600  vancomycin - pharmacy to dose  (vancomycin IVPB (PEDS and ADULTS))        Placed in "And" Linked Group    -- IV pharmacy to manage frequency 02/03/25 2151          Antifungals (From admission, onward)      None          Antivirals (From admission, onward)      None             Immunization History   Administered Date(s) Administered    COVID-19, MRNA, LN-S, PF (Pfizer) (Purple Cap) 01/26/2021, 02/15/2021, 11/17/2021    Influenza 10/18/2005, 10/11/2013, 09/18/2015, 09/17/2016, 10/01/2018, 10/25/2018, 09/12/2019    Influenza (FLUAD) - Quadrivalent - Adjuvanted - PF *Preferred* (65+) 11/01/2023    Influenza - Quadrivalent - High Dose - PF (65 years and older) 09/17/2022    Influenza - Quadrivalent - PF *Preferred* (6 months and older) 10/11/2013, 12/24/2021    Influenza - Trivalent - Fluzone High Dose - PF (65 years and older) 09/18/2015, 09/17/2016, 10/25/2018, 09/12/2019, 08/18/2020    Influenza Split 10/18/2005, 10/11/2013    Pneumococcal Conjugate - 13 Valent 10/11/2013, 06/23/2016, 06/02/2017, 06/28/2021    Pneumococcal Polysaccharide - 23 Valent 11/01/2010    Tdap 02/02/2022    Zoster 08/18/2020, 11/19/2020    Zoster Recombinant 08/18/2020, 11/19/2020       Family History       Problem Relation (Age of Onset)    COPD Brother    Cholelithiasis Brother    Colon cancer Brother, Brother    Diabetes Father    Diabetes type I Other    Heart disease Brother, Brother    Hypertension Mother, Father    Lung cancer Sister          Social History     Socioeconomic History    Marital status:    Tobacco Use    Smoking status: Never    Smokeless tobacco: " Never   Substance and Sexual Activity    Alcohol use: No    Drug use: Never    Sexual activity: Not Currently     Social Drivers of Health     Financial Resource Strain: Low Risk  (2/3/2025)    Overall Financial Resource Strain (CARDIA)     Difficulty of Paying Living Expenses: Not hard at all   Food Insecurity: No Food Insecurity (2/3/2025)    Hunger Vital Sign     Worried About Running Out of Food in the Last Year: Never true     Ran Out of Food in the Last Year: Never true   Transportation Needs: No Transportation Needs (2/3/2025)    TRANSPORTATION NEEDS     Transportation : No   Physical Activity: Inactive (2/3/2025)    Exercise Vital Sign     Days of Exercise per Week: 0 days     Minutes of Exercise per Session: 0 min   Stress: No Stress Concern Present (2/3/2025)    [employer] - Occupational Stress Questionnaire     Feeling of Stress : Not at all   Recent Concern: Stress - Stress Concern Present (2/3/2025)    [employer] - Occupational Stress Questionnaire     Feeling of Stress : Rather much   Housing Stability: Low Risk  (2/3/2025)    Housing Stability Vital Sign     Unable to Pay for Housing in the Last Year: No     Homeless in the Last Year: No     Review of Systems  Pertinent items are noted in HPI. All other systems are reviewed and are negative.   Objective:     Vital Signs (Most Recent):  Temp: 97.9 °F (36.6 °C) (02/04/25 1203)  Pulse: 73 (02/04/25 1203)  Resp: 18 (02/04/25 1203)  BP: (!) 131/56 (02/04/25 1203)  SpO2: 97 % (02/04/25 1203) Vital Signs (24h Range):  Temp:  [97.9 °F (36.6 °C)-98 °F (36.7 °C)] 97.9 °F (36.6 °C)  Pulse:  [67-90] 73  Resp:  [16-20] 18  SpO2:  [96 %-100 %] 97 %  BP: (122-189)/(56-75) 131/56     Weight: 68 kg (149 lb 14.6 oz)  Body mass index is 29.28 kg/m².    Estimated Creatinine Clearance: 21.6 mL/min (A) (based on SCr of 1.7 mg/dL (H)).     Physical Exam  HENT:      Head: Normocephalic and atraumatic.      Nose:  Nose normal.      Mouth/Throat:      Pharynx: Oropharynx is clear.   Eyes:      Extraocular Movements: Extraocular movements intact.   Cardiovascular:      Rate and Rhythm: Normal rate and regular rhythm.      Heart sounds: Normal heart sounds.   Pulmonary:      Effort: Pulmonary effort is normal.      Breath sounds: Normal breath sounds.   Abdominal:      General: There is no distension.      Palpations: Abdomen is soft.      Tenderness: There is no abdominal tenderness.   Musculoskeletal:      Cervical back: Normal range of motion and neck supple.      Right lower leg: Edema present.      Left lower leg: Edema present.      Comments: Dopplerable 2+ DP BLE; BLE pitting edema to knees  RLE shin with circumferential erythema, warm and tender   LLE left lateral shin area of discoloration and LLE knee area of discoloration, per pt both chronic and 2/2 SCC.    Gross ROM intact, sensation intact.    Skin:     General: Skin is warm.      Capillary Refill: Capillary refill takes 2 to 3 seconds.   Neurological:      Mental Status: She is alert.   Psychiatric:         Mood and Affect: Mood normal.         Behavior: Behavior normal.     Significant Labs: All pertinent labs within the past 24 hours have been reviewed.    Significant Imaging: I have reviewed all pertinent imaging results/findings within the past 24 hours.

## 2025-02-04 NOTE — NURSING
Patient arrived to unit and assisted into bed. She is AAOX4, able to make all needs known. 20G IV noted to right forearm patent and flushes with no problems. Patient requires stand-by assistance. She tolerated scheduled night medications whole with no problems. Staff educated patient on all medications received and following the plan of care, safety precautions in place, call light within reach and encouraged to use, patient verbalized understanding, will continue with plan of care.

## 2025-02-04 NOTE — PROGRESS NOTES
"[facility] IM Resident HO-2 Progress Note    Subjective:      NAEON. AF, VSS. Pending specialist recs and US.      Objective:   Last 24 Hour Vital Signs:  BP  Min: 122/58  Max: 189/74  Temp  Av.9 °F (36.6 °C)  Min: 97.9 °F (36.6 °C)  Max: 98 °F (36.7 °C)  Pulse  Av.1  Min: 67  Max: 90  Resp  Av  Min: 16  Max: 20  SpO2  Av.7 %  Min: 96 %  Max: 100 %  Height  Av' 0.3" (153.2 cm)  Min: 5' (152.4 cm)  Max: 5' 1" (154.9 cm)  Weight  Av.7 kg (151 lb 7.3 oz)  Min: 68 kg (149 lb 14.6 oz)  Max: 69.4 kg (153 lb)  I/O last 3 completed shifts:  In: 120 [P.O.:120]  Out: -     Physical Examination:  HENT:      Head: Normocephalic and atraumatic.      Nose: Nose normal.      Mouth/Throat:      Pharynx: Oropharynx is clear.   Eyes:      Extraocular Movements: Extraocular movements intact.   Cardiovascular:      Rate and Rhythm: Normal rate and regular rhythm.      Heart sounds: Normal heart sounds.   Pulmonary:      Effort: Pulmonary effort is normal.      Breath sounds: Normal breath sounds.   Abdominal:      General: There is no distension.      Palpations: Abdomen is soft.      Tenderness: There is no abdominal tenderness.   Musculoskeletal:      Cervical back: Normal range of motion and neck supple.      Right lower leg: Edema present.      Left lower leg: Edema present.      Comments: Dopplerable 2+ DP BLE; BLE pitting edema to knees  RLE shin with circumferential erythema, warm and tender to touch with drainage and strong odor.   LLE left lateral shin area of discoloration and LLE knee area of discoloration, per pt both chronic and 2/2 SCC.    Gross ROM intact, sensation intact.    Skin:     General: Skin is warm.      Capillary Refill: Capillary refill takes 2 to 3 seconds.   Neurological:      Mental Status: She is alert.   Psychiatric:         Mood and Affect: Mood normal.         Behavior: Behavior normal.      Laboratory:  Laboratory Data Reviewed: yes  Pertinent Findings:  Trended Lab Data:  Recent " "Labs   Lab 02/03/25  1637 02/04/25  0245 02/04/25  0246   WBC 4.61  --  4.51   HGB 9.5*  --  8.8*   HCT 31.9*  --  29.1*     --  247   *  --  101*   RDW 13.1  --  13.0    143  --    K 4.2 4.6  --     111*  --    CO2 25 20*  --    BUN 34* 37*  --    CREATININE 1.7* 1.7*  --    GLU 97 140*  --    PROT 6.7 5.7*  --    ALBUMIN 3.3* 2.8*  --    BILITOT 0.3 0.3  --    AST 44* 38  --    ALKPHOS 39* 35*  --    ALT 28 24  --        Trended Cardiac Data:  No results for input(s): "TROPONINI", "CKTOTAL", "CKMB", "BNP" in the last 168 hours.    Microbiology Data Reviewed: yes  Pertinent Findings:  BCX 2/3 pend    Other Results:  EKG (my interpretation):none new   Radiology Data Reviewed:   Pertinent Findings:      Current Medications:     Infusions:       Scheduled:   atorvastatin  10 mg Oral Daily    enoxparin  30 mg Subcutaneous Daily    EScitalopram oxalate  5 mg Oral Nightly    gabapentin  300 mg Oral TID    lactated ringers  1,000 mL Intravenous Once    levothyroxine  100 mcg Oral Before breakfast    metoprolol succinate  50 mg Oral Daily    piperacillin-tazobactam (Zosyn) IV (PEDS and ADULTS) (extended infusion is not appropriate)  4.5 g Intravenous Q12H        PRN:    Current Facility-Administered Medications:     dextrose 50%, 12.5 g, Intravenous, PRN    dextrose 50%, 25 g, Intravenous, PRN    glucagon (human recombinant), 1 mg, Intramuscular, PRN    glucose, 16 g, Oral, PRN    glucose, 24 g, Oral, PRN    HYDROcodone-acetaminophen, 1 tablet, Oral, Q8H PRN    melatonin, 9 mg, Oral, Nightly PRN    naloxone, 0.02 mg, Intravenous, PRN    sodium chloride 0.9%, 10 mL, Intravenous, Q12H PRN    Pharmacy to dose Vancomycin consult, , , Once **AND** vancomycin - pharmacy to dose, , Intravenous, pharmacy to manage frequency    Antibiotics and Day Number of Therapy:  Vanc 2/3-  Zosyn 2/3-    Assessment:     Elizabeth Dickson is a 84 y.o.female with  Patient Active Problem List    Diagnosis Date Noted "    Cellulitis 02/03/2025    Current chronic use of systemic steroids 01/31/2024    Decreased functional activity tolerance 12/12/2023    Decreased strength of lower extremity 12/12/2023    Wrist lump, left 06/19/2023    Mycosis fungoides 06/19/2023    Cutaneous T-cell lymphoma, unspecified, 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    Atherosclerosis of aorta 02/01/2023    Other specified spondylopathies, 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/01/2022    Acute pain of left knee 02/01/2022    Umbilical bleeding 02/01/2022    Bilateral leg edema 07/01/2021    Anxiety 01/14/2021    Depression 01/14/2021    Acute cystitis without hematuria 04/30/2020    Hyperkalemia 04/30/2020    Dermatitis 04/30/2020    Chronic right-sided low back pain without sciatica 02/06/2020    Hyperlipidemia 02/06/2020    Hypertension     Stage 3a chronic kidney disease     Hypothyroidism     Pancreatic cyst     Right renal mass 05/26/2018    Microalbuminuria 03/16/2018    Breast cancer 03/16/2018    Pulmonary nodules 03/16/2018    Osteopenia 03/16/2018    Hypothyroid 03/16/2018    Acidosis 03/16/2018    Iron deficiency anemia 01/09/2018        Plan:     RLE cellulitis 2/2 SCC Bx, failed PO abx   Hx of SCC of legs and face  BLE swelling  - afebrile, RLE shin cellulitis after lesion bx with dermatology at [facility] dermatology, failed keflex, transitioned to cipro but did not tolerate 2/2 diarrhea, finished keflex course  - XR in ED showing swelling subq edema but no free air   - no leukocytosis, CRP 64, ESR pend   - Bcx ordered  - home creams not available   Plan:  - DVT US BLE, arterial RLE US   - continue vanc/zosyn  - consult podiatry in AM vs surg?  - continue broad spectrum abx   - fidencio TID, jonah prn for pain     Macrocytic anemia   - H/H on adm 9.5/32, MCV 10, baseline old from 1 years ago 13  - on daily iron PO  - B12, folate, iron studies ordered     CKD  - Cr on adm 1.7, BUN 34; baseline 1.1-1.8 but more likely 1.8, recent bump per pt 2/2 diuretics per pt   - continue to  monitor, 1L given, if needed but no TTE available      Hyperlipidemia  HTN  - A1c 4.7; lipid chol 122, HDL 38, trig 84, LDL 67  - continue home atorvastatin, fenofibrate   - continue home metoprolol, unclear if for BP     Depression?  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

## 2025-02-04 NOTE — PLAN OF CARE
[facility]      HOME HEALTH ORDERS  FACE TO FACE ENCOUNTER    Patient Name: Elizabeth Dickson  YOB: 1940    PCP: Porsha Masters MD   [facility] [id number] [facility] [place] [address]  PCP Phone Number: [phone]  PCP Fax: [phone]    Encounter Date: 2/3/25    Admit to Home Health    Diagnoses:  Active Hospital Problems    Diagnosis  POA    *Cellulitis [L03.90]  Yes      Resolved Hospital Problems   No resolved problems to display.       Follow Up Appointments:  Future Appointments   Date Time Provider Department Center   2/10/2025  9:30 AM Arelis Noland MD [facility]   6/26/2025 10:00 AM Porsha Masters MD [facility]       Allergies:  Review of patient's allergies indicates:   Allergen Reactions    Lisinopril      hyperkalemia       Medications: Review discharge medications with patient and family and provide education.    Current Facility-Administered Medications   Medication Dose Route Frequency Provider Last Rate Last Admin    acetaminophen tablet 650 mg  650 mg Oral Daily PRN Elina Mustafa MD   650 mg at 02/07/25 1443    aspirin chewable tablet 81 mg  81 mg Oral Daily Robert Flores MD   81 mg at 02/08/25 0855    atorvastatin tablet 10 mg  10 mg Oral Daily Laura Cristobal MD   10 mg at 02/08/25 0855    cefTRIAXone injection 1 g  1 g Intravenous Q24H Laura Cristobal MD   1 g at 02/07/25 2115    dextrose 50% injection 12.5 g  12.5 g Intravenous PRN Laura Cristobal MD        dextrose 50% injection 25 g  25 g Intravenous PRN Laura Cristobal MD        enoxaparin injection 40 mg  40 mg Subcutaneous Daily Laura Cristobal MD        EScitalopram oxalate tablet 5 mg  5 mg Oral Nightly Laura Cristobal MD   5 mg at 02/07/25 2101    gabapentin capsule 300 mg  300 mg Oral TID Robert Flores MD   300 mg at 02/08/25 0855    glucagon (human recombinant) injection 1 mg  1 mg Intramuscular PRN Laura Cristobal MD         glucose chewable tablet 16 g  16 g Oral PRN Laura Cristobal MD        glucose chewable tablet 24 g  24 g Oral PRN Laura Cristobal MD        HYDROcodone-acetaminophen 5-325 mg per tablet 1 tablet  1 tablet Oral Q6H PRN Robert Flores MD        hydrOXYzine pamoate capsule 25 mg  25 mg Oral Q12H PRN Elina Mustafa MD   25 mg at 02/08/25 0042    levothyroxine tablet 100 mcg  100 mcg Oral Before breakfast Laura Cristobal MD   100 mcg at 02/08/25 0624    LIDOcaine 5 % patch 1 patch  1 patch Transdermal Daily Robert Flores MD        melatonin tablet 12 mg  12 mg Oral Nightly Robert Flores MD   12 mg at 02/07/25 2101    metoprolol succinate (TOPROL-XL) 24 hr tablet 50 mg  50 mg Oral Daily Laura Cristobal MD   50 mg at 02/08/25 0855    naloxone 0.4 mg/mL injection 0.02 mg  0.02 mg Intravenous PRN Laura Cristobal MD        sodium chloride 0.9% flush 10 mL  10 mL Intravenous Q12H PRN Laura Cristobal MD        vancomycin - pharmacy to dose   Intravenous pharmacy to manage frequency Robert Flores MD            Medication List        START taking these medications      acetaminophen 325 MG tablet  Commonly known as: TYLENOL  Take 2 tablets (650 mg total) by mouth every 8 (eight) hours as needed for Pain.     amoxicillin-clavulanate 875-125mg 875-125 mg per tablet  Commonly known as: AUGMENTIN  Take 1 tablet by mouth 2 (two) times daily. for 12 days     aspirin 81 MG Chew  Take 1 tablet (81 mg total) by mouth once daily.  Start taking on: February 9, 2025     doxycycline 100 MG Cap  Commonly known as: VIBRAMYCIN  Take 1 capsule (100 mg total) by mouth every 12 (twelve) hours. for 12 days     gabapentin 300 MG capsule  Commonly known as: NEURONTIN  Take 1 capsule (300 mg total) by mouth 2 (two) times daily as needed (leg pain and cramps).     LIDOcaine 5 %  Commonly known as: LIDODERM  Place 1 patch onto the skin once daily. Remove & Discard patch within 12 hours or as directed by MD  Start taking  on: February 9, 2025            CHANGE how you take these medications      atorvastatin 10 MG tablet  Commonly known as: LIPITOR  TAKE 1 TABLET BY MOUTH EVERY DAY  What changed: when to take this     EScitalopram oxalate 5 MG Tab  Commonly known as: LEXAPRO  TAKE 1 TABLET BY MOUTH EVERY DAY  What changed: when to take this     metoprolol succinate 50 MG 24 hr tablet  Commonly known as: TOPROL-XL  TAKE 1 TABLET BY MOUTH ONCE  DAILY  What changed: when to take this            CONTINUE taking these medications      ascorbic acid (vitamin C) 1000 MG tablet  Commonly known as: VITAMIN C  Take 1,000 mg by mouth once daily.     calcium carbonate 1250 MG capsule  Take 1,250 mg by mouth once daily.     cholecalciferol (vitamin D3) 25 mcg (1,000 unit) capsule  Commonly known as: VITAMIN D3  Take 1,000 Units by mouth once daily.     fenofibrate micronized 200 MG Cap  Commonly known as: LOFIBRA  Take 200 mg by mouth daily with breakfast.     ferrous sulfate 325 mg (65 mg iron) Tab tablet  Commonly known as: FEOSOL  Take 325 mg by mouth once daily.     latanoprost 0.005 % ophthalmic solution  Place 1 drop into both eyes every evening.     levothyroxine 100 MCG tablet  Commonly known as: SYNTHROID  Take 100 mcg by mouth before breakfast.     melatonin 10 mg Tab  Take 10 mg by mouth nightly as needed.     omeprazole 20 MG capsule  Commonly known as: PRILOSEC  Take 20 mg by mouth once daily.            STOP taking these medications      betamethasone dipropionate 0.05 % cream     bexarotene 75 mg Cap                I have seen and examined this patient within the last 30 days. My clinical findings that support the need for the home health skilled services and home bound status are the following:no   Weakness/numbness causing balance and gait disturbance due to Infection making it taxing to leave home.     Diet:   diabetic diet 2000 calorie    Labs:  Report Lab results to PCP.    Referrals/ Consults   to evaluate for  community resources/long-range planning.    Activities:   activity as tolerated    Nursing:   Agency to admit patient within 24 hours of hospital discharge unless specified on physician order or at patient request    SN to complete comprehensive assessment including routine vital signs. Instruct on disease process and s/s of complications to report to MD. Review/verify medication list sent home with the patient at time of discharge  and instruct patient/caregiver as needed. Frequency may be adjusted depending on start of care date.     Skilled nurse to perform up to 3 visits PRN for symptoms related to diagnosis    Notify MD if SBP > 160 or < 90; DBP > 90 or < 50; HR > 120 or < 50; Temp > 101; O2 < 88%; Other:       Ok to schedule additional visits based on staff availability and patient request on consecutive days within the home health episode.    When multiple disciplines ordered:    Start of Care occurs on Sunday - Wednesday schedule remaining discipline evaluations as ordered on separate consecutive days following the start of care.    Thursday SOC -schedule subsequent evaluations Friday and Monday the following week.     Friday - Saturday SOC - schedule subsequent discipline evaluations on consecutive days starting Monday of the following week.    For all post-discharge communication and subsequent orders please contact patient's primary care physician. If unable to reach primary care physician or do not receive response within 30 minutes, please contact [facility] for clinical staff order clarification    Miscellaneous   NA    Home Health Aide:  Home Health Aide Twice weekly    Wound Care Orders  yes:  Wound to right lower extremity, 3-4x per week dressing changes    Clean area with vashe, xerform, abd pad, and wrap RLE in kerlix and ACE .     I certify that this patient is confined to her home and needs wound care.

## 2025-02-05 LAB
ALBUMIN SERPL BCP-MCNC: 2.8 G/DL (ref 3.5–5.2)
ALP SERPL-CCNC: 34 U/L (ref 40–150)
ALT SERPL W/O P-5'-P-CCNC: 24 U/L (ref 10–44)
ANION GAP SERPL CALC-SCNC: 9 MMOL/L (ref 8–16)
AST SERPL-CCNC: 44 U/L (ref 10–40)
BASOPHILS # BLD AUTO: 0.03 K/UL (ref 0–0.2)
BASOPHILS NFR BLD: 0.9 % (ref 0–1.9)
BILIRUB SERPL-MCNC: 0.3 MG/DL (ref 0.1–1)
BUN SERPL-MCNC: 29 MG/DL (ref 8–23)
CALCIUM SERPL-MCNC: 9.1 MG/DL (ref 8.7–10.5)
CHLORIDE SERPL-SCNC: 111 MMOL/L (ref 95–110)
CO2 SERPL-SCNC: 23 MMOL/L (ref 23–29)
CREAT SERPL-MCNC: 1.8 MG/DL (ref 0.5–1.4)
DIFFERENTIAL METHOD BLD: ABNORMAL
EOSINOPHIL # BLD AUTO: 0.3 K/UL (ref 0–0.5)
EOSINOPHIL NFR BLD: 8.7 % (ref 0–8)
ERYTHROCYTE [DISTWIDTH] IN BLOOD BY AUTOMATED COUNT: 13.2 % (ref 11.5–14.5)
EST. GFR  (NO RACE VARIABLE): 28 ML/MIN/1.73 M^2
GLUCOSE SERPL-MCNC: 76 MG/DL (ref 70–110)
HCT VFR BLD AUTO: 30.9 % (ref 37–48.5)
HGB BLD-MCNC: 9.2 G/DL (ref 12–16)
IMM GRANULOCYTES # BLD AUTO: 0.01 K/UL (ref 0–0.04)
IMM GRANULOCYTES NFR BLD AUTO: 0.3 % (ref 0–0.5)
LYMPHOCYTES # BLD AUTO: 1.3 K/UL (ref 1–4.8)
LYMPHOCYTES NFR BLD: 38.3 % (ref 18–48)
MAGNESIUM SERPL-MCNC: 2.1 MG/DL (ref 1.6–2.6)
MCH RBC QN AUTO: 29.7 PG (ref 27–31)
MCHC RBC AUTO-ENTMCNC: 29.8 G/DL (ref 32–36)
MCV RBC AUTO: 100 FL (ref 82–98)
MONOCYTES # BLD AUTO: 0.5 K/UL (ref 0.3–1)
MONOCYTES NFR BLD: 16.2 % (ref 4–15)
NEUTROPHILS # BLD AUTO: 1.2 K/UL (ref 1.8–7.7)
NEUTROPHILS NFR BLD: 35.6 % (ref 38–73)
NRBC BLD-RTO: 0 /100 WBC
PHOSPHATE SERPL-MCNC: 4.1 MG/DL (ref 2.7–4.5)
PLATELET # BLD AUTO: 264 K/UL (ref 150–450)
PMV BLD AUTO: 10.8 FL (ref 9.2–12.9)
POTASSIUM SERPL-SCNC: 5 MMOL/L (ref 3.5–5.1)
PROT SERPL-MCNC: 5.8 G/DL (ref 6–8.4)
RBC # BLD AUTO: 3.1 M/UL (ref 4–5.4)
SODIUM SERPL-SCNC: 143 MMOL/L (ref 136–145)
VANCOMYCIN SERPL-MCNC: 14.6 UG/ML
WBC # BLD AUTO: 3.34 K/UL (ref 3.9–12.7)

## 2025-02-05 PROCEDURE — 25000003 PHARM REV CODE 250

## 2025-02-05 PROCEDURE — 83735 ASSAY OF MAGNESIUM: CPT

## 2025-02-05 PROCEDURE — 36415 COLL VENOUS BLD VENIPUNCTURE: CPT | Performed by: INTERNAL MEDICINE

## 2025-02-05 PROCEDURE — 11000001 HC ACUTE MED/SURG PRIVATE ROOM

## 2025-02-05 PROCEDURE — 80053 COMPREHEN METABOLIC PANEL: CPT

## 2025-02-05 PROCEDURE — 85025 COMPLETE CBC W/AUTO DIFF WBC: CPT

## 2025-02-05 PROCEDURE — 25000003 PHARM REV CODE 250: Performed by: INTERNAL MEDICINE

## 2025-02-05 PROCEDURE — 63600175 PHARM REV CODE 636 W HCPCS

## 2025-02-05 PROCEDURE — 36415 COLL VENOUS BLD VENIPUNCTURE: CPT

## 2025-02-05 PROCEDURE — 80202 ASSAY OF VANCOMYCIN: CPT | Performed by: INTERNAL MEDICINE

## 2025-02-05 PROCEDURE — 84100 ASSAY OF PHOSPHORUS: CPT

## 2025-02-05 PROCEDURE — 63600175 PHARM REV CODE 636 W HCPCS: Performed by: INTERNAL MEDICINE

## 2025-02-05 RX ORDER — NAPROXEN SODIUM 220 MG/1
81 TABLET, FILM COATED ORAL DAILY
Status: DISCONTINUED | OUTPATIENT
Start: 2025-02-05 | End: 2025-02-08 | Stop reason: HOSPADM

## 2025-02-05 RX ORDER — FOLIC ACID 5 MG/ML
1 INJECTION, SOLUTION INTRAMUSCULAR; INTRAVENOUS; SUBCUTANEOUS DAILY
Status: CANCELLED | OUTPATIENT
Start: 2025-02-05 | End: 2025-02-08

## 2025-02-05 RX ADMIN — Medication 9 MG: at 09:02

## 2025-02-05 RX ADMIN — ENOXAPARIN SODIUM 30 MG: 30 INJECTION SUBCUTANEOUS at 04:02

## 2025-02-05 RX ADMIN — ESCITALOPRAM OXALATE 5 MG: 5 TABLET, FILM COATED ORAL at 09:02

## 2025-02-05 RX ADMIN — ATORVASTATIN CALCIUM 10 MG: 10 TABLET, FILM COATED ORAL at 08:02

## 2025-02-05 RX ADMIN — METOPROLOL SUCCINATE 50 MG: 50 TABLET, EXTENDED RELEASE ORAL at 08:02

## 2025-02-05 RX ADMIN — GABAPENTIN 300 MG: 300 CAPSULE ORAL at 09:02

## 2025-02-05 RX ADMIN — HYDROCODONE BITARTRATE AND ACETAMINOPHEN 1 TABLET: 5; 325 TABLET ORAL at 10:02

## 2025-02-05 RX ADMIN — ASPIRIN 81 MG CHEWABLE TABLET 81 MG: 81 TABLET CHEWABLE at 10:02

## 2025-02-05 RX ADMIN — HYDROCODONE BITARTRATE AND ACETAMINOPHEN 1 TABLET: 5; 325 TABLET ORAL at 06:02

## 2025-02-05 RX ADMIN — VANCOMYCIN HYDROCHLORIDE 1000 MG: 1 INJECTION, POWDER, LYOPHILIZED, FOR SOLUTION INTRAVENOUS at 01:02

## 2025-02-05 RX ADMIN — GABAPENTIN 300 MG: 300 CAPSULE ORAL at 02:02

## 2025-02-05 RX ADMIN — LEVOTHYROXINE SODIUM 100 MCG: 100 TABLET ORAL at 05:02

## 2025-02-05 RX ADMIN — GABAPENTIN 300 MG: 300 CAPSULE ORAL at 08:02

## 2025-02-05 NOTE — PLAN OF CARE
Patient is awake and alert. Patient given medications as ordered per MAR. PRN Norco given for pain. Ambulated to toilet using cane with 1 person assist. Safety maintained. Instructed to use call light for assistance.           Problem: Skin or Soft Tissue Infection  Goal: Absence of Infection Signs and Symptoms  Outcome: Progressing     Problem: Fall Injury Risk  Goal: Absence of Fall and Fall-Related Injury  Outcome: Progressing     Problem: Wound  Goal: Optimal Pain Control and Function  Outcome: Progressing

## 2025-02-05 NOTE — PROGRESS NOTES
[facility] IM Resident HO-2 Progress Note    Subjective:      NAEON. AF, VSS. Tolerating PO intake. Afebrile.      Objective:   Last 24 Hour Vital Signs:  BP  Min: 121/62  Max: 146/71  Temp  Av.7 °F (36.5 °C)  Min: 97.4 °F (36.3 °C)  Max: 98.1 °F (36.7 °C)  Pulse  Av.3  Min: 60  Max: 73  Resp  Av.3  Min: 18  Max: 20  SpO2  Av.3 %  Min: 95 %  Max: 98 %  Weight  Av.4 kg (159 lb 9.8 oz)  Min: 72.4 kg (159 lb 9.8 oz)  Max: 72.4 kg (159 lb 9.8 oz)  I/O last 3 completed shifts:  In: 2381.9 [P.O.:1080; IV Piggyback:1301.9]  Out: 550 [Urine:550]    Physical Examination:  HENT:      Head: Normocephalic and atraumatic.      Nose: Nose normal.      Mouth/Throat:      Pharynx: Oropharynx is clear.   Eyes:      Extraocular Movements: Extraocular movements intact.   Cardiovascular:      Rate and Rhythm: Normal rate and regular rhythm.      Heart sounds: Normal heart sounds.   Pulmonary:      Effort: Pulmonary effort is normal.      Breath sounds: Normal breath sounds.   Abdominal:      General: There is no distension.      Palpations: Abdomen is soft.      Tenderness: There is no abdominal tenderness.   Musculoskeletal:      Cervical back: Normal range of motion and neck supple.      Right lower leg: Edema present.      Left lower leg: Edema present.      Comments: Dopplerable 2+ DP BLE; BLE pitting edema to knees  RLE shin with circumferential erythema, warm and tender to touch. No drainage or odor present.   LLE left lateral shin area of discoloration and LLE knee area of discoloration, per pt both chronic and 2/2 SCC.    Gross ROM intact, sensation intact.    Skin:     General: Skin is warm.      Capillary Refill: Capillary refill takes 2 to 3 seconds.   Neurological:      Mental Status: She is alert.   Psychiatric:         Mood and Affect: Mood normal.         Behavior: Behavior normal.      Laboratory:  Laboratory Data Reviewed: yes  Pertinent Findings:  Trended Lab Data:  Recent Labs   Lab 25  4122  "02/04/25  0245 02/04/25  0246 02/05/25  0200   WBC 4.61  --  4.51 3.34*   HGB 9.5*  --  8.8* 9.2*   HCT 31.9*  --  29.1* 30.9*     --  247 264   *  --  101* 100*   RDW 13.1  --  13.0 13.2    143  --  143   K 4.2 4.6  --  5.0    111*  --  111*   CO2 25 20*  --  23   BUN 34* 37*  --  29*   CREATININE 1.7* 1.7*  --  1.8*   GLU 97 140*  --  76   PROT 6.7 5.7*  --  5.8*   ALBUMIN 3.3* 2.8*  --  2.8*   BILITOT 0.3 0.3  --  0.3   AST 44* 38  --  44*   ALKPHOS 39* 35*  --  34*   ALT 28 24  --  24       Trended Cardiac Data:  No results for input(s): "TROPONINI", "CKTOTAL", "CKMB", "BNP" in the last 168 hours.    Microbiology Data Reviewed: yes  Pertinent Findings:  BCX 2/3 pend    Other Results:  EKG (my interpretation):none new   Radiology Data Reviewed:   Pertinent Findings:      Current Medications:     Infusions:       Scheduled:   atorvastatin  10 mg Oral Daily    enoxparin  30 mg Subcutaneous Daily    EScitalopram oxalate  5 mg Oral Nightly    gabapentin  300 mg Oral TID    levothyroxine  100 mcg Oral Before breakfast    metoprolol succinate  50 mg Oral Daily    NON FORMULARY MEDICATION 75 mg  75 mg Oral 6x Daily        PRN:    Current Facility-Administered Medications:     dextrose 50%, 12.5 g, Intravenous, PRN    dextrose 50%, 25 g, Intravenous, PRN    glucagon (human recombinant), 1 mg, Intramuscular, PRN    glucose, 16 g, Oral, PRN    glucose, 24 g, Oral, PRN    HYDROcodone-acetaminophen, 1 tablet, Oral, Q8H PRN    melatonin, 9 mg, Oral, Nightly PRN    naloxone, 0.02 mg, Intravenous, PRN    sodium chloride 0.9%, 10 mL, Intravenous, Q12H PRN    Pharmacy to dose Vancomycin consult, , , Once **AND** vancomycin - pharmacy to dose, , Intravenous, pharmacy to manage frequency    Antibiotics and Day Number of Therapy:  Vanc 2/3-  Zosyn 2/3-    Assessment:     Elizabeth Dickson is a 84 y.o.female with  Patient Active Problem List    Diagnosis Date Noted    Cellulitis 02/03/2025    Current " chronic use of systemic steroids 01/31/2024    Decreased functional activity tolerance 12/12/2023    Decreased strength of lower extremity 12/12/2023    Wrist lump, left 06/19/2023    Mycosis fungoides 06/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    Cutaneous T-cell lymphoma, unspecified, 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    Atherosclerosis of aorta 02/01/2023    Other specified spondylopathies, 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/01/2022    Acute pain of left knee 02/01/2022    Umbilical bleeding 02/01/2022    Bilateral leg edema 07/01/2021    Anxiety 01/14/2021    Depression 01/14/2021    Acute cystitis without hematuria 04/30/2020    Hyperkalemia 04/30/2020    Dermatitis 04/30/2020    Chronic right-sided low back pain without sciatica 02/06/2020    Hyperlipidemia 02/06/2020    Hypertension     Stage 3a chronic kidney disease     Hypothyroidism     Pancreatic cyst     Right renal mass 05/26/2018    Microalbuminuria 03/16/2018    Breast cancer 03/16/2018    Pulmonary nodules 03/16/2018    Osteopenia 03/16/2018    Hypothyroid 03/16/2018    Acidosis 03/16/2018    Iron deficiency anemia 01/09/2018        Plan:     RLE cellulitis 2/2 SCC Bx, failed PO abx   Hx of SCC of legs and face  BLE swelling  - afebrile, RLE shin cellulitis after lesion bx with dermatology at [facility] dermatology, failed keflex, transitioned to cipro but did not tolerate 2/2 diarrhea, finished keflex course  - XR in ED showing swelling subq edema but no free air   - no leukocytosis, CRP 64, ESR pend   - Bcx ordered  - home creams not available   Plan:  - DVT US BLE - neg DVT study  - arterial RLE US - Diffuse atherosclerosis with biphasic and monophasic waveforms which can be seen with inflow stenosis. No elevated velocities to suggest hemodynamically significant stenosis   - ID consulted - recc discontinuing zosyn, continue IV vanc with trough 10-15. Transition to PO antibiotics on discharge.   - Surgery consulted: no Acute intervention.   - fidencio TID, jonah prn for pain     Macrocytic anemia   - H/H on adm  9.5/32, MCV 10, baseline old from 1 years ago 13  - on daily iron PO  - B12 345, folate 10.1, iron studies low, elevated ferritin.     CKD  - Cr on adm 1.7, BUN 34; baseline 1.1-1.8 but more likely 1.8, recent bump per pt 2/2 diuretics per pt   - continue to monitor, 1L given, if needed but no TTE available      Hyperlipidemia  HTN  - A1c 4.7; lipid chol 122, HDL 38, trig 84, LDL 67  - continue home atorvastatin, fenofibrate   - continue home metoprolol, unclear if for BP     Depression?  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

## 2025-02-05 NOTE — PLAN OF CARE
[facility]ON Message     Medicare Outpatient and Observation Notification regarding financial responsibility Signed/date by patient/caregiver; Explained to patient/caregiver   Date DUBOSE was signed 2/4/2025   Time DUBOSE was signed [id number]

## 2025-02-05 NOTE — ASSESSMENT & PLAN NOTE
85 yo female with PMH of HTN, HLD, SCC of face and legs, breast CA s/p surgery/chemo and radx, hypothyroidism, CKD3, and ZEKE sent from clinic for worsening RLE cellulitis after PO Abx      -would continue vanco with goal trough of 10-15  -continue vanco while admited  -monitor for improvement in exam  -on discharge will switch to po doxy bid x a total of 14 days (including time on vanco)

## 2025-02-05 NOTE — PLAN OF CARE
Pt was accepted by [facility] to receive HH services.  Pt encouraged to call with any questions or concerns.  Cm will continue to follow pt through transitions of care and assist with any discharge needs.    Robe Lynn MSTANA  [phone]    Future Appointments   Date Time Provider Department Center   2/14/2025  2:00 PM Arelis Noland MD [facility]   6/26/2025 10:00 AM Porsha Masters MD [facility]        02/05/25 1051   Post-Acute Status   Post-Acute Authorization Home Health   Home Health Status Referrals Sent   Discharge Delays None known at this time   Discharge Plan   Discharge Plan A Home Health

## 2025-02-05 NOTE — SUBJECTIVE & OBJECTIVE
Interval History: No acute events    Review of Systems   Constitutional:  Negative for chills and fever.   Respiratory:  Negative for cough and shortness of breath.    Cardiovascular:  Negative for chest pain and palpitations.   Gastrointestinal:  Negative for abdominal pain, nausea and vomiting.   Genitourinary:  Negative for dysuria.   Skin:  Positive for color change and wound.   Neurological:  Negative for dizziness and light-headedness.     Objective:     Vital Signs (Most Recent):  Temp: 97.6 °F (36.4 °C) (02/05/25 1110)  Pulse: 62 (02/05/25 1110)  Resp: 18 (02/05/25 1110)  BP: 122/71 (02/05/25 1110)  SpO2: 96 % (02/05/25 1110) Vital Signs (24h Range):  Temp:  [97.4 °F (36.3 °C)-98.1 °F (36.7 °C)] 97.6 °F (36.4 °C)  Pulse:  [60-73] 62  Resp:  [18-20] 18  SpO2:  [95 %-98 %] 96 %  BP: (121-146)/(56-71) 122/71     Weight: 72.4 kg (159 lb 9.8 oz)  Body mass index is 31.17 kg/m².    Estimated Creatinine Clearance: 21 mL/min (A) (based on SCr of 1.8 mg/dL (H)).     Physical Exam  HENT:      Head: Normocephalic and atraumatic.      Nose: Nose normal.      Mouth/Throat:      Pharynx: Oropharynx is clear.   Eyes:      Extraocular Movements: Extraocular movements intact.   Cardiovascular:      Rate and Rhythm: Normal rate and regular rhythm.      Heart sounds: Normal heart sounds.   Pulmonary:      Effort: Pulmonary effort is normal.      Breath sounds: Normal breath sounds.   Abdominal:      General: There is no distension.      Palpations: Abdomen is soft.      Tenderness: There is no abdominal tenderness.   Musculoskeletal:      Cervical back: Normal range of motion and neck supple.      Right lower leg: Edema present.      Left lower leg: Edema present.      Comments:   RLE shin with circumferential erythema, warm and tender   LLE left lateral shin area of discoloration   Skin:     General: Skin is warm.      Capillary Refill: Capillary refill takes 2 to 3 seconds.   Neurological:      Mental Status: She is alert.    Psychiatric:         Mood and Affect: Mood normal.         Behavior: Behavior normal.     Significant Labs: All pertinent labs within the past 24 hours have been reviewed.    Significant Imaging: I have reviewed all pertinent imaging results/findings within the past 24 hours.

## 2025-02-05 NOTE — PROGRESS NOTES
[facility] Surg  Infectious Disease  Progress Note    Patient Name: Elizabeth Dickson  MRN: [id number]  Admission Date: 2/3/2025  Length of Stay: 1 days  Attending Physician: Rocky Perez MD  Primary Care Provider: Porsha Masters MD    Isolation Status: No active isolations  Assessment/Plan:      ID  * Cellulitis  85 yo female with PMH of HTN, HLD, SCC of face and legs, breast CA s/p surgery/chemo and radx, hypothyroidism, CKD3, and ZEKE sent from clinic for worsening RLE cellulitis after PO Abx      -would continue vanco with goal trough of 10-15  -continue vanco while admited  -monitor for improvement in exam  -on discharge will switch to po doxy bid x a total of 14 days (including time on vanco)          Thank you for your consult. I will sign off. Please contact us if you have any additional questions.    Yonatan Barillas MD  Infectious Disease  [facility] Med [facility]    Subjective:     Principal Problem:Cellulitis    HPI: 85 yo female with PMH of HTN, HLD, SCC of face and legs, breast CA s/p surgery/chemo and radx, hypothyroidism, CKD3, and ZEKE sent from clinic for worsening RLE cellulitis after PO Abx. The patient was in their usual state of health until she she got SCC bx on her BLE. Denies any chills, fever, nausea/vomiting, urinary or bowel changes, CP, SOB. Endorses worsening swelling of BLE and stopping her diuretics because of incr BUN. On 1/25 had wound culture of right anterior thigh showed pansens S. Aureus and patient was placed on Keflex by dermatology. Was also briefly on cipro but could not tolerate it due to diarrhea. Currently on zosyn and vanco.   Interval History: No acute events    Review of Systems   Constitutional:  Negative for chills and fever.   Respiratory:  Negative for cough and shortness of breath.    Cardiovascular:  Negative for chest pain and palpitations.   Gastrointestinal:  Negative for abdominal pain, nausea and vomiting.   Genitourinary:  Negative for dysuria.   Skin:   Positive for color change and wound.   Neurological:  Negative for dizziness and light-headedness.     Objective:     Vital Signs (Most Recent):  Temp: 97.6 °F (36.4 °C) (02/05/25 1110)  Pulse: 62 (02/05/25 1110)  Resp: 18 (02/05/25 1110)  BP: 122/71 (02/05/25 1110)  SpO2: 96 % (02/05/25 1110) Vital Signs (24h Range):  Temp:  [97.4 °F (36.3 °C)-98.1 °F (36.7 °C)] 97.6 °F (36.4 °C)  Pulse:  [60-73] 62  Resp:  [18-20] 18  SpO2:  [95 %-98 %] 96 %  BP: (121-146)/(56-71) 122/71     Weight: 72.4 kg (159 lb 9.8 oz)  Body mass index is 31.17 kg/m².    Estimated Creatinine Clearance: 21 mL/min (A) (based on SCr of 1.8 mg/dL (H)).     Physical Exam  HENT:      Head: Normocephalic and atraumatic.      Nose: Nose normal.      Mouth/Throat:      Pharynx: Oropharynx is clear.   Eyes:      Extraocular Movements: Extraocular movements intact.   Cardiovascular:      Rate and Rhythm: Normal rate and regular rhythm.      Heart sounds: Normal heart sounds.   Pulmonary:      Effort: Pulmonary effort is normal.      Breath sounds: Normal breath sounds.   Abdominal:      General: There is no distension.      Palpations: Abdomen is soft.      Tenderness: There is no abdominal tenderness.   Musculoskeletal:      Cervical back: Normal range of motion and neck supple.      Right lower leg: Edema present.      Left lower leg: Edema present.      Comments:   RLE shin with circumferential erythema, warm and tender   LLE left lateral shin area of discoloration   Skin:     General: Skin is warm.      Capillary Refill: Capillary refill takes 2 to 3 seconds.   Neurological:      Mental Status: She is alert.   Psychiatric:         Mood and Affect: Mood normal.         Behavior: Behavior normal.     Significant Labs: All pertinent labs within the past 24 hours have been reviewed.    Significant Imaging: I have reviewed all pertinent imaging results/findings within the past 24 hours.

## 2025-02-05 NOTE — PROGRESS NOTES
Pharmacokinetic Assessment Follow Up: IV Vancomycin    Vancomycin serum concentration assessment(s):    The random level was drawn correctly and can be used to guide therapy at this time. The measurement is within the desired definitive target range of 10 to 15 mcg/mL.    Vancomycin Regimen Plan:    Re-dose when the random level is less than 15 mcg/mL, next level to be drawn at 2/5 on 2230    Drug levels (last 3 results):  Recent Labs   Lab Result Units 02/04/25  2231   Vancomycin, Random ug/mL 11.7       Pharmacy will continue to follow and monitor vancomycin.    Please contact pharmacy at extension [phone] for questions regarding this assessment.    Thank you for the consult,   Ginna Lehman       Patient brief summary:  Elizabeth Dickson is a 84 y.o. female initiated on antimicrobial therapy with IV Vancomycin for treatment of skin & soft tissue infection    The patient's current regimen is pulse dose give vanco 1g now    Drug Allergies:   Review of patient's allergies indicates:   Allergen Reactions    Lisinopril      hyperkalemia       Actual Body Weight:   68kg    Renal Function:   Estimated Creatinine Clearance: 21.6 mL/min (A) (based on SCr of 1.7 mg/dL (H)).,     Dialysis Method (if applicable):  N/A    CBC (last 72 hours):  Recent Labs   Lab Result Units 02/03/25  1637 02/04/25  0245 02/04/25  0246   WBC K/uL 4.61  --  4.51   Hemoglobin g/dL 9.5*  --  8.8*   Hemoglobin A1C %  --  4.7  --    Hematocrit % 31.9*  --  29.1*   Platelets K/uL 268  --  247   Gran % % 70.3  --  74.8*   Lymph % % 18.2  --  17.7*   Mono % % 9.1  --  6.2   Eosinophil % % 1.3  --  0.2   Basophil % % 0.7  --  0.7   Differential Method  Automated  --  Automated       Metabolic Panel (last 72 hours):  Recent Labs   Lab Result Units 02/03/25  1637 02/04/25  0245 02/04/25  0912   Sodium mmol/L 144 143  --    Sodium, Urine mmol/L  --   --  85   Potassium mmol/L 4.2 4.6  --    Chloride mmol/L 107 111*  --    CO2 mmol/L 25 20*  --     Glucose mg/dL 97 140*  --    BUN mg/dL 34* 37*  --    Creatinine mg/dL 1.7* 1.7*  --    Creatinine, Urine mg/dL  --   --  113.6   Albumin g/dL 3.3* 2.8*  --    Total Bilirubin mg/dL 0.3 0.3  --    Alkaline Phosphatase U/L 39* 35*  --    AST U/L 44* 38  --    ALT U/L 28 24  --    Magnesium mg/dL  --  2.1  --    Phosphorus mg/dL  --  3.1  --        Vancomycin Administrations:  vancomycin given in the last 96 hours                     vancomycin 1,250 mg in 0.9% NaCl 250 mL IVPB (admixture device) (mg) 1,250 mg New Bag 02/03/25 7617                    Microbiologic Results:  Microbiology Results (last 7 days)       Procedure Component Value Units Date/Time    Aerobic culture [[id number]]     Order Status: No result Specimen: Skin from Leg, Right     Blood culture #2 **CANNOT BE ORDERED STAT** [[id number]] Collected: 02/03/25 1639    Order Status: Completed Specimen: Blood from Peripheral, Hand, Left Updated: 02/04/25 0715     Blood Culture, Routine No Growth to date    Blood culture #1 **CANNOT BE ORDERED STAT** [[id number]] Collected: 02/03/25 1638    Order Status: Completed Specimen: Blood from Peripheral, Antecubital, Left Updated: 02/04/25 0715     Blood Culture, Routine No Growth to date

## 2025-02-05 NOTE — CONSULTS
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 female w/ past medical history of CTCL, on bexarotene. She is currently admitted for leg cellulitis and is being treated with vancomycin with documented recommendations from ID to switch to doxycycline at time of discharge. Hematology is consulted for "sign off on rx patient taking as outpatient Bexarotene for CTCL." Patient seen in room 533 today. She notes leg remains bothersome to her.       PTA Medications   Medication Sig    ascorbic acid, vitamin C, (VITAMIN C) 1000 MG tablet Take 1,000 mg by mouth once daily.    atorvastatin (LIPITOR) 10 MG tablet TAKE 1 TABLET BY MOUTH EVERY DAY (Patient taking differently: Take 10 mg by mouth once daily.)    betamethasone dipropionate 0.05 % cream Apply topically once daily.    bexarotene 75 mg Cap Take 6 capsules by mouth Daily.    calcium carbonate 1250 MG capsule Take 1,250 mg by mouth once daily.    cholecalciferol, vitamin D3, (VITAMIN D3) 25 mcg (1,000 unit) capsule Take 1,000 Units by mouth once daily.    EScitalopram oxalate (LEXAPRO) 5 MG Tab TAKE 1 TABLET BY MOUTH EVERY DAY (Patient taking differently: Take 5 mg by mouth nightly.)    fenofibrate micronized (LOFIBRA) 200 MG Cap Take 200 mg by mouth daily with breakfast.    ferrous sulfate (FEOSOL) 325 mg (65 mg iron) Tab tablet Take 325 mg by mouth once daily.    latanoprost 0.005 % ophthalmic solution Place 1 drop into both eyes every evening.    levothyroxine (SYNTHROID) 100 MCG tablet Take 100 mcg by mouth before breakfast.    melatonin 10 mg Tab Take 10 mg by mouth nightly as needed.    metoprolol succinate (TOPROL-XL) 50 MG 24 hr tablet TAKE 1 TABLET BY MOUTH ONCE  DAILY (Patient taking differently: Take 50 mg by mouth once daily.) "    omeprazole (PRILOSEC) 20 MG capsule Take 20 mg by mouth once daily.      Review of patient's allergies indicates:   Allergen Reactions    Lisinopril      hyperkalemia       Past Medical History:   Diagnosis Date    Anxiety     Bilateral leg edema     Breast cancer     Cellulitis of left leg     CKD (chronic kidney disease), stage III     Hypertension     Hypothyroidism     Iron deficiency anemia     Osteopenia     Ovarian cyst     Teratoma and Benign Serous Cystadenoma    Pancreatic cyst     Pulmonary nodules     Renal mass     Vitamin D deficiency        Past Surgical History:   Procedure Laterality Date    BLOCK, NERVE, GENICULAR Bilateral 1/16/2025    Procedure: B/L GNB;  Surgeon: Rasta Lambert MD;  [facility] PAIN [facility];  Service: Pain Management;  Laterality: Bilateral;  no sed-no ac    BREAST SURGERY      debridement      Left leg     Removal of Ovaries      SKIN BIOPSY      1/5/24      Family History   Problem Relation Name Age of Onset    Hypertension Mother Dianne     Diabetes Father Max     Hypertension Father Max     Lung cancer Sister Ana     Heart disease Brother Nick     Cholelithiasis Brother Nick     Colon cancer Brother Nick     Heart disease Brother Kelby     COPD Brother Lama     Colon cancer Brother Kelby     Diabetes type I Other grandson        Social History     Tobacco Use    Smoking status: Never    Smokeless tobacco: Never   Substance Use Topics    Alcohol use: No    Drug use: Never        OBJECTIVE:     Vital Signs (Most Recent)   Temp: 97.6 °F (36.4 °C) (02/05/25 1110)  Pulse: 62 (02/05/25 1110)  Resp: 18 (02/05/25 1110)  BP: 122/71 (02/05/25 1110)  SpO2: 96 % (02/05/25 1110)  Body mass index is 31.17 kg/m².      Physical Exam:  Physical Exam  Vitals and nursing note reviewed.   Constitutional:       General: She is not in acute distress.     Appearance: Normal appearance. She is not toxic-appearing.   HENT:      Head: Normocephalic and atraumatic.   Eyes:       "General: No scleral icterus.     Conjunctiva/sclera: Conjunctivae normal.   Cardiovascular:      Rate and Rhythm: Normal rate.   Pulmonary:      Effort: Pulmonary effort is normal. No respiratory distress.   Abdominal:      General: There is no distension.   Musculoskeletal:         General: No swelling or deformity.      Cervical back: Neck supple.      Right lower leg: Edema present.   Skin:     Coloration: Skin is not jaundiced.      Findings: No erythema.      Comments: RLE cellulitis   Neurological:      General: No focal deficit present.      Mental Status: She is alert and oriented to person, place, and time.      Motor: No weakness.   Psychiatric:         Mood and Affect: Mood normal.         Behavior: Behavior normal.         Cardiac Enzymes: Ejection Fractions:    No results for input(s): "CPK", "CPKMB", "MB", "TROPONINI" in the last 72 hours. No results found for: "EF"     Chemistries:   Recent Labs   Lab 02/03/25  1637 02/04/25 0245 02/05/25  0200    143 143   K 4.2 4.6 5.0    111* 111*   CO2 25 20* 23   BUN 34* 37* 29*   CREATININE 1.7* 1.7* 1.8*   CALCIUM 9.8 9.3 9.1   PROT 6.7 5.7* 5.8*   BILITOT 0.3 0.3 0.3   ALKPHOS 39* 35* 34*   ALT 28 24 24   AST 44* 38 44*   MG  --  2.1 2.1   PHOS  --  3.1 4.1        CBC/Anemia Labs: Coags:    Recent Labs   Lab 02/03/25  1637 02/04/25  0245 02/04/25  0246 02/05/25  0200   WBC 4.61  --  4.51 3.34*   HGB 9.5*  --  8.8* 9.2*   HCT 31.9*  --  29.1* 30.9*     --  247 264   *  --  101* 100*   RDW 13.1  --  13.0 13.2   IRON  --  33  --   --    FERRITIN  --  796*  --   --    FOLATE  --  10.1  --   --    [id number]  --  345  --   --     No results for input(s): "PT", "INR", "APTT" in the last 168 hours.     POCT Glucose: HbA1c:    No results for input(s): "POCTGLUCOSE" in the last 168 hours. Hemoglobin A1C   Date Value Ref Range Status   02/04/2025 4.7 4.0 - 5.6 % Final     Comment:     ADA Screening Guidelines:  5.7-6.4%  Consistent with " prediabetes  >or=6.5%  Consistent with diabetes    High levels of fetal hemoglobin interfere with the HbA1C  assay. Heterozygous hemoglobin variants (HbS, HgC, etc)do  not significantly interfere with this assay.   However, presence of multiple variants may affect accuracy.          Lines/Drains:       Peripheral IV - Single Lumen 02/03/25 1637 20 G Left Antecubital (Active)   Site Assessment Clean;Dry;Intact;No redness;No swelling 02/05/25 1309   Line Securement Device Secured with sutureless device 02/03/25 2135   Extremity Assessment Distal to IV No abnormal discoloration;No redness;No swelling;No warmth 02/05/25 1309   Line Status Flushed;Saline locked 02/05/25 1309   Dressing Status Clean;Dry;Intact 02/05/25 1309   Dressing Intervention Integrity maintained 02/05/25 1309   Dressing Change Due 02/07/25 02/05/25 1309   Site Change Due 02/07/25 02/05/25 0900   Reason Not Rotated Not due 02/05/25 1309   Number of days: 1         ASSESSMENT/PLAN:     Active Hospital Problems    Diagnosis  POA    *Cellulitis [L03.90]  Yes      Resolved Hospital Problems   No resolved problems to display.       SUMMARY: 84 y.o. female here with Cellulitis. Her other PMH is notable for CTCL for which she is on Bexarotene.   There are contraindications to the use of bexarotene in a patient with active cellulitis. Bexarotene can be associated with an increased risk of infections, including cellulitis, as noted in the adverse reactions section of its prescribing information. The occurrence of infections, including bacterial infections, is a documented adverse effect of bexarotene therapy, which could potentially exacerbate an existing infection such as cellulitis.  I would advise the medication be held until infection is completely resolved.       Robert Malin MD, FACP  Hematology & Medical Oncology  [facility]

## 2025-02-06 LAB
ALBUMIN SERPL BCP-MCNC: 2.6 G/DL (ref 3.5–5.2)
ALP SERPL-CCNC: 34 U/L (ref 40–150)
ALT SERPL W/O P-5'-P-CCNC: 23 U/L (ref 10–44)
ANION GAP SERPL CALC-SCNC: 8 MMOL/L (ref 8–16)
AST SERPL-CCNC: 40 U/L (ref 10–40)
BASOPHILS # BLD AUTO: 0.04 K/UL (ref 0–0.2)
BASOPHILS NFR BLD: 1.2 % (ref 0–1.9)
BILIRUB SERPL-MCNC: 0.2 MG/DL (ref 0.1–1)
BUN SERPL-MCNC: 37 MG/DL (ref 8–23)
CALCIUM SERPL-MCNC: 8.9 MG/DL (ref 8.7–10.5)
CHLORIDE SERPL-SCNC: 111 MMOL/L (ref 95–110)
CO2 SERPL-SCNC: 24 MMOL/L (ref 23–29)
CREAT SERPL-MCNC: 1.4 MG/DL (ref 0.5–1.4)
DIFFERENTIAL METHOD BLD: ABNORMAL
EOSINOPHIL # BLD AUTO: 0.4 K/UL (ref 0–0.5)
EOSINOPHIL NFR BLD: 10.9 % (ref 0–8)
ERYTHROCYTE [DISTWIDTH] IN BLOOD BY AUTOMATED COUNT: 13 % (ref 11.5–14.5)
EST. GFR  (NO RACE VARIABLE): 37 ML/MIN/1.73 M^2
GLUCOSE SERPL-MCNC: 95 MG/DL (ref 70–110)
HCT VFR BLD AUTO: 28.3 % (ref 37–48.5)
HGB BLD-MCNC: 8.5 G/DL (ref 12–16)
IMM GRANULOCYTES # BLD AUTO: 0.01 K/UL (ref 0–0.04)
IMM GRANULOCYTES NFR BLD AUTO: 0.3 % (ref 0–0.5)
LYMPHOCYTES # BLD AUTO: 1.1 K/UL (ref 1–4.8)
LYMPHOCYTES NFR BLD: 32.4 % (ref 18–48)
MAGNESIUM SERPL-MCNC: 2.1 MG/DL (ref 1.6–2.6)
MCH RBC QN AUTO: 30.2 PG (ref 27–31)
MCHC RBC AUTO-ENTMCNC: 30 G/DL (ref 32–36)
MCV RBC AUTO: 101 FL (ref 82–98)
MONOCYTES # BLD AUTO: 0.5 K/UL (ref 0.3–1)
MONOCYTES NFR BLD: 16.1 % (ref 4–15)
NEUTROPHILS # BLD AUTO: 1.3 K/UL (ref 1.8–7.7)
NEUTROPHILS NFR BLD: 39.1 % (ref 38–73)
NRBC BLD-RTO: 0 /100 WBC
PHOSPHATE SERPL-MCNC: 3.9 MG/DL (ref 2.7–4.5)
PLATELET # BLD AUTO: 250 K/UL (ref 150–450)
PMV BLD AUTO: 10.5 FL (ref 9.2–12.9)
POTASSIUM SERPL-SCNC: 4.3 MMOL/L (ref 3.5–5.1)
PROT SERPL-MCNC: 5.3 G/DL (ref 6–8.4)
RBC # BLD AUTO: 2.81 M/UL (ref 4–5.4)
SODIUM SERPL-SCNC: 143 MMOL/L (ref 136–145)
WBC # BLD AUTO: 3.3 K/UL (ref 3.9–12.7)

## 2025-02-06 PROCEDURE — 63600175 PHARM REV CODE 636 W HCPCS: Performed by: INTERNAL MEDICINE

## 2025-02-06 PROCEDURE — 84100 ASSAY OF PHOSPHORUS: CPT

## 2025-02-06 PROCEDURE — 83735 ASSAY OF MAGNESIUM: CPT

## 2025-02-06 PROCEDURE — 11000001 HC ACUTE MED/SURG PRIVATE ROOM

## 2025-02-06 PROCEDURE — 25000003 PHARM REV CODE 250

## 2025-02-06 PROCEDURE — 25000003 PHARM REV CODE 250: Performed by: INTERNAL MEDICINE

## 2025-02-06 PROCEDURE — 80202 ASSAY OF VANCOMYCIN: CPT | Performed by: INTERNAL MEDICINE

## 2025-02-06 PROCEDURE — 63600175 PHARM REV CODE 636 W HCPCS

## 2025-02-06 PROCEDURE — 80053 COMPREHEN METABOLIC PANEL: CPT

## 2025-02-06 PROCEDURE — 36415 COLL VENOUS BLD VENIPUNCTURE: CPT | Performed by: INTERNAL MEDICINE

## 2025-02-06 PROCEDURE — 36415 COLL VENOUS BLD VENIPUNCTURE: CPT

## 2025-02-06 PROCEDURE — 85025 COMPLETE CBC W/AUTO DIFF WBC: CPT

## 2025-02-06 RX ORDER — CEFTRIAXONE 1 G/1
1 INJECTION, POWDER, FOR SOLUTION INTRAMUSCULAR; INTRAVENOUS
Status: DISCONTINUED | OUTPATIENT
Start: 2025-02-06 | End: 2025-02-08 | Stop reason: HOSPADM

## 2025-02-06 RX ADMIN — LEVOTHYROXINE SODIUM 100 MCG: 100 TABLET ORAL at 05:02

## 2025-02-06 RX ADMIN — ESCITALOPRAM OXALATE 5 MG: 5 TABLET, FILM COATED ORAL at 09:02

## 2025-02-06 RX ADMIN — METOPROLOL SUCCINATE 50 MG: 50 TABLET, EXTENDED RELEASE ORAL at 09:02

## 2025-02-06 RX ADMIN — HYDROCODONE BITARTRATE AND ACETAMINOPHEN 1 TABLET: 5; 325 TABLET ORAL at 05:02

## 2025-02-06 RX ADMIN — GABAPENTIN 300 MG: 300 CAPSULE ORAL at 09:02

## 2025-02-06 RX ADMIN — CEFTRIAXONE SODIUM 1 G: 1 INJECTION, POWDER, FOR SOLUTION INTRAMUSCULAR; INTRAVENOUS at 09:02

## 2025-02-06 RX ADMIN — ENOXAPARIN SODIUM 30 MG: 30 INJECTION SUBCUTANEOUS at 04:02

## 2025-02-06 RX ADMIN — Medication 9 MG: at 09:02

## 2025-02-06 RX ADMIN — HYDROCODONE BITARTRATE AND ACETAMINOPHEN 1 TABLET: 5; 325 TABLET ORAL at 04:02

## 2025-02-06 RX ADMIN — GABAPENTIN 300 MG: 300 CAPSULE ORAL at 03:02

## 2025-02-06 RX ADMIN — VANCOMYCIN HYDROCHLORIDE 750 MG: 750 INJECTION, POWDER, LYOPHILIZED, FOR SOLUTION INTRAVENOUS at 12:02

## 2025-02-06 RX ADMIN — ASPIRIN 81 MG CHEWABLE TABLET 81 MG: 81 TABLET CHEWABLE at 09:02

## 2025-02-06 RX ADMIN — ATORVASTATIN CALCIUM 10 MG: 10 TABLET, FILM COATED ORAL at 09:02

## 2025-02-06 NOTE — PLAN OF CARE
Pt AAOx3. Medications administered per order. Ambulates with cane and assist to restroom. RLE redness within lines. Encouraged to call with questions/concerns/assistance. Safety.

## 2025-02-06 NOTE — PLAN OF CARE
SHAKIR met with pt at bedside this AM to discuss final dc assessment. Per pt she would rather d/c with Home Health and to have [facility] service her for wound care. SHAKIR called nurse Pamella [phone] to inform her of above. SHAKIR sent HH referrals via [employer]. SHAKIR canceled [facility] referral system. Pt is likely to dc later today. Cm will continue to follow pt through transitions of care and assist with any discharge needs.    Robe Lynn, MSTANA  [phone]    Future Appointments   Date Time Provider Department Center   2/14/2025  1:00 PM Debi Davis MD [facility] WOUND [facility]   2/14/2025  2:00 PM Arelis Noland MD [facility] IMPRI [facility]   6/26/2025 10:00 AM Porsha Masters MD [facility]        02/06/25 1152   Final Note   Assessment Type Final Discharge Note   Anticipated Discharge Disposition Home-Health   What phone number can be called within the next 1-3 days to see how you are doing after discharge? [phone]   Post-Acute Status   Home Health Status Referrals Sent   Discharge Delays None known at this time

## 2025-02-06 NOTE — PROGRESS NOTES
Pharmacokinetic Assessment Follow Up: IV Vancomycin    Vancomycin serum concentration assessment(s):    The random level was drawn correctly and can be used to guide therapy at this time. The measurement is within the desired definitive target range of 10 to 15 mcg/mL.    Vancomycin Regimen Plan:    Re-dose when the random level is less than 15 mcg/mL, next level to be drawn at 27 on 0000    Drug levels (last 3 results):  Recent Labs   Lab Result Units 02/04/25  2231 02/05/25  2239   Vancomycin, Random ug/mL 11.7 14.6       Pharmacy will continue to follow and monitor vancomycin.    Please contact pharmacy at extension [phone] for questions regarding this assessment.    Thank you for the consult,   Ginna Lehman       Patient brief summary:  Elizabeth Dickson is a 84 y.o. female initiated on antimicrobial therapy with IV Vancomycin for treatment of skin & soft tissue infection    The patient's current regimen is pulse dose give vanco 750mg     Drug Allergies:   Review of patient's allergies indicates:   Allergen Reactions    Lisinopril      hyperkalemia       Actual Body Weight:   74kg    Renal Function:   Estimated Creatinine Clearance: 21.4 mL/min (A) (based on SCr of 1.8 mg/dL (H)).,     Dialysis Method (if applicable):  N/A    CBC (last 72 hours):  Recent Labs   Lab Result Units 02/03/25  1637 02/04/25  0245 02/04/25  0246 02/05/25  0200   WBC K/uL 4.61  --  4.51 3.34*   Hemoglobin g/dL 9.5*  --  8.8* 9.2*   Hemoglobin A1C %  --  4.7  --   --    Hematocrit % 31.9*  --  29.1* 30.9*   Platelets K/uL 268  --  247 264   Gran % % 70.3  --  74.8* 35.6*   Lymph % % 18.2  --  17.7* 38.3   Mono % % 9.1  --  6.2 16.2*   Eosinophil % % 1.3  --  0.2 8.7*   Basophil % % 0.7  --  0.7 0.9   Differential Method  Automated  --  Automated Automated       Metabolic Panel (last 72 hours):  Recent Labs   Lab Result Units 02/03/25  1637 02/04/25  0245 02/04/25  0912 02/05/25  0200   Sodium mmol/L 144 143  --  143   Sodium, Urine  mmol/L  --   --  85  --    Potassium mmol/L 4.2 4.6  --  5.0   Chloride mmol/L 107 111*  --  111*   CO2 mmol/L 25 20*  --  23   Glucose mg/dL 97 140*  --  76   BUN mg/dL 34* 37*  --  29*   Creatinine mg/dL 1.7* 1.7*  --  1.8*   Creatinine, Urine mg/dL  --   --  113.6  --    Albumin g/dL 3.3* 2.8*  --  2.8*   Total Bilirubin mg/dL 0.3 0.3  --  0.3   Alkaline Phosphatase U/L 39* 35*  --  34*   AST U/L 44* 38  --  44*   ALT U/L 28 24  --  24   Magnesium mg/dL  --  2.1  --  2.1   Phosphorus mg/dL  --  3.1  --  4.1       Vancomycin Administrations:  vancomycin given in the last 96 hours                     vancomycin (VANCOCIN) 1,000 mg in 0.9% NaCl 250 mL IVPB (admixture device) (mg) 1,000 mg New Bag 02/05/25 0114    vancomycin 1,250 mg in 0.9% NaCl 250 mL IVPB (admixture device) (mg) 1,250 mg New Bag 02/03/25 2326                    Microbiologic Results:  Microbiology Results (last 7 days)       Procedure Component Value Units Date/Time    Blood culture #2 **CANNOT BE ORDERED STAT** [[id number]] Collected: 02/03/25 1639    Order Status: Completed Specimen: Blood from Peripheral, Hand, Left Updated: 02/05/25 0613     Blood Culture, Routine No Growth to date      No Growth to date    Blood culture #1 **CANNOT BE ORDERED STAT** [[id number]] Collected: 02/03/25 1638    Order Status: Completed Specimen: Blood from Peripheral, Antecubital, Left Updated: 02/05/25 0613     Blood Culture, Routine No Growth to date      No Growth to date    Aerobic culture [[id number]]     Order Status: No result Specimen: Skin from Leg, Right

## 2025-02-06 NOTE — MEDICAL/APP STUDENT
[facility] Medicine Progress Note    Primary Team: [facility] Hospitalist Team   Attending Physician: Rocky Perez MD  Resident: Laura Edmonds MD  Intern: Robert Flores MD    Date of Admit: 2/3/2025     Subjective/Interval Events:      NAEON. AF, HDS. RLE erythema unchanged from yesterday, referenced by marking. Patient has no acute complaints at this time and is comfortable.      Objective:   Last 24 Hour Vital Signs:  BP  Min: 122/71  Max: 130/57  Temp  Av °F (36.7 °C)  Min: 97.5 °F (36.4 °C)  Max: 98.4 °F (36.9 °C)  Pulse  Av.6  Min: 62  Max: 77  Resp  Av.3  Min: 16  Max: 20  SpO2  Av.6 %  Min: 95 %  Max: 100 %  Weight  Av.7 kg (164 lb 10.9 oz)  Min: 74.7 kg (164 lb 10.9 oz)  Max: 74.7 kg (164 lb 10.9 oz)    Intake/Output Summary (Last 24 hours) at 2025 0724  Last data filed at 2025 0716  Gross per 24 hour   Intake 360 ml   Output 1300 ml   Net -940 ml       Physical Examination:    General appearance: AAOx4   Head: Normocephalic, without obvious abnormality, atraumatic  Neck: no adenopathy, no JVD  Lungs: Clear to auscultation bilaterally, no wheezes/rales/ or rhonchi, no respiratory distress.   Heart: regular rate and rhythm, S1, S2 normal, no murmur, click, rub or gallop  Abdomen: Soft, non-tender, non-distended, no rebound, guarding or rigidity  Extremities: BLLE pitting edema; cellulitis to RLE; unchanged from maryse yesterday 05FEB  Pulses: 2+ in upper extremities; 2+ DP signaled via doppler to BLLE  Skin: RLE shin with circumferential erythema and is warm and TTP. No drainage or odor present. LLE lateral shin and knee areas of discoloration d/t SCC (both chronic per patient)   Neurologic: No focal neurological deficit, no facial asymmetry, strength equal and symmetric.      Laboratory:  Recent Labs   Lab 25  0245 25  0246 25  0200 25   WBC  --  4.51 3.34*  --  3.30*   HGB  --  8.8* 9.2*  --  8.5*   HCT  --  29.1* 30.9*  --   28.3*   PLT  --  247 264  --  250   MCV  --  101* 100*  --  101*   RDW  --  13.0 13.2  --  13.0     --  143 143  --    K 4.6  --  5.0 4.3  --    *  --  111* 111*  --    CO2 20*  --  23 24  --    BUN 37*  --  29* 37*  --    CREATININE 1.7*  --  1.8* 1.4  --    *  --  76 95  --    PROT 5.7*  --  5.8* 5.3*  --    ALBUMIN 2.8*  --  2.8* 2.6*  --    BILITOT 0.3  --  0.3 0.2  --    AST 38  --  44* 40  --    ALKPHOS 35*  --  34* 34*  --    ALT 24  --  24 23  --        Microbiology:  Blood cultures: NGTD 48hrs    Imaging:  .[email or web address]    Current Medications:     Scheduled:   aspirin  81 mg Oral Daily    atorvastatin  10 mg Oral Daily    enoxparin  30 mg Subcutaneous Daily    EScitalopram oxalate  5 mg Oral Nightly    gabapentin  300 mg Oral TID    levothyroxine  100 mcg Oral Before breakfast    metoprolol succinate  50 mg Oral Daily         Infusions:       PRN:    Current Facility-Administered Medications:     dextrose 50%, 12.5 g, Intravenous, PRN    dextrose 50%, 25 g, Intravenous, PRN    glucagon (human recombinant), 1 mg, Intramuscular, PRN    glucose, 16 g, Oral, PRN    glucose, 24 g, Oral, PRN    HYDROcodone-acetaminophen, 1 tablet, Oral, Q8H PRN    melatonin, 9 mg, Oral, Nightly PRN    naloxone, 0.02 mg, Intravenous, PRN    sodium chloride 0.9%, 10 mL, Intravenous, Q12H PRN    Pharmacy to dose Vancomycin consult, , , Once **AND** vancomycin - pharmacy to dose, , Intravenous, pharmacy to manage frequency      Plan:       RLE cellulitis 2/2 SCC Bx, failed PO abx   Hx of SCC of legs and face  BLE swelling  - afebrile, RLE shin cellulitis after lesion bx with dermatology at [facility] dermatology, failed keflex, transitioned to cipro but did not tolerate 2/2 diarrhea, finished keflex course  - XR in ED showing swelling subq edema but no free air   - no leukocytosis, CRP 64, ESR 16   - Bcx ordered; NGTD 48hrs  - home creams not available   Plan:  - DVT US BLE - neg DVT study  - arterial RLE US - Diffuse  atherosclerosis with biphasic and monophasic waveforms which can be seen with inflow stenosis. No elevated velocities to suggest hemodynamically significant stenosis   - ID consulted - recc discontinuing zosyn, continue IV vanc with trough 10-15. Transition to PO antibiotics on discharge.   - Surgery consulted: no Acute intervention.   - fidencio TID, jonah prn for pain     Macrocytic anemia   - H/H on adm 9.5/32,   - on daily iron PO  - B12 345, folate 10.1, iron studies low, elevated ferritin.     CKD  - Cr on adm 1.7, BUN 34; baseline 1.1-1.8 but more likely 1.8, recent bump per pt 2/2 diuretics per pt   - continue to monitor, 1L given, if needed but no TTE available   - currently 37/1.4     Hyperlipidemia  HTN  - A1c 4.7; lipid chol 122, HDL 38, trig 84, LDL 67  - continue home atorvastatin, fenofibrate   - continue home metoprolol, unclear if for BP     Depression?  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

## 2025-02-06 NOTE — PROGRESS NOTES
[facility] IM Resident HO-2 Progress Note    Subjective:      NAEON. AF, VSS. Pending reassessment and if okay possibly dispo on PO abx and HH.      Objective:   Last 24 Hour Vital Signs:  BP  Min: 122/71  Max: 138/70  Temp  Av.1 °F (36.7 °C)  Min: 97.5 °F (36.4 °C)  Max: 98.4 °F (36.9 °C)  Pulse  Av.2  Min: 62  Max: 77  Resp  Av.2  Min: 16  Max: 20  SpO2  Av.2 %  Min: 95 %  Max: 100 %  Weight  Av.7 kg (164 lb 10.9 oz)  Min: 74.7 kg (164 lb 10.9 oz)  Max: 74.7 kg (164 lb 10.9 oz)  I/O last 3 completed shifts:  In: 480 [P.O.:480]  Out: 1500 [Urine:1500]    Physical Examination:  HENT:      Head: Normocephalic and atraumatic.      Nose: Nose normal.      Mouth/Throat:      Pharynx: Oropharynx is clear.   Eyes:      Extraocular Movements: Extraocular movements intact.   Cardiovascular:      Rate and Rhythm: Normal rate and regular rhythm.      Heart sounds: Normal heart sounds.   Pulmonary:      Effort: Pulmonary effort is normal.      Breath sounds: Normal breath sounds.   Abdominal:      General: There is no distension.      Palpations: Abdomen is soft.      Tenderness: There is no abdominal tenderness.   Musculoskeletal:      Cervical back: Normal range of motion and neck supple.      Right lower leg: Edema present.      Left lower leg: Edema present.      Comments: Dopplerable 2+ DP BLE; BLE pitting edema to knees  RLE shin with circumferential erythema, warm and tender to touch. No drainage or odor present.   LLE left lateral shin area of discoloration and LLE knee area of discoloration, per pt both chronic and 2/2 SCC.    Gross ROM intact, sensation intact.    Skin:     General: Skin is warm.      Capillary Refill: Capillary refill takes 2 to 3 seconds.   Neurological:      Mental Status: She is alert.   Psychiatric:         Mood and Affect: Mood normal.         Behavior: Behavior normal.      Laboratory:  Laboratory Data Reviewed: yes  Pertinent Findings:  Trended Lab Data:  Recent Labs   Lab  "02/04/25  0245 02/04/25  0246 02/05/25  0200 02/06/25  0222 02/06/25 0223   WBC  --  4.51 3.34*  --  3.30*   HGB  --  8.8* 9.2*  --  8.5*   HCT  --  29.1* 30.9*  --  28.3*   PLT  --  247 264  --  250   MCV  --  101* 100*  --  101*   RDW  --  13.0 13.2  --  13.0     --  143 143  --    K 4.6  --  5.0 4.3  --    *  --  111* 111*  --    CO2 20*  --  23 24  --    BUN 37*  --  29* 37*  --    CREATININE 1.7*  --  1.8* 1.4  --    *  --  76 95  --    PROT 5.7*  --  5.8* 5.3*  --    ALBUMIN 2.8*  --  2.8* 2.6*  --    BILITOT 0.3  --  0.3 0.2  --    AST 38  --  44* 40  --    ALKPHOS 35*  --  34* 34*  --    ALT 24  --  24 23  --        Trended Cardiac Data:  No results for input(s): "TROPONINI", "CKTOTAL", "CKMB", "BNP" in the last 168 hours.    Microbiology Data Reviewed: yes  Pertinent Findings:  BCX 2/3 pend    Other Results:  EKG (my interpretation):none new   Radiology Data Reviewed:   Pertinent Findings:      Current Medications:     Infusions:       Scheduled:   aspirin  81 mg Oral Daily    atorvastatin  10 mg Oral Daily    enoxparin  30 mg Subcutaneous Daily    EScitalopram oxalate  5 mg Oral Nightly    gabapentin  300 mg Oral TID    levothyroxine  100 mcg Oral Before breakfast    metoprolol succinate  50 mg Oral Daily        PRN:    Current Facility-Administered Medications:     dextrose 50%, 12.5 g, Intravenous, PRN    dextrose 50%, 25 g, Intravenous, PRN    glucagon (human recombinant), 1 mg, Intramuscular, PRN    glucose, 16 g, Oral, PRN    glucose, 24 g, Oral, PRN    HYDROcodone-acetaminophen, 1 tablet, Oral, Q8H PRN    melatonin, 9 mg, Oral, Nightly PRN    naloxone, 0.02 mg, Intravenous, PRN    sodium chloride 0.9%, 10 mL, Intravenous, Q12H PRN    Pharmacy to dose Vancomycin consult, , , Once **AND** vancomycin - pharmacy to dose, , Intravenous, pharmacy to manage frequency    Antibiotics and Day Number of Therapy:  Vanc 2/3-  Zosyn 2/3-2/4    Assessment:     Elizabeth Dickson is a 84 " y.o.female with  Patient Active Problem List    Diagnosis Date Noted    Cellulitis 02/03/2025    Current chronic use of systemic steroids 01/31/2024    Decreased functional activity tolerance 12/12/2023    Decreased strength of lower extremity 12/12/2023    Wrist lump, left 06/19/2023    Mycosis fungoides 06/19/2023    Cutaneous T-cell lymphoma, unspecified, 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    Atherosclerosis of aorta 02/01/2023    Other specified spondylopathies, 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/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    Acute pain of left knee 02/01/2022    Umbilical bleeding 02/01/2022    Bilateral leg edema 07/01/2021    Anxiety 01/14/2021    Depression 01/14/2021    Acute cystitis without hematuria 04/30/2020    Hyperkalemia 04/30/2020    Dermatitis 04/30/2020    Chronic right-sided low back pain without sciatica 02/06/2020    Hyperlipidemia 02/06/2020    Hypertension     Stage 3a chronic kidney disease     Hypothyroidism     Pancreatic cyst     Right renal mass 05/26/2018    Microalbuminuria 03/16/2018    Breast cancer 03/16/2018    Pulmonary nodules 03/16/2018    Osteopenia 03/16/2018    Hypothyroid 03/16/2018    Acidosis 03/16/2018    Iron deficiency anemia 01/09/2018        Plan:     RLE cellulitis 2/2 SCC Bx, failed PO abx   Hx of SCC of legs and face  BLE swelling  - afebrile, RLE shin cellulitis after lesion bx with dermatology at [facility] dermatology, failed keflex, transitioned to cipro but did not tolerate 2/2 diarrhea, finished keflex course  - XR in ED showing swelling subq edema but no free air   - no leukocytosis, CRP 64, ESR pend   - Bcx ordered  - home creams not available   - DVT US BLE - neg DVT study  - arterial RLE US - Diffuse atherosclerosis with biphasic and monophasic waveforms which can be seen with inflow stenosis. No elevated velocities to suggest hemodynamically significant stenosis   - ID consulted - recc discontinuing zosyn, continue IV vanc with trough 10-15. Transition to PO antibiotics on discharge.   - Surgery  consulted: no Acute intervention.   Plan:  - HH wound care on dispo and outpt derm fup  - doxy course on dispo  - fidencio TID, jonah prn for pain     Macrocytic anemia   - H/H on adm 9.5/32, MCV 10, baseline old from 1 years ago 13  - on daily iron PO  - B12 345, folate 10.1, iron studies low, elevated ferritin.     CKD  - Cr on adm 1.7, BUN 34; baseline 1.1-1.8 but more likely 1.8, recent bump per pt 2/2 diuretics per pt   - continue to monitor, 1L given, if needed but no TTE available      Hyperlipidemia  HTN  - A1c 4.7; lipid chol 122, HDL 38, trig 84, LDL 67  - continue home atorvastatin, fenofibrate   - continue home metoprolol, unclear if for BP     Depression?  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

## 2025-02-06 NOTE — PLAN OF CARE
Assumed care of patient. Patient in no apparent distress on room air. Patient ambulated to bathroom throughout shift. Medications administered via MAR. Call light within reach, bed alarm set, safety precautions maintained.    Problem: Adult Inpatient Plan of Care  Goal: Plan of Care Review  Outcome: Progressing  Goal: Patient-Specific Goal (Individualized)  Outcome: Progressing  Goal: Absence of Hospital-Acquired Illness or Injury  Outcome: Progressing  Goal: Optimal Comfort and Wellbeing  Outcome: Progressing  Goal: Readiness for Transition of Care  Outcome: Progressing     Problem: Skin or Soft Tissue Infection  Goal: Absence of Infection Signs and Symptoms  Outcome: Progressing     Problem: Fall Injury Risk  Goal: Absence of Fall and Fall-Related Injury  Outcome: Progressing     Problem: Wound  Goal: Optimal Coping  Outcome: Progressing  Goal: Optimal Functional Ability  Outcome: Progressing  Goal: Absence of Infection Signs and Symptoms  Outcome: Progressing  Goal: Improved Oral Intake  Outcome: Progressing  Goal: Optimal Pain Control and Function  Outcome: Progressing  Goal: Skin Health and Integrity  Outcome: Progressing  Goal: Optimal Wound Healing  Outcome: Progressing     Problem: Comorbidity Management  Goal: Blood Pressure in Desired Range  Outcome: Progressing     Problem: Chronic Kidney Disease  Goal: Optimal Coping with Chronic Illness  Outcome: Progressing  Goal: Electrolyte Balance  Outcome: Progressing  Goal: Fluid Balance  Outcome: Progressing  Goal: Optimal Functional Ability  Outcome: Progressing  Goal: Absence of Anemia Signs and Symptoms  Outcome: Progressing  Goal: Optimal Oral Intake  Outcome: Progressing  Goal: Acceptable Pain Control  Outcome: Progressing  Goal: Minimize Renal Failure Effects  Outcome: Progressing     Problem: Skin Injury Risk Increased  Goal: Skin Health and Integrity  Outcome: Progressing

## 2025-02-07 LAB
ALBUMIN SERPL BCP-MCNC: 2.6 G/DL (ref 3.5–5.2)
ALP SERPL-CCNC: 37 U/L (ref 40–150)
ALT SERPL W/O P-5'-P-CCNC: 26 U/L (ref 10–44)
ANION GAP SERPL CALC-SCNC: 8 MMOL/L (ref 8–16)
AST SERPL-CCNC: 46 U/L (ref 10–40)
BASOPHILS # BLD AUTO: 0.03 K/UL (ref 0–0.2)
BASOPHILS NFR BLD: 0.8 % (ref 0–1.9)
BILIRUB SERPL-MCNC: 0.2 MG/DL (ref 0.1–1)
BUN SERPL-MCNC: 39 MG/DL (ref 8–23)
CALCIUM SERPL-MCNC: 9 MG/DL (ref 8.7–10.5)
CHLORIDE SERPL-SCNC: 111 MMOL/L (ref 95–110)
CO2 SERPL-SCNC: 25 MMOL/L (ref 23–29)
CREAT SERPL-MCNC: 1.4 MG/DL (ref 0.5–1.4)
DIFFERENTIAL METHOD BLD: ABNORMAL
EOSINOPHIL # BLD AUTO: 0.3 K/UL (ref 0–0.5)
EOSINOPHIL NFR BLD: 7.9 % (ref 0–8)
ERYTHROCYTE [DISTWIDTH] IN BLOOD BY AUTOMATED COUNT: 12.9 % (ref 11.5–14.5)
EST. GFR  (NO RACE VARIABLE): 37 ML/MIN/1.73 M^2
GLUCOSE SERPL-MCNC: 99 MG/DL (ref 70–110)
HCT VFR BLD AUTO: 29.2 % (ref 37–48.5)
HGB BLD-MCNC: 8.9 G/DL (ref 12–16)
IMM GRANULOCYTES # BLD AUTO: 0.01 K/UL (ref 0–0.04)
IMM GRANULOCYTES NFR BLD AUTO: 0.3 % (ref 0–0.5)
LYMPHOCYTES # BLD AUTO: 0.8 K/UL (ref 1–4.8)
LYMPHOCYTES NFR BLD: 21.3 % (ref 18–48)
MAGNESIUM SERPL-MCNC: 2 MG/DL (ref 1.6–2.6)
MCH RBC QN AUTO: 30.4 PG (ref 27–31)
MCHC RBC AUTO-ENTMCNC: 30.5 G/DL (ref 32–36)
MCV RBC AUTO: 100 FL (ref 82–98)
MONOCYTES # BLD AUTO: 0.5 K/UL (ref 0.3–1)
MONOCYTES NFR BLD: 12.9 % (ref 4–15)
NEUTROPHILS # BLD AUTO: 2.2 K/UL (ref 1.8–7.7)
NEUTROPHILS NFR BLD: 56.8 % (ref 38–73)
NRBC BLD-RTO: 0 /100 WBC
PHOSPHATE SERPL-MCNC: 3.5 MG/DL (ref 2.7–4.5)
PLATELET # BLD AUTO: 264 K/UL (ref 150–450)
PMV BLD AUTO: 10.5 FL (ref 9.2–12.9)
POTASSIUM SERPL-SCNC: 4.4 MMOL/L (ref 3.5–5.1)
PROT SERPL-MCNC: 5.4 G/DL (ref 6–8.4)
RBC # BLD AUTO: 2.93 M/UL (ref 4–5.4)
SODIUM SERPL-SCNC: 144 MMOL/L (ref 136–145)
VANCOMYCIN SERPL-MCNC: 14.8 UG/ML
WBC # BLD AUTO: 3.81 K/UL (ref 3.9–12.7)

## 2025-02-07 PROCEDURE — 25000003 PHARM REV CODE 250

## 2025-02-07 PROCEDURE — 25000003 PHARM REV CODE 250: Performed by: INTERNAL MEDICINE

## 2025-02-07 PROCEDURE — 83735 ASSAY OF MAGNESIUM: CPT

## 2025-02-07 PROCEDURE — 97535 SELF CARE MNGMENT TRAINING: CPT

## 2025-02-07 PROCEDURE — 97165 OT EVAL LOW COMPLEX 30 MIN: CPT

## 2025-02-07 PROCEDURE — 84100 ASSAY OF PHOSPHORUS: CPT

## 2025-02-07 PROCEDURE — 63600175 PHARM REV CODE 636 W HCPCS: Performed by: INTERNAL MEDICINE

## 2025-02-07 PROCEDURE — 63600175 PHARM REV CODE 636 W HCPCS

## 2025-02-07 PROCEDURE — 36415 COLL VENOUS BLD VENIPUNCTURE: CPT

## 2025-02-07 PROCEDURE — 85025 COMPLETE CBC W/AUTO DIFF WBC: CPT

## 2025-02-07 PROCEDURE — 97162 PT EVAL MOD COMPLEX 30 MIN: CPT

## 2025-02-07 PROCEDURE — 80053 COMPREHEN METABOLIC PANEL: CPT

## 2025-02-07 PROCEDURE — 11000001 HC ACUTE MED/SURG PRIVATE ROOM

## 2025-02-07 PROCEDURE — 97116 GAIT TRAINING THERAPY: CPT

## 2025-02-07 RX ORDER — HYDROCODONE BITARTRATE AND ACETAMINOPHEN 5; 325 MG/1; MG/1
1 TABLET ORAL EVERY 6 HOURS PRN
Status: DISCONTINUED | OUTPATIENT
Start: 2025-02-07 | End: 2025-02-08 | Stop reason: HOSPADM

## 2025-02-07 RX ORDER — ACETAMINOPHEN 325 MG/1
650 TABLET ORAL DAILY PRN
Status: DISCONTINUED | OUTPATIENT
Start: 2025-02-07 | End: 2025-02-08 | Stop reason: HOSPADM

## 2025-02-07 RX ORDER — TALC
12 POWDER (GRAM) TOPICAL NIGHTLY
Status: DISCONTINUED | OUTPATIENT
Start: 2025-02-07 | End: 2025-02-08 | Stop reason: HOSPADM

## 2025-02-07 RX ORDER — LIDOCAINE 50 MG/G
1 PATCH TOPICAL DAILY
Status: DISCONTINUED | OUTPATIENT
Start: 2025-02-07 | End: 2025-02-08 | Stop reason: HOSPADM

## 2025-02-07 RX ORDER — HYDROCODONE BITARTRATE AND ACETAMINOPHEN 5; 325 MG/1; MG/1
1 TABLET ORAL EVERY 8 HOURS PRN
Status: DISCONTINUED | OUTPATIENT
Start: 2025-02-07 | End: 2025-02-07

## 2025-02-07 RX ADMIN — GABAPENTIN 300 MG: 300 CAPSULE ORAL at 08:02

## 2025-02-07 RX ADMIN — GABAPENTIN 300 MG: 300 CAPSULE ORAL at 09:02

## 2025-02-07 RX ADMIN — GABAPENTIN 300 MG: 300 CAPSULE ORAL at 02:02

## 2025-02-07 RX ADMIN — HYDROCODONE BITARTRATE AND ACETAMINOPHEN 1 TABLET: 5; 325 TABLET ORAL at 12:02

## 2025-02-07 RX ADMIN — ENOXAPARIN SODIUM 30 MG: 30 INJECTION SUBCUTANEOUS at 04:02

## 2025-02-07 RX ADMIN — LEVOTHYROXINE SODIUM 100 MCG: 100 TABLET ORAL at 05:02

## 2025-02-07 RX ADMIN — ASPIRIN 81 MG CHEWABLE TABLET 81 MG: 81 TABLET CHEWABLE at 08:02

## 2025-02-07 RX ADMIN — CEFTRIAXONE SODIUM 1 G: 1 INJECTION, POWDER, FOR SOLUTION INTRAMUSCULAR; INTRAVENOUS at 09:02

## 2025-02-07 RX ADMIN — ACETAMINOPHEN 650 MG: 325 TABLET ORAL at 05:02

## 2025-02-07 RX ADMIN — METOPROLOL SUCCINATE 50 MG: 50 TABLET, EXTENDED RELEASE ORAL at 08:02

## 2025-02-07 RX ADMIN — VANCOMYCIN HYDROCHLORIDE 750 MG: 750 INJECTION, POWDER, LYOPHILIZED, FOR SOLUTION INTRAVENOUS at 01:02

## 2025-02-07 RX ADMIN — ACETAMINOPHEN 650 MG: 325 TABLET ORAL at 02:02

## 2025-02-07 RX ADMIN — ESCITALOPRAM OXALATE 5 MG: 5 TABLET, FILM COATED ORAL at 09:02

## 2025-02-07 RX ADMIN — ATORVASTATIN CALCIUM 10 MG: 10 TABLET, FILM COATED ORAL at 08:02

## 2025-02-07 RX ADMIN — Medication 12 MG: at 09:02

## 2025-02-07 NOTE — PROGRESS NOTES
Pharmacokinetic Assessment Follow Up: IV Vancomycin    Vancomycin serum concentration assessment(s):    The random level was drawn correctly and can be used to guide therapy at this time. The measurement is within the desired definitive target range of 10 to 15 mcg/mL.    Vancomycin Regimen Plan:    Continue regimen to Vancomycin 750 mg IV every once hours with next serum trough concentration measured at 0100 prior to next dose on 02/08    Drug levels (last 3 results):  Recent Labs   Lab Result Units 02/04/25  2231 02/05/25  2239 02/06/25  2355   Vancomycin, Random ug/mL 11.7 14.6 14.8       Pharmacy will continue to follow and monitor vancomycin.    Please contact pharmacy at extension [phone] for questions regarding this assessment.    Thank you for the consult,   Cynthia Samano       Patient brief summary:  Elizabeth Dickson is a 84 y.o. female initiated on antimicrobial therapy with IV Vancomycin for treatment of skin & soft tissue infection    The patient's current regimen is 750 mg once    Drug Allergies:   Review of patient's allergies indicates:   Allergen Reactions    Lisinopril      hyperkalemia       Actual Body Weight:   75.3 kg     Renal Function:   Estimated Creatinine Clearance: 27.6 mL/min (based on SCr of 1.4 mg/dL).,     Dialysis Method (if applicable):  N/A    CBC (last 72 hours):  Recent Labs   Lab Result Units 02/04/25  0245 02/04/25  0246 02/05/25  0200 02/06/25  0223   WBC K/uL  --  4.51 3.34* 3.30*   Hemoglobin g/dL  --  8.8* 9.2* 8.5*   Hemoglobin A1C % 4.7  --   --   --    Hematocrit %  --  29.1* 30.9* 28.3*   Platelets K/uL  --  247 264 250   Gran % %  --  74.8* 35.6* 39.1   Lymph % %  --  17.7* 38.3 32.4   Mono % %  --  6.2 16.2* 16.1*   Eosinophil % %  --  0.2 8.7* 10.9*   Basophil % %  --  0.7 0.9 1.2   Differential Method   --  Automated Automated Automated       Metabolic Panel (last 72 hours):  Recent Labs   Lab Result Units 02/04/25  0245 02/04/25  0912 02/05/25  0200  02/06/25  0222   Sodium mmol/L 143  --  143 143   Sodium, Urine mmol/L  --  85  --   --    Potassium mmol/L 4.6  --  5.0 4.3   Chloride mmol/L 111*  --  111* 111*   CO2 mmol/L 20*  --  23 24   Glucose mg/dL 140*  --  76 95   BUN mg/dL 37*  --  29* 37*   Creatinine mg/dL 1.7*  --  1.8* 1.4   Creatinine, Urine mg/dL  --  113.6  --   --    Albumin g/dL 2.8*  --  2.8* 2.6*   Total Bilirubin mg/dL 0.3  --  0.3 0.2   Alkaline Phosphatase U/L 35*  --  34* 34*   AST U/L 38  --  44* 40   ALT U/L 24  --  24 23   Magnesium mg/dL 2.1  --  2.1 2.1   Phosphorus mg/dL 3.1  --  4.1 3.9       Vancomycin Administrations:  vancomycin given in the last 96 hours                     vancomycin 750 mg in 0.9% NaCl 250 mL IVPB (admixture device) (mg) 750 mg New Bag 02/06/25 0016    vancomycin (VANCOCIN) 1,000 mg in 0.9% NaCl 250 mL IVPB (admixture device) (mg) 1,000 mg New Bag 02/05/25 0114    vancomycin 1,250 mg in 0.9% NaCl 250 mL IVPB (admixture device) (mg) 1,250 mg New Bag 02/03/25 2326                    Microbiologic Results:  Microbiology Results (last 7 days)       Procedure Component Value Units Date/Time    Blood culture #1 **CANNOT BE ORDERED STAT** [[id number]] Collected: 02/03/25 1638    Order Status: Completed Specimen: Blood from Peripheral, Antecubital, Left Updated: 02/06/25 0612     Blood Culture, Routine No Growth to date      No Growth to date      No Growth to date    Blood culture #2 **CANNOT BE ORDERED STAT** [[id number]] Collected: 02/03/25 1639    Order Status: Completed Specimen: Blood from Peripheral, Hand, Left Updated: 02/06/25 0612     Blood Culture, Routine No Growth to date      No Growth to date      No Growth to date    Aerobic culture [[id number]]     Order Status: No result Specimen: Skin from Leg, Right

## 2025-02-07 NOTE — PLAN OF CARE
Problem: Occupational Therapy  Goal: Occupational Therapy Goal  Description: Goals to be met by: 03/07     Patient will increase functional independence with ADLs by performing:    LE Dressing with Stand-by Assistance.  Toileting from toilet with Modified [place] for hygiene and clothing management.   Toilet transfer to toilet with Modified [place].  Increased functional strength to WFL for ADLs.    Outcome: Progressing   Pt found [facility] & agreeable to OT eval/tx this date.  Pt w/o c/o pain & perf the following:  -standing via SPC w/ CGA for short distance fxnl mobiltiy w/in room for ADLs w/ CGA  -G/H standing at sink w/ CGA for standing safety  -returned to b/s chair w/ Min A for controlled descent  Edu/tx re: general safety techs & HEP. Pt verbalized understanding.  Pt left [facility] w/ alarm & nsg made aware.    Pt presents w/ decreased overall endurance/conditioning, balance/mobility & coordination w/ subsequent decline in (I)/safety w/ BADLs, fxnl mobility & fxnl t/f's.  OT 2x/wk to increase phys/fxnl status & maximize potential to achieve established goals for d/c-->low intensity tx w/ rec hip kit.

## 2025-02-07 NOTE — PLAN OF CARE
SW met with pt at bedside to discuss dc planning. Pt reported that she still plans on having her drt [facility] [phone] help transport her home at time of dc. Pt is approved to receive [facility] at time of dc. Pt is pending those orders. Pt has a Dr. Noland f/u appt on Monday 9:30am. To make sure the abx she is taking will working as planned. Cm will continue to follow pt through transitions of care and assist with any discharge needs.    Robe Lynn, MSTANA  [phone]    Future Appointments   Date Time Provider Department Center   2/10/2025  9:30 AM Arelis Noland MD [facility] IMPR[facility]   2/14/2025  1:00 PM Debi Davis MD [facility] WOUND [facility]   6/26/2025 10:00 AM Porsha Masters MD [facility]        02/07/25 1309   Post-Acute Status   Post-Acute Authorization Home Health   Home Health Status Set-up Complete/Auth obtained   Discharge Delays None known at this time   Discharge Plan   Discharge Plan A Home Health

## 2025-02-07 NOTE — PT/OT/SLP EVAL
Occupational Therapy   Evaluation/tx    Name: Elizabeth Dickson  MRN: [id number]  Admitting Diagnosis: Cellulitis  Recent Surgery: * No surgery found *      Recommendations:     Discharge Recommendations: Low Intensity Therapy  Discharge Equipment Recommendations:  hip kit  Barriers to discharge:  None    Assessment:   Pt presents w/ decreased overall endurance/conditioning, balance/mobility & coordination w/ subsequent decline in (I)/safety w/ BADLs, fxnl mobility & fxnl t/f's.  OT 2x/wk to increase phys/fxnl status & maximize potential to achieve established goals for d/c-->low intensity tx w/ rec hip kit.    Elizabeth Dickson is a 84 y.o. female with a medical diagnosis of Cellulitis.  She presents with performance deficits affecting function: weakness, impaired endurance, impaired self care skills, impaired functional mobility, gait instability, impaired balance, decreased coordination, decreased lower extremity function, impaired skin, edema.      Rehab Prognosis: Good; patient would benefit from acute skilled OT services to address these deficits and reach maximum level of function.       Plan:     Patient to be seen 2 x/week to address the above listed problems via self-care/home management, therapeutic activities, therapeutic exercises  Plan of Care Expires: 03/07/25  Plan of Care Reviewed with: patient    Subjective     Chief Complaint: BLE infection  Patient/Family Comments/goals: return home    Occupational Profile:  Living Environment: w/ dgtr/COURTNEY in 2SH w/ 1STE; bedroom & WIT on 1st level  Previous level of function: MI via SPC  Roles and Routines: light IADLs  Equipment Used at Home: cane, straight, wheelchair, walker, rolling, raised toilet  Assistance upon Discharge: fly    Pain/Comfort:  Pain Rating 1: 0/10  Pain Rating Post-Intervention 1: 0/10    Patients cultural, spiritual, Jew conflicts given the current situation:      Objective:     Communicated with: michael prior to session.  Patient  found up in chair with chair check, peripheral IV upon OT entry to room.    General Precautions: Standard, fall  Orthopedic Precautions: N/A  Braces: N/A  Respiratory Status: Room air    Occupational Performance:    Bed Mobility:        Functional Mobility/Transfers:  Patient completed Sit <> Stand Transfer with contact guard assistance  with  straight cane   Patient completed Bed <> Chair Transfer using Step Transfer technique with minimum assistance with straight cane  Functional Mobility: via SPC for short distance w/in room w/ CGA    Activities of Daily Living:  Grooming: contact guard assistance for standing safety  Lower Body Dressing: maximal assistance doff/don socks    Cognitive/Visual Perceptual:  AO4    No signif deficits noted    Physical Exam:  BUEs WFL at 4/5 prox; 4+/5 distally    Sit balance: G-  Stand balance: F-    Sensation: grossly intact  Coordination: min decrease BLEs  Endurance: F    AMPAC 6 Click ADL:  AMPAC Total Score: 19    Treatment & Education:   Pt found [facility] & agreeable to OT eval/tx this date.  Pt w/o c/o pain & perf the following:  -standing via SPC w/ CGA for short distance fxnl mobiltiy w/in room for ADLs w/ CGA  -G/H standing at sink w/ CGA for standing safety  -returned to b/s chair w/ Min A for controlled descent  Edu/tx re: general safety techs & HEP. Pt verbalized understanding.  Pt left [facility] w/ alarm & nsg made aware.        Patient left up in chair with all lines intact, call button in reach, chair alarm on, and nsg notified    GOALS:   Multidisciplinary Problems       Occupational Therapy Goals          Problem: Occupational Therapy    Goal Priority Disciplines Outcome Interventions   Occupational Therapy Goal     OT, PT/OT Progressing    Description: Goals to be met by: 03/07     Patient will increase functional independence with ADLs by performing:    LE Dressing with Stand-by Assistance.  Toileting from toilet with Modified [place] for hygiene and clothing  management.   Toilet transfer to toilet with Modified [place].  Increased functional strength to WFL for ADLs.                         DME Justifications:  No DME recommended requiring DME justifications    History:     Past Medical History:   Diagnosis Date    Anxiety     Bilateral leg edema     Breast cancer     Cellulitis of left leg     CKD (chronic kidney disease), stage III     Hypertension     Hypothyroidism     Iron deficiency anemia     Osteopenia     Ovarian cyst     Teratoma and Benign Serous Cystadenoma    Pancreatic cyst     Pulmonary nodules     Renal mass     Vitamin D deficiency          Past Surgical History:   Procedure Laterality Date    BLOCK, NERVE, GENICULAR Bilateral 1/16/2025    Procedure: B/L GNB;  Surgeon: Rasta Lambert MD;  Location: [facility];  Service: Pain Management;  Laterality: Bilateral;  no sed-no ac    BREAST SURGERY      debridement      Left leg     Removal of Ovaries      SKIN BIOPSY      1/5/24       Time Tracking:     OT Date of Treatment: 02/07/25  OT Start Time: 1033  OT Stop Time: 1049  OT Total Time (min): 16 min    Billable Minutes:Evaluation 8  Self Care/Home Management 8  Total Time 16    2/7/2025

## 2025-02-07 NOTE — PLAN OF CARE
Problem: Adult Inpatient Plan of Care  Goal: Plan of Care Review  Outcome: Progressing  Goal: Patient-Specific Goal (Individualized)  Outcome: Progressing  Goal: Absence of Hospital-Acquired Illness or Injury  Outcome: Progressing  Goal: Optimal Comfort and Wellbeing  Outcome: Progressing  Goal: Readiness for Transition of Care  Outcome: Progressing     Problem: Fall Injury Risk  Goal: Absence of Fall and Fall-Related Injury  Outcome: Progressing     Problem: Wound  Goal: Optimal Coping  Outcome: Progressing  Goal: Optimal Functional Ability  Outcome: Progressing  Goal: Absence of Infection Signs and Symptoms  Outcome: Progressing  Goal: Improved Oral Intake  Outcome: Progressing  Goal: Optimal Pain Control and Function  Outcome: Progressing  Goal: Skin Health and Integrity  Outcome: Progressing  Goal: Optimal Wound Healing  Outcome: Progressing     Problem: Comorbidity Management  Goal: Blood Pressure in Desired Range  Outcome: Progressing     Problem: Chronic Kidney Disease  Goal: Optimal Coping with Chronic Illness  Outcome: Progressing  Goal: Electrolyte Balance  Outcome: Progressing  Goal: Fluid Balance  Outcome: Progressing  Goal: Optimal Functional Ability  Outcome: Progressing  Goal: Absence of Anemia Signs and Symptoms  Outcome: Progressing  Goal: Optimal Oral Intake  Outcome: Progressing  Goal: Acceptable Pain Control  Outcome: Progressing  Goal: Minimize Renal Failure Effects  Outcome: Progressing     Problem: Skin Injury Risk Increased  Goal: Skin Health and Integrity  Outcome: Progressing    Patient is AAOx4, NAD noted, VSS.  No complaints of nausea, vomiting throughout shift.  Family at bedside to visit today. PRN  pain medication given per MAR.  Tolerating diet, oral intake encouraged. Tele NSR.  Ambulated to bathroom and in room.  PT/OT worked with patient.  Wound care saw patient today, daily wound care ordered.  Labs and vitals being monitored.  Patient free from falls throughout shift, bed alarm  on, bed is in lowest position, wheels locked, call light within reach, safety maintained.  Will continue to monitor.

## 2025-02-07 NOTE — PT/OT/SLP EVAL
"Physical Therapy Evaluation    Patient Name:  Elizabeth Dickson   MRN:  [id number]    Recommendations:     Discharge Recommendations: Low Intensity Therapy   Discharge Equipment Recommendations: walker, rolling   Barriers to discharge: Decreased caregiver support and requires increased assist with functional mobility    Assessment:     Elizabeth Dickson is a 84 y.o. female admitted with a medical diagnosis of Cellulitis.  She presents with the following impairments/functional limitations: weakness, impaired endurance, impaired self care skills, impaired functional mobility, gait instability, impaired balance, decreased lower extremity function, decreased safety awareness, decreased ROM, impaired coordination, impaired skin, edema, impaired cardiopulmonary response to activity     Patient seen for physical therapy evaluation on this date.  Patient is agreeable to therapy and performs supine to sit with CGA, sit to stand with CGA and gait with SPC x ~80' with CGA, valgus at B knees, reaching for wall railing at times.  Patient agrees to sit up in chair at end of session.  Patient will benefit from inpatient physical therapy to address functional limitations.  Recommending low intensity therapy at discharge and a RW for home use.  Full report to follow.    Rehab Prognosis: Good; patient would benefit from acute skilled PT services to address these deficits and reach maximum level of function.    Recent Surgery: * No surgery found *      Plan:     During this hospitalization, patient to be seen 3 x/week to address the identified rehab impairments via gait training, therapeutic activities, therapeutic exercises, neuromuscular re-education and progress toward the following goals:    Plan of Care Expires:  03/09/25    Subjective     Chief Complaint: "I have to get a tumor removed from my leg ... "  Patient/Family Comments/goals: to return to PLOF  Pain/Comfort:  Pain Rating 1: 0/10  Pain Rating Post-Intervention 1: " 0/10    Patients cultural, spiritual, Quaker conflicts given the current situation: no    Living Environment:  Patient lives with daughter and son-in-law in 2 SH, 1 ELIER, [facility] with built in bench and GB.  Patient's bedroom and bathroom are on the first floor.    Prior to admission, patients level of function was Modified Independent with gait with SPC and ADLs, + Driving.  Equipment used at home: cane, straight, wheelchair, raised toilet.  DME owned (not currently used): wheelchair.  Upon discharge, patient will have assistance from family (daughter and son-in-law work during the day).    Objective:     Communicated with nurse Sandhya prior to session.  Patient found supine with peripheral IV, telemetry, PureWick  upon PT entry to room.    General Precautions: Standard, fall  Orthopedic Precautions:N/A   Braces: N/A  Respiratory Status: Room air    Exams:  Cognitive Exam:  Patient is oriented to Person, Place, Time, Situation, and follows commands  Gross Motor Coordination:  WFL  Postural Exam:  Patient presented with the following abnormalities:    -       Rounded shoulders  -       Forward head  -       Kyphosis  Sensation:    -       Intact  light/touch B LE  Skin Integrity/Edema:      -       Skin integrity: B lower legs with open areas, redness, erythema, edema  -       Edema: Moderate B lower legs  RLE ROM: Deficits: R Hip WFL, R knee lacking ~ 5 degrees terminal knee extension, R Ankle to neutral only  RLE Strength: Deficits: 3+ to 4/5 grossly  LLE ROM: Deficits: L Hip WFL, L knee lacking ~ 5 degrees terminal knee extension  LLE Strength: Deficits: 3+ to 4/5 grossly    Functional Mobility:  Bed Mobility:     Rolling Right: contact guard assistance  Scooting: contact guard assistance  Supine to Sit: contact guard assistance  Transfers:     Sit to Stand:  contact guard assistance with straight cane  Gait: gait with SPC x ~80' with CGA, valgus at B knees, reaching for wall railing at times, slightly  unsteady  Balance: Seated:  SBA at EOB sitting    Standing: prefers to use SPC, requires CGA, unsteady (may benefit from use of RW)      AM-PAC 6 CLICK MOBILITY  Total Score:16       Treatment & Education:  Patient educated on role of physical therapy.  Patient opting to use SPC vs. RW.  Patient ambulated as above, often reaching for wall railing, max B valgus, unsteady at times requiring CGA.  Gait is slow with short step lengths.  Patient agrees to sit up in chair at end of session.  Patient educated to perform B LE exercises as able.      Patient left up in chair with all lines intact, call button in reach, chair alarm on, and nurse notified.    GOALS:   Multidisciplinary Problems       Physical Therapy Goals          Problem: Physical Therapy    Goal Priority Disciplines Outcome Interventions   Physical Therapy Goal     PT, PT/OT Progressing    Description: Goals to be met by: 3/9/2025     Patient will increase functional independence with mobility by performin. Supine to sit with Modified [place]  2. Sit to supine with Modified [place]  3. Rolling to Left and Right with Modified [place].  4. Sit to stand transfer with Modified [place]  5. Bed to chair transfer with Modified [place] using SPC vs. RW.  6. Gait  x 150 feet with Supervision using SPC vs. RW.  7. Ascend/descend 1 stair with no Handrails Contact Guard Assistance using RW vs. SPC.                         DME Justifications:   Elizabeth Ambrocio's mobility limitation cannot be sufficiently resolved by the use of a cane. Her functional mobility deficit can be sufficiently resolved with the use of a Rolling Walker. Patient's mobility limitation significantly impairs their ability to participate in one of more activities of daily living.  The use of a RW will significantly improve the patient's ability to participate in MRADLS and the patient will use it on regular basis in the home.    History:     Past Medical History:    Diagnosis Date    Anxiety     Bilateral leg edema     Breast cancer     Cellulitis of left leg     CKD (chronic kidney disease), stage III     Hypertension     Hypothyroidism     Iron deficiency anemia     Osteopenia     Ovarian cyst     Teratoma and Benign Serous Cystadenoma    Pancreatic cyst     Pulmonary nodules     Renal mass     Vitamin D deficiency        Past Surgical History:   Procedure Laterality Date    BLOCK, NERVE, GENICULAR Bilateral 1/16/2025    Procedure: B/L GNB;  Surgeon: Rasta Lambert MD;  Location: [facility];  Service: Pain Management;  Laterality: Bilateral;  no sed-no ac    BREAST SURGERY      debridement      Left leg     Removal of Ovaries      SKIN BIOPSY      1/5/24       Time Tracking:     PT Received On: 02/07/25  PT Start Time: 0958     PT Stop Time: 1023  PT Total Time (min): 25 min     Billable Minutes: Evaluation 15 and Gait Training 10      02/07/2025

## 2025-02-07 NOTE — PLAN OF CARE
Patient seen for physical therapy evaluation on this date.  Patient is agreeable to therapy and performs supine to sit with CGA, sit to stand with CGA and gait with SPC x ~80' with CGA, valgus at B knees, reaching for wall railing at times.  Patient agrees to sit up in chair at end of session.  Patient will benefit from inpatient physical therapy to address functional limitations.  Recommending low intensity therapy at discharge and a RW for home use.  Full report to follow.      Problem: Physical Therapy  Goal: Physical Therapy Goal  Description: Goals to be met by: 3/9/2025     Patient will increase functional independence with mobility by performin. Supine to sit with Modified [place]  2. Sit to supine with Modified [place]  3. Rolling to Left and Right with Modified [place].  4. Sit to stand transfer with Modified [place]  5. Bed to chair transfer with Modified [place] using SPC vs. RW.  6. Gait  x 150 feet with Supervision using SPC vs. RW.  7. Ascend/descend 1 stair with no Handrails Contact Guard Assistance using RW vs. SPC.    Outcome: Progressing

## 2025-02-07 NOTE — PLAN OF CARE
"  Problem: Adult Inpatient Plan of Care  Goal: Plan of Care Review  Outcome: Progressing  Flowsheets (Taken 2/7/2025 0240)  Plan of Care Reviewed With: patient  Goal: Patient-Specific Goal (Individualized)  Outcome: Progressing   Provider notified pt crying c/o back and leg pain," ihave a pinched nerve" order placed for norco prn.  "

## 2025-02-07 NOTE — PROGRESS NOTES
[facility] IM Resident HO-2 Progress Note    Subjective:      NAEON. AF, VSS. Minimal improvement overnight. Episode of pain overnight due to pain meds falling off of MAR, provided pain meds with improvement in pain.   Resting on room air, tolerating PO intake.      Objective:   Last 24 Hour Vital Signs:  BP  Min: 136/54  Max: 173/72  Temp  Av.8 °F (36.6 °C)  Min: 97.7 °F (36.5 °C)  Max: 97.9 °F (36.6 °C)  Pulse  Av  Min: 65  Max: 89  Resp  Av.6  Min: 18  Max: 20  SpO2  Av.5 %  Min: 95 %  Max: 98 %  Weight  Av.3 kg (166 lb 0.1 oz)  Min: 75.3 kg (166 lb 0.1 oz)  Max: 75.3 kg (166 lb 0.1 oz)  I/O last 3 completed shifts:  In: 905.7 [P.O.:660; IV Piggyback:245.7]  Out: 2850 [Urine:2850]    Physical Examination:  HENT:      Head: Normocephalic and atraumatic.      Nose: Nose normal.      Mouth/Throat:      Pharynx: Oropharynx is clear.   Eyes:      Extraocular Movements: Extraocular movements intact.   Cardiovascular:      Rate and Rhythm: Normal rate and regular rhythm.      Heart sounds: Normal heart sounds.   Pulmonary:      Effort: Pulmonary effort is normal.      Breath sounds: Normal breath sounds.   Abdominal:      General: There is no distension.      Palpations: Abdomen is soft.      Tenderness: There is no abdominal tenderness.   Musculoskeletal:      Cervical back: Normal range of motion and neck supple.      Right lower leg: Edema present.      Left lower leg: Edema present.      Comments: Dopplerable 2+ DP BLE; BLE pitting edema to knees  RLE shin with circumferential erythema, warm and tender to touch. No drainage or odor present.   LLE left lateral shin area of discoloration and LLE knee area of discoloration, per pt both chronic and 2/2 SCC.    Gross ROM intact, sensation intact.    Skin:     General: Skin is warm.      Capillary Refill: Capillary refill takes 2 to 3 seconds.   Neurological:      Mental Status: She is alert.   Psychiatric:         Mood and Affect: Mood normal.          "Behavior: Behavior normal.      Laboratory:  Laboratory Data Reviewed: yes  Pertinent Findings:  Trended Lab Data:  Recent Labs   Lab 02/05/25  0200 02/06/25  0222 02/06/25 0223 02/07/25 0223   WBC 3.34*  --  3.30* 3.81*   HGB 9.2*  --  8.5* 8.9*   HCT 30.9*  --  28.3* 29.2*     --  250 264   *  --  101* 100*   RDW 13.2  --  13.0 12.9    143  --  144   K 5.0 4.3  --  4.4   * 111*  --  111*   CO2 23 24  --  25   BUN 29* 37*  --  39*   CREATININE 1.8* 1.4  --  1.4   GLU 76 95  --  99   PROT 5.8* 5.3*  --  5.4*   ALBUMIN 2.8* 2.6*  --  2.6*   BILITOT 0.3 0.2  --  0.2   AST 44* 40  --  46*   ALKPHOS 34* 34*  --  37*   ALT 24 23  --  26       Trended Cardiac Data:  No results for input(s): "TROPONINI", "CKTOTAL", "CKMB", "BNP" in the last 168 hours.    Microbiology Data Reviewed: yes  Pertinent Findings:  BCX 2/3 pend    Other Results:  EKG (my interpretation):none new   Radiology Data Reviewed:   Pertinent Findings:      Current Medications:     Infusions:       Scheduled:   aspirin  81 mg Oral Daily    atorvastatin  10 mg Oral Daily    cefTRIAXone (Rocephin) IV (PEDS and ADULTS)  1 g Intravenous Q24H    enoxparin  30 mg Subcutaneous Daily    EScitalopram oxalate  5 mg Oral Nightly    gabapentin  300 mg Oral TID    levothyroxine  100 mcg Oral Before breakfast    metoprolol succinate  50 mg Oral Daily        PRN:    Current Facility-Administered Medications:     acetaminophen, 650 mg, Oral, Daily PRN    dextrose 50%, 12.5 g, Intravenous, PRN    dextrose 50%, 25 g, Intravenous, PRN    glucagon (human recombinant), 1 mg, Intramuscular, PRN    glucose, 16 g, Oral, PRN    glucose, 24 g, Oral, PRN    HYDROcodone-acetaminophen, 1 tablet, Oral, Q6H PRN    melatonin, 9 mg, Oral, Nightly PRN    naloxone, 0.02 mg, Intravenous, PRN    sodium chloride 0.9%, 10 mL, Intravenous, Q12H PRN    Pharmacy to dose Vancomycin consult, , , Once **AND** vancomycin - pharmacy to dose, , Intravenous, pharmacy to manage " frequency    Antibiotics and Day Number of Therapy:  Vanc 2/3-  Zosyn 2/3-2/4    Assessment:     Elizabeth Dickson is a 84 y.o.female with  Patient Active Problem List    Diagnosis Date Noted    Cellulitis 02/03/2025    Current chronic use of systemic steroids 01/31/2024    Decreased functional activity tolerance 12/12/2023    Decreased strength of lower extremity 12/12/2023    Wrist lump, left 06/19/2023    Mycosis fungoides 06/19/2023    Cutaneous T-cell lymphoma, unspecified, 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    Atherosclerosis of aorta 02/01/2023    Other specified spondylopathies, 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/01/2022    Acute pain of left knee 02/01/2022    Umbilical bleeding 02/01/2022    Bilateral leg edema 07/01/2021    Anxiety 01/14/2021    Depression 01/14/2021    Acute cystitis without hematuria 04/30/2020    Hyperkalemia 04/30/2020    Dermatitis 04/30/2020    Chronic right-sided low back pain without sciatica 02/06/2020    Hyperlipidemia 02/06/2020    Hypertension     Stage 3a chronic kidney disease     Hypothyroidism     Pancreatic cyst     Right renal mass 05/26/2018    Microalbuminuria 03/16/2018    Breast cancer 03/16/2018    Pulmonary nodules 03/16/2018    Osteopenia 03/16/2018    Hypothyroid 03/16/2018    Acidosis 03/16/2018    Iron deficiency anemia 01/09/2018        Plan:     RLE cellulitis 2/2 SCC Bx, failed PO abx   Hx of SCC of legs and face  BLE swelling  - afebrile, RLE shin cellulitis after lesion bx with dermatology at [facility] dermatology, failed keflex, transitioned to cipro but did not tolerate 2/2 diarrhea, finished keflex course  - XR in ED showing swelling subq edema but no free air   - no leukocytosis, CRP 64, ESR 16  - Bcx ordered, NGTD.   - home creams not available   - DVT US BLE - neg DVT study  - arterial RLE US - Diffuse atherosclerosis with biphasic and monophasic waveforms which can be seen with inflow stenosis. No elevated velocities to suggest hemodynamically significant stenosis   - ID  consulted - recc discontinuing zosyn, continue IV vanc with trough 10-15. Transition to PO antibiotics on discharge.   - Surgery consulted: no Acute intervention.   Plan:  - Continued on vanc, added Rocephin 1g for broader coverage in setting of minimal improvement of cellulitis.   - HH wound care on dispo and outpt derm fup  - doxy course on dispo  - fidencio TID, jonah prn for pain     Macrocytic anemia   - H/H on adm 9.5/32, MCV 10, baseline old from 1 years ago 13  - on daily iron PO  - B12 345, folate 10.1, iron studies low, elevated ferritin.     CKD  - Cr on adm 1.7, BUN 34; baseline 1.1-1.8 but more likely 1.8, recent bump per pt 2/2 diuretics per pt   - continue to monitor, 1L given, if needed but no TTE available   - renal function improving.      Hyperlipidemia  HTN  - A1c 4.7; lipid chol 122, HDL 38, trig 84, LDL 67  - continue home atorvastatin, fenofibrate   - continue home metoprolol, unclear if for BP     Depression?  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

## 2025-02-07 NOTE — PLAN OF CARE
Medicare Message     Important Message from Medicare regarding Discharge Appeal Rights -- Explained to patient/caregiver; Signed/date by patient/caregiver   Date IMM was signed -- 2/7/2025   Time IMM was signed -- [id number]

## 2025-02-07 NOTE — CONSULTS
Wound care re-evaluation per medicine team. Area was cleaned with vashe, xerform, abd pad were applied and RLE was wrapped in kerlix and ACE. Pt stated not to do wound care on LLE. RLE dry, flaky with redness.  Boarders of redness marked w/ no strike through noted.

## 2025-02-07 NOTE — MEDICAL/APP STUDENT
[facility] Medicine Progress Note    Primary Team: \[facility]\"" Hospitalist Team   Attending Physician: Rocky Perez MD  Resident: Laura Edmonds MD  Intern: Robert Flores MD    Date of Admit: 2/3/2025     Subjective/Interval Events:      NAEON. AF, HDS. RLE erythema unchanged from yesterday, s/p addition of CTX, referenced by marking. Patient endorses right lateral thigh pain. Otherwise, no acute complaints.      Objective:   Last 24 Hour Vital Signs:  BP  Min: 136/54  Max: 173/72  Temp  Av °F (36.7 °C)  Min: 97.7 °F (36.5 °C)  Max: 98.4 °F (36.9 °C)  Pulse  Av.8  Min: 65  Max: 89  Resp  Av.6  Min: 18  Max: 20  SpO2  Av %  Min: 95 %  Max: 100 %  Weight  Av.3 kg (166 lb 0.1 oz)  Min: 75.3 kg (166 lb 0.1 oz)  Max: 75.3 kg (166 lb 0.1 oz)    Intake/Output Summary (Last 24 hours) at 2025 0659  Last data filed at 2025 0541  Gross per 24 hour   Intake 905.7 ml   Output 2550 ml   Net -1644.3 ml       Physical Examination:    General appearance: AAOx4   Head: Normocephalic, without obvious abnormality, atraumatic  Neck: no adenopathy, no JVD  Lungs: Clear to auscultation bilaterally, no wheezes/rales/ or rhonchi, no respiratory distress.   Heart: regular rate and rhythm, S1, S2 normal, no murmur, click, rub or gallop  Abdomen: Soft, non-tender, non-distended, no rebound, guarding or rigidity  Extremities: BLLE 1+ pitting edema; cellulitis to RLE; unchanged from maryse yesterday 05FEB  Pulses: 2+ in upper extremities; 2+ DP signaled via doppler to BLLE  Skin: RLE shin with circumferential erythema and is warm and TTP. No drainage or odor present. LLE lateral shin and knee areas of discoloration d/t SCC (both chronic per patient)   Neurologic: No focal neurological deficit, no facial asymmetry, strength equal and symmetric.      Laboratory:  Recent Labs   Lab 25  02025   WBC 3.34*  --  3.30* 3.81*   HGB 9.2*  --  8.5* 8.9*   HCT 30.9*  --   28.3* 29.2*     --  250 264   *  --  101* 100*   RDW 13.2  --  13.0 12.9    143  --  144   K 5.0 4.3  --  4.4   * 111*  --  111*   CO2 23 24  --  25   BUN 29* 37*  --  39*   CREATININE 1.8* 1.4  --  1.4   GLU 76 95  --  99   PROT 5.8* 5.3*  --  5.4*   ALBUMIN 2.8* 2.6*  --  2.6*   BILITOT 0.3 0.2  --  0.2   AST 44* 40  --  46*   ALKPHOS 34* 34*  --  37*   ALT 24 23  --  26       Microbiology:  Blood cultures: NGTD 72hrs    Imaging:  US Lower Extremity Arteries Bilateral   Final Result      Diffuse atherosclerosis with biphasic and monophasic waveforms which can be seen with inflow stenosis.  No elevated velocities to suggest hemodynamically significant stenosis.         Electronically signed by: Denia Munoz   Date:    02/04/2025   Time:    14:23      US Lower Extremity Veins Bilateral   Final Result      No evidence of acute deep venous thrombosis in either lower extremity.      Probable left popliteal fossa cyst.         Electronically signed by: Denia Munoz   Date:    02/04/2025   Time:    14:19      X-Ray Tibia Fibula 2 View Right   Final Result      Subcutaneous edema of the calf on the right suggesting cellulitis.      No underlying bony abnormality as imaged.         Electronically signed by: Shane Harry   Date:    02/03/2025   Time:    19:51           Current Medications:     Scheduled:   aspirin  81 mg Oral Daily    atorvastatin  10 mg Oral Daily    cefTRIAXone (Rocephin) IV (PEDS and ADULTS)  1 g Intravenous Q24H    enoxparin  30 mg Subcutaneous Daily    EScitalopram oxalate  5 mg Oral Nightly    gabapentin  300 mg Oral TID    levothyroxine  100 mcg Oral Before breakfast    metoprolol succinate  50 mg Oral Daily         Infusions:       PRN:    Current Facility-Administered Medications:     acetaminophen, 650 mg, Oral, Daily PRN    dextrose 50%, 12.5 g, Intravenous, PRN    dextrose 50%, 25 g, Intravenous, PRN    glucagon (human recombinant), 1 mg, Intramuscular, PRN     glucose, 16 g, Oral, PRN    glucose, 24 g, Oral, PRN    HYDROcodone-acetaminophen, 1 tablet, Oral, Q8H PRN    melatonin, 9 mg, Oral, Nightly PRN    naloxone, 0.02 mg, Intravenous, PRN    sodium chloride 0.9%, 10 mL, Intravenous, Q12H PRN    Pharmacy to dose Vancomycin consult, , , Once **AND** vancomycin - pharmacy to dose, , Intravenous, pharmacy to manage frequency      Plan:       RLE cellulitis 2/2 SCC Bx, failed PO abx   Hx of SCC of legs and face  BLE swelling  - afebrile, RLE shin cellulitis after lesion bx with dermatology at [facility] dermatology, failed keflex, transitioned to cipro but did not tolerate 2/2 diarrhea, finished keflex course  - XR in ED showing swelling subq edema but no free air   - no leukocytosis, CRP 64, ESR 16   - Bcx ordered; NGTD 72hrs  - home creams not available   Plan:  - DVT US BLE - neg DVT study  - arterial RLE US - Diffuse atherosclerosis with biphasic and monophasic waveforms which can be seen with inflow stenosis. No elevated velocities to suggest hemodynamically significant stenosis   - ID consulted - recc discontinuing zosyn, continue IV vanc with trough 10-15. Recent addition of CTX on [id number]. Transition to PO antibiotics on discharge.   - Surgery consulted: no Acute intervention.   - fidencio TID, jonah prn for pain     Macrocytic anemia   - H/H on adm 9.5/32,   - on daily iron PO  - B12 345, folate 10.1, iron studies low, elevated ferritin.     CKD  - Cr on adm 1.7, BUN 34; baseline 1.1-1.8 but more likely 1.8, recent bump per pt 2/2 diuretics per pt   - continue to monitor, 1L given, if needed but no TTE available   - currently 37/1.4     Hyperlipidemia  HTN  - A1c 4.7; lipid chol 122, HDL 38, trig 84, LDL 67  - continue home atorvastatin, fenofibrate   - continue home metoprolol, unclear if for BP     Depression?  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

## 2025-02-08 VITALS
HEART RATE: 67 BPM | TEMPERATURE: 98 F | BODY MASS INDEX: 32.59 KG/M2 | WEIGHT: 166 LBS | DIASTOLIC BLOOD PRESSURE: 65 MMHG | SYSTOLIC BLOOD PRESSURE: 160 MMHG | OXYGEN SATURATION: 99 % | RESPIRATION RATE: 18 BRPM | HEIGHT: 60 IN

## 2025-02-08 LAB
ALBUMIN SERPL BCP-MCNC: 2.7 G/DL (ref 3.5–5.2)
ALP SERPL-CCNC: 35 U/L (ref 40–150)
ALT SERPL W/O P-5'-P-CCNC: 38 U/L (ref 10–44)
ANION GAP SERPL CALC-SCNC: 8 MMOL/L (ref 8–16)
AST SERPL-CCNC: 61 U/L (ref 10–40)
BASOPHILS # BLD AUTO: 0.04 K/UL (ref 0–0.2)
BASOPHILS NFR BLD: 1.1 % (ref 0–1.9)
BILIRUB SERPL-MCNC: 0.2 MG/DL (ref 0.1–1)
BUN SERPL-MCNC: 36 MG/DL (ref 8–23)
CALCIUM SERPL-MCNC: 9.2 MG/DL (ref 8.7–10.5)
CHLORIDE SERPL-SCNC: 107 MMOL/L (ref 95–110)
CO2 SERPL-SCNC: 26 MMOL/L (ref 23–29)
CREAT SERPL-MCNC: 1.1 MG/DL (ref 0.5–1.4)
DIFFERENTIAL METHOD BLD: ABNORMAL
EOSINOPHIL # BLD AUTO: 0.3 K/UL (ref 0–0.5)
EOSINOPHIL NFR BLD: 7.6 % (ref 0–8)
ERYTHROCYTE [DISTWIDTH] IN BLOOD BY AUTOMATED COUNT: 12.9 % (ref 11.5–14.5)
EST. GFR  (NO RACE VARIABLE): 50 ML/MIN/1.73 M^2
GLUCOSE SERPL-MCNC: 122 MG/DL (ref 70–110)
HCT VFR BLD AUTO: 28.8 % (ref 37–48.5)
HGB BLD-MCNC: 8.9 G/DL (ref 12–16)
IMM GRANULOCYTES # BLD AUTO: 0.01 K/UL (ref 0–0.04)
IMM GRANULOCYTES NFR BLD AUTO: 0.3 % (ref 0–0.5)
LYMPHOCYTES # BLD AUTO: 1 K/UL (ref 1–4.8)
LYMPHOCYTES NFR BLD: 26.7 % (ref 18–48)
MAGNESIUM SERPL-MCNC: 2 MG/DL (ref 1.6–2.6)
MCH RBC QN AUTO: 30.4 PG (ref 27–31)
MCHC RBC AUTO-ENTMCNC: 30.9 G/DL (ref 32–36)
MCV RBC AUTO: 98 FL (ref 82–98)
MONOCYTES # BLD AUTO: 0.5 K/UL (ref 0.3–1)
MONOCYTES NFR BLD: 14.3 % (ref 4–15)
NEUTROPHILS # BLD AUTO: 1.8 K/UL (ref 1.8–7.7)
NEUTROPHILS NFR BLD: 50 % (ref 38–73)
NRBC BLD-RTO: 0 /100 WBC
PHOSPHATE SERPL-MCNC: 3.3 MG/DL (ref 2.7–4.5)
PLATELET # BLD AUTO: 260 K/UL (ref 150–450)
PMV BLD AUTO: 10 FL (ref 9.2–12.9)
POTASSIUM SERPL-SCNC: 4 MMOL/L (ref 3.5–5.1)
PROT SERPL-MCNC: 5.5 G/DL (ref 6–8.4)
RBC # BLD AUTO: 2.93 M/UL (ref 4–5.4)
SODIUM SERPL-SCNC: 141 MMOL/L (ref 136–145)
VANCOMYCIN SERPL-MCNC: 14 UG/ML
WBC # BLD AUTO: 3.56 K/UL (ref 3.9–12.7)

## 2025-02-08 PROCEDURE — 25000003 PHARM REV CODE 250: Performed by: INTERNAL MEDICINE

## 2025-02-08 PROCEDURE — 85025 COMPLETE CBC W/AUTO DIFF WBC: CPT

## 2025-02-08 PROCEDURE — 80053 COMPREHEN METABOLIC PANEL: CPT

## 2025-02-08 PROCEDURE — 25000003 PHARM REV CODE 250

## 2025-02-08 PROCEDURE — 84100 ASSAY OF PHOSPHORUS: CPT

## 2025-02-08 PROCEDURE — 80202 ASSAY OF VANCOMYCIN: CPT | Performed by: INTERNAL MEDICINE

## 2025-02-08 PROCEDURE — 36415 COLL VENOUS BLD VENIPUNCTURE: CPT | Performed by: INTERNAL MEDICINE

## 2025-02-08 PROCEDURE — 63600175 PHARM REV CODE 636 W HCPCS

## 2025-02-08 PROCEDURE — 63600175 PHARM REV CODE 636 W HCPCS: Performed by: INTERNAL MEDICINE

## 2025-02-08 PROCEDURE — 83735 ASSAY OF MAGNESIUM: CPT

## 2025-02-08 RX ORDER — AMOXICILLIN AND CLAVULANATE POTASSIUM 875; 125 MG/1; MG/1
1 TABLET, FILM COATED ORAL 2 TIMES DAILY
Qty: 24 TABLET | Refills: 0 | Status: SHIPPED | OUTPATIENT
Start: 2025-02-08 | End: 2025-02-08

## 2025-02-08 RX ORDER — NAPROXEN SODIUM 220 MG/1
81 TABLET, FILM COATED ORAL DAILY
Qty: 30 TABLET | Refills: 0 | Status: SHIPPED | OUTPATIENT
Start: 2025-02-09 | End: 2025-02-08

## 2025-02-08 RX ORDER — HYDROXYZINE PAMOATE 25 MG/1
25 CAPSULE ORAL EVERY 12 HOURS PRN
Status: DISCONTINUED | OUTPATIENT
Start: 2025-02-08 | End: 2025-02-08 | Stop reason: HOSPADM

## 2025-02-08 RX ORDER — ENOXAPARIN SODIUM 100 MG/ML
40 INJECTION SUBCUTANEOUS EVERY 24 HOURS
Status: DISCONTINUED | OUTPATIENT
Start: 2025-02-08 | End: 2025-02-08 | Stop reason: HOSPADM

## 2025-02-08 RX ORDER — DOXYCYCLINE 100 MG/1
100 CAPSULE ORAL EVERY 12 HOURS
Qty: 24 CAPSULE | Refills: 0 | Status: SHIPPED | OUTPATIENT
Start: 2025-02-08 | End: 2025-02-20

## 2025-02-08 RX ORDER — ACETAMINOPHEN 325 MG/1
650 TABLET ORAL EVERY 8 HOURS PRN
Qty: 12 TABLET | Refills: 0 | Status: SHIPPED | OUTPATIENT
Start: 2025-02-08 | End: 2025-02-08

## 2025-02-08 RX ORDER — GABAPENTIN 300 MG/1
300 CAPSULE ORAL 2 TIMES DAILY PRN
Qty: 60 CAPSULE | Refills: 11 | Status: SHIPPED | OUTPATIENT
Start: 2025-02-08 | End: 2025-02-08

## 2025-02-08 RX ORDER — DOXYCYCLINE 100 MG/1
100 CAPSULE ORAL EVERY 12 HOURS
Qty: 24 CAPSULE | Refills: 0 | Status: SHIPPED | OUTPATIENT
Start: 2025-02-08 | End: 2025-02-08

## 2025-02-08 RX ORDER — AMOXICILLIN AND CLAVULANATE POTASSIUM 875; 125 MG/1; MG/1
1 TABLET, FILM COATED ORAL 2 TIMES DAILY
Qty: 24 TABLET | Refills: 0 | Status: SHIPPED | OUTPATIENT
Start: 2025-02-08 | End: 2025-02-20

## 2025-02-08 RX ORDER — ATORVASTATIN CALCIUM 10 MG/1
10 TABLET, FILM COATED ORAL DAILY
Qty: 90 TABLET | Refills: 2 | Status: SHIPPED | OUTPATIENT
Start: 2025-02-08

## 2025-02-08 RX ORDER — LIDOCAINE 50 MG/G
1 PATCH TOPICAL DAILY
Qty: 5 PATCH | Refills: 0 | Status: SHIPPED | OUTPATIENT
Start: 2025-02-09 | End: 2025-02-08

## 2025-02-08 RX ORDER — GABAPENTIN 300 MG/1
300 CAPSULE ORAL 2 TIMES DAILY PRN
Qty: 60 CAPSULE | Refills: 11 | Status: SHIPPED | OUTPATIENT
Start: 2025-02-08 | End: 2026-02-08

## 2025-02-08 RX ORDER — NAPROXEN SODIUM 220 MG/1
81 TABLET, FILM COATED ORAL DAILY
Qty: 30 TABLET | Refills: 0 | Status: SHIPPED | OUTPATIENT
Start: 2025-02-09 | End: 2025-03-11

## 2025-02-08 RX ORDER — LIDOCAINE 50 MG/G
1 PATCH TOPICAL DAILY
Qty: 5 PATCH | Refills: 0 | Status: SHIPPED | OUTPATIENT
Start: 2025-02-09

## 2025-02-08 RX ORDER — ACETAMINOPHEN 325 MG/1
650 TABLET ORAL EVERY 8 HOURS PRN
Qty: 12 TABLET | Refills: 0 | Status: SHIPPED | OUTPATIENT
Start: 2025-02-08

## 2025-02-08 RX ADMIN — ENOXAPARIN SODIUM 40 MG: 40 INJECTION SUBCUTANEOUS at 04:02

## 2025-02-08 RX ADMIN — VANCOMYCIN HYDROCHLORIDE 750 MG: 750 INJECTION, POWDER, LYOPHILIZED, FOR SOLUTION INTRAVENOUS at 03:02

## 2025-02-08 RX ADMIN — LEVOTHYROXINE SODIUM 100 MCG: 100 TABLET ORAL at 06:02

## 2025-02-08 RX ADMIN — ATORVASTATIN CALCIUM 10 MG: 10 TABLET, FILM COATED ORAL at 08:02

## 2025-02-08 RX ADMIN — HYDROXYZINE PAMOATE 25 MG: 25 CAPSULE ORAL at 12:02

## 2025-02-08 RX ADMIN — GABAPENTIN 300 MG: 300 CAPSULE ORAL at 04:02

## 2025-02-08 RX ADMIN — METOPROLOL SUCCINATE 50 MG: 50 TABLET, EXTENDED RELEASE ORAL at 08:02

## 2025-02-08 RX ADMIN — GABAPENTIN 300 MG: 300 CAPSULE ORAL at 08:02

## 2025-02-08 RX ADMIN — ASPIRIN 81 MG CHEWABLE TABLET 81 MG: 81 TABLET CHEWABLE at 08:02

## 2025-02-08 NOTE — PLAN OF CARE
Problem: Adult Inpatient Plan of Care  Goal: Plan of Care Review  2/8/2025 1614 by Patrizia Nixon, RN  Outcome: Met  2/8/2025 0842 by Patrizia Nixon, RN  Outcome: Progressing  Goal: Patient-Specific Goal (Individualized)  Outcome: Met  Goal: Absence of Hospital-Acquired Illness or Injury  Outcome: Met  Goal: Optimal Comfort and Wellbeing  Outcome: Met  Goal: Readiness for Transition of Care  Outcome: Met

## 2025-02-08 NOTE — NURSING
Updated MD Mustafa of pt's bp 170/70's, stated ok with these numbers but if consistently 180's and above to notify/update her. Pt is restless yet denies pain/distress at this time. MD to order med for sleep/anxiety (see chat messages), plan of care is ongoing.

## 2025-02-08 NOTE — PROGRESS NOTES
[facility] IM Resident HO-2 Progress Note    Subjective:      Overnight slightly incr BP that she attributes to not sleeping and being frustrated. Now improved. VSS, AF.   RLE looks improved this AM.     Objective:   Last 24 Hour Vital Signs:  BP  Min: 160/78  Max: 190/78  Temp  Av.7 °F (36.5 °C)  Min: 97 °F (36.1 °C)  Max: 98 °F (36.7 °C)  Pulse  Av  Min: 66  Max: 80  Resp  Av.5  Min: 18  Max: 20  SpO2  Av %  Min: 96 %  Max: 100 %  I/O last 3 completed shifts:  In: 965.7 [P.O.:720; IV Piggyback:245.7]  Out: 2350 [Urine:2350]    Physical Examination:  HENT:      Head: Normocephalic and atraumatic.      Nose: Nose normal.      Mouth/Throat:      Pharynx: Oropharynx is clear.   Eyes:      Extraocular Movements: Extraocular movements intact.   Cardiovascular:      Rate and Rhythm: Normal rate and regular rhythm.      Heart sounds: Normal heart sounds.   Pulmonary:      Effort: Pulmonary effort is normal.      Breath sounds: Normal breath sounds.   Abdominal:      General: There is no distension.      Palpations: Abdomen is soft.      Tenderness: There is no abdominal tenderness.   Musculoskeletal:      Cervical back: Normal range of motion and neck supple.      Right lower leg: Edema present.      Left lower leg: Edema present.      Comments: Dopplerable 2+ DP BLE; BLE pitting edema to knees  RLE shin with circumferential erythema, warm and tender to touch. No drainage or odor present.   LLE left lateral shin area of discoloration and LLE knee area of discoloration, per pt both chronic and 2/2 SCC.    Gross ROM intact, sensation intact.    Skin:     General: Skin is warm.      Capillary Refill: Capillary refill takes 2 to 3 seconds.   Neurological:      Mental Status: She is alert.   Psychiatric:         Mood and Affect: Mood normal.         Behavior: Behavior normal.      Laboratory:  Laboratory Data Reviewed: yes  Pertinent Findings:  Trended Lab Data:  Recent Labs   Lab 25  "02/07/25  0223 02/08/25  0115   WBC  --  3.30* 3.81* 3.56*   HGB  --  8.5* 8.9* 8.9*   HCT  --  28.3* 29.2* 28.8*   PLT  --  250 264 260   MCV  --  101* 100* 98   RDW  --  13.0 12.9 12.9     --  144 141   K 4.3  --  4.4 4.0   *  --  111* 107   CO2 24  --  25 26   BUN 37*  --  39* 36*   CREATININE 1.4  --  1.4 1.1   GLU 95  --  99 122*   PROT 5.3*  --  5.4* 5.5*   ALBUMIN 2.6*  --  2.6* 2.7*   BILITOT 0.2  --  0.2 0.2   AST 40  --  46* 61*   ALKPHOS 34*  --  37* 35*   ALT 23  --  26 38       Trended Cardiac Data:  No results for input(s): "TROPONINI", "CKTOTAL", "CKMB", "BNP" in the last 168 hours.    Microbiology Data Reviewed: yes  Pertinent Findings:  BCX 2/3 pend    Other Results:  EKG (my interpretation):none new   Radiology Data Reviewed:   Pertinent Findings:      Current Medications:     Infusions:       Scheduled:   aspirin  81 mg Oral Daily    atorvastatin  10 mg Oral Daily    cefTRIAXone (Rocephin) IV (PEDS and ADULTS)  1 g Intravenous Q24H    enoxparin  40 mg Subcutaneous Daily    EScitalopram oxalate  5 mg Oral Nightly    gabapentin  300 mg Oral TID    levothyroxine  100 mcg Oral Before breakfast    LIDOcaine  1 patch Transdermal Daily    melatonin  12 mg Oral Nightly    metoprolol succinate  50 mg Oral Daily        PRN:    Current Facility-Administered Medications:     acetaminophen, 650 mg, Oral, Daily PRN    dextrose 50%, 12.5 g, Intravenous, PRN    dextrose 50%, 25 g, Intravenous, PRN    glucagon (human recombinant), 1 mg, Intramuscular, PRN    glucose, 16 g, Oral, PRN    glucose, 24 g, Oral, PRN    HYDROcodone-acetaminophen, 1 tablet, Oral, Q6H PRN    hydrOXYzine pamoate, 25 mg, Oral, Q12H PRN    naloxone, 0.02 mg, Intravenous, PRN    sodium chloride 0.9%, 10 mL, Intravenous, Q12H PRN    Pharmacy to dose Vancomycin consult, , , Once **AND** vancomycin - pharmacy to dose, , Intravenous, pharmacy to manage frequency    Antibiotics and Day Number of Therapy:  Vanc 2/3-  Zosyn 2/3-2/4  CTX " 2/6-    Assessment:     Elizabeth Dickson is a 84 y.o.female with  Patient Active Problem List    Diagnosis Date Noted    Cellulitis 02/03/2025    Current chronic use of systemic steroids 01/31/2024    Decreased functional activity tolerance 12/12/2023    Decreased strength of lower extremity 12/12/2023    Wrist lump, left 06/19/2023    Mycosis fungoides 06/19/2023    Cutaneous T-cell lymphoma, unspecified, 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    Atherosclerosis of aorta 02/01/2023    Other specified spondylopathies, 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/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    Acute pain of left knee 02/01/2022    Umbilical bleeding 02/01/2022    Bilateral leg edema 07/01/2021    Anxiety 01/14/2021    Depression 01/14/2021    Acute cystitis without hematuria 04/30/2020    Hyperkalemia 04/30/2020    Dermatitis 04/30/2020    Chronic right-sided low back pain without sciatica 02/06/2020    Hyperlipidemia 02/06/2020    Hypertension     Stage 3a chronic kidney disease     Hypothyroidism     Pancreatic cyst     Right renal mass 05/26/2018    Microalbuminuria 03/16/2018    Breast cancer 03/16/2018    Pulmonary nodules 03/16/2018    Osteopenia 03/16/2018    Hypothyroid 03/16/2018    Acidosis 03/16/2018    Iron deficiency anemia 01/09/2018        Plan:     RLE cellulitis 2/2 SCC Bx, failed PO abx   Hx of SCC of legs and face  BLE swelling  - afebrile, RLE shin cellulitis after lesion bx with dermatology at [facility] dermatology, failed keflex, transitioned to cipro but did not tolerate 2/2 diarrhea, finished keflex course  - XR in ED showing swelling subq edema but no free air   - no leukocytosis, CRP 64, ESR 16  - Bcx ordered, NGTD.   - home creams not available   - DVT US BLE - neg DVT study  - arterial RLE US - Diffuse atherosclerosis with biphasic and monophasic waveforms which can be seen with inflow stenosis. No elevated velocities to suggest hemodynamically significant stenosis   - ID consulted - recc discontinuing zosyn, continue IV vanc with trough 10-15.  Transition to PO antibiotics on discharge.   - Surgery consulted: no Acute intervention.   Plan:  - Continued on vanc, added Rocephin 1g for broader coverage in setting of minimal improvement of cellulitis.   - HH wound care on dispo and outpt derm fup  - doxy course on dispo  - fidencio TID, jonah prn for pain     Macrocytic anemia   - H/H on adm 9.5/32, MCV 10, baseline old from 1 years ago 13  - on daily iron PO  - B12 345, folate 10.1, iron studies low, elevated ferritin.     CKD  - Cr on adm 1.7, BUN 34; baseline 1.1-1.8 but more likely 1.8, recent bump per pt 2/2 diuretics per pt   - continue to monitor, 1L given, if needed but no TTE available   - renal function improving.      Hyperlipidemia  HTN  - A1c 4.7; lipid chol 122, HDL 38, trig 84, LDL 67  - continue home atorvastatin, fenofibrate   - continue home metoprolol, unclear if for BP     Depression?  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

## 2025-02-08 NOTE — PROGRESS NOTES
Pharmacist Renal Dose Adjustment Note    Elizabeth Dickson is a 84 y.o. female being treated with the medication enoxaparin.     Patient Data:    Vital Signs (Most Recent):  Temp: 97.8 °F (36.6 °C) (02/08/25 0732)  Pulse: 80 (02/08/25 0732)  Resp: 20 (02/08/25 0732)  BP: (!) 162/72 (02/08/25 0732)  SpO2: 98 % (02/08/25 0732) Vital Signs (72h Range):  Temp:  [97 °F (36.1 °C)-98.4 °F (36.9 °C)]   Pulse:  [62-89]   Resp:  [16-20]   BP: (122-190)/(54-78)   SpO2:  [95 %-100 %]      Recent Labs   Lab 02/06/25 0222 02/07/25 0223 02/08/25 0115   CREATININE 1.4 1.4 1.1     Serum creatinine: 1.1 mg/dL 02/08/25 0115  Estimated creatinine clearance: 35.1 mL/min    Medication: Enoxaparin 30 mg daily will be changed to medication enoxaparin 40 mg daily    Pharmacist's Name: Meg Huffman  Pharmacist's Extension: [phone]

## 2025-02-08 NOTE — PROGRESS NOTES
Pharmacokinetic Assessment Follow Up: IV Vancomycin    Vancomycin serum concentration assessment(s):    The random level was drawn correctly and can be used to guide therapy at this time. The measurement is within the desired definitive target range of 10 to 15 mcg/mL.    Vancomycin Regimen Plan:    Continue regimen to Vancomycin 750 mg IV every once hours with next serum trough concentration measured at 0100 prior to RANDOM dose on 02/09/2025    Drug levels (last 3 results):  Recent Labs   Lab Result Units 02/05/25  2239 02/06/25  2355 02/08/25  0106   Vancomycin, Random ug/mL 14.6 14.8 14.0       Pharmacy will continue to follow and monitor vancomycin.    Please contact pharmacy at extension [phone] for questions regarding this assessment.    Thank you for the consult,   Cynthia Samano       Patient brief summary:  Elizabeth Dickson is a 84 y.o. female initiated on antimicrobial therapy with IV Vancomycin for treatment of skin & soft tissue infection    The patient's current regimen is Vancomycin 750 mg once    Drug Allergies:   Review of patient's allergies indicates:   Allergen Reactions    Lisinopril      hyperkalemia       Actual Body Weight:   75.3 kg    Renal Function:   Estimated Creatinine Clearance: 35.1 mL/min (based on SCr of 1.1 mg/dL).,     Dialysis Method (if applicable):  N/A    CBC (last 72 hours):  Recent Labs   Lab Result Units 02/05/25  0200 02/06/25 0223 02/07/25 0223 02/08/25  0115   WBC K/uL 3.34* 3.30* 3.81* 3.56*   Hemoglobin g/dL 9.2* 8.5* 8.9* 8.9*   Hematocrit % 30.9* 28.3* 29.2* 28.8*   Platelets K/uL 264 250 264 260   Gran % % 35.6* 39.1 56.8 50.0   Lymph % % 38.3 32.4 21.3 26.7   Mono % % 16.2* 16.1* 12.9 14.3   Eosinophil % % 8.7* 10.9* 7.9 7.6   Basophil % % 0.9 1.2 0.8 1.1   Differential Method  Automated Automated Automated Automated       Metabolic Panel (last 72 hours):  Recent Labs   Lab Result Units 02/05/25  0200 02/06/25  0222 02/07/25 0223 02/08/25  0115   Sodium  mmol/L 143 143 144 141   Potassium mmol/L 5.0 4.3 4.4 4.0   Chloride mmol/L 111* 111* 111* 107   CO2 mmol/L 23 24 25 26   Glucose mg/dL 76 95 99 122*   BUN mg/dL 29* 37* 39* 36*   Creatinine mg/dL 1.8* 1.4 1.4 1.1   Albumin g/dL 2.8* 2.6* 2.6* 2.7*   Total Bilirubin mg/dL 0.3 0.2 0.2 0.2   Alkaline Phosphatase U/L 34* 34* 37* 35*   AST U/L 44* 40 46* 61*   ALT U/L 24 23 26 38   Magnesium mg/dL 2.1 2.1 2.0 2.0   Phosphorus mg/dL 4.1 3.9 3.5 3.3       Vancomycin Administrations:  vancomycin given in the last 96 hours                     vancomycin 750 mg in 0.9% NaCl 250 mL IVPB (admixture device) (mg) 750 mg New Bag 02/07/25 0155    vancomycin 750 mg in 0.9% NaCl 250 mL IVPB (admixture device) (mg) 750 mg New Bag 02/06/25 0016    vancomycin (VANCOCIN) 1,000 mg in 0.9% NaCl 250 mL IVPB (admixture device) (mg) 1,000 mg New Bag 02/05/25 0114                    Microbiologic Results:  Microbiology Results (last 7 days)       Procedure Component Value Units Date/Time    Blood culture #1 **CANNOT BE ORDERED STAT** [[id number]] Collected: 02/03/25 1638    Order Status: Completed Specimen: Blood from Peripheral, Antecubital, Left Updated: 02/07/25 0612     Blood Culture, Routine No Growth to date      No Growth to date      No Growth to date      No Growth to date    Blood culture #2 **CANNOT BE ORDERED STAT** [[id number]] Collected: 02/03/25 1639    Order Status: Completed Specimen: Blood from Peripheral, Hand, Left Updated: 02/07/25 0612     Blood Culture, Routine No Growth to date      No Growth to date      No Growth to date      No Growth to date    Aerobic culture [[id number]]     Order Status: No result Specimen: Skin from Leg, Right

## 2025-02-08 NOTE — NURSING
Notified MD Cristobal of the pt's elevated bp 180/70's [id number], taken by tech and nurse. she denies pain at this time, but states that she has been very anxious and unable to sleep for the last couple of nights. Also stated that she takes metoprolol at night at home, pt was given night dose of melatonin to aide in sleeping. MD stated to recheck bp in one hour and that she will give sign out to oncoming team. Otherwise plan of care is ongoing.

## 2025-02-08 NOTE — PT/OT/SLP PROGRESS
Occupational Therapy  Missed Visit    Patient Name:  Elizabeth Dickson   MRN:  [id number]    Patient not seen today secondary to Other (Comment). Pt w/ mult fly members visiting. Will follow-up .    2/8/2025

## 2025-02-08 NOTE — PLAN OF CARE
Problem: Fall Injury Risk  Goal: Absence of Fall and Fall-Related Injury  Intervention: Identify and Manage Contributors  Flowsheets (Taken 2/8/2025 0659)  Self-Care Promotion:   BADL personal objects within reach   BADL personal routines maintained   adaptive equipment use encouraged  Medication Review/Management:   medications reviewed   high-risk medications identified  Intervention: Promote Injury-Free Environment  Flowsheets (Taken 2/8/2025 0659)  Safety Promotion/Fall Prevention:   assistive device/personal item within reach   bed alarm set   medications reviewed   nonskid shoes/socks when out of bed   Supervised toileting - stay within arms reach   room near unit station   upper side rails raised x 2, lower siderails raised x 1 (Peds only)     Problem: Fall Injury Risk  Goal: Absence of Fall and Fall-Related Injury  Intervention: Identify and Manage Contributors  Flowsheets (Taken 2/8/2025 0659)  Self-Care Promotion:   BADL personal objects within reach   BADL personal routines maintained   adaptive equipment use encouraged  Medication Review/Management:   medications reviewed   high-risk medications identified     Problem: Fall Injury Risk  Goal: Absence of Fall and Fall-Related Injury  Intervention: Promote Injury-Free Environment  Flowsheets (Taken 2/8/2025 0659)  Safety Promotion/Fall Prevention:   assistive device/personal item within reach   bed alarm set   medications reviewed   nonskid shoes/socks when out of bed   Supervised toileting - stay within arms reach   room near unit station   upper side rails raised x 2, lower siderails raised x 1 (Peds only)

## 2025-02-08 NOTE — DISCHARGE SUMMARY
[facility] Hospital Medicine Discharge Summary    Primary Team: [facility] Hospitalist Team A  Attending Physician: Rocky Perez MD  Resident: Charlie Greene/Laura Cristobal  Intern: Tim Giron/Robert Flores    Date of Admit: 2/3/2025  Date of Discharge: 2/8/2025    Discharge to: Home/Self-Care  Condition: Stable    Discharge Diagnoses     Patient Active Problem List   Diagnosis    Iron deficiency anemia    Microalbuminuria    Breast cancer    Pulmonary nodules    Osteopenia    Hypothyroid    Acidosis    Right renal mass    Hypertension    Stage 3a chronic kidney disease    Hypothyroidism    Pancreatic cyst    Chronic right-sided low back pain without sciatica    Hyperlipidemia    Acute cystitis without hematuria    Hyperkalemia    Dermatitis    Anxiety    Depression    Bilateral leg edema    Acute pain of left knee    Umbilical bleeding    Other specified spondylopathies, lumbar region    Atherosclerosis of aorta    Wrist lump, left    Mycosis fungoides    Decreased functional activity tolerance    Decreased strength of lower extremity    Current chronic use of systemic steroids    Cutaneous T-cell lymphoma, unspecified, unspecified site    Cellulitis       Brief History of Present Illness      83yo female with PMH of HTN, HLD, SCC of face and legs, breast CA s/p surgery/chemo and radx, hypothyroidism, CKD3, ZEKE sent from clinic for worsening RLE cellulitis failing PO Abx.      The patient was in their usual state of health until she started to get SCC bx on her BLE. Denies any chills, fever, nausea/vomiting, urinary or bowel changes, CP, SOB. Endorses worsening swelling of BLE and stopping her diuretics because of incr BUN.      1/8 face and R posterior calf shave bx.    1/25 wound culture of right anterior thigh showed pansens S. Aureus and patient was placed on Keflex by dermatology.   1/29 patient advised by derm over phone to present to ED for worsening RLE wound after associated trauma.   1/29 presented to [facility]  clindamycin added to Keflex after ED discussion with dermatology, but patient only completed 3-4 days 2/2 diarrhea, which has since resolved. Advised by dermatologist to only continue keflex, course completed.   2/3 advised to go to ER.     Radiograph of RLE showed SQ edema but no evidence of free air.  No leukocytosis on labs, but elevated CRP.  DVT US was negative for acute thrombus. Arterial US of RLE showed diffuse atherosclerosis but no  hemodynamically significant stenosis.     Initially on vancomycin and zosyn, started on gabapentin and oxycodone for pain.  Surgery was consulted, recommended no acute debridement. ID initially recommending vancomycin monotherapy.  Lack of improvement on monotherapy led to addition of Rocephin to broaden coverage. Improvement noted after broadening.  Patient eventually discharged 5 days later on course of doxy + augmentin (total antibiotic treatment of 2 weeks planned).  Also referred for home health for wound care treatment, dermatology referral on follow-up.    For the full HPI please refer to the History & Physical from this admission.    Hospital Course By Problem with Pertinent Findings     RLE cellulitis 2/2 SCC Bx, failed PO abx   Hx of SCC of legs and face  BLE swelling  - afebrile, RLE shin cellulitis after lesion bx with dermatology at [facility] dermatology, failed keflex, transitioned to cipro but did not tolerate 2/2 diarrhea, finished keflex course  - XR in ED showing swelling subq edema but no free air   - no leukocytosis, CRP 64, ESR 16  - Bcx ordered, NGTD.   - home creams not available   - DVT US BLE - neg DVT study  - arterial RLE US - Diffuse atherosclerosis with biphasic and monophasic waveforms which can be seen with inflow stenosis. No elevated velocities to suggest hemodynamically significant stenosis   - ID consulted - recc discontinuing zosyn, continue IV vanc with trough 10-15. Transition to PO antibiotics on discharge.   - Surgery consulted: no Acute  intervention.   Plan:  - Continued on vanc, Rocephin 1g for broader coverage in setting of minimal improvement of cellulitis.   - improvement of cellulitis on 2-antibiotic regimen  - HH wound care on dispo and outpt derm fup  - discharge on doxy + augmentin for a total of two weeks of appropriate treatment (starting from day Rocephin added, EOT 2/20)  - fidencio TID, jonha prn for pain while inpatient; gabapentin continued on discharge     Macrocytic anemia   - H/H on adm 9.5/32, MCV 10, baseline old from 1 years ago 13  - on daily iron PO  - B12 345, folate 10.1, iron studies low, elevated ferritin  - continued ferrous sulfate tabs on discharge     CKD  - Cr on adm 1.7, BUN 34; baseline 1.1-1.8 but more likely 1.8, recent bump 2/2 diuretics per pt   - continue to monitor, 1L given, if needed but no prior TTE available   - renal function improved on discharge.      Hyperlipidemia  HTN  - A1c 4.7; lipid chol 122, HDL 38, trig 84, LDL 67  - continue home atorvastatin, fenofibrate   - continue home metoprolol, unclear if for BP  - discharged on the above     Depression?  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 Appropriate behavior.   Head: normocephalic, atraumatic  Eyes: PERRL. EOMI. No conjunctival injection. No scleral icterus.  ENT: MMM, no rhinorrhea or epistaxis.   Neck: No lymphadenopathy. No accessory muscle use.   Cardiac: Regular rate. Regular rhythm. No  murmurs appreciated.  Pulmonary/Chest: CTAB. Normal work of breathing.   Abdomen: Soft, non tender, non distended, normoactive bowel sounds.   Extremities: atraumatic, no deformities, no edema.  Skin: dry, warm, intact. No bruising or rashes.  Neuro: Alert. Oriented to person, place, time. Moving all extremities spontaneously. Normal  strength bilaterally.     Discharge Medications        Medication List        START taking these medications      acetaminophen 325 MG tablet  Commonly known as: TYLENOL  Take 2 tablets (650 mg total) by mouth every 8 (eight) hours as needed for Pain.     amoxicillin-clavulanate 875-125mg 875-125 mg per tablet  Commonly known as: AUGMENTIN  Take 1 tablet by mouth 2 (two) times daily. for 12 days     aspirin 81 MG Chew  Take 1 tablet (81 mg total) by mouth once daily.  Start taking on: February 9, 2025     doxycycline 100 MG Cap  Commonly known as: VIBRAMYCIN  Take 1 capsule (100 mg total) by mouth every 12 (twelve) hours. for 12 days     gabapentin 300 MG capsule  Commonly known as: NEURONTIN  Take 1 capsule (300 mg total) by mouth 2 (two) times daily as needed (leg pain and cramps).     LIDOcaine 5 %  Commonly known as: LIDODERM  Place 1 patch onto the skin once daily. Remove & Discard patch within 12 hours or as directed by MD  Start taking on: February 9, 2025            CHANGE how you take these medications      EScitalopram oxalate 5 MG Tab  Commonly known as: LEXAPRO  TAKE 1 TABLET BY MOUTH EVERY DAY  What changed: when to take this     metoprolol succinate 50 MG 24 hr tablet  Commonly known as: TOPROL-XL  TAKE 1 TABLET BY MOUTH ONCE  DAILY  What changed: when to take this            CONTINUE taking these medications      ascorbic acid (vitamin C) 1000 MG tablet  Commonly known as: VITAMIN C     atorvastatin 10 MG tablet  Commonly known as: LIPITOR  Take 1 tablet (10 mg total) by mouth once daily.     calcium carbonate 1250 MG capsule     cholecalciferol (vitamin D3) 25 mcg  (1,000 unit) capsule  Commonly known as: VITAMIN D3     fenofibrate micronized 200 MG Cap  Commonly known as: LOFIBRA     ferrous sulfate 325 mg (65 mg iron) Tab tablet  Commonly known as: FEOSOL     latanoprost 0.005 % ophthalmic solution     levothyroxine 100 MCG tablet  Commonly known as: SYNTHROID     melatonin 10 mg Tab     omeprazole 20 MG capsule  Commonly known as: PRILOSEC            STOP taking these medications      betamethasone dipropionate 0.05 % cream     bexarotene 75 mg Cap               Where to Get Your Medications        These medications were sent to [facility]/pharmacy [facility][id number] - [place] [facility] [address]  [address] [facility] [place] [address]      Phone: [phone]   acetaminophen 325 MG tablet  amoxicillin-clavulanate 875-125mg 875-125 mg per tablet  aspirin 81 MG Chew  doxycycline 100 MG Cap  gabapentin 300 MG capsule  LIDOcaine 5 %       These medications were sent to [facility] - [address] [address]      Phone: [phone]   atorvastatin 10 MG tablet          Discharge Information:   Diet:  Renal diet    Physical Activity:  As tolerated (patient would like to ride elliptical at her gym)             Instructions:  1. Take all medications as prescribed  2. Keep all follow-up appointments  3. Return to the hospital or call your primary care physicians if any worsening symptoms such as fever, chest pain, shortness of breath, return of symptoms, or any other concerns.    Follow-Up Appointments:  PCP   Derm  Nephrology    Tim Giron MD  [facility] Internal Medicine PGY-I  [facility] Hospitalist Team A  02/08/2025 4:14 PM

## 2025-02-08 NOTE — PLAN OF CARE
02/08/25 1534   Final Note   Assessment Type Final Discharge Note   Anticipated Discharge Disposition Home   What phone number can be called within the next 1-3 days to see how you are doing after discharge? [phone]   Post-Acute Status   Post-Acute Authorization Home Health   Home Health Status Set-up Complete/Auth obtained   Discharge Delays None known at this time      spoke with pts daughter regarding discharge plan to home. Daughter informed SW that she would transport her home at D/C. Per Robe, SW< pt [facility]. Pt has no additional needs.    SW attached HH orders in Epic.     Follow up appointments scheduled below.    Future Appointments   Date Time Provider Department Center   2/10/2025  9:30 AM Arelis Noland MD [facility]   6/26/2025 10:00 AM Porsha Masters MD [facility]

## 2025-02-08 NOTE — PROGRESS NOTES
Discharge orders noted. Additional clinical references attached. Patient's discharge instructions given by bedside RN and reviewed via this VN.  Education provided on new and previous medications, diagnosis, and follow-up appointments.  New medications were sent to patient's  pharmacy. Patient verbalized understanding and teach back method was used. Patient's ride/transportation home at bedside. All questions answered. Transport to [facility] will be requested. Floor nurse notified.              02/08/25 1612    Notification    Notified Of Discharge Status   Admission   Communication Issues? None   Shift   Virtual Nurse - Rounding Complete   Virtual Nurse - Patient Verbalized Approval Of Camera Use   Safety/Activity   Patient Rounds bed in low position;call light in patient/parent reach;clutter free environment maintained;visualized patient   Safety Promotion/Fall Prevention side rails raised x 2;family to remain at bedside   Activity Management Sitting at edge of bed - L2

## 2025-02-09 LAB
BACTERIA BLD CULT: NORMAL
BACTERIA BLD CULT: NORMAL

## 2025-02-10 ENCOUNTER — OFFICE VISIT (OUTPATIENT)
Dept: PRIMARY CARE CLINIC | Facility: CLINIC | Age: 85
End: 2025-02-10
Payer: MEDICARE

## 2025-02-10 VITALS
HEIGHT: 60 IN | HEART RATE: 67 BPM | TEMPERATURE: 97 F | OXYGEN SATURATION: 100 % | SYSTOLIC BLOOD PRESSURE: 122 MMHG | BODY MASS INDEX: 31.77 KG/M2 | DIASTOLIC BLOOD PRESSURE: 59 MMHG | WEIGHT: 161.81 LBS

## 2025-02-10 DIAGNOSIS — C84.A5 CUTANEOUS T-CELL LYMPHOMA INVOLVING LYMPH NODES OF LOWER EXTREMITY: ICD-10-CM

## 2025-02-10 DIAGNOSIS — L03.115 CELLULITIS OF RIGHT LOWER EXTREMITY: Primary | ICD-10-CM

## 2025-02-10 DIAGNOSIS — C44.92 CANCER OF SKIN, SQUAMOUS CELL: ICD-10-CM

## 2025-02-10 DIAGNOSIS — R60.9 SWELLING: ICD-10-CM

## 2025-02-10 PROBLEM — N30.00 ACUTE CYSTITIS WITHOUT HEMATURIA: Status: RESOLVED | Noted: 2020-04-30 | Resolved: 2025-02-10

## 2025-02-10 PROBLEM — E87.20 ACIDOSIS: Status: RESOLVED | Noted: 2018-03-16 | Resolved: 2025-02-10

## 2025-02-10 PROCEDURE — 99999 PR PBB SHADOW E&M-EST. PATIENT-LVL III: CPT | Mod: PBBFAC,,, | Performed by: INTERNAL MEDICINE

## 2025-02-10 RX ORDER — POTASSIUM CHLORIDE 750 MG/1
10 CAPSULE, EXTENDED RELEASE ORAL DAILY
Qty: 7 CAPSULE | Refills: 0 | Status: SHIPPED | OUTPATIENT
Start: 2025-02-10

## 2025-02-10 RX ORDER — FUROSEMIDE 40 MG/1
40 TABLET ORAL DAILY
Qty: 7 TABLET | Refills: 0 | Status: SHIPPED | OUTPATIENT
Start: 2025-02-10 | End: 2026-02-10

## 2025-02-10 NOTE — PROGRESS NOTES
[facility]   New Visit Progress Note   Recent Hospital Discharge     PRESENTING HISTORY     Chief Complaint/Reason for Admission:  Follow up Hospital Discharge   PCP: Porsha Masters MD    History of Present Illness:  Ms. Elizabeth Dickson is a 84 y.o. female who was recently admitted to the hospital.    [facility] Hospital Medicine Discharge Summary  Primary Team: [facility] Hospitalist Team A  Attending Physician: Rocky Perez MD  Resident: Charlie Greene/Laura Cristobal  Intern: Tim Giron/Robert Flores  Date of Admit: 2/3/2025  Date of Discharge: 2/8/2025  Discharge to: Home/Self-Care  Condition: Stable  __________________________________________________________________    Today:  [facility] to [facility] for initial hospital follow up.  Recently hospitalized for management of RLE cellulitis following outpatient shave biopsy of skin lesion.   Did not improved with outpatient Keflex and limited course Clindamycin (Clindamycin discontinued early due to diarrhea).   Admitted to [facility] Medicine service with ID consultation.   Vancomycin, Ceftriaxone, and Zosyn initiated.  Lower Extremity US- no DVT.  Arterial US without evidence of hemodynamically significant stenosis.   Blood Cx NGTD.   Patient responded well to above interventions and supportive care.  Discharged to home on Doxycycline and Augmentin.   [facility] arranged.     Patient accompanied today by family.  Ambulatory with single prong cane.   Requires some assistance with ADL's.  Brought medication bottles for review.  Family manages her medication.   Known hx squamous cell cancer and Cutaneous T cell lymphoma- followed by Dermatology team at [facility] (Dr Cyndee Pinto).     Review of Systems  General ROS: negative for chills, fever or weight loss  + > 10 lb weight gain, unintentional over 2 weeks   Psychological ROS: negative for hallucination, depression or suicidal ideation  Ophthalmic ROS: negative for blurry vision, photophobia  or eye pain  ENT ROS: negative for epistaxis, sore throat or rhinorrhea  Respiratory ROS: no cough, shortness of breath, or wheezing  Cardiovascular ROS: no chest pain or dyspnea on exertion  + bilateral LE swelling   Gastrointestinal ROS: no abdominal pain, change in bowel habits, or black/ bloody stools  Genito-Urinary ROS: no dysuria, trouble voiding, or hematuria  Musculoskeletal ROS: negative for gait disturbance or muscular weakness  Neurological ROS: no syncope or seizures; no ataxia  Dermatological ROS: negative for pruritis, rash and jaundice      PAST HISTORY:     Past Medical History:   Diagnosis Date    Anxiety     Bilateral leg edema     Breast cancer     Cellulitis of left leg     CKD (chronic kidney disease), stage III     Hypertension     Hypothyroidism     Iron deficiency anemia     Osteopenia     Ovarian cyst     Teratoma and Benign Serous Cystadenoma    Pancreatic cyst     Pulmonary nodules     Renal mass     Vitamin D deficiency        Past Surgical History:   Procedure Laterality Date    BLOCK, NERVE, GENICULAR Bilateral 1/16/2025    Procedure: B/L GNB;  Surgeon: Rasta Lambert MD;  Location: [facility];  Service: Pain Management;  Laterality: Bilateral;  no sed-no ac    BREAST SURGERY      debridement      Left leg     Removal of Ovaries      SKIN BIOPSY      1/5/24       Family History   Problem Relation Name Age of Onset    Hypertension Mother Dianne     Diabetes Father Max     Hypertension Father Max     Lung cancer Sister Ana     Heart disease Brother Nick     Cholelithiasis Brother Nick     Colon cancer Brother Nick     Heart disease Brother Lama     COPD Brother Lama     Colon cancer Brother Lama     Diabetes type I Other grandson          MEDICATIONS & ALLERGIES:     Current Outpatient Medications on File Prior to Visit   Medication Sig Dispense Refill    acetaminophen (TYLENOL) 325 MG tablet Take 2 tablets (650 mg total) by mouth every 8 (eight) hours as needed  for Pain. 12 tablet 0    amoxicillin-clavulanate 875-125mg (AUGMENTIN) 875-125 mg per tablet Take 1 tablet by mouth 2 (two) times daily. for 12 days 24 tablet 0    ascorbic acid, vitamin C, (VITAMIN C) 1000 MG tablet Take 1,000 mg by mouth once daily.      aspirin 81 MG Chew Take 1 tablet (81 mg total) by mouth once daily. 30 tablet 0    atorvastatin (LIPITOR) 10 MG tablet Take 1 tablet (10 mg total) by mouth once daily. 90 tablet 2    calcium carbonate 1250 MG capsule Take 1,250 mg by mouth once daily.      cholecalciferol, vitamin D3, (VITAMIN D3) 25 mcg (1,000 unit) capsule Take 1,000 Units by mouth once daily.      doxycycline (VIBRAMYCIN) 100 MG Cap Take 1 capsule (100 mg total) by mouth every 12 (twelve) hours. for 12 days 24 capsule 0    EScitalopram oxalate (LEXAPRO) 5 MG Tab TAKE 1 TABLET BY MOUTH EVERY DAY (Patient taking differently: Take 5 mg by mouth nightly.) 90 tablet 3    fenofibrate micronized (LOFIBRA) 200 MG Cap Take 200 mg by mouth daily with breakfast.      ferrous sulfate (FEOSOL) 325 mg (65 mg iron) Tab tablet Take 325 mg by mouth once daily.      gabapentin (NEURONTIN) 300 MG capsule Take 1 capsule (300 mg total) by mouth 2 (two) times daily as needed (leg pain and cramps). 60 capsule 11    latanoprost 0.005 % ophthalmic solution Place 1 drop into both eyes every evening.      levothyroxine (SYNTHROID) 100 MCG tablet Take 100 mcg by mouth before breakfast.      LIDOcaine (LIDODERM) 5 % Place 1 patch onto the skin once daily. Remove & Discard patch within 12 hours or as directed by MD 5 patch 0    melatonin 10 mg Tab Take 10 mg by mouth nightly as needed.      metoprolol succinate (TOPROL-XL) 50 MG 24 hr tablet TAKE 1 TABLET BY MOUTH ONCE  DAILY 90 tablet 3    omeprazole (PRILOSEC) 20 MG capsule Take 20 mg by mouth once daily.       No current facility-administered medications on file prior to visit.        Review of patient's allergies indicates:   Allergen Reactions    Lisinopril       hyperkalemia       OBJECTIVE:     Vital Signs:  BP (!) 122/59 (BP Location: Left arm, Patient Position: Sitting)   Pulse 67   Temp 97.1 °F (36.2 °C) (Oral)   Ht 5' (1.524 m)   Wt 73.4 kg (161 lb 13.1 oz)   SpO2 100%   BMI 31.60 kg/m²   Wt Readings from Last 3 Encounters:   02/10/25 1004 73.4 kg (161 lb 13.1 oz)   02/06/25 2040 75.3 kg (166 lb 0.1 oz)   02/05/25 2014 74.7 kg (164 lb 10.9 oz)   02/05/25 0000 72.4 kg (159 lb 9.8 oz)   02/03/25 2338 68 kg (149 lb 14.6 oz)   02/03/25 1456 69.4 kg (153 lb)   01/16/25 0852 70.3 kg (155 lb)     Body mass index is 31.6 kg/m².        Physical Exam:  BP (!) 122/59 (BP Location: Left arm, Patient Position: Sitting)   Pulse 67   Temp 97.1 °F (36.2 °C) (Oral)   Ht 5' (1.524 m)   Wt 73.4 kg (161 lb 13.1 oz)   SpO2 100%   BMI 31.60 kg/m²   General appearance: alert, cooperative, no distress  Constitutional:Oriented to person, place, and time  + appears obese    HEENT: Normocephalic, atraumatic, neck symmetrical, no nasal discharge   Eyes: conjunctivae/corneas clear, PERRL, EOM's intact  Lungs: clear to auscultation bilaterally, no dullness to percussion bilaterally  Heart: regular rate and rhythm without rub; no displacement of the PMI   Abdomen: soft, non-tender; bowel sounds normoactive; no organomegaly  Extremities: extremities symmetric; no clubbing, cyanosis, + 2 pitting edema bilateral LE   Integument: + bilateral shin wounds, erythema   Neurologic: Alert and oriented X 3, normal strength, normal coordination   Psychiatric: no pressured speech; normal affect; + memory impairment     Laboratory  Lab Results   Component Value Date    WBC 3.56 (L) 02/08/2025    HGB 8.9 (L) 02/08/2025    HCT 28.8 (L) 02/08/2025    MCV 98 02/08/2025     02/08/2025     BMP  Lab Results   Component Value Date     02/08/2025    K 4.0 02/08/2025     02/08/2025    CO2 26 02/08/2025    BUN 36 (H) 02/08/2025    CREATININE 1.1 02/08/2025    CALCIUM 9.2 02/08/2025    ANIONGAP  "8 02/08/2025    EGFRNORACEVR 50 (A) 02/08/2025     Lab Results   Component Value Date    ALT 38 02/08/2025    AST 61 (H) 02/08/2025    ALKPHOS 35 (L) 02/08/2025    BILITOT 0.2 02/08/2025     No results found for: "INR", "PROTIME"  Lab Results   Component Value Date    HGBA1C 4.7 02/04/2025       Diagnostic Results:    US Lower Extremities arterial 2/4/25:  Diffuse atherosclerosis with biphasic and monophasic waveforms which can be seen with inflow stenosis. No elevated velocities to suggest hemodynamically significant stenosis.     US venous bilateral lower extremities 2/4/25:  No evidence of acute deep venous thrombosis in either lower extremity.  Probable left popliteal fossa cyst.      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  Dispense: 7 tablet; Refill: 0  -     potassium chloride (MICRO-K) 10 MEQ CpSR; Take 1 capsule (10 mEq total) by mouth once daily.  Dispense: 7 capsule; Refill: 0    I will see patient again in hospital follow up clinic 2/18/25.     Instructions for the patient:      Scheduled Follow-up :  Future Appointments   Date Time Provider Department Center   2/18/2025 11:30 AM Arelis Noland MD [facility]   6/26/2025 10:00 AM Porsha Masters MD [facility]       Post Visit Medication List:     Medication List            " Accurate as of February 10, 2025 10:56 AM. If you have any questions, ask your nurse or doctor.                START taking these medications      furosemide 40 MG tablet  Commonly known as: LASIX  Take 1 tablet (40 mg total) by mouth once daily.  Started by: Arelis Noland MD     potassium chloride 10 MEQ Cpsr  Commonly known as: MICRO-K  Take 1 capsule (10 mEq total) by mouth once daily.  Started by: Arelis Noland MD            CONTINUE taking these medications      acetaminophen 325 MG tablet  Commonly known as: TYLENOL  Take 2 tablets (650 mg total) by mouth every 8 (eight) hours as needed for Pain.     amoxicillin-clavulanate 875-125mg 875-125 mg per tablet  Commonly known as: AUGMENTIN  Take 1 tablet by mouth 2 (two) times daily. for 12 days     ascorbic acid (vitamin C) 1000 MG tablet  Commonly known as: VITAMIN C     aspirin 81 MG Chew  Take 1 tablet (81 mg total) by mouth once daily.     atorvastatin 10 MG tablet  Commonly known as: LIPITOR  Take 1 tablet (10 mg total) by mouth once daily.     calcium carbonate 1250 MG capsule     cholecalciferol (vitamin D3) 25 mcg (1,000 unit) capsule  Commonly known as: VITAMIN D3     doxycycline 100 MG Cap  Commonly known as: VIBRAMYCIN  Take 1 capsule (100 mg total) by mouth every 12 (twelve) hours. for 12 days     EScitalopram oxalate 5 MG Tab  Commonly known as: LEXAPRO  TAKE 1 TABLET BY MOUTH EVERY DAY     fenofibrate micronized 200 MG Cap  Commonly known as: LOFIBRA     ferrous sulfate 325 mg (65 mg iron) Tab tablet  Commonly known as: FEOSOL     gabapentin 300 MG capsule  Commonly known as: NEURONTIN  Take 1 capsule (300 mg total) by mouth 2 (two) times daily as needed (leg pain and cramps).     latanoprost 0.005 % ophthalmic solution     levothyroxine 100 MCG tablet  Commonly known as: SYNTHROID     LIDOcaine 5 %  Commonly known as: LIDODERM  Place 1 patch onto the skin once daily. Remove & Discard patch within 12 hours or as directed by MD     melatonin  10 mg Tab     metoprolol succinate 50 MG 24 hr tablet  Commonly known as: TOPROL-XL  TAKE 1 TABLET BY MOUTH ONCE  DAILY     omeprazole 20 MG capsule  Commonly known as: PRILOSEC               Where to Get Your Medications        These medications were sent to [facility]/pharmacy #[id number] - [place] - [address]  [address] [place] [address]      Phone: [phone]   furosemide 40 MG tablet  potassium chloride 10 MEQ Cpsr         Signing Physician:  Arelis Noland MD

## 2025-02-11 ENCOUNTER — PATIENT OUTREACH (OUTPATIENT)
Dept: ADMINISTRATIVE | Facility: CLINIC | Age: 85
End: 2025-02-11
Payer: MEDICARE

## 2025-02-11 NOTE — PROGRESS NOTES
C3 nurse spoke with Elizabeth Dickson's daughter Abbey for a TCC post hospital discharge follow up call. Pt had hospital follow up with Arelis Noland MD on 02/10/25.

## 2025-02-17 ENCOUNTER — TELEPHONE (OUTPATIENT)
Dept: PRIMARY CARE CLINIC | Facility: CLINIC | Age: 85
End: 2025-02-17
Payer: MEDICARE

## 2025-02-17 NOTE — TELEPHONE ENCOUNTER
----- Message from Dolores sent at 2/17/2025 10:57 AM CST -----  Contact: Shivani with [facility] [phone] ext [phone]  [email or web address] Patient is calling for Medical Advice regarding:Shivani with [facility] [phone] ext [phone] calling to get Physical Therapy orders for pt. Please call her back and advise.How long has patient had these symptoms:Pharmacy name and phone#:Patient wants a call back or thru [facility]:callbackComments:Please advise patient replies from provider may take up to 48 hours.

## 2025-02-18 ENCOUNTER — LAB VISIT (OUTPATIENT)
Dept: LAB | Facility: HOSPITAL | Age: 85
End: 2025-02-18
Attending: INTERNAL MEDICINE
Payer: MEDICARE

## 2025-02-18 ENCOUNTER — OFFICE VISIT (OUTPATIENT)
Dept: PRIMARY CARE CLINIC | Facility: CLINIC | Age: 85
End: 2025-02-18
Payer: MEDICARE

## 2025-02-18 VITALS
OXYGEN SATURATION: 98 % | HEART RATE: 65 BPM | HEIGHT: 60 IN | WEIGHT: 152.56 LBS | BODY MASS INDEX: 29.95 KG/M2 | SYSTOLIC BLOOD PRESSURE: 132 MMHG | DIASTOLIC BLOOD PRESSURE: 62 MMHG | TEMPERATURE: 97 F

## 2025-02-18 DIAGNOSIS — C44.92 CANCER OF SKIN, SQUAMOUS CELL: ICD-10-CM

## 2025-02-18 DIAGNOSIS — M79.89 LEG SWELLING: Primary | ICD-10-CM

## 2025-02-18 DIAGNOSIS — M79.89 LEG SWELLING: ICD-10-CM

## 2025-02-18 DIAGNOSIS — C84.A5 CUTANEOUS T-CELL LYMPHOMA INVOLVING LYMPH NODES OF LOWER EXTREMITY: ICD-10-CM

## 2025-02-18 DIAGNOSIS — L03.115 CELLULITIS OF RIGHT LOWER EXTREMITY: ICD-10-CM

## 2025-02-18 LAB
ANION GAP SERPL CALC-SCNC: 12 MMOL/L (ref 8–16)
BUN SERPL-MCNC: 54 MG/DL (ref 8–23)
CALCIUM SERPL-MCNC: 9.6 MG/DL (ref 8.7–10.5)
CHLORIDE SERPL-SCNC: 104 MMOL/L (ref 95–110)
CO2 SERPL-SCNC: 26 MMOL/L (ref 23–29)
CREAT SERPL-MCNC: 1.6 MG/DL (ref 0.5–1.4)
EST. GFR  (NO RACE VARIABLE): 32 ML/MIN/1.73 M^2
GLUCOSE SERPL-MCNC: 85 MG/DL (ref 70–110)
MAGNESIUM SERPL-MCNC: 1.9 MG/DL (ref 1.6–2.6)
POTASSIUM SERPL-SCNC: 4.5 MMOL/L (ref 3.5–5.1)
SODIUM SERPL-SCNC: 142 MMOL/L (ref 136–145)

## 2025-02-18 PROCEDURE — 36415 COLL VENOUS BLD VENIPUNCTURE: CPT | Performed by: INTERNAL MEDICINE

## 2025-02-18 PROCEDURE — 83735 ASSAY OF MAGNESIUM: CPT | Performed by: INTERNAL MEDICINE

## 2025-02-18 PROCEDURE — 80048 BASIC METABOLIC PNL TOTAL CA: CPT | Performed by: INTERNAL MEDICINE

## 2025-02-18 NOTE — PROGRESS NOTES
Subjective:       Patient ID: Elizabeth Dickson is a 84 y.o. female.    Chief Complaint: Hospital Follow Up    HPI:  Hospitalized 2/3/25 - 2/8/25 for management of RLE cellulitis following outpatient shave biopsy of skin lesion.   Did not improved with outpatient Keflex and limited course Clindamycin (Clindamycin discontinued early due to diarrhea).   Admitted to [facility] Hospital Medicine service with ID consultation.   Vancomycin, Ceftriaxone, and Zosyn initiated.  Lower Extremity US- no DVT.  Arterial US without evidence of hemodynamically significant stenosis.   Blood Cx NGTD.   Patient responded well to above interventions and supportive care.  Discharged to home on Doxycycline and Augmentin.   [facility] arranged.     [facility] 2/10/25 - noted significant bilateral LE swelling and > 10 lb weight gain.   7 day course Lasix and K prescribed.  Patient returns today for re-evaluation.   Weight down 9 lbs; swelling still present but greatly improved.   Denies dyspnea or orthopnea.     Review of Systems   Constitutional:  Positive for weight loss. Negative for chills, fever and malaise/fatigue.   Respiratory:  Negative for shortness of breath.    Cardiovascular:  Positive for leg swelling. Negative for orthopnea.   Genitourinary:  Negative for dysuria.   Musculoskeletal:  Negative for falls.   Neurological:  Negative for focal weakness.   Psychiatric/Behavioral:  The patient is not nervous/anxious.            Objective:      Vital Signs:  Vitals:    02/18/25 1150   BP: 132/62   Pulse: 65   Temp: 97.4 °F (36.3 °C)     Wt Readings from Last 3 Encounters:   02/18/25 1150 69.2 kg (152 lb 8.9 oz)   02/10/25 1004 73.4 kg (161 lb 13.1 oz)   02/06/25 2040 75.3 kg (166 lb 0.1 oz)   02/05/25 2014 74.7 kg (164 lb 10.9 oz)   02/05/25 0000 72.4 kg (159 lb 9.8 oz)   02/03/25 2338 68 kg (149 lb 14.6 oz)   02/03/25 1456 69.4 kg (153 lb)     Body mass index is 29.79 kg/m².   SpO2 Readings from Last 1  Encounters:   02/18/25 98%       Physical Exam  Constitutional:       General: She is not in acute distress.     Appearance: She is not ill-appearing.   Cardiovascular:      Rate and Rhythm: Normal rate.   Pulmonary:      Effort: Pulmonary effort is normal. No respiratory distress.      Breath sounds: No wheezing.   Abdominal:      Palpations: Abdomen is soft.   Musculoskeletal:      Cervical back: Neck supple.      Right lower leg: Edema present.      Left lower leg: Edema present.   Neurological:      General: No focal deficit present.      Mental Status: She is oriented to person, place, and time.   Psychiatric:         Mood and Affect: Mood normal.             Assessment:       1. Leg swelling    2. Cellulitis of right lower extremity    3. 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 Cancer of skin, squamous cell        Plan:       Diagnoses and all orders for this visit:    Leg swelling  -     Basic Metabolic Panel; Future  -     MAGNESIUM; Future  -     Echo Saline Bubble? No; Ultrasound enhancing contrast? 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 If you have any questions, ask  your nurse or doctor.                CONTINUE taking these medications      acetaminophen 325 MG tablet  Commonly known as: TYLENOL  Take 2 tablets (650 mg total) by mouth every 8 (eight) hours as needed for Pain.     amoxicillin-clavulanate 875-125mg 875-125 mg per tablet  Commonly known as: AUGMENTIN  Take 1 tablet by mouth 2 (two) times daily. for 12 days     ascorbic acid (vitamin C) 1000 MG tablet  Commonly known as: VITAMIN C     aspirin 81 MG Chew  Take 1 tablet (81 mg total) by mouth once daily.     atorvastatin 10 MG tablet  Commonly known as: LIPITOR  Take 1 tablet (10 mg total) by mouth once daily.     calcium carbonate 1250 MG capsule     cholecalciferol (vitamin D3) 25 mcg (1,000 unit) capsule  Commonly known as: VITAMIN D3     doxycycline 100 MG Cap  Commonly known as: VIBRAMYCIN  Take 1 capsule (100 mg total) by mouth every 12 (twelve) hours. for 12 days     EScitalopram oxalate 5 MG Tab  Commonly known as: LEXAPRO  TAKE 1 TABLET BY MOUTH EVERY DAY     fenofibrate micronized 200 MG Cap  Commonly known as: LOFIBRA     ferrous sulfate 325 mg (65 mg iron) Tab tablet  Commonly known as: FEOSOL     furosemide 40 MG tablet  Commonly known as: LASIX  Take 1 tablet (40 mg total) by mouth once daily.     gabapentin 300 MG capsule  Commonly known as: NEURONTIN  Take 1 capsule (300 mg total) by mouth 2 (two) times daily as needed (leg pain and cramps).     latanoprost 0.005 % ophthalmic solution     levothyroxine 100 MCG tablet  Commonly known as: SYNTHROID     LIDOcaine 5 %  Commonly known as: LIDODERM  Place 1 patch onto the skin once daily. Remove & Discard patch within 12 hours or as directed by MD     melatonin 10 mg Tab     metoprolol succinate 50 MG 24 hr tablet  Commonly known as: TOPROL-XL  TAKE 1 TABLET BY MOUTH ONCE  DAILY     omeprazole 20 MG capsule  Commonly known as: PRILOSEC     potassium chloride 10 MEQ Cpsr  Commonly known as: MICRO-K  Take 1 capsule (10 mEq total) by mouth once daily.

## 2025-02-19 PROBLEM — C84.A5: Status: ACTIVE | Noted: 2023-04-25

## 2025-02-19 NOTE — PHYSICIAN QUERY
Please clarify Cellulitis as it relates to the procedure biopsy:      Clinically undetermined     Patient had cellulitis in her lower leg, which also had known SCC.  This could have predisposed to the cellulitis.  Unknown whether the biopsy led to this problem.    Tim Giron MD

## 2025-03-05 DIAGNOSIS — Z78.0 MENOPAUSE: ICD-10-CM

## 2025-03-10 ENCOUNTER — TELEPHONE (OUTPATIENT)
Dept: PRIMARY CARE CLINIC | Facility: CLINIC | Age: 85
End: 2025-03-10
Payer: MEDICARE

## 2025-03-10 VITALS — SYSTOLIC BLOOD PRESSURE: 170 MMHG | DIASTOLIC BLOOD PRESSURE: 80 MMHG

## 2025-03-10 DIAGNOSIS — F41.9 ANXIETY: Primary | ICD-10-CM

## 2025-03-10 DIAGNOSIS — I10 HYPERTENSION, UNSPECIFIED TYPE: ICD-10-CM

## 2025-03-10 DIAGNOSIS — R60.0 BILATERAL LEG EDEMA: ICD-10-CM

## 2025-03-10 RX ORDER — LORAZEPAM 0.5 MG/1
TABLET ORAL
Qty: 30 TABLET | Refills: 0 | Status: SHIPPED | OUTPATIENT
Start: 2025-03-10

## 2025-03-10 RX ORDER — FUROSEMIDE 40 MG/1
TABLET ORAL
Qty: 30 TABLET | Refills: 0 | Status: SHIPPED | OUTPATIENT
Start: 2025-03-10

## 2025-03-10 RX ORDER — POTASSIUM CHLORIDE 750 MG/1
10 TABLET, EXTENDED RELEASE ORAL 2 TIMES DAILY
Qty: 30 TABLET | Refills: 0 | Status: SHIPPED | OUTPATIENT
Start: 2025-03-10

## 2025-03-10 NOTE — TELEPHONE ENCOUNTER
I called to d/w pt.  No answer. I left her a message to return my call. Please get her on the phone for me in Am.  Also, I would like to d/w Dr. Nieto.   Dr. knight

## 2025-03-10 NOTE — TELEPHONE ENCOUNTER
----- Message from Brandy sent at 3/10/2025  1:11 PM CDT -----  Contact: [facility] Care/Connie/[phone]  Type: Home Health (orders, updates, clarifications, etc.)Home Health Agency/Nurse:[facility]/Karlos number:[phone] for call:blood pressure Comments: Connie said that she is calling to report pt's blood pressure has been running btwn 170/80 180/80, she stated that checked pt's b/p logs and the pressure has been running btwn 170-180 also pt has swelling in her lower extremities. Please advise

## 2025-03-11 NOTE — TELEPHONE ENCOUNTER
I called and d/w pt. She is very anxious about her upcoming echo on 3/25/25.  Her fu with Renal is 3/26/25.    She is elevating her legs and the swelling is not improving.  She has one leg wrapped and the other is in a compression sock.  Son in law reports her legs are a little better than they were on discharge. They were better when she was on lasix x 1 wk. I will rx her lasix 40 mg (#30) I will have her take a dose today and tomorrow and will take 1 Kcl dose with each.  She is sched for labs in 10 days. She denies SOB.  Ordered a CMP.Please send a copy of her orders to her for [facility].   Dr. JACOBS

## 2025-03-24 DIAGNOSIS — Z00.00 ENCOUNTER FOR MEDICARE ANNUAL WELLNESS EXAM: ICD-10-CM

## 2025-03-25 ENCOUNTER — PATIENT MESSAGE (OUTPATIENT)
Dept: PRIMARY CARE CLINIC | Facility: CLINIC | Age: 85
End: 2025-03-25
Payer: MEDICARE

## 2025-03-25 ENCOUNTER — RESULTS FOLLOW-UP (OUTPATIENT)
Dept: PRIMARY CARE CLINIC | Facility: CLINIC | Age: 85
End: 2025-03-25

## 2025-03-25 ENCOUNTER — HOSPITAL ENCOUNTER (OUTPATIENT)
Dept: CARDIOLOGY | Facility: HOSPITAL | Age: 85
Discharge: HOME | End: 2025-03-25
Attending: INTERNAL MEDICINE
Payer: MEDICARE

## 2025-03-25 ENCOUNTER — HOSPITAL ENCOUNTER (OUTPATIENT)
Dept: RADIOLOGY | Facility: HOSPITAL | Age: 85
Discharge: HOME | End: 2025-03-25
Attending: INTERNAL MEDICINE
Payer: MEDICARE

## 2025-03-25 ENCOUNTER — TELEPHONE (OUTPATIENT)
Dept: PRIMARY CARE CLINIC | Facility: CLINIC | Age: 85
End: 2025-03-25
Payer: MEDICARE

## 2025-03-25 VITALS — BODY MASS INDEX: 29.84 KG/M2 | WEIGHT: 152 LBS | HEIGHT: 60 IN

## 2025-03-25 DIAGNOSIS — Z78.0 MENOPAUSE: ICD-10-CM

## 2025-03-25 DIAGNOSIS — M79.89 LEG SWELLING: ICD-10-CM

## 2025-03-25 LAB
APICAL FOUR CHAMBER EJECTION FRACTION: 63 %
APICAL TWO CHAMBER EJECTION FRACTION: 55 %
ASCENDING AORTA: 2.3 CM
AV INDEX (PROSTH): 0.74
AV MEAN GRADIENT: 6 MMHG
AV PEAK GRADIENT: 10 MMHG
AV VALVE AREA BY VELOCITY RATIO: 2.8 CM²
AV VALVE AREA: 2.6 CM²
AV VELOCITY RATIO: 0.81
BSA FOR ECHO PROCEDURE: 1.71 M2
CV ECHO LV RWT: 0.28 CM
DOP CALC AO PEAK VEL: 1.6 M/S
DOP CALC AO VTI: 38.2 CM
DOP CALC LVOT AREA: 3.5 CM2
DOP CALC LVOT DIAMETER: 2.1 CM
DOP CALC LVOT PEAK VEL: 1.3 M/S
DOP CALC LVOT STROKE VOLUME: 98 CM3
DOP CALC MV VTI: 39 CM
DOP CALCLVOT PEAK VEL VTI: 28.3 CM
E WAVE DECELERATION TIME: 177 MSEC
E/A RATIO: 1.05
E/E' RATIO: 20 M/S
ECHO LV POSTERIOR WALL: 0.6 CM (ref 0.6–1.1)
FRACTIONAL SHORTENING: 32.6 % (ref 28–44)
INTERVENTRICULAR SEPTUM: 0.7 CM (ref 0.6–1.1)
IVC DIAMETER: 1.89 CM
LEFT ATRIUM AREA SYSTOLIC (APICAL 2 CHAMBER): 19.31 CM2
LEFT ATRIUM AREA SYSTOLIC (APICAL 4 CHAMBER): 19.44 CM2
LEFT ATRIUM VOLUME INDEX MOD: 34 ML/M2
LEFT ATRIUM VOLUME MOD: 56 ML
LEFT INTERNAL DIMENSION IN SYSTOLE: 2.9 CM (ref 2.1–4)
LEFT VENTRICLE DIASTOLIC VOLUME INDEX: 49.4 ML/M2
LEFT VENTRICLE DIASTOLIC VOLUME: 82 ML
LEFT VENTRICLE END DIASTOLIC VOLUME APICAL 2 CHAMBER: 54.73 ML
LEFT VENTRICLE END DIASTOLIC VOLUME APICAL 4 CHAMBER: 76.17 ML
LEFT VENTRICLE END SYSTOLIC VOLUME APICAL 2 CHAMBER: 56.1 ML
LEFT VENTRICLE END SYSTOLIC VOLUME APICAL 4 CHAMBER: 54.82 ML
LEFT VENTRICLE MASS INDEX: 48.5 G/M2
LEFT VENTRICLE SYSTOLIC VOLUME INDEX: 19.9 ML/M2
LEFT VENTRICLE SYSTOLIC VOLUME: 33 ML
LEFT VENTRICULAR INTERNAL DIMENSION IN DIASTOLE: 4.3 CM (ref 3.5–6)
LEFT VENTRICULAR MASS: 80.6 G
LV LATERAL E/E' RATIO: 18.9 M/S
LV SEPTAL E/E' RATIO: 22 M/S
LVED V (TEICH): 82.49 ML
LVES V (TEICH): 32.84 ML
LVOT MG: 3.51 MMHG
LVOT MV: 0.88 CM/S
MV PEAK A VEL: 1.26 M/S
MV PEAK E VEL: 1.32 M/S
MV PEAK GRADIENT: 8 MMHG
MV STENOSIS PRESSURE HALF TIME: 51.19 MS
MV VALVE AREA BY CONTINUITY EQUATION: 2.51 CM2
MV VALVE AREA P 1/2 METHOD: 4.3 CM2
OHS CV RV/LV RATIO: 1.02 CM
OHS LV EJECTION FRACTION SIMPSONS BIPLANE MOD: 61 %
PISA MRMAX VEL: 4.72 M/S
PISA TR MAX VEL: 3.2 M/S
PULM VEIN S/D RATIO: 1.38
PV PEAK D VEL: 0.58 M/S
PV PEAK GRADIENT: 4 MMHG
PV PEAK S VEL: 0.8 M/S
PV PEAK VELOCITY: 0.98 M/S
RA PRESSURE ESTIMATED: 3 MMHG
RA VOL SYS: 28.98 ML
RIGHT ATRIAL AREA: 11.7 CM2
RIGHT ATRIUM VOLUME AREA LENGTH APICAL 4 CHAMBER: 28.2 ML
RIGHT VENTRICLE DIASTOLIC BASEL DIMENSION: 4.4 CM
RV TB RVSP: 6 MMHG
RV TISSUE DOPPLER FREE WALL SYSTOLIC VELOCITY 1 (APICAL 4 CHAMBER VIEW): 15.44 CM/S
SINUS: 2.75 CM
STJ: 2.2 CM
TDI LATERAL: 0.07 M/S
TDI SEPTAL: 0.06 M/S
TDI: 0.07 M/S
TR MAX PG: 41 MMHG
TRICUSPID ANNULAR PLANE SYSTOLIC EXCURSION: 2.3 CM
TV REST PULMONARY ARTERY PRESSURE: 44 MMHG
Z-SCORE OF LEFT VENTRICULAR DIMENSION IN END DIASTOLE: -0.78
Z-SCORE OF LEFT VENTRICULAR DIMENSION IN END SYSTOLE: 0.05

## 2025-03-25 PROCEDURE — 93306 TTE W/DOPPLER COMPLETE: CPT

## 2025-03-25 PROCEDURE — 77080 DXA BONE DENSITY AXIAL: CPT | Mod: 26,,, | Performed by: INTERNAL MEDICINE

## 2025-03-25 PROCEDURE — 93306 TTE W/DOPPLER COMPLETE: CPT | Mod: 26,,, | Performed by: INTERNAL MEDICINE

## 2025-03-25 PROCEDURE — 77080 DXA BONE DENSITY AXIAL: CPT | Mod: TC

## 2025-03-25 NOTE — TELEPHONE ENCOUNTER
----- Message from Ramila sent at 3/25/2025  3:10 PM CDT -----  Contact: Self [phone]  Would like to receive medical advice.Would they like a call back or a response via [facility]:  call backAdditional information:  Pt states blood work was completed and would like provider to read labs from kidney provider. Pt also states echo and bone frank test was completed today.

## 2025-03-25 NOTE — TELEPHONE ENCOUNTER
LOV 12/26/24  RTC 4/29/25    Pt would like discuss her labs Results from kidney provider. Pt also states echo and bone frank test was completed today.     Virtual Visit is held for pt 3/27/25      Please Advised

## 2025-03-26 ENCOUNTER — TELEPHONE (OUTPATIENT)
Dept: PRIMARY CARE CLINIC | Facility: CLINIC | Age: 85
End: 2025-03-26
Payer: MEDICARE

## 2025-03-26 ENCOUNTER — RESULTS FOLLOW-UP (OUTPATIENT)
Dept: PRIMARY CARE CLINIC | Facility: CLINIC | Age: 85
End: 2025-03-26

## 2025-03-26 NOTE — TELEPHONE ENCOUNTER
Spoke to patient and she refused cardiology appointment. Patient states that she will speak to her primary care provider.

## 2025-03-26 NOTE — TELEPHONE ENCOUNTER
LVM For Pt To Call Office To be Schedule For lab review       Spoke with [facility]   Form are on Provider Desk to Sign   These form was drop off 2 wk Ago       Please  Advised

## 2025-03-26 NOTE — TELEPHONE ENCOUNTER
----- Message from Suzy sent at 3/26/2025 10:39 AM CDT -----  Regarding: Order Status  Contact: Juana [phone]  Juana with [facility] is calling in ref to orders hand delivered for pt. 2 weeks ago.  She says she is checking the status. Best Call Back Number: Juana [phone]

## 2025-03-26 NOTE — TELEPHONE ENCOUNTER
----- Message from Essence sent at 3/26/2025 12:35 PM CDT -----  Contact: [phone]  .Type: Returning a callWho left a message? Dr. Masters Staff When did the practice call? todayDoes patient know what this is regarding: Virtual Visit. Would the patient rather a call back or a response via [facility]? Call back Comments:

## 2025-03-26 NOTE — TELEPHONE ENCOUNTER
I sent her a message about her DEXA and then about her labs.  Labs ok. Cholesterol looked good. Uric acid - wnl. Kidney function stable. AST - slightly high but stable.   Dr. JACOBS

## 2025-03-26 NOTE — TELEPHONE ENCOUNTER
----- Message from Brian sent at 3/25/2025  4:22 PM CDT -----  Contact: [phone]  Patient is returning a phone call.Who left a message for the patient: Fredy patient know what this is regarding:  resultWould you like a call back, or a response through your [facility] portal?:   callComments:

## 2025-03-26 NOTE — PROGRESS NOTES
I sent pt a my chart message -  I reviewed your DEXA/Bone Density which showed osteopenia. I recommend continuing your Calcium 1200 mg/d, and vitamin D3 1000 units/day. OTC.  In addition, I rec weight-bearing  exercise at least 30 minutes 3 times/wk and ideally 5 times/wk.  No specific intensity.  Walking is fine. I just rec  a form of weight-bearing exercise you enjoy to facilitate long term compliance and benefit. We can repeat a DEXA in 2 yrs.  Porsha

## 2025-04-02 ENCOUNTER — DOCUMENT SCAN (OUTPATIENT)
Dept: HOME HEALTH SERVICES | Facility: HOSPITAL | Age: 85
End: 2025-04-02
Payer: MEDICARE

## 2025-04-11 ENCOUNTER — DOCUMENT SCAN (OUTPATIENT)
Dept: HOME HEALTH SERVICES | Facility: HOSPITAL | Age: 85
End: 2025-04-11
Payer: MEDICARE

## 2025-04-15 ENCOUNTER — RESULTS FOLLOW-UP (OUTPATIENT)
Dept: PRIMARY CARE CLINIC | Facility: CLINIC | Age: 85
End: 2025-04-15

## 2025-04-15 NOTE — PROGRESS NOTES
I sent pt a my chart message -  I reviewed your labs.  Your kidney function looked better. Continue to work on your hydration. One of your liver enzymes the  AST remains slightly elevated but stable. Your corrected calcium was slightly high.  Decrease your calcium supplement from 2 tabs daily to 1 tab daily.  Porsha

## 2025-04-22 DIAGNOSIS — R60.0 BILATERAL LEG EDEMA: ICD-10-CM

## 2025-04-22 RX ORDER — POTASSIUM CHLORIDE 750 MG/1
10 TABLET, EXTENDED RELEASE ORAL 2 TIMES DAILY
Qty: 30 TABLET | Refills: 0 | Status: SHIPPED | OUTPATIENT
Start: 2025-04-22

## 2025-04-22 NOTE — TELEPHONE ENCOUNTER
No care due was identified.  [facility] Embedded Care Due Messages. Reference number: [id number].   4/22/2025 2:24:41 PM CDT

## 2025-04-27 NOTE — PROGRESS NOTES
"[facility] Note    Chief Complaint    No chief complaint on file.      History of Present Illness      Elizabeth Dickson is a 84 y.o.   WF with LBBB, Breast cancer, Pulmonary nodules, Adjustment disorder, CKD III,  BLE, HTN, Hypothyroidism, Osteopenia presents to fu chronic issues. Pt s/p  Ptosis repair/Ectropion repair 6/13/22 with Dr. Mcpherson.  Last visit-  12/26/24    Interim:   Seen in ED at [facility] in end of Jan and admitted to  [facility] in beg of Feb- Tx for  RLE - currently off Abx. Discharge on doxy + augmentin for a total of two weeks of appropriate treatmen     Arora Dysf - Echo - 3/25/25 -EF is 61%. Turbulence noted across the LVOT without significant gradient.  Grade II diastolic dysfunction. he right ventricle has moderate enlargement. LAE, Mild AR, Mild MR, Mild mod TR, PAP - 44 mmHG. Refer to Cards, Dr. Palomino.     Vit B12 - 345 - 2/4/25. Rec OTC vitamin B12 1000 mcg/d. Folate - wnl.      Low back pain - "It's ok". She take breaks.  Pt off Gabapentin 100 mg QHS per Dr. Rollins. No longer fu by Dr. Mallory. CT Chest/abd/pelvis -1/13/23 - Right 1 anterolisthesis of L4 on L5. Multilevel spondylosis of the spine.  UC - 12/20/24 - tx with prednisone and robaxin for sciatica and was referred to ortho. Thoracic Xray - 12/26/24 No evid. Fx.  findings consistent with arthritis and scoliosis and osteopenia.  L spine xrays - 12/26/24-no evidence of a fracture.  It showed Multilevel degenerative disc disease, noting disc height loss with endplate changes.  There is grade 1 anterolisthesis  at L4-5.  Multilevel facet joint arthropathy noted. + scoliosis .      Fall - No falls since last visit. Pt has a mobility limitation that significantly impairs her ability to participate in one or more mobility related activities in the community. The mobility limitation cannot be sufficiently resolved by the use of a cane or walker.   The use of a transport wheelchair will significantly improve the " patient's ability to participate in the community and the patient will use it on regular basis outside the home. Pt has expressed her willingness to use a transport wheelchair outside the home.   Pt also has a caregiver who is available, willing, and able to provide assistance with the wheelchair when needed.     LBBB -  chronic and was seen on previous EKG from 2016.       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   Prev Fu by Derm, Dr. Sloan, and Dr. Lynn. She saw ID, Dr. Rollins. She was on Prednisone 5 mg/d.   She was told to try a diet avoiding eggplant, potatoes, peppers, and blue cheese. Allergy tests were neg. Immune tests wnl. She saw A/I Dr. Bar and was told to fu with Derm . Pt off Abx.   Fu with Derm, Dr. Hinds, who called to inform me he performed a Bx on pt which showed atypical lymphoid infiltrate +CD 30 which could be assoc with possible mycosis Fungoides, T cell lymphoma, lymphomatoid papulosa. He referred her to [facility]'s T cell lymphoma clinic at [facility] (Dr. Jazzmine James at [facility]).  She is no longer being seen by [facility] or Dr. Williamson and is only fu with [facility].   She was recently started on Bexarotene 150 mg/day and continued on betamethasone.  Using gentamycin and betamethasone to the area. 1/2023 CT chest- SUBCENTIMETER PULMONARY NODULES WITH SOME NODULES WHICH APPEAR GROSSLY SIMILAR, SOME NODULES WHICH APPEAR DECREASED AND SOME RETICULONODULAR CHANGES OF THE LEFT LOWER LOBE WHICH WERE NOT SEEN ON PREVIOUS EXAMINATION FOR WHICH CONTINUED FOLLOW-UP IS RECOMMENDED. 1/2023 CT AP- SMALL HYPODENSITY NOTED IN THE LIVER WHICH MAY REFLECT SMALL CYST OR HEMANGIOMA. NO EVIDENCE OF LYMPHADENOPATHY. ATHEROSCLEROTIC VASCULAR DISEASE. She fu with PulDr. Brad johnson. 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 bexarotene, again discussed side effects and anticipated central thyroid suppression and hypertriglycerdemia; patient already on  lipitor and synthroid.  She is being monitored with freq labs per Derm, Dr. Pinto. Now fu by H/O, Dr. Thomas. PET - 2/28/24 -No definite malignant metabolic activity throughout the exam.  There are 3 foci of punctate low-level uptake involving the skin of the proximal left leg just below the knee.  Maximum uptake is 1.9 SUV which is, at most, inflammatory.  Correlation with physical exam findings necessary, although suspect urinary contamination. Additional arthritic uptake associated with facet joint arthropathy right C2-3, and degenerative changes both knees are present.    Pt on prednisone 5 mg/d. Pt on bexarotene 6 tabs/d - this was just inc per Derm. Pt on plaquenil 100 mg/d.   UC - 11/19/24 - cellulitis left forearm tx with keflex. Tx with keflex 500 mg BID in Jan for cellulitis   Hosp - 1/29/25 - Clinda was added to regimen.   ED - 2/3/25 - d/c on Doxy and augmentin     AST - slightly high but stable.     Left cheek and Rt calf SCC - fu by Derm.      Left Knee Oa - Twisted it getting up from sofa 1/25/22. Fu by Dr Hernandez.  She did Ptx. She uses a cane to ambulate now.  Has to stop to rest. Told she is not a surgical candidate. Cont conservative measures. Has recent injections - not helpful. Wuld like pain mgmnt referral. Ok to take tylenol prn. She tries to limit steroids given eye pressures which have improved from decreasing her oral steroids. Will try to get Ptx and get a seated walker.  Pt uses a cane in home and does not leave house without a family member.      Breast CA-invasive ductal carcinoma, right breast s/p lumpectomy.   Now being seen by a new Sx at [facility].  Pt off Arimidex.  Now fu by H/O at [facility]. She declines going back to Dr. Santana.  Plans to fu with a new Breast Sx at [facility].     Iron deficiency anemia - H/H - 11.5/39 - 4/29/25.  Pt on iron q day.       Pulmonary nodules - Fu by Pulmonary, Dr. Salinas, prn.   She had a PET scan and was reassured. Pt now getting serial CT scans to monitor her T  "cell Lymphoma.      Vitamin-D deficiency-Resolved 48 up from prev 28-Pt completed ergocalciferol times 12 weeks and is on vitamin D3 1000 units/day.     CKD III -Fu by Dr. Nieto.  BUN/Cr - 48/1.4 GFR - 37 - 4/29/25. Cont to rec adeq hydration.  We stopped amodipine to due to edema which improved. Her HCTZ was also stopped. Uric acid - wnl.     Adjustment disorder with mixed anxiety and depression-Pt on melatonin OTC.  Pt doing good on Lexapro 5 mg and it has helped and she has been blocking out things that bother her.       TMJ -  "It's ok". Can't take NSAIDS. She takes a tylenol prn - helps.  She can also try ice/massaging.      Hypothyroidism -  Free T4 - wnl - 1.1 - 3/21/25   She is currently on Levothyroxine 100 mcg/day per Dr. Pinto. Has order from Dr. Pinto.      HTN - Pt off Lisinopril 10 mg 2 tabs/day due to hyperkalemia.  Pt is off amlodipine 2.5 mg/d due to edema.  She is doing well on Toprol-XL 50 mg QHS and losartan. Continue low-sodium diet. +Microalbuminuria.  She denies any dizziness.   Cont to monitor edema and renal function. Dr. Nieto Started her on low dose losartan  25 mg/d with hctz 12.5 mg/d - Hctz was stopped 12/18/24. Pt on Lasix  2/wk (Mon/Fri.) with Kcl.       HLD - Pt on Lipitor 10 mg QHS and fenofibrate 200 mg/d. Controlled.  Cont current dose of Lipitor.  HDL 50  LDL 67  -3/21/25.       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     BLE - Pt uses compression socks. Improved with stopping amlodipine.  Regarding her Compression stockings she should wear these when on her feet/out and about. When she gets home and can raise her legs she should remove them. Pt on Lasix  2/wk with Kcl.         Carotid bruits- No significant stenosis seen on previous carotid ultrasound.     Renal mass -8 mm right kidney.  Fu by JAVIER Sanchez - was discharged from his clinic.  Renal U/S- stable Left renal cyst - 9/13/22. Not noted on recent CT 1/13/23 at [facility].  Fu by   Nephrologist, Dr. Nieto.    "   Pancreatic cyst- Fu by GI, Dr. King.  Pt had EUS-2/21/2019. She was told to fu prn. Not noted on recent CT 1/13/23 at [facility].  Had recent PET skin for her Mycoses fungoides.      Osteopenia-Pt on Calcium 1200 mg/d, and vitamin D3 1000 units/day.  She has been off Prolia x 2 yrs.  ID, Dr. Rollins, told her to hold it.  Note - she is off arimidex.     H/o Syncope - No further episodes. Pt had a CT Head and Cardiac davis that was neg.      Acc by son in law     HCM - Flu - 8/21/24;  RSV - 6/13/24; Tdap - 2/2/22;  PCV 13 - 6/2/17;  PVX 23 - 6/28/21;   Zostavax - utd; Shingrix - 8/18/20;  #2 - 11/19/20; COVID - 19 - Vaccine (Pfizer) #1 - 1/26/21;  #2 - 2/15/21; # 3 11/17/21; # 4 ;   MGM and Tariq breast U/S- 7/9/24 - repeat 1 yr;   DEXA - 3/25/25-Osteopenia;  Hep C - 2/7/18; C-scope - 1/10/19 - polyp, int hemorrhoids, diverticulosis, told no need to repeat when she d/w GI;  Gyn/Onc - Dr. Chandler;  Renal - Dr. Nieto;  GI - Dr. King/Darron;  H/O - Dr. Grande;  Prev Rheum - Dr. Mallory;  Gen Sx - Dr. Alabaster;  Pulm - Dr. Salinas;  ID - Dr. Rollins; Podiatry - Dr. Ruiz/Samuel; Ortho - [facility]; Derm - Dr. Pinto;   [facility] since doc [facility]     Patient Care Team:  Porsha Masters MD as PCP - General (Internal Medicine)  Benoit Salinas MD as Consulting Physician (Family Medicine)  Robbie Mallory Jr., MD as Consulting Physician (Rheumatology)  Robbie Mallory Jr., MD (Rheumatology)  Russell King MD as Consulting Physician (Gastroenterology)  Lynsey Goodman MD as Consulting Physician (Nephrology)  Felecia Solo MD as Consulting Physician (General Surgery)  Duncan Singleton DPM as Consulting Physician (Podiatry)  Tarun Rollins Jr., MD as  (Infectious Diseases)     Health Maintenance:  Immunization History   Administered Date(s) Administered    COVID-19, MRNA, LN-S, PF (Pfizer) (Purple Cap) 01/26/2021, 02/15/2021, 11/17/2021     Influenza 10/18/2005, 10/11/2013, 09/18/2015, 09/17/2016, 10/01/2018, 10/25/2018, 09/12/2019    Influenza (FLUAD) - Quadrivalent - Adjuvanted - PF *Preferred* (65+) 11/01/2023    Influenza - Quadrivalent - High Dose - PF (65 years and older) 09/17/2022    Influenza - Quadrivalent - PF *Preferred* (6 months and older) 10/11/2013, 12/24/2021    Influenza - Trivalent - Fluzone High Dose - PF (65 years and older) 09/18/2015, 09/17/2016, 10/25/2018, 09/12/2019, 08/18/2020    Influenza Split 10/18/2005, 10/11/2013    Pneumococcal Conjugate - 13 Valent 10/11/2013, 06/23/2016, 06/02/2017, 06/28/2021    Pneumococcal Polysaccharide - 23 Valent 11/01/2010    Tdap 02/02/2022    Zoster 08/18/2020, 11/19/2020    Zoster Recombinant 08/18/2020, 11/19/2020      Health Maintenance   Topic Date Due    COVID-19 Vaccine (4 - 2024-25 season) 09/01/2024    DEXA Scan  03/25/2028    Lipid Panel  03/21/2030    TETANUS VACCINE  02/02/2032    Shingles Vaccine  Completed    Influenza Vaccine  Completed    RSV Vaccine (Age 60+ and Pregnant patients)  Completed    Pneumococcal Vaccines (Age 50+)  Completed        Past Medical History:  Past Medical History:   Diagnosis Date    Anxiety     Bilateral leg edema     Breast cancer     Cellulitis of left leg     CKD (chronic kidney disease), stage III     Hypertension     Hypothyroidism     Iron deficiency anemia     Osteopenia     Ovarian cyst     Teratoma and Benign Serous Cystadenoma    Pancreatic cyst     Pulmonary nodules     Renal mass     Vitamin D deficiency        Past Surgical History:   has a past surgical history that includes Breast surgery; Removal of Ovaries; debridement; Skin biopsy; and block, nerve, genicular (Bilateral, 1/16/2025).    Family History:  family history includes COPD in her brother; Cholelithiasis in her brother; Colon cancer in her brother and brother; Diabetes in her father; Diabetes type I in an other family member; Heart disease in her brother and brother;  Hypertension in her father and mother; Lung cancer in her sister.     Social History:  Social History[1]    Review of Systems   Constitutional:  Negative for chills, fever and night sweats.   HENT:  Positive for hearing loss. Negative for nasal congestion and sore throat.    Eyes:  Negative for visual disturbance.   Respiratory:  Negative for cough and shortness of breath.    Cardiovascular:  Positive for leg swelling. Negative for chest pain and palpitations.        Off and on but not right now. Takes lasix and potassium 2/wk.    Gastrointestinal:  Negative for abdominal pain, constipation, diarrhea, nausea and vomiting.   Genitourinary:  Positive for nocturia and urgency. Negative for bladder incontinence and dysuria.        Even nights she does not take the lasix. No foul smelling urine   Musculoskeletal:  Positive for arthralgias. Negative for myalgias.        Knees   Neurological:  Negative for dizziness and headaches.   Psychiatric/Behavioral:  Negative for depressed mood. The patient is not nervous/anxious.         Medications:  Current Medications[2]     Allergies:  Review of patient's allergies indicates:   Allergen Reactions    Lisinopril      hyperkalemia       Physical Exam              Vitals:    04/29/25 1330   BP: (!) 140/72   Patient Position: Sitting   Pulse: 60   Resp: 16   Temp: 97.9 °F (36.6 °C)   TempSrc: Oral   SpO2: 97%   Weight: 64.1 kg (141 lb 5 oz)   Height: 5' (1.524 m)             Physical Exam  Vitals reviewed.   Constitutional:       General: She is not in acute distress.     Appearance: Normal appearance. She is not ill-appearing, toxic-appearing or diaphoretic.   HENT:      Head: Normocephalic and atraumatic.      Right Ear: Tympanic membrane normal.      Left Ear: Tympanic membrane normal.      Ears:      Comments: Tariq hearing aids.   Cardiovascular:      Rate and Rhythm: Normal rate and regular rhythm.      Pulses: Normal pulses.      Heart sounds: Normal heart sounds. No murmur  heard.  Pulmonary:      Effort: No respiratory distress.      Breath sounds: Normal breath sounds. No wheezing.   Abdominal:      General: Bowel sounds are normal. There is no distension.      Palpations: Abdomen is soft.      Tenderness: There is no abdominal tenderness. There is no guarding or rebound.   Musculoskeletal:         General: Normal range of motion.   Skin:     General: Skin is warm.             Comments: RT meadial calf midway - <1 mm Subcutaneous NTTP mass.    Neurological:      General: No focal deficit present.      Mental Status: She is alert and oriented to person, place, and time.   Psychiatric:         Mood and Affect: Mood normal.         Behavior: Behavior normal.          Laboratory:  CBC:  Recent Labs   Lab 02/06/25 0223 02/07/25 0223 02/08/25  0115   WBC 3.30 L 3.81 L 3.56 L   RBC 2.81 L 2.93 L 2.93 L   Hemoglobin 8.5 L 8.9 L 8.9 L   Hematocrit 28.3 L 29.2 L 28.8 L   Platelets 250 264 260    H 100 H 98   MCH 30.2 30.4 30.4   MCHC 30.0 L 30.5 L 30.9 L       CMP:  Recent Labs   Lab 02/07/25 0223 02/08/25  0115 02/18/25  1223 04/14/25  0943   Glucose 99 122 H   < > 88   Calcium 9.0 9.2   < > 10.3   Albumin 2.6 L 2.7 L  --  3.7   Total Protein 5.4 L 5.5 L  --  6.3   Sodium 144 141   < > 141   Potassium 4.4 4.0   < > 4.3   CO2 25 26   < > 26   Chloride 111 H 107   < > 106   BUN 39 H 36 H   < > 48 H   Creatinine 1.4 1.1   < > 1.22 H   Alkaline Phosphatase 37 L 35 L  --   --    ALT 26 38  --  24   AST 46 H 61 H  --  39 H   Total Bilirubin 0.2 0.2  --  0.4    < > = values in this interval not displayed.           URINALYSIS:  Recent Labs   Lab 09/23/22  1046   Color, UA YELLOW   Specific Gravity, UA 1.022   pH, UA < OR = 5.0   Protein, UA NEGATIVE   Glucose, UA NEGATIVE   Bacteria, UA NONE SEEN   Nitrite, UA NEGATIVE   Leukocytes, UA 1+ A   Hyaline Casts, UA NONE SEEN        LIPIDS:  Recent Labs   Lab 12/20/22  1543 01/26/23  0957 04/14/23  1046 06/09/23  0905 09/09/24  1122  09/23/24  1120 11/25/24  0950 02/04/25  0245   TSH 1.26  --  0.42 0.19 L  --   --   --   --    HDL 75 H   < >  --   --   --  56 49 38 L   Cholesterol 170   < >  --   --   --  167 176 122   Triglycerides 122   < >  --   --    < > 165 H 184 H 84   LDL Cholesterol  --   --   --   --   --   --   --  67.2   LDL Calculated 71   < >  --   --   --  78 90  --    HDL/Cholesterol Ratio  --   --   --   --   --   --   --  31.1   Hdl/Cholesterol Ratio 2.27  --   --   --   --  2.98 3.59  --    Non-HDL Cholesterol 95  --   --   --   --  111 127 84   Total Cholesterol/HDL Ratio  --   --   --   --   --   --   --  3.2    < > = values in this interval not displayed.       TSH:  Recent Labs   Lab 12/20/22  1543 04/14/23  1046 06/09/23  0905   TSH 1.26 0.42 0.19 L       A1C:  Recent Labs   Lab 02/04/25  0245   Hemoglobin A1C 4.7        Other:   Recent Labs   Lab 02/04/25  0245   Vitamin B-12 345   Ferritin 796 H   Iron 33   Transferrin 262   TIBC 388   Saturated Iron 9 L           Assessment/Plan     Elizabeth Dickson is a 84 y.o.female with:    Hypertension, unspecified type  - Slightly high. Cont current. Cont. To monitor.     Hyperlipidemia, unspecified hyperlipidemia type  - Controlled.  Cont current.     Debility  -     Cancel: Ambulatory referral/consult to Home Health; Future; Expected date: 04/30/2025  -     WALKER FOR HOME USE  - Will get Ptx and Walker.     Stage 3a chronic kidney disease  -     Comprehensive Metabolic Panel; Future; Expected date: 04/29/2025  - Cont to monitor.  Fu by Renal. Rec adq hydration.     Bilateral leg edema  -     potassium chloride SA (K-DUR,KLOR-CON M) 10 MEQ tablet; 1 po daily when takes Lasix  - Stable.  Cont current regimen.    Diastolic heart failure, unspecified HF chronicity  -     Ambulatory referral/consult to Cardiology; Future; Expected date: 05/06/2025  - Refer to Dr. Georgette Matthew.     Pulmonary hypertension  -     Ambulatory referral/consult to Cardiology; Future; Expected date:  05/06/2025  - Refer to Cards, Dr. Palomino.     Anemia in chronic kidney disease, unspecified CKD stage  -     CBC Auto Differential; Future; Expected date: 04/29/2025  - Repeat CBC.     Osteoarthritis, unspecified osteoarthritis type, unspecified site  -     Cancel: Ambulatory referral/consult to Home Health; Future; Expected date: 04/30/2025     Nocturia  -     POCT urine dipstick without microscope  -     Urinalysis, Reflex to Urine Culture; Future; Expected date: 04/29/2025    Subcutaneous nodule of lower extremity  -     Cancel: US Extremity Non Vascular Complete Right; Future; Expected date: 04/29/2025    Dysuria  -     Urinalysis, Reflex to Urine Culture; Future; Expected date: 05/06/2025    Other orders  -     aspirin 81 MG Chew; Take 1 tablet (81 mg total) by mouth once daily.    Chronic conditions status updated as per HPI.  Other than changes above, cont current medications and maintain follow up with specialists.    Follow up in about 3 months (around 7/29/2025).      Porsha Masters MD  [facility]                       [1]   Social History  Tobacco Use    Smoking status: Never    Smokeless tobacco: Never   Substance Use Topics    Alcohol use: No    Drug use: Never   [2]   Current Outpatient Medications:     acetaminophen (TYLENOL) 325 MG tablet, Take 2 tablets (650 mg total) by mouth every 8 (eight) hours as needed for Pain., Disp: 12 tablet, Rfl: 0    ascorbic acid, vitamin C, (VITAMIN C) 1000 MG tablet, Take 1,000 mg by mouth once daily., Disp: , Rfl:     atorvastatin (LIPITOR) 10 MG tablet, Take 1 tablet (10 mg total) by mouth once daily., Disp: 90 tablet, Rfl: 2    calcium carbonate (OS-VALDEZ) 600 mg calcium (1,500 mg) Tab, Take 600 mg by mouth once daily., Disp: , Rfl:     cholecalciferol, vitamin D3, (VITAMIN D3) 25 mcg (1,000 unit) capsule, Take 1,000 Units by mouth once daily. Pt  has been taking 10,000 units daily., Disp: , Rfl:     EScitalopram oxalate (LEXAPRO) 5 MG Tab, TAKE 1  TABLET BY MOUTH EVERY DAY, Disp: 90 tablet, Rfl: 3    fenofibrate micronized (LOFIBRA) 200 MG Cap, Take 200 mg by mouth daily with breakfast., Disp: , Rfl:     ferrous sulfate (FEOSOL) 325 mg (65 mg iron) Tab tablet, Take 325 mg by mouth once daily., Disp: , Rfl:     furosemide (LASIX) 40 MG tablet, 1 po daily prn edema, Disp: 30 tablet, Rfl: 0    latanoprost 0.005 % ophthalmic solution, Place 1 drop into both eyes every evening., Disp: , Rfl:     levothyroxine (SYNTHROID) 100 MCG tablet, Take 100 mcg by mouth before breakfast., Disp: , Rfl:     LORazepam (ATIVAN) 0.5 MG tablet, 1 po daily prn anxiety, Disp: 30 tablet, Rfl: 0    melatonin 10 mg Tab, Take 10 mg by mouth nightly as needed., Disp: , Rfl:     metoprolol succinate (TOPROL-XL) 50 MG 24 hr tablet, TAKE 1 TABLET BY MOUTH ONCE  DAILY, Disp: 90 tablet, Rfl: 3    omeprazole (PRILOSEC) 20 MG capsule, Take 20 mg by mouth once daily., Disp: , Rfl:     aspirin 81 MG Chew, Take 1 tablet (81 mg total) by mouth once daily., Disp: , Rfl:     potassium chloride SA (K-DUR,KLOR-CON M) 10 MEQ tablet, 1 po daily when takes Lasix, Disp: , Rfl:

## 2025-04-29 ENCOUNTER — TELEPHONE (OUTPATIENT)
Dept: PRIMARY CARE CLINIC | Facility: CLINIC | Age: 85
End: 2025-04-29
Payer: MEDICARE

## 2025-04-29 ENCOUNTER — OFFICE VISIT (OUTPATIENT)
Dept: PRIMARY CARE CLINIC | Facility: CLINIC | Age: 85
End: 2025-04-29
Payer: MEDICARE

## 2025-04-29 ENCOUNTER — LAB VISIT (OUTPATIENT)
Dept: LAB | Facility: HOSPITAL | Age: 85
End: 2025-04-29
Attending: INTERNAL MEDICINE
Payer: MEDICARE

## 2025-04-29 VITALS
RESPIRATION RATE: 16 BRPM | BODY MASS INDEX: 27.74 KG/M2 | SYSTOLIC BLOOD PRESSURE: 140 MMHG | OXYGEN SATURATION: 97 % | TEMPERATURE: 98 F | DIASTOLIC BLOOD PRESSURE: 72 MMHG | WEIGHT: 141.31 LBS | HEART RATE: 60 BPM | HEIGHT: 60 IN

## 2025-04-29 DIAGNOSIS — D63.1 ANEMIA IN CHRONIC KIDNEY DISEASE, UNSPECIFIED CKD STAGE: ICD-10-CM

## 2025-04-29 DIAGNOSIS — R30.0 DYSURIA: ICD-10-CM

## 2025-04-29 DIAGNOSIS — R35.1 NOCTURIA: ICD-10-CM

## 2025-04-29 DIAGNOSIS — N18.31 STAGE 3A CHRONIC KIDNEY DISEASE: ICD-10-CM

## 2025-04-29 DIAGNOSIS — E78.5 HYPERLIPIDEMIA, UNSPECIFIED HYPERLIPIDEMIA TYPE: ICD-10-CM

## 2025-04-29 DIAGNOSIS — R60.0 BILATERAL LEG EDEMA: ICD-10-CM

## 2025-04-29 DIAGNOSIS — I10 HYPERTENSION, UNSPECIFIED TYPE: Primary | ICD-10-CM

## 2025-04-29 DIAGNOSIS — R22.40 SUBCUTANEOUS NODULE OF LOWER EXTREMITY: ICD-10-CM

## 2025-04-29 DIAGNOSIS — N18.9 ANEMIA IN CHRONIC KIDNEY DISEASE, UNSPECIFIED CKD STAGE: ICD-10-CM

## 2025-04-29 DIAGNOSIS — M19.90 OSTEOARTHRITIS, UNSPECIFIED OSTEOARTHRITIS TYPE, UNSPECIFIED SITE: ICD-10-CM

## 2025-04-29 DIAGNOSIS — I27.20 PULMONARY HYPERTENSION: ICD-10-CM

## 2025-04-29 DIAGNOSIS — R53.81 DEBILITY: ICD-10-CM

## 2025-04-29 DIAGNOSIS — I50.30 DIASTOLIC HEART FAILURE, UNSPECIFIED HF CHRONICITY: ICD-10-CM

## 2025-04-29 LAB
BILIRUB SERPL-MCNC: NORMAL MG/DL
BLOOD URINE, POC: NEGATIVE
CLARITY, POC UA: CLEAR
COLOR, POC UA: YELLOW
GLUCOSE UR QL STRIP: NEGATIVE
KETONES UR QL STRIP: NORMAL
LEUKOCYTE ESTERASE URINE, POC: NORMAL
NITRITE, POC UA: NEGATIVE
PH, POC UA: 5
PROTEIN, POC: 30
SPECIFIC GRAVITY, POC UA: 1.02
UROBILINOGEN, POC UA: NORMAL

## 2025-04-29 PROCEDURE — 3288F FALL RISK ASSESSMENT DOCD: CPT | Mod: CPTII,S$GLB,, | Performed by: INTERNAL MEDICINE

## 2025-04-29 PROCEDURE — 3078F DIAST BP <80 MM HG: CPT | Mod: CPTII,S$GLB,, | Performed by: INTERNAL MEDICINE

## 2025-04-29 PROCEDURE — 99999 PR PBB SHADOW E&M-EST. PATIENT-LVL V: CPT | Mod: PBBFAC,,, | Performed by: INTERNAL MEDICINE

## 2025-04-29 PROCEDURE — 80053 COMPREHEN METABOLIC PANEL: CPT

## 2025-04-29 PROCEDURE — 81002 URINALYSIS NONAUTO W/O SCOPE: CPT | Mod: S$GLB,,, | Performed by: INTERNAL MEDICINE

## 2025-04-29 PROCEDURE — 85025 COMPLETE CBC W/AUTO DIFF WBC: CPT

## 2025-04-29 PROCEDURE — 36415 COLL VENOUS BLD VENIPUNCTURE: CPT | Mod: PN

## 2025-04-29 PROCEDURE — 99214 OFFICE O/P EST MOD 30 MIN: CPT | Mod: S$GLB,,, | Performed by: INTERNAL MEDICINE

## 2025-04-29 PROCEDURE — 1126F AMNT PAIN NOTED NONE PRSNT: CPT | Mod: CPTII,S$GLB,, | Performed by: INTERNAL MEDICINE

## 2025-04-29 PROCEDURE — G2211 COMPLEX E/M VISIT ADD ON: HCPCS | Mod: S$GLB,,, | Performed by: INTERNAL MEDICINE

## 2025-04-29 PROCEDURE — 1101F PT FALLS ASSESS-DOCD LE1/YR: CPT | Mod: CPTII,S$GLB,, | Performed by: INTERNAL MEDICINE

## 2025-04-29 PROCEDURE — 3077F SYST BP >= 140 MM HG: CPT | Mod: CPTII,S$GLB,, | Performed by: INTERNAL MEDICINE

## 2025-04-29 RX ORDER — POTASSIUM CHLORIDE 750 MG/1
TABLET, EXTENDED RELEASE ORAL
Start: 2025-04-29

## 2025-04-29 RX ORDER — POTASSIUM CHLORIDE 750 MG/1
TABLET, EXTENDED RELEASE ORAL
Start: 2025-04-29 | End: 2025-04-29

## 2025-04-29 RX ORDER — NAPROXEN SODIUM 220 MG/1
81 TABLET, FILM COATED ORAL DAILY
COMMUNITY
Start: 2025-04-29 | End: 2025-05-29

## 2025-04-29 NOTE — TELEPHONE ENCOUNTER
Attempt to call pt to inform her that a home collect urine specimen. No answer left vm and portal message

## 2025-04-29 NOTE — TELEPHONE ENCOUNTER
----- Message from Addi sent at 4/29/2025  2:52 PM CDT -----  Contact: [phone]  Calling to get test results. Name of test (lab, x-ray): lab UrineDate of test: 04/29/25[place] or a response through your [facility] portal?:    call back Comments:

## 2025-04-30 LAB
ABSOLUTE EOSINOPHIL (OHS): 0.03 K/UL
ABSOLUTE MONOCYTE (OHS): 0.29 K/UL (ref 0.3–1)
ABSOLUTE NEUTROPHIL COUNT (OHS): 2.02 K/UL (ref 1.8–7.7)
ALBUMIN SERPL BCP-MCNC: 3.7 G/DL (ref 3.5–5.2)
ALP SERPL-CCNC: 67 UNIT/L (ref 40–150)
ALT SERPL W/O P-5'-P-CCNC: 21 UNIT/L (ref 10–44)
ANION GAP (OHS): 11 MMOL/L (ref 8–16)
AST SERPL-CCNC: 33 UNIT/L (ref 11–45)
BASOPHILS # BLD AUTO: 0.03 K/UL
BASOPHILS NFR BLD AUTO: 0.7 %
BILIRUB SERPL-MCNC: 0.4 MG/DL (ref 0.1–1)
BUN SERPL-MCNC: 48 MG/DL (ref 8–23)
CALCIUM SERPL-MCNC: 10.1 MG/DL (ref 8.7–10.5)
CHLORIDE SERPL-SCNC: 105 MMOL/L (ref 95–110)
CO2 SERPL-SCNC: 27 MMOL/L (ref 23–29)
CREAT SERPL-MCNC: 1.4 MG/DL (ref 0.5–1.4)
ERYTHROCYTE [DISTWIDTH] IN BLOOD BY AUTOMATED COUNT: 14.3 % (ref 11.5–14.5)
GFR SERPLBLD CREATININE-BSD FMLA CKD-EPI: 37 ML/MIN/1.73/M2
GLUCOSE SERPL-MCNC: 78 MG/DL (ref 70–110)
HCT VFR BLD AUTO: 39 % (ref 37–48.5)
HGB BLD-MCNC: 11.5 GM/DL (ref 12–16)
IMM GRANULOCYTES # BLD AUTO: 0.01 K/UL (ref 0–0.04)
IMM GRANULOCYTES NFR BLD AUTO: 0.2 % (ref 0–0.5)
LYMPHOCYTES # BLD AUTO: 1.81 K/UL (ref 1–4.8)
MCH RBC QN AUTO: 27.4 PG (ref 27–31)
MCHC RBC AUTO-ENTMCNC: 29.5 G/DL (ref 32–36)
MCV RBC AUTO: 93 FL (ref 82–98)
NUCLEATED RBC (/100WBC) (OHS): 0 /100 WBC
PLATELET # BLD AUTO: 246 K/UL (ref 150–450)
PMV BLD AUTO: 11.5 FL (ref 9.2–12.9)
POTASSIUM SERPL-SCNC: 4.3 MMOL/L (ref 3.5–5.1)
PROT SERPL-MCNC: 7 GM/DL (ref 6–8.4)
RBC # BLD AUTO: 4.2 M/UL (ref 4–5.4)
RELATIVE EOSINOPHIL (OHS): 0.7 %
RELATIVE LYMPHOCYTE (OHS): 43.2 % (ref 18–48)
RELATIVE MONOCYTE (OHS): 6.9 % (ref 4–15)
RELATIVE NEUTROPHIL (OHS): 48.3 % (ref 38–73)
SODIUM SERPL-SCNC: 143 MMOL/L (ref 136–145)
WBC # BLD AUTO: 4.19 K/UL (ref 3.9–12.7)

## 2025-05-01 ENCOUNTER — TELEPHONE (OUTPATIENT)
Dept: PRIMARY CARE CLINIC | Facility: CLINIC | Age: 85
End: 2025-05-01
Payer: MEDICARE

## 2025-05-01 DIAGNOSIS — R53.81 DEBILITY: Primary | ICD-10-CM

## 2025-05-01 DIAGNOSIS — M19.90 OSTEOARTHRITIS, UNSPECIFIED OSTEOARTHRITIS TYPE, UNSPECIFIED SITE: ICD-10-CM

## 2025-05-01 NOTE — TELEPHONE ENCOUNTER
----- Message from Bianca sent at 5/1/2025  9:40 AM CDT -----  Contact: [phone]  Pt was seen on 4/29/2025 and wants to make sure no  more testing is needed, please call pt

## 2025-05-01 NOTE — PROGRESS NOTES
Patient is currently using [facility]. Referral has been faxed.    Fax: [phone]

## 2025-05-06 ENCOUNTER — HOSPITAL ENCOUNTER (OUTPATIENT)
Dept: RADIOLOGY | Facility: HOSPITAL | Age: 85
Discharge: HOME | End: 2025-05-06
Attending: INTERNAL MEDICINE
Payer: MEDICARE

## 2025-05-06 DIAGNOSIS — R22.40 SUBCUTANEOUS NODULE OF LOWER EXTREMITY: ICD-10-CM

## 2025-05-06 PROCEDURE — 76882 US LMTD JT/FCL EVL NVASC XTR: CPT | Mod: 26,RT,, | Performed by: RADIOLOGY

## 2025-05-06 PROCEDURE — 76882 US LMTD JT/FCL EVL NVASC XTR: CPT | Mod: TC,RT

## 2025-05-08 PROCEDURE — G0180 MD CERTIFICATION HHA PATIENT: HCPCS | Mod: ,,, | Performed by: INTERNAL MEDICINE

## 2025-05-11 DIAGNOSIS — F41.9 ANXIETY: ICD-10-CM

## 2025-05-11 DIAGNOSIS — F32.A DEPRESSION, UNSPECIFIED DEPRESSION TYPE: ICD-10-CM

## 2025-05-11 RX ORDER — ESCITALOPRAM OXALATE 5 MG/1
5 TABLET ORAL
Qty: 90 TABLET | Refills: 3 | Status: SHIPPED | OUTPATIENT
Start: 2025-05-11

## 2025-05-11 NOTE — TELEPHONE ENCOUNTER
No care due was identified.  Health [facility] Embedded Care Due Messages. Reference number: [id number].   5/11/2025 9:36:51 AM CDT

## 2025-05-13 ENCOUNTER — OFFICE VISIT (OUTPATIENT)
Dept: CARDIOLOGY | Facility: CLINIC | Age: 85
End: 2025-05-13
Payer: MEDICARE

## 2025-05-13 ENCOUNTER — TELEPHONE (OUTPATIENT)
Dept: PRIMARY CARE CLINIC | Facility: CLINIC | Age: 85
End: 2025-05-13
Payer: MEDICARE

## 2025-05-13 VITALS
BODY MASS INDEX: 28.13 KG/M2 | SYSTOLIC BLOOD PRESSURE: 120 MMHG | HEIGHT: 60 IN | HEART RATE: 61 BPM | WEIGHT: 143.31 LBS | DIASTOLIC BLOOD PRESSURE: 70 MMHG

## 2025-05-13 DIAGNOSIS — I27.20 PULMONARY HYPERTENSION: ICD-10-CM

## 2025-05-13 DIAGNOSIS — R09.89 BILATERAL CAROTID BRUITS: ICD-10-CM

## 2025-05-13 DIAGNOSIS — E78.2 MIXED HYPERLIPIDEMIA: ICD-10-CM

## 2025-05-13 DIAGNOSIS — I10 PRIMARY HYPERTENSION: Primary | ICD-10-CM

## 2025-05-13 DIAGNOSIS — I70.0 ATHEROSCLEROSIS OF AORTA: ICD-10-CM

## 2025-05-13 DIAGNOSIS — I50.30 DIASTOLIC HEART FAILURE, UNSPECIFIED HF CHRONICITY: ICD-10-CM

## 2025-05-13 DIAGNOSIS — N18.31 STAGE 3A CHRONIC KIDNEY DISEASE: ICD-10-CM

## 2025-05-13 LAB
OHS QRS DURATION: 128 MS
OHS QTC CALCULATION: 493 MS

## 2025-05-13 PROCEDURE — 1101F PT FALLS ASSESS-DOCD LE1/YR: CPT | Mod: CPTII,S$GLB,, | Performed by: INTERNAL MEDICINE

## 2025-05-13 PROCEDURE — 1126F AMNT PAIN NOTED NONE PRSNT: CPT | Mod: CPTII,S$GLB,, | Performed by: INTERNAL MEDICINE

## 2025-05-13 PROCEDURE — 93000 ELECTROCARDIOGRAM COMPLETE: CPT | Mod: S$GLB,,, | Performed by: INTERNAL MEDICINE

## 2025-05-13 PROCEDURE — 3074F SYST BP LT 130 MM HG: CPT | Mod: CPTII,S$GLB,, | Performed by: INTERNAL MEDICINE

## 2025-05-13 PROCEDURE — 3078F DIAST BP <80 MM HG: CPT | Mod: CPTII,S$GLB,, | Performed by: INTERNAL MEDICINE

## 2025-05-13 PROCEDURE — 1159F MED LIST DOCD IN RCRD: CPT | Mod: CPTII,S$GLB,, | Performed by: INTERNAL MEDICINE

## 2025-05-13 PROCEDURE — 3288F FALL RISK ASSESSMENT DOCD: CPT | Mod: CPTII,S$GLB,, | Performed by: INTERNAL MEDICINE

## 2025-05-13 PROCEDURE — 99999 PR PBB SHADOW E&M-EST. PATIENT-LVL IV: CPT | Mod: PBBFAC,,, | Performed by: INTERNAL MEDICINE

## 2025-05-13 PROCEDURE — 99204 OFFICE O/P NEW MOD 45 MIN: CPT | Mod: 25,S$GLB,, | Performed by: INTERNAL MEDICINE

## 2025-05-13 NOTE — PROGRESS NOTES
Subjective:   05/13/2025     Patient ID:  Elizabeth Dickson is a 84 y.o. female who presents for evaulation of Results and Hyperlipidemia       History of Present Illness    CHIEF COMPLAINT:  Patient presents for a quarterly evaluation and follow-up on recent echo results.    HPI:  Patient had an echo on 03/25, which showed an EF of 61% of the left ventricle, diastolic dysfunction, right ventricular enlargement, and pulmonary artery pressure of 44 mmHg. Pulmonary HTN was present, likely secondary to diastolic dysfunction. She was advised to see a cardiologist, though it was noted there was no urgency.    She reports feeling well currently. She takes 40 mg of Lasix BIW, as prescribed by nephrologist, Dr. Jakashin. BP has been ranging between 120/60 and 140/80.    She had lower extremity edema, leading to furosemide (Lasix) prescription. She was admitted to the hospital at the end of January 2025 with cellulitis. Home care attempted to manage the swelling with wrapping, but it was ineffective, resulting in fluid pill use.    Her mobility is affected by knee issues.    She follows up with a nephrologist, who had previously discontinued fluid pill due to dehydration. She also sees Dr. Arango for skin cancer and has CLT, causing skin patches.    She had labs done 2 weeks ago with Dr. Jakashin, reporting generally good results, with only the BUN slightly out of range.    She denies chest pain, tightness in the chest, dyspnea, nocturnal dyspnea, current leg swelling, weakness on one side, numbness in lower extremities or upper extremities. She denies a history of MI or CVA.    CARDIAC HISTORY:  Echocardiography 03/25/2025: EF 61%, diastolic dysfunction, RV enlargement, PAP 44 mmHg, pulmonary HTN present (likely secondary to diastolic dysfunction)  Carotid US: no stenosis reported EKG: LBBB (present since at least 2022)    MEDICATIONS:  Lasix (furosemide) 40 mg, 2x weekly (Monday and Thursday), for edema  Potassium, only  when taking Lasix  Atorvastatin 10 mg, daily  Metoprolol succinate 50 mg, daily  Aspirin 81 mg, daily  Fenofibrate 200 mg, daily  Discontinued Lasix by nephrologist due to dehydration, prior to current regimen    TEST RESULTS:  Patient's last LDL was 67. Her triglycerides were 184 on 11/24 and 84 on 2/25. Two weeks ago, all labs were good except for BUN, which was slightly elevated but stable compared to previous results. Her renal function was about stable at that time. In February, her cholesterol levels looked good.    MEDICAL HISTORY:  Patient has a history of hypertension, high cholesterol, skin cancer, and CLT (skin disease). She also experienced cellulitis in January 2025.    FAMILY HISTORY:  Family history is significant for brother being a patient of Dr. Martin.      ROS:  General: -fever, -chills, -fatigue, -weight gain, -weight loss  Eyes: -vision changes, -redness, -discharge  ENT: -ear pain, -nasal congestion, -sore throat  Cardiovascular: -chest pain, -palpitations, +lower extremity edema  Respiratory: -cough, -shortness of breath  Gastrointestinal: -abdominal pain, -nausea, -vomiting, -diarrhea, -constipation, -blood in stool  Genitourinary: -dysuria, -hematuria, -frequency  Musculoskeletal: +joint pain, -muscle pain, +limb pain  Skin: -rash, -lesion  Neurological: -headache, -dizziness, -numbness, -tingling  Psychiatric: -anxiety, -depression, -sleep difficulty, +excessive fear          Problem List[1]     Review of patient's allergies indicates:   Allergen Reactions    Lisinopril      hyperkalemia       Current Medications[2]     Objective:   Physical Exam    General: No acute distress. Well-developed. Well-nourished.  Eyes: EOMI. Sclerae anicteric.  HENT: Normocephalic. Atraumatic. Nares patent. Moist oral mucosa.  Cardiovascular: Regular rate. Regular rhythm. No murmurs. No rubs. No gallops. Normal S1, S2. Carotid bruits bilaterally.  Respiratory: Normal respiratory effort. Clear to auscultation  bilaterally. No rales. No rhonchi. No wheezing.  Musculoskeletal: No  obvious deformity.  Extremities: No lower extremity edema.  Neurological: Alert & oriented x3. No slurred speech. Normal gait.  Psychiatric: Normal mood. Normal affect. Good insight. Good judgment.  Skin: Warm. Dry. No rash.          Vitals:    05/13/25 1030   BP: 120/70   Pulse: 61     Wt Readings from Last 3 Encounters:   05/13/25 65 kg (143 lb 4.8 oz)   04/29/25 64.1 kg (141 lb 5 oz)   03/25/25 68.9 kg (152 lb)     Temp Readings from Last 3 Encounters:   04/29/25 97.9 °F (36.6 °C) (Oral)   02/18/25 97.4 °F (36.3 °C) (Oral)   02/10/25 97.1 °F (36.2 °C) (Oral)     BP Readings from Last 3 Encounters:   05/13/25 120/70   04/29/25 (!) 140/72   03/10/25 (!) 170/80     Pulse Readings from Last 3 Encounters:   05/13/25 61   04/29/25 60   02/18/25 65               Lab Results   Component Value Date    CHOL 122 02/04/2025    CHOL 176 11/25/2024    CHOL 167 09/23/2024     Lab Results   Component Value Date    HDL 38 (L) 02/04/2025    HDL 49 11/25/2024    HDL 56 09/23/2024     Lab Results   Component Value Date    LDLCALC 67.2 02/04/2025    LDLCALC 90 11/25/2024    LDLCALC 78 09/23/2024     Lab Results   Component Value Date    ALT 21 04/29/2025    AST 33 04/29/2025    AST 39 (H) 04/14/2025    AST 61 (H) 02/08/2025     Lab Results   Component Value Date    CREATININE 1.4 04/29/2025    BUN 48 (H) 04/29/2025     04/29/2025    K 4.3 04/29/2025    CO2 27 04/29/2025    CO2 26 04/14/2025    CO2 26 02/18/2025     Lab Results   Component Value Date    HGB 11.5 (L) 04/29/2025    HCT 39.0 04/29/2025    HCT 28.8 (L) 02/08/2025    HCT 29.2 (L) 02/07/2025       Assessment and Plan:   Assessment & Plan    I50.30 Diastolic heart failure, unspecified HF chronicity  I27.20 Pulmonary hypertension  N18.31 Stage 3a chronic kidney disease  I10 Primary hypertension  E78.2 Mixed hyperlipidemia  I70.0 Atherosclerosis of aorta  R09.89 Bilateral carotid  bruits    IMPRESSION:  - Reviewed history of LLE edema, cellulitis, and recent echocardiogram showing diastolic dysfunction with pulmonary HTN.  - Assessed current symptoms and medication regimen.  - Considered future use of Jardiance or Farxiga if pulmonary HTN persists on follow-up echocardiogram.  - LBBB on EKG is stable and does not require intervention at this time.    DIASTOLIC HEART FAILURE, UNSPECIFIED HF CHRONICITY:  - Explained diastolic dysfunction as stiffness of the heart muscle, common with high blood pressure and aging.  - Continued Lasix 40 mg twice weekly (Monday and Thursday), potassium supplement with Lasix doses.  - Continued Metoprolol succinate 50 mg daily.  - Ordered repeat echocardiogram in 3 months.  - Follow up in 3 months for repeat imaging.    PULMONARY HYPERTENSION:  - Discussed how heart stiffness can lead to pulmonary HTN and its treatment with medications.  - Coordinate with nephrologist (Dr. Jakashin) regarding potential use of Jardiance or Farxiga.    STAGE 3A CHRONIC KIDNEY DISEASE:  - Coordinate with nephrologist (Dr. Jakashin) regarding potential use of Jardiance or Farxiga.    PRIMARY HYPERTENSION:  - Explained diastolic dysfunction as stiffness of the heart muscle, common with high blood pressure and aging.  - Continued Metoprolol succinate 50 mg daily.    MIXED HYPERLIPIDEMIA:  - Continued Atorvastatin 10 mg daily.  - Continued Fenofibrate (dosage not specified).    ATHEROSCLEROSIS OF AORTA:  - Continued Aspirin daily.    BILATERAL CAROTID BRUITS:  - Ordered Carotid duplex.  - Follow up in 3 months for repeat imaging.         Would consider SG LT 2 inhibitor therapy for continued pulmonary hypertension on repeat echo in 3 months.  Follow up in about 4 months (around 9/13/2025).        Future Appointments   Date Time Provider Department Center   7/14/2025 10:15 AM [facility] [place]   7/14/2025 11:00 AM [facility] [place]   7/29/2025 10:30 AM  Porsha Masters MD [facility] [place]       This note was generated with the assistance of ambient listening technology. Verbal consent was obtained by the patient and accompanying visitor(s) for the recording of patient appointment to facilitate this note. I attest to having reviewed and edited the generated note for accuracy, though some syntax or spelling errors may persist. Please contact the author of this note for any clarification.                      [1]   Patient Active Problem List  Diagnosis    Iron deficiency anemia    Microalbuminuria    Breast cancer    Pulmonary nodules    Osteopenia    Hypothyroid    Right renal mass    Hypertension    Stage 3a chronic kidney disease    Hypothyroidism    Pancreatic cyst    Chronic right-sided low back pain without sciatica    Hyperlipidemia    Hyperkalemia    Dermatitis    Anxiety    Depression    Bilateral leg edema    Acute pain of left knee    Umbilical bleeding    Other specified spondylopathies, lumbar region    Atherosclerosis of aorta    Wrist lump, left    Mycosis fungoides    Decreased functional activity tolerance    Decreased strength of lower extremity    Current chronic use of systemic steroids    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    Cellulitis    Cancer of skin, squamous cell    Bilateral carotid bruits   [2]   Current Outpatient Medications:     acetaminophen (TYLENOL) 325 MG tablet, Take 2 tablets (650 mg total) by mouth every 8 (eight) hours as needed for Pain., Disp: 12 tablet, Rfl: 0    ascorbic acid, vitamin C, (VITAMIN C) 1000 MG tablet, Take 1,000 mg by mouth once daily., Disp: , Rfl:     aspirin 81 MG Chew, Take 1 tablet (81 mg total) by mouth once daily., Disp: , Rfl:     atorvastatin (LIPITOR) 10 MG tablet, Take 1 tablet (10 mg total) by mouth once daily., Disp: 90 tablet, Rfl: 2    calcium carbonate (OS-VALDEZ) 600 mg calcium (1,500 mg) Tab, Take 600 mg by mouth once daily., Disp: , Rfl:      cholecalciferol, vitamin D3, (VITAMIN D3) 25 mcg (1,000 unit) capsule, Take 1,000 Units by mouth once daily. Pt  has been taking 10,000 units daily., Disp: , Rfl:     EScitalopram oxalate (LEXAPRO) 5 MG Tab, TAKE 1 TABLET BY MOUTH EVERY DAY, Disp: 90 tablet, Rfl: 3    fenofibrate micronized (LOFIBRA) 200 MG Cap, Take 200 mg by mouth daily with breakfast., Disp: , Rfl:     ferrous sulfate (FEOSOL) 325 mg (65 mg iron) Tab tablet, Take 325 mg by mouth once daily., Disp: , Rfl:     furosemide (LASIX) 40 MG tablet, 1 po daily prn edema, Disp: 30 tablet, Rfl: 0    latanoprost 0.005 % ophthalmic solution, Place 1 drop into both eyes every evening., Disp: , Rfl:     levothyroxine (SYNTHROID) 100 MCG tablet, Take 100 mcg by mouth before breakfast., Disp: , Rfl:     LORazepam (ATIVAN) 0.5 MG tablet, 1 po daily prn anxiety, Disp: 30 tablet, Rfl: 0    melatonin 10 mg Tab, Take 10 mg by mouth nightly as needed., Disp: , Rfl:     metoprolol succinate (TOPROL-XL) 50 MG 24 hr tablet, TAKE 1 TABLET BY MOUTH ONCE  DAILY, Disp: 90 tablet, Rfl: 3    omeprazole (PRILOSEC) 20 MG capsule, Take 20 mg by mouth once daily., Disp: , Rfl:     potassium chloride SA (K-DUR,KLOR-CON M) 10 MEQ tablet, 1 po daily when takes Lasix, Disp: , Rfl:

## 2025-05-13 NOTE — TELEPHONE ENCOUNTER
----- Message from Patrizia sent at 5/13/2025  1:07 PM CDT -----  Contact: [phone]  Patient saw Dr Palomino and report is in the system, she can read all the information, also want to inform that she did not receive the report on the leg. Please call and advise. Thank you.

## 2025-05-22 ENCOUNTER — RESULTS FOLLOW-UP (OUTPATIENT)
Dept: PRIMARY CARE CLINIC | Facility: CLINIC | Age: 85
End: 2025-05-22

## 2025-06-25 NOTE — TELEPHONE ENCOUNTER
Refill Decision Note   Elizabeth Dickson  is requesting a refill authorization.  Brief Assessment and Rationale for Refill:  Approve     Medication Therapy Plan:        Comments:     Note composed:11:10 PM 05/11/2025            Care Transitions Initial Follow Up Call    Outreach made within 2 business days of discharge: Yes    Patient: Earnest Avila Patient : 1954   MRN: [id number]  Reason for Admission: Small bowel Obstruction  Discharge Date: 25       Spoke with: KAREN    Discharge department/facility: [facility] Interactive Patient Contact:  Was patient able to fill all prescriptions: No:   Was patient instructed to bring all medications to the follow-up visit: No:   Is patient taking all medications as directed in the discharge summary? LM for patient to call the office.  Does patient understand their discharge instructions: No:   Does patient have questions or concerns that need addressed prior to 7-14 day follow up office visit: no    Additional needs identified to be addressed with provider  LM for patient to call the office to schedule hospital follow up             Scheduled appointment with PCP within 7-14 days    Follow Up  Future Appointments   Date Time Provider Department Center   2025  8:00 AM Theresa Garrison MD [facility]   2025  3:30 PM Justo Severino MD [facility]   2025 11:00 AM Chu Alvarado MD [facility]   2025  3:50 PM Adriana Pandya MD [facility]       Torrie Mckeon MA     22-Jun-2022

## 2025-07-09 DIAGNOSIS — R60.0 BILATERAL LEG EDEMA: ICD-10-CM

## 2025-07-09 NOTE — TELEPHONE ENCOUNTER
No care due was identified.  [facility] Embedded Care Due Messages. Reference number: [id number].   7/09/2025 6:37:07 PM CDT

## 2025-07-10 RX ORDER — POTASSIUM CHLORIDE 750 MG/1
TABLET, EXTENDED RELEASE ORAL
Qty: 30 TABLET | Refills: 0 | Status: SHIPPED | OUTPATIENT
Start: 2025-07-10

## 2025-07-10 NOTE — TELEPHONE ENCOUNTER
Pt doing well on jardiance. Has not been needing lasix.  Will refill KCL in case does need dose of lasix. She will only take if takes the Lasix.   Dr. JACOBS

## 2025-07-10 NOTE — TELEPHONE ENCOUNTER
Refill Routing Note   Medication(s) are not appropriate for processing by [facility] for the following reason(s):        New or recently adjusted medication    [facility] action(s):  Defer             Appointments  past 12m or future 3m with PCP    Date Provider   Last Visit   4/29/2025 Porsha Masters MD   Next Visit   7/29/2025 Porsha Masters MD   ED visits in past 90 days: 0        Note composed:3:11 AM 07/10/2025

## 2025-07-14 ENCOUNTER — HOSPITAL ENCOUNTER (OUTPATIENT)
Dept: CARDIOLOGY | Facility: HOSPITAL | Age: 85
Discharge: HOME | End: 2025-07-14
Attending: INTERNAL MEDICINE
Payer: MEDICARE

## 2025-07-14 VITALS
DIASTOLIC BLOOD PRESSURE: 73 MMHG | HEART RATE: 64 BPM | HEIGHT: 60 IN | BODY MASS INDEX: 28.07 KG/M2 | SYSTOLIC BLOOD PRESSURE: 165 MMHG | WEIGHT: 143 LBS

## 2025-07-14 DIAGNOSIS — R09.89 BILATERAL CAROTID BRUITS: ICD-10-CM

## 2025-07-14 DIAGNOSIS — I27.20 PULMONARY HYPERTENSION: ICD-10-CM

## 2025-07-14 DIAGNOSIS — I50.30 DIASTOLIC HEART FAILURE, UNSPECIFIED HF CHRONICITY: ICD-10-CM

## 2025-07-14 LAB
AORTIC SIZE INDEX (SOV): 1.5 CM/M2
AORTIC SIZE INDEX: 1.5 CM/M2
APICAL FOUR CHAMBER EJECTION FRACTION: 59 %
APICAL TWO CHAMBER EJECTION FRACTION: 62 %
ASCENDING AORTA: 2.5 CM
AV AREA BY CONTINUOUS VTI: 3.2 CM2
AV INDEX (PROSTH): 0.94
AV LVOT MEAN GRADIENT: 4 MMHG
AV LVOT PEAK GRADIENT: 7 MMHG
AV MEAN GRADIENT: 4 MMHG
AV PEAK GRADIENT: 8 MMHG
AV VALVE AREA BY VELOCITY RATIO: 3.2 CM²
AV VALVE AREA: 3.2 CM²
AV VELOCITY RATIO: 0.93
BSA FOR ECHO PROCEDURE: 1.66 M2
CV ECHO LV RWT: 0.39 CM
DOP CALC AO PEAK VEL: 1.4 M/S
DOP CALC AO VTI: 35.9 CM
DOP CALC LVOT AREA: 3.5 CM2
DOP CALC LVOT DIAMETER: 2.1 CM
DOP CALC LVOT PEAK VEL: 1.3 M/S
DOP CALC LVOT STROKE VOLUME: 116.7 CM3
DOP CALC MV VTI: 44.7 CM
DOP CALC RVOT AREA: 3.27 CM2
DOP CALC RVOT DIAMETER: 2.04 CM
DOP CALCLVOT PEAK VEL VTI: 33.7 CM
E WAVE DECELERATION TIME: 210 MSEC
E/A RATIO: 1.35
E/E' RATIO: 18 M/S
ECHO EF ESTIMATED: 63 %
ECHO LV POSTERIOR WALL: 0.8 CM (ref 0.6–1.1)
FRACTIONAL SHORTENING: 34.1 % (ref 28–44)
HR MV ECHO: 64 BPM
INTERVENTRICULAR SEPTUM: 0.7 CM (ref 0.6–1.1)
IVC DIAMETER: 2.13 CM
IVRT: 54 MSEC
LA MAJOR: 4.7 CM
LA MINOR: 4.7 CM
LA WIDTH: 3.9 CM
LEFT ARM DIASTOLIC BLOOD PRESSURE: 73 MMHG
LEFT ARM SYSTOLIC BLOOD PRESSURE: 165 MMHG
LEFT ATRIUM AREA SYSTOLIC (APICAL 2 CHAMBER): 15.59 CM2
LEFT ATRIUM AREA SYSTOLIC (APICAL 4 CHAMBER): 14.99 CM2
LEFT ATRIUM SIZE: 4.4 CM
LEFT ATRIUM VOLUME INDEX MOD: 25 ML/M2
LEFT ATRIUM VOLUME INDEX: 42 ML/M2
LEFT ATRIUM VOLUME MOD: 41 ML
LEFT ATRIUM VOLUME: 69 CM3
LEFT CBA DIAS: 17 CM/S
LEFT CBA SYS: 77 CM/S
LEFT CCA DIST DIAS: 12 CM/S
LEFT CCA DIST SYS: 72 CM/S
LEFT CCA MID DIAS: 15 CM/S
LEFT CCA MID SYS: 69 CM/S
LEFT CCA PROX DIAS: 11 CM/S
LEFT CCA PROX SYS: 85 CM/S
LEFT ECA DIAS: 6 CM/S
LEFT ECA SYS: 130 CM/S
LEFT ICA DIST DIAS: 47 CM/S
LEFT ICA DIST SYS: 177 CM/S
LEFT ICA MID DIAS: 31 CM/S
LEFT ICA MID SYS: 139 CM/S
LEFT ICA PROX DIAS: 33 CM/S
LEFT ICA PROX SYS: 140 CM/S
LEFT INTERNAL DIMENSION IN SYSTOLE: 2.7 CM (ref 2.1–4)
LEFT VENTRICLE DIASTOLIC VOLUME INDEX: 45.68 ML/M2
LEFT VENTRICLE DIASTOLIC VOLUME: 74 ML
LEFT VENTRICLE END DIASTOLIC VOLUME APICAL 2 CHAMBER: 67.77 ML
LEFT VENTRICLE END DIASTOLIC VOLUME APICAL 4 CHAMBER: 66.52 ML
LEFT VENTRICLE END SYSTOLIC VOLUME APICAL 2 CHAMBER: 42.04 ML
LEFT VENTRICLE END SYSTOLIC VOLUME APICAL 4 CHAMBER: 38.92 ML
LEFT VENTRICLE MASS INDEX: 55.2 G/M2
LEFT VENTRICLE SYSTOLIC VOLUME INDEX: 16.7 ML/M2
LEFT VENTRICLE SYSTOLIC VOLUME: 27 ML
LEFT VENTRICULAR INTERNAL DIMENSION IN DIASTOLE: 4.1 CM (ref 3.5–6)
LEFT VENTRICULAR MASS: 89.4 G
LEFT VERTEBRAL DIAS: 14 CM/S
LEFT VERTEBRAL SYS: 111 CM/S
LV LATERAL E/E' RATIO: 16.8 M/S
LV SEPTAL E/E' RATIO: 19.1 M/S
LVED V (TEICH): 73.97 ML
LVES V (TEICH): 27.46 ML
LVOT MG: 3.99 MMHG
LVOT MV: 0.97 CM/S
MV A" WAVE DURATION": 65.65 MS
MV MEAN GRADIENT: 3 MMHG
MV PEAK A VEL: 0.99 M/S
MV PEAK E VEL: 1.34 M/S
MV PEAK GRADIENT: 7 MMHG
MV STENOSIS PRESSURE HALF TIME: 60.93 MS
MV VALVE AREA BY CONTINUITY EQUATION: 2.61 CM2
MV VALVE AREA P 1/2 METHOD: 3.61 CM2
OHS CV CAROTID RIGHT ICA EDV HIGHEST: 35
OHS CV CAROTID ULTRASOUND LEFT ICA/CCA RATIO: 2.46
OHS CV CAROTID ULTRASOUND RIGHT ICA/CCA RATIO: 2.49
OHS CV PV CAROTID LEFT HIGHEST CCA: 85
OHS CV PV CAROTID LEFT HIGHEST ICA: 177
OHS CV PV CAROTID RIGHT HIGHEST CCA: 68
OHS CV PV CAROTID RIGHT HIGHEST ICA: 169
OHS CV RV/LV RATIO: 0.85 CM
OHS CV US CAROTID LEFT HIGHEST EDV: 47
OHS LV EJECTION FRACTION SIMPSONS BIPLANE MOD: 60 %
PISA TR MAX VEL: 3.5 M/S
PULM VEIN A" WAVE DURATION": 65.65 MS
PULM VEIN S/D RATIO: 1.07
PULMONIC VEIN PEAK A VELOCITY: 0.3 M/S
PV PEAK D VEL: 0.76 M/S
PV PEAK S VEL: 0.81 M/S
RA MAJOR: 4.95 CM
RA PRESSURE ESTIMATED: 8 MMHG
RA VOL SYS: 55 ML
RA WIDTH: 4.19 CM
RIGHT ARM DIASTOLIC BLOOD PRESSURE: 66 MMHG
RIGHT ARM SYSTOLIC BLOOD PRESSURE: 172 MMHG
RIGHT ATRIAL AREA: 17.9 CM2
RIGHT ATRIUM END SYSTOLIC VOLUME APICAL 4 CHAMBER INDEX BSA: 33.07 ML/M2
RIGHT ATRIUM VOLUME AREA LENGTH APICAL 4 CHAMBER: 53.58 ML
RIGHT CBA DIAS: 14 CM/S
RIGHT CBA SYS: 88 CM/S
RIGHT CCA DIST DIAS: 13 CM/S
RIGHT CCA DIST SYS: 68 CM/S
RIGHT CCA MID DIAS: 11 CM/S
RIGHT CCA MID SYS: 56 CM/S
RIGHT CCA PROX DIAS: 11 CM/S
RIGHT CCA PROX SYS: 59 CM/S
RIGHT ECA DIAS: 7 CM/S
RIGHT ECA SYS: 93 CM/S
RIGHT ICA DIST DIAS: 35 CM/S
RIGHT ICA DIST SYS: 169 CM/S
RIGHT ICA MID DIAS: 24 CM/S
RIGHT ICA MID SYS: 84 CM/S
RIGHT ICA PROX DIAS: 16 CM/S
RIGHT ICA PROX SYS: 80 CM/S
RIGHT VENTRICLE DIASTOLIC BASEL DIMENSION: 3.5 CM
RIGHT VENTRICULAR END-DIASTOLIC DIMENSION: 3.53 CM
RIGHT VERTEBRAL DIAS: 13 CM/S
RIGHT VERTEBRAL SYS: 60 CM/S
RV TB RVSP: 12 MMHG
RV TISSUE DOPPLER FREE WALL SYSTOLIC VELOCITY 1 (APICAL 4 CHAMBER VIEW): 15.53 CM/S
SINUS: 2.5 CM
STJ: 2.2 CM
TDI LATERAL: 0.08 M/S
TDI SEPTAL: 0.07 M/S
TDI: 0.08 M/S
TR MAX PG: 48 MMHG
TRICUSPID ANNULAR PLANE SYSTOLIC EXCURSION: 2.4 CM
TV PEAK GRADIENT: 48 MMHG
TV REST PULMONARY ARTERY PRESSURE: 57 MMHG
Z-SCORE OF LEFT VENTRICULAR DIMENSION IN END DIASTOLE: -1.11
Z-SCORE OF LEFT VENTRICULAR DIMENSION IN END SYSTOLE: -0.4

## 2025-07-14 PROCEDURE — 93306 TTE W/DOPPLER COMPLETE: CPT

## 2025-07-14 PROCEDURE — 93880 EXTRACRANIAL BILAT STUDY: CPT | Mod: 26,,, | Performed by: INTERNAL MEDICINE

## 2025-07-14 PROCEDURE — 93306 TTE W/DOPPLER COMPLETE: CPT | Mod: 26,,, | Performed by: INTERNAL MEDICINE

## 2025-07-14 PROCEDURE — 93880 EXTRACRANIAL BILAT STUDY: CPT

## 2025-07-18 ENCOUNTER — EXTERNAL HOME HEALTH (OUTPATIENT)
Dept: HOME HEALTH SERVICES | Facility: HOSPITAL | Age: 85
End: 2025-07-18
Payer: MEDICARE

## 2025-07-27 NOTE — PROGRESS NOTES
"[facility] Note    Chief Complaint      Chief Complaint   Patient presents with    Follow-up       History of Present Illness      Elizabeth Dickson is a 85 y.o.     WF with LBBB, Breast cancer, Pulmonary nodules, Adjustment disorder, CKD III,  BLE, HTN, Hypothyroidism, Osteopenia presents to fu chronic issues. Pt s/p  Ptosis repair/Ectropion repair 6/13/22 with Dr. Mcpherson.  Last visit-  4/29/25    Venous u/s - 5/6/25 - Subcentimeter calcification in the subcutaneous tissues of the left lower extremity. The findings may be secondary to prior trauma or foreign body.      Arora Dysf - Echo - 3/25/25 -EF is 61%. Turbulence noted across the LVOT without significant gradient.  Grade II diastolic dysfunction. he right ventricle has moderate enlargement. LAE, Mild AR, Mild MR, Mild mod TR, PAP - 44 mmHG. Echo 7/14/25 - EF 60-65%;  Grade II Diast dysf; Mild Lae, Mild MR;  pHTN - 57 mmHg. Carotid u/s - 7/14/25 - Bilateral carotid atherosclerosis is present.No evidence of significant ICA stenosis (less than 50%) bilaterally.Vertebral flow is antegrade bilaterally.  Follow up in about 4 months (around 9/13/2025) with [facility], Dr. Palomino.  Pt now on SG LT 2 inhibitor therapy for continued pulmonary hypertension on repeat echo in 3 months.        Vit B12 - 345 - 2/4/25. Rec OTC vitamin B12 1000 mcg/d. Folate - wnl.      Low back pain - "It's ok". She take breaks.  Pt off Gabapentin 100 mg QHS per Dr. Rollins. No longer fu by Dr. Mallory. CT Chest/abd/pelvis -1/13/23 - Right 1 anterolisthesis of L4 on L5. Multilevel spondylosis of the spine.  UC - 12/20/24 - tx with prednisone and robaxin for sciatica and was referred to ortho. Thoracic Xray - 12/26/24 No evid. Fx.  findings consistent with arthritis and scoliosis and osteopenia.  L spine xrays - 12/26/24-no evidence of a fracture.  It showed Multilevel degenerative disc disease, noting disc height loss with endplate changes.  There is grade 1 anterolisthesis  " at L4-5.  Multilevel facet joint arthropathy noted. + scoliosis .      Fall - No falls since last visit. Pt has a mobility limitation that significantly impairs her ability to participate in one or more mobility related activities in the community. The mobility limitation cannot be sufficiently resolved by the use of a cane or walker.   The use of a transport wheelchair will significantly improve the patient's ability to participate in the community and the patient will use it on regular basis outside the home. Pt has expressed her willingness to use a transport wheelchair outside the home.   Pt also has a caregiver who is available, willing, and able to provide assistance with the wheelchair when needed.     LBBB -  chronic and was seen on previous EKG from 2016.       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   Prev Fu by Derm, Dr. Sloan, and Dr. Lynn. She saw ID, Dr. Rollins. She was on Prednisone 5 mg/d.   She was told to try a diet avoiding eggplant, potatoes, peppers, and blue cheese. Allergy tests were neg. Immune tests wnl. She saw A/I Dr. Bar and was told to fu with Derm . Pt off Abx.   Fu with Derm, Dr. Hinds, who called to inform me he performed a Bx on pt which showed atypical lymphoid infiltrate +CD 30 which could be assoc with possible mycosis Fungoides, T cell lymphoma, lymphomatoid papulosa. He referred her to [facility] T cell lymphoma clinic at [facility] (Dr. Jazzmine James at [facility]).  She is no longer being seen by [facility] or Dr. Williamson and is only fu with [facility].   She was recently started on Bexarotene 150 mg/day and continued on betamethasone.  Using gentamycin and betamethasone to the area. 1/2023 CT chest- SUBCENTIMETER PULMONARY NODULES WITH SOME NODULES WHICH APPEAR GROSSLY SIMILAR, SOME NODULES WHICH APPEAR DECREASED AND SOME RETICULONODULAR CHANGES OF THE LEFT LOWER LOBE WHICH WERE NOT SEEN ON PREVIOUS EXAMINATION FOR WHICH CONTINUED FOLLOW-UP  IS RECOMMENDED. 1/2023 CT AP- SMALL HYPODENSITY NOTED IN THE LIVER WHICH MAY REFLECT SMALL CYST OR HEMANGIOMA. NO EVIDENCE OF LYMPHADENOPATHY. ATHEROSCLEROTIC VASCULAR DISEASE. She fu with Dr. Brad Domingo. 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 bexarotene, again discussed side effects and anticipated central thyroid suppression and hypertriglycerdemia; patient already on lipitor and synthroid.  She is being monitored with freq labs per [place], Dr. Pinto. Now fu by H/O, Dr. Thomas. PET - 2/28/24 -No definite malignant metabolic activity throughout the exam.  There are 3 foci of punctate low-level uptake involving the skin of the proximal left leg just below the knee.  Maximum uptake is 1.9 SUV which is, at most, inflammatory.  Correlation with physical exam findings necessary, although suspect urinary contamination. Additional arthritic uptake associated with facet joint arthropathy right C2-3, and degenerative changes both knees are present.    Pt on prednisone 5 mg/d. Pt on bexarotene 6 tabs/d - this was just inc per Derm. Pt on plaquenil 100 mg/d.   UC - 11/19/24 - cellulitis left forearm tx with keflex. Tx with keflex 500 mg BID in Jan for cellulitis   Hosp - 1/29/25 - Clinda was added to regimen.   ED - 2/3/25 - d/c on Doxy and augmentin   S/p Moh's - 5/21/25  Seen in ED at [facility] in end of Jan and admitted to  [facility] in beg of Feb- Tx for  RLE - currently off Abx. Discharge on doxy + augmentin for a total of two weeks of appropriate treatmen      AST - slightly high but stable.      Left cheek and Rt calf SCC - fu by Derm.      Left Knee Oa - Twisted it getting up from sofa 1/25/22. Fu by Dr Hernandez.  She did Ptx. She uses a cane to ambulate now.  Has to stop to rest. Told she is not a surgical candidate. Cont conservative measures. Has recent injections - not helpful. Wuld like pain mgmnt referral. Ok to take tylenol prn. She tries to limit steroids given eye pressures which have  "improved from decreasing her oral steroids. Will try to get Ptx and get a seated walker.  Pt uses a cane in home and does not leave house without a family member.      Breast CA-invasive ductal carcinoma, right breast s/p lumpectomy.   Now being seen by a new Sx at [facility].  Pt off Arimidex.  Now fu by H/O at [facility]. She declines going back to Dr. Santana.  Plans to fu with a new Breast Sx at [facility].     Iron deficiency anemia - H/H - 11.5/39 - 4/29/25.  Pt on iron q day.       Pulmonary nodules - Fu by Pulmonary, Dr. Salinas, prn.   She had a PET scan and was reassured. Pt now getting serial CT scans to monitor her T cell Lymphoma.      Vitamin-D deficiency-Resolved 48 up from prev 28-Pt completed ergocalciferol times 12 weeks and is on vitamin D3 1000 units/day.     CKD III -Fu by Dr. Nieto.  BUN/Cr - 36/1.34 GFR - 39 6/25/25. Cont to rec adeq hydration.  We stopped amodipine to due to edema which improved. Her HCTZ was also stopped. Uric acid - wnl.     Adjustment disorder with mixed anxiety and depression-Pt on melatonin OTC.  Pt doing good on Lexapro 5 mg and it has helped and she has been blocking out things that bother her.       TMJ -  "It's ok". Can't take NSAIDS. She takes a tylenol prn - helps.  She can also try ice/massaging.      Hypothyroidism -  Free T4 - wnl - 1.1 - 3/21/25   She is currently on Levothyroxine 100 mcg/day per Dr. Pinto. Has order from Dr. Pinto.      HTN - Pt off Lisinopril 10 mg 2 tabs/day due to hyperkalemia.  Pt is off amlodipine 2.5 mg/d due to edema.  She is doing well on Toprol-XL 50 mg QHS and losartan. Continue low-sodium diet. +Microalbuminuria.  She denies any dizziness.   Cont to monitor edema and renal function. Dr. Nieto Started her on low dose losartan  25 mg/d with hctz 12.5 mg/d - Hctz was stopped 12/18/24. Pt on Lasix  2/wk (Mon/Fri.) with Kcl. Pt doing well on jardiance. Has not been needing lasix.  Will refill KCL in case does need dose of lasix. She will only take if " takes the Lasix.  BP -102/57 yest.      HLD - Pt on Lipitor 10 mg QHS and fenofibrate 200 mg/d. Controlled.  Cont current dose of Lipitor.  HDL 50  LDL 67  -3/21/25.       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     BLE - Pt uses compression socks. Improved with stopping amlodipine.  Regarding her Compression stockings she should wear these when on her feet/out and about. When she gets home and can raise her legs she should remove them. Pt on Lasix  2/wk with Kcl.         Carotid bruits- No significant stenosis seen on previous carotid ultrasound.     Renal mass -8 mm right kidney.  Fu by  Dr. Sanchez - was discharged from his clinic.  Renal U/S- stable Left renal cyst - 9/13/22. Not noted on recent CT 1/13/23 at [facility] [place] [place].  Fu by   Nephrologist, Dr. Nieto.      Pancreatic cyst- Fu by GI, Dr. King.  Pt had EUS-2/21/2019. She was told to fu prn. Not noted on recent CT 1/13/23 at [facility] [place] [place].  Had recent PET skin for her Mycoses fungoides.      Osteopenia-Pt on Calcium 1200 mg/d, and vitamin D3 1000 units/day.  She has been off Prolia x 2 yrs.  ID, Dr. Rollins, told her to hold it.  Note - she is off arimidex.     H/o Syncope - No further episodes. Pt had a CT Head and Cardiac davis that was neg.      Acc by son in law     HCM - Flu - 8/21/24;  RSV - 6/13/24; Tdap - 2/2/22;  PCV 13 - 6/2/17;  PVX 23 - 6/28/21;   Zostavax - utd; Shingrix - 8/18/20;  #2 - 11/19/20; COVID - 19 - Vaccine (Pfizer) #1 - 1/26/21;  #2 - 2/15/21; # 3 11/17/21; # 4 ;   MGM - 7/11/25-repeat 1 yr;   DEXA - 3/25/25-Osteopenia;  Hep C - 2/7/18; C-scope - 1/10/19 - polyp, int hemorrhoids, diverticulosis, told no need to repeat when she d/w GI;  Gyn/Onc - Dr. Chandler;  Renal - Dr. Nieto;  GI - Dr. King/Darron;  H/O - Dr. Grande;  Prev Rheum - Dr. Mallory;  Gen Sx - Dr. Alabaster;  Pulm - Dr. Ayala;  ID - Dr. Rollins; Podiatry - Dr. Ruiz/Samuel; Ortho - [facility]; Derm - Dr. Pinto;   H/O - ? At [facility] - is changing since doc  moved    Patient Care Team:  Porsha Masters MD as PCP - General (Internal Medicine)  Benoit Salinas MD as Consulting Physician (Family Medicine)  Robbie Mallory Jr., MD as Consulting Physician (Rheumatology)  Robbie Mallory Jr., MD (Rheumatology)  Russell King MD as Consulting Physician (Gastroenterology)  Lynsey Goodman MD as Consulting Physician (Nephrology)  Felecia Solo MD as Consulting Physician (General Surgery)  Duncan Singleton DPM as Consulting Physician (Podiatry)  Tarun Rollins Jr., MD as  (Infectious Diseases)     Health Maintenance:  Immunization History   Administered Date(s) Administered    COVID-19, MRNA, LN-S, PF (Pfizer) (Purple Cap) 01/26/2021, 02/15/2021, 11/17/2021    Influenza 10/18/2005, 10/11/2013, 09/18/2015, 09/17/2016, 10/01/2018, 10/25/2018, 09/12/2019    Influenza (FLUAD) - Quadrivalent - Adjuvanted - PF *Preferred* (65+) 11/01/2023    Influenza - Quadrivalent - High Dose - PF (65 years and older) 09/17/2022    Influenza - Quadrivalent - PF *Preferred* (6 months and older) 10/11/2013, 12/24/2021    Influenza - Trivalent - Fluzone High Dose - PF (65 years and older) 09/18/2015, 09/17/2016, 10/25/2018, 09/12/2019, 08/18/2020    Influenza Split 10/18/2005, 10/11/2013    Pneumococcal Conjugate - 13 Valent 10/11/2013, 06/23/2016, 06/02/2017, 06/28/2021    Pneumococcal Polysaccharide - 23 Valent 11/01/2010    Tdap 02/02/2022    Zoster 08/18/2020, 11/19/2020    Zoster Recombinant 08/18/2020, 11/19/2020      Health Maintenance   Topic Date Due    COVID-19 Vaccine (4 - 2024-25 season) 09/01/2024    Influenza Vaccine (1) 09/01/2025    DEXA Scan  03/25/2027    Lipid Panel  03/21/2030    TETANUS VACCINE  02/02/2032    Shingles Vaccine  Completed    RSV Vaccine (Age 60+ and Pregnant patients)  Completed    Pneumococcal Vaccines (Age 50+)  Completed        Past Medical History:  Past Medical History:   Diagnosis Date    Anxiety      Bilateral leg edema     Breast cancer     Cellulitis of left leg     CKD (chronic kidney disease), stage III     Hypertension     Hypothyroidism     Iron deficiency anemia     Osteopenia     Ovarian cyst     Teratoma and Benign Serous Cystadenoma    Pancreatic cyst     Pulmonary nodules     Renal mass     Vitamin D deficiency        Past Surgical History:   has a past surgical history that includes Breast surgery; Removal of Ovaries; debridement; Skin biopsy; block, nerve, genicular (Bilateral, 01/16/2025); and Hysterectomy.    Family History:  family history includes Arthritis in her mother; COPD in her brother; Cholelithiasis in her brother; Colon cancer in her brother and brother; Diabetes in her father; Diabetes type I in an other family member; Heart disease in her brother and brother; Hypertension in her father and mother; Lung cancer in her sister.     Social History:  Social History[1]    Review of Systems   Constitutional:  Negative for chills, fever and night sweats.   HENT:  Negative for hearing loss.    Eyes:  Negative for visual disturbance.   Respiratory:  Negative for apnea and shortness of breath.         No snoring.    Cardiovascular:  Negative for chest pain, palpitations and leg swelling.   Gastrointestinal:  Negative for abdominal pain, constipation, diarrhea, nausea and vomiting.   Genitourinary:  Positive for nocturia. Negative for bladder incontinence, dysuria and hematuria.        No foul smell to urine. She gets up 5 times/night since Mar.    Musculoskeletal:  Positive for arthralgias. Negative for leg pain.        Knees   Neurological:  Negative for dizziness and headaches.   Psychiatric/Behavioral:  Negative for depressed mood. The patient is not nervous/anxious.         Medications:  Current Medications[2]     Allergies:  Review of patient's allergies indicates:   Allergen Reactions    Lisinopril      hyperkalemia       Physical Exam      Vital Signs  Temp: 97.7 °F (36.5 °C)  Temp Source:  Oral  Pulse: 62  Resp: 17  SpO2: 98 %  BP: (!) 150/78  BP Location: Right arm  Patient Position: Sitting  Pain Score: 0-No pain  Height and Weight  Height: 5' (152.4 cm)  Weight: 64.9 kg (143 lb 1.3 oz)  BSA (Calculated - sq m): 1.66 sq meters  BMI (Calculated): 27.9  Weight in (lb) to have BMI = 25: 127.7      Patient Position: Sitting      Physical Exam  Vitals reviewed.   Constitutional:       General: She is not in acute distress.     Appearance: Normal appearance. She is not ill-appearing, toxic-appearing or diaphoretic.   HENT:      Head: Normocephalic and atraumatic.      Mouth/Throat:      Mouth: Mucous membranes are moist.   Neck:      Vascular: No carotid bruit.   Cardiovascular:      Rate and Rhythm: Normal rate and regular rhythm.      Pulses: Normal pulses.      Heart sounds: Normal heart sounds. No murmur heard.     Comments: Trace edema to BLE- compression socks in place.   Pulmonary:      Effort: No respiratory distress.      Breath sounds: Normal breath sounds. No wheezing.   Abdominal:      General: There is no distension.      Palpations: Abdomen is soft.      Tenderness: There is no abdominal tenderness.   Musculoskeletal:      Comments: Valgus deformity to knees.    Neurological:      General: No focal deficit present.      Mental Status: She is alert and oriented to person, place, and time.   Psychiatric:         Mood and Affect: Mood normal.         Behavior: Behavior normal.          Laboratory:  CBC:  Recent Labs   Lab 02/07/25  0223 02/08/25  0115 04/29/25  1312   WBC 3.81 L 3.56 L 4.19   RBC 2.93 L 2.93 L 4.20   Hemoglobin 8.9 L 8.9 L  --    HGB  --   --  11.5 L   Hematocrit 29.2 L 28.8 L  --    HCT  --   --  39.0   Platelet Count  --   --  246   Platelets 264 260  --     H 98 93   MCH 30.4 30.4 27.4   MCHC 30.5 L 30.9 L 29.5 L       CMP:  Recent Labs   Lab 04/29/25  1312 05/30/25  1001 06/25/25  0938   Glucose 78 84 85   Calcium 10.1 9.8 10.1   Albumin 3.7  --   --    Albumin Level   --  3.7 3.7   Protein Total 7.0  --   --    Sodium 143 142 144   Potassium 4.3 4.1 4.4   CO2 27  --   --    Carbon Dioxide  --  25 26   Chloride 105  --   --    BUN 48 H  --   --    Blood Urea Nitrogen  --  47 H 36 H   Creatinine 1.4 1.10 H 1.34 H   Alkaline Phosphatase  --  59 58   ALP 67  --   --    ALT 21 21 14   AST 33 34 23   Total Bilirubin  --  0.4 0.4   Bilirubin Total 0.4  --   --            URINALYSIS:  Recent Labs   Lab 09/23/22  1046 04/29/25  1325   Color, UA YELLOW Yellow   Clarity, UA  --  Clear   Spec Grav UA  --  1.025   Specific Gravity, UA 1.022  --    pH, UA < OR = 5.0 5   Protein, UA NEGATIVE  --    Glucose, UA NEGATIVE  --    Bacteria, UA NONE SEEN  --    Nitrite, UA NEGATIVE negative   Leukocytes, UA 1+ A  --    Urobilinogen, UA  --  normal   Hyaline Casts, UA NONE SEEN  --         LIPIDS:  Recent Labs   Lab 12/20/22  1543 01/26/23  0957 04/14/23  1046 06/09/23  0905 09/09/24  1122 09/23/24  1120 11/25/24  0950 02/04/25  0245   TSH 1.26  --  0.42 0.19 L  --   --   --   --    HDL 75 H   < >  --   --   --  56 49 38 L   Cholesterol 170   < >  --   --   --  167 176 122   Triglycerides 122   < >  --   --    < > 165 H 184 H 84   LDL Cholesterol  --   --   --   --   --   --   --  67.2   LDL Calculated 71   < >  --   --   --  78 90  --    HDL/Cholesterol Ratio  --   --   --   --   --   --   --  31.1   Hdl/Cholesterol Ratio 2.27  --   --   --   --  2.98 3.59  --    Non-HDL Cholesterol 95  --   --   --   --  111 127 84   Total Cholesterol/HDL Ratio  --   --   --   --   --   --   --  3.2    < > = values in this interval not displayed.       TSH:  Recent Labs   Lab 12/20/22  1543 04/14/23  1046 06/09/23  0905   TSH 1.26 0.42 0.19 L       A1C:  Recent Labs   Lab 02/04/25  0245   Hemoglobin A1C 4.7       Other:   Recent Labs   Lab 02/04/25  0245   Vitamin B-12 345   Ferritin 796 H   Iron 33   Transferrin 262   TIBC 388   Saturated Iron 9 L           Assessment/Plan     Elizabeth Dickson is a 85  y.o.female with:    Hypertension, unspecified type    Hyperlipidemia, unspecified hyperlipidemia type  - Controlled.  Cont current.     Diastolic heart failure, unspecified HF chronicity  - PAP inc. Cont current regimen.    Stage 3a chronic kidney disease  - Stable.  Cont current regimen.    Anxiety  - Stable.  Cont current regimen. Enc to take 1/2 tab BZD as needed.     Pulmonary hypertension  - PAP has inc.  Fu by Cards.     Nocturia  -     Urinalysis, Reflex to Urine Culture Urine, Clean Catch  -     GREY TOP URINE HOLD  -     Urinalysis Microscopic  - Check UA.     Bilateral leg edema  - Stable.  Cont current regimen. Has not been needing Lasix.     Depression, unspecified depression type  - Stable.  Cont current regimen.    Debility  - Pt has transport wheelchair and rollator.  Enc pt to get out and sit on porch and get some sunshine and fresh air.     Hypothyroidism, unspecified type  - Stable.  Cont current regimen.    Osteopenia, unspecified location  - Stable.  Cont current regimen.    Chronic pain of both knees  -   Other orders  -     losartan (COZAAR) 25 MG tablet; Take 1 tablet (25 mg total) by mouth once daily.  Dispense: 1 tablet; Refill: 0  -     dapagliflozin propanediol (FARXIGA) 10 mg tablet; Take 1 tablet (10 mg total) by mouth once daily.  Dispense: 1 tablet; Refill: 0       Chronic conditions status updated as per HPI.  Other than changes above, cont current medications and maintain follow up with specialists.   Follow up in about 6 months (around 1/29/2026).      Porsha Masters MD  [facility]                       [1]   Social History  Tobacco Use    Smoking status: Never    Smokeless tobacco: Never   Vaping Use    Vaping status: Never Used   Substance Use Topics    Alcohol use: No    Drug use: Never   [2]   Current Outpatient Medications:     acetaminophen (TYLENOL) 325 MG tablet, Take 2 tablets (650 mg total) by mouth every 8 (eight) hours as needed for Pain., Disp: 12 tablet,  Rfl: 0    ascorbic acid, vitamin C, (VITAMIN C) 1000 MG tablet, Take 1,000 mg by mouth once daily., Disp: , Rfl:     atorvastatin (LIPITOR) 10 MG tablet, Take 1 tablet (10 mg total) by mouth once daily., Disp: 90 tablet, Rfl: 2    calcium carbonate (OS-VALDEZ) 600 mg calcium (1,500 mg) Tab, Take 600 mg by mouth once daily., Disp: , Rfl:     cholecalciferol, vitamin D3, (VITAMIN D3) 25 mcg (1,000 unit) capsule, Take 1,000 Units by mouth once daily. Pt  has been taking 10,000 units daily., Disp: , Rfl:     EScitalopram oxalate (LEXAPRO) 5 MG Tab, TAKE 1 TABLET BY MOUTH EVERY DAY, Disp: 90 tablet, Rfl: 3    fenofibrate micronized (LOFIBRA) 200 MG Cap, Take 200 mg by mouth daily with breakfast., Disp: , Rfl:     ferrous sulfate (FEOSOL) 325 mg (65 mg iron) Tab tablet, Take 325 mg by mouth once daily., Disp: , Rfl:     furosemide (LASIX) 40 MG tablet, 1 po daily prn edema, Disp: 30 tablet, Rfl: 0    latanoprost 0.005 % ophthalmic solution, Place 1 drop into both eyes every evening., Disp: , Rfl:     levothyroxine (SYNTHROID) 100 MCG tablet, Take 100 mcg by mouth before breakfast., Disp: , Rfl:     LORazepam (ATIVAN) 0.5 MG tablet, 1 po daily prn anxiety, Disp: 30 tablet, Rfl: 0    melatonin 10 mg Tab, Take 10 mg by mouth nightly as needed., Disp: , Rfl:     metoprolol succinate (TOPROL-XL) 50 MG 24 hr tablet, TAKE 1 TABLET BY MOUTH ONCE  DAILY, Disp: 90 tablet, Rfl: 3    omeprazole (PRILOSEC) 20 MG capsule, Take 20 mg by mouth once daily., Disp: , Rfl:     potassium chloride SA (K-DUR,KLOR-CON M) 10 MEQ tablet, 1 Tab twice weekly when she takes lasix, Disp: 30 tablet, Rfl: 0    aspirin 81 MG Chew, Take 1 tablet (81 mg total) by mouth once daily., Disp: , Rfl:     dapagliflozin propanediol (FARXIGA) 10 mg tablet, Take 1 tablet (10 mg total) by mouth once daily., Disp: 1 tablet, Rfl: 0    losartan (COZAAR) 25 MG tablet, Take 1 tablet (25 mg total) by mouth once daily., Disp: 1 tablet, Rfl: 0

## 2025-07-29 ENCOUNTER — OFFICE VISIT (OUTPATIENT)
Dept: PRIMARY CARE CLINIC | Facility: CLINIC | Age: 85
End: 2025-07-29
Payer: MEDICARE

## 2025-07-29 VITALS
HEIGHT: 60 IN | SYSTOLIC BLOOD PRESSURE: 150 MMHG | OXYGEN SATURATION: 98 % | HEART RATE: 62 BPM | BODY MASS INDEX: 28.09 KG/M2 | TEMPERATURE: 98 F | WEIGHT: 143.06 LBS | RESPIRATION RATE: 17 BRPM | DIASTOLIC BLOOD PRESSURE: 78 MMHG

## 2025-07-29 DIAGNOSIS — F41.9 ANXIETY: ICD-10-CM

## 2025-07-29 DIAGNOSIS — I27.20 PULMONARY HYPERTENSION: ICD-10-CM

## 2025-07-29 DIAGNOSIS — M25.561 CHRONIC PAIN OF BOTH KNEES: ICD-10-CM

## 2025-07-29 DIAGNOSIS — F32.A DEPRESSION, UNSPECIFIED DEPRESSION TYPE: ICD-10-CM

## 2025-07-29 DIAGNOSIS — E03.9 HYPOTHYROIDISM, UNSPECIFIED TYPE: ICD-10-CM

## 2025-07-29 DIAGNOSIS — N18.31 STAGE 3A CHRONIC KIDNEY DISEASE: ICD-10-CM

## 2025-07-29 DIAGNOSIS — E78.5 HYPERLIPIDEMIA, UNSPECIFIED HYPERLIPIDEMIA TYPE: ICD-10-CM

## 2025-07-29 DIAGNOSIS — R60.0 BILATERAL LEG EDEMA: ICD-10-CM

## 2025-07-29 DIAGNOSIS — M85.80 OSTEOPENIA, UNSPECIFIED LOCATION: ICD-10-CM

## 2025-07-29 DIAGNOSIS — R53.81 DEBILITY: ICD-10-CM

## 2025-07-29 DIAGNOSIS — G89.29 CHRONIC PAIN OF BOTH KNEES: ICD-10-CM

## 2025-07-29 DIAGNOSIS — M25.562 CHRONIC PAIN OF BOTH KNEES: ICD-10-CM

## 2025-07-29 DIAGNOSIS — R35.1 NOCTURIA: ICD-10-CM

## 2025-07-29 DIAGNOSIS — I10 HYPERTENSION, UNSPECIFIED TYPE: Primary | ICD-10-CM

## 2025-07-29 DIAGNOSIS — I50.30 DIASTOLIC HEART FAILURE, UNSPECIFIED HF CHRONICITY: ICD-10-CM

## 2025-07-29 LAB
BACTERIA #/AREA URNS AUTO: NORMAL /HPF
BILIRUB UR QL STRIP.AUTO: NEGATIVE
CLARITY UR: CLEAR
COLOR UR AUTO: YELLOW
GLUCOSE UR QL STRIP: ABNORMAL
HGB UR QL STRIP: NEGATIVE
HYALINE CASTS UR QL AUTO: 0 /LPF (ref 0–1)
KETONES UR QL STRIP: NEGATIVE
LEUKOCYTE ESTERASE UR QL STRIP: NEGATIVE
MICROSCOPIC COMMENT: NORMAL
NITRITE UR QL STRIP: NEGATIVE
PH UR STRIP: 5 [PH]
PROT UR QL STRIP: NEGATIVE
RBC #/AREA URNS AUTO: <1 /HPF (ref 0–4)
SP GR UR STRIP: 1.02
UROBILINOGEN UR STRIP-ACNC: NEGATIVE EU/DL
WBC #/AREA URNS AUTO: <1 /HPF (ref 0–5)
YEAST UR QL AUTO: NORMAL /HPF

## 2025-07-29 PROCEDURE — 3078F DIAST BP <80 MM HG: CPT | Mod: CPTII,S$GLB,, | Performed by: INTERNAL MEDICINE

## 2025-07-29 PROCEDURE — 3077F SYST BP >= 140 MM HG: CPT | Mod: CPTII,S$GLB,, | Performed by: INTERNAL MEDICINE

## 2025-07-29 PROCEDURE — 99999 PR PBB SHADOW E&M-EST. PATIENT-LVL V: CPT | Mod: PBBFAC,,, | Performed by: INTERNAL MEDICINE

## 2025-07-29 PROCEDURE — 1126F AMNT PAIN NOTED NONE PRSNT: CPT | Mod: CPTII,S$GLB,, | Performed by: INTERNAL MEDICINE

## 2025-07-29 PROCEDURE — G2211 COMPLEX E/M VISIT ADD ON: HCPCS | Mod: S$GLB,,, | Performed by: INTERNAL MEDICINE

## 2025-07-29 PROCEDURE — 99214 OFFICE O/P EST MOD 30 MIN: CPT | Mod: S$GLB,,, | Performed by: INTERNAL MEDICINE

## 2025-07-29 PROCEDURE — 1159F MED LIST DOCD IN RCRD: CPT | Mod: CPTII,S$GLB,, | Performed by: INTERNAL MEDICINE

## 2025-07-29 PROCEDURE — 3288F FALL RISK ASSESSMENT DOCD: CPT | Mod: CPTII,S$GLB,, | Performed by: INTERNAL MEDICINE

## 2025-07-29 PROCEDURE — 1160F RVW MEDS BY RX/DR IN RCRD: CPT | Mod: CPTII,S$GLB,, | Performed by: INTERNAL MEDICINE

## 2025-07-29 PROCEDURE — 1101F PT FALLS ASSESS-DOCD LE1/YR: CPT | Mod: CPTII,S$GLB,, | Performed by: INTERNAL MEDICINE

## 2025-07-29 PROCEDURE — 81001 URINALYSIS AUTO W/SCOPE: CPT | Performed by: INTERNAL MEDICINE

## 2025-07-29 RX ORDER — DAPAGLIFLOZIN 10 MG/1
10 TABLET, FILM COATED ORAL DAILY
Qty: 1 TABLET | Refills: 0
Start: 2025-07-29

## 2025-07-29 RX ORDER — LOSARTAN POTASSIUM 25 MG/1
25 TABLET ORAL DAILY
Qty: 1 TABLET | Refills: 0
Start: 2025-07-29 | End: 2026-07-29

## 2025-07-30 LAB — HOLD SPECIMEN: NORMAL

## 2025-08-26 ENCOUNTER — PATIENT MESSAGE (OUTPATIENT)
Dept: PRIMARY CARE CLINIC | Facility: CLINIC | Age: 85
End: 2025-08-26
Payer: MEDICARE

## 2025-09-02 DIAGNOSIS — R60.0 BILATERAL LEG EDEMA: ICD-10-CM

## 2025-09-03 RX ORDER — POTASSIUM CHLORIDE 750 MG/1
TABLET, EXTENDED RELEASE ORAL
Qty: 24 TABLET | Refills: 3 | Status: SHIPPED | OUTPATIENT
Start: 2025-09-03